# Patient Record
Sex: FEMALE | Race: BLACK OR AFRICAN AMERICAN | Employment: UNEMPLOYED | URBAN - METROPOLITAN AREA
[De-identification: names, ages, dates, MRNs, and addresses within clinical notes are randomized per-mention and may not be internally consistent; named-entity substitution may affect disease eponyms.]

---

## 2017-03-06 ENCOUNTER — APPOINTMENT (OUTPATIENT)
Dept: CT IMAGING | Age: 52
DRG: 149 | End: 2017-03-06
Attending: EMERGENCY MEDICINE
Payer: MEDICARE

## 2017-03-06 ENCOUNTER — APPOINTMENT (OUTPATIENT)
Dept: MRI IMAGING | Age: 52
DRG: 149 | End: 2017-03-06
Attending: INTERNAL MEDICINE
Payer: MEDICARE

## 2017-03-06 ENCOUNTER — HOSPITAL ENCOUNTER (INPATIENT)
Age: 52
LOS: 3 days | Discharge: HOME | DRG: 149 | End: 2017-03-10
Attending: EMERGENCY MEDICINE | Admitting: INTERNAL MEDICINE
Payer: MEDICARE

## 2017-03-06 ENCOUNTER — APPOINTMENT (OUTPATIENT)
Dept: GENERAL RADIOLOGY | Age: 52
DRG: 149 | End: 2017-03-06
Attending: EMERGENCY MEDICINE
Payer: MEDICARE

## 2017-03-06 DIAGNOSIS — R42 VERTIGO: Primary | ICD-10-CM

## 2017-03-06 DIAGNOSIS — R10.819 ABDOMINAL TENDERNESS WITHOUT REBOUND TENDERNESS, UNSPECIFIED LOCATION: ICD-10-CM

## 2017-03-06 DIAGNOSIS — R42 LIGHTHEADEDNESS: ICD-10-CM

## 2017-03-06 DIAGNOSIS — R55 NEAR SYNCOPE: ICD-10-CM

## 2017-03-06 DIAGNOSIS — Z86.73 HISTORY OF CVA (CEREBROVASCULAR ACCIDENT): ICD-10-CM

## 2017-03-06 DIAGNOSIS — E78.2 MIXED HYPERLIPIDEMIA: ICD-10-CM

## 2017-03-06 DIAGNOSIS — I65.23 STENOSIS OF BOTH INTERNAL CAROTID ARTERIES: ICD-10-CM

## 2017-03-06 DIAGNOSIS — I10 ESSENTIAL HYPERTENSION: ICD-10-CM

## 2017-03-06 LAB
ALBUMIN SERPL BCP-MCNC: 3.3 G/DL (ref 3.5–5)
ALBUMIN/GLOB SERPL: 0.7 {RATIO} (ref 1.1–2.2)
ALP SERPL-CCNC: 81 U/L (ref 45–117)
ALT SERPL-CCNC: 34 U/L (ref 12–78)
ANION GAP BLD CALC-SCNC: 12 MMOL/L (ref 5–15)
APPEARANCE UR: ABNORMAL
AST SERPL W P-5'-P-CCNC: 28 U/L (ref 15–37)
BACTERIA URNS QL MICRO: NEGATIVE /HPF
BASOPHILS # BLD AUTO: 0 K/UL (ref 0–0.1)
BASOPHILS # BLD: 0 % (ref 0–1)
BILIRUB SERPL-MCNC: 0.3 MG/DL (ref 0.2–1)
BILIRUB UR QL: NEGATIVE
BUN SERPL-MCNC: 17 MG/DL (ref 6–20)
BUN/CREAT SERPL: 17 (ref 12–20)
CALCIUM SERPL-MCNC: 8.9 MG/DL (ref 8.5–10.1)
CHLORIDE SERPL-SCNC: 105 MMOL/L (ref 97–108)
CK SERPL-CCNC: 64 U/L (ref 26–192)
CO2 SERPL-SCNC: 27 MMOL/L (ref 21–32)
COLOR UR: ABNORMAL
CREAT SERPL-MCNC: 1.01 MG/DL (ref 0.55–1.02)
EOSINOPHIL # BLD: 0.2 K/UL (ref 0–0.4)
EOSINOPHIL NFR BLD: 4 % (ref 0–7)
EPITH CASTS URNS QL MICRO: ABNORMAL /LPF
ERYTHROCYTE [DISTWIDTH] IN BLOOD BY AUTOMATED COUNT: 14 % (ref 11.5–14.5)
GLOBULIN SER CALC-MCNC: 4.7 G/DL (ref 2–4)
GLUCOSE BLD STRIP.AUTO-MCNC: 71 MG/DL (ref 65–100)
GLUCOSE BLD STRIP.AUTO-MCNC: 82 MG/DL (ref 65–100)
GLUCOSE SERPL-MCNC: 90 MG/DL (ref 65–100)
GLUCOSE UR STRIP.AUTO-MCNC: NEGATIVE MG/DL
HCT VFR BLD AUTO: 35.6 % (ref 35–47)
HGB BLD-MCNC: 11 G/DL (ref 11.5–16)
HGB UR QL STRIP: NEGATIVE
HYALINE CASTS URNS QL MICRO: ABNORMAL /LPF (ref 0–5)
KETONES UR QL STRIP.AUTO: NEGATIVE MG/DL
LEUKOCYTE ESTERASE UR QL STRIP.AUTO: ABNORMAL
LIPASE SERPL-CCNC: 98 U/L (ref 73–393)
LYMPHOCYTES # BLD AUTO: 43 % (ref 12–49)
LYMPHOCYTES # BLD: 2.5 K/UL (ref 0.8–3.5)
MAGNESIUM SERPL-MCNC: 1.6 MG/DL (ref 1.6–2.4)
MCH RBC QN AUTO: 26.5 PG (ref 26–34)
MCHC RBC AUTO-ENTMCNC: 30.9 G/DL (ref 30–36.5)
MCV RBC AUTO: 85.8 FL (ref 80–99)
MONOCYTES # BLD: 0.4 K/UL (ref 0–1)
MONOCYTES NFR BLD AUTO: 7 % (ref 5–13)
NEUTS SEG # BLD: 2.7 K/UL (ref 1.8–8)
NEUTS SEG NFR BLD AUTO: 46 % (ref 32–75)
NITRITE UR QL STRIP.AUTO: NEGATIVE
PH UR STRIP: 7 [PH] (ref 5–8)
PLATELET # BLD AUTO: 251 K/UL (ref 150–400)
POTASSIUM SERPL-SCNC: 4 MMOL/L (ref 3.5–5.1)
PROT SERPL-MCNC: 8 G/DL (ref 6.4–8.2)
PROT UR STRIP-MCNC: NEGATIVE MG/DL
RBC # BLD AUTO: 4.15 M/UL (ref 3.8–5.2)
RBC #/AREA URNS HPF: ABNORMAL /HPF (ref 0–5)
SERVICE CMNT-IMP: NORMAL
SERVICE CMNT-IMP: NORMAL
SODIUM SERPL-SCNC: 144 MMOL/L (ref 136–145)
SP GR UR REFRACTOMETRY: 1.02 (ref 1–1.03)
TROPONIN I SERPL-MCNC: <0.04 NG/ML
UA: UC IF INDICATED,UAUC: ABNORMAL
UROBILINOGEN UR QL STRIP.AUTO: 0.2 EU/DL (ref 0.2–1)
WBC # BLD AUTO: 5.8 K/UL (ref 3.6–11)
WBC URNS QL MICRO: ABNORMAL /HPF (ref 0–4)

## 2017-03-06 PROCEDURE — 74000 XR ABD (KUB): CPT

## 2017-03-06 PROCEDURE — 70551 MRI BRAIN STEM W/O DYE: CPT

## 2017-03-06 PROCEDURE — 96361 HYDRATE IV INFUSION ADD-ON: CPT

## 2017-03-06 PROCEDURE — 99218 HC RM OBSERVATION: CPT

## 2017-03-06 PROCEDURE — 36415 COLL VENOUS BLD VENIPUNCTURE: CPT | Performed by: EMERGENCY MEDICINE

## 2017-03-06 PROCEDURE — 74011250637 HC RX REV CODE- 250/637: Performed by: INTERNAL MEDICINE

## 2017-03-06 PROCEDURE — 93005 ELECTROCARDIOGRAM TRACING: CPT

## 2017-03-06 PROCEDURE — 81001 URINALYSIS AUTO W/SCOPE: CPT | Performed by: EMERGENCY MEDICINE

## 2017-03-06 PROCEDURE — 85025 COMPLETE CBC W/AUTO DIFF WBC: CPT | Performed by: EMERGENCY MEDICINE

## 2017-03-06 PROCEDURE — 83690 ASSAY OF LIPASE: CPT | Performed by: EMERGENCY MEDICINE

## 2017-03-06 PROCEDURE — 70450 CT HEAD/BRAIN W/O DYE: CPT

## 2017-03-06 PROCEDURE — 82962 GLUCOSE BLOOD TEST: CPT

## 2017-03-06 PROCEDURE — 74011250637 HC RX REV CODE- 250/637: Performed by: EMERGENCY MEDICINE

## 2017-03-06 PROCEDURE — 74011250636 HC RX REV CODE- 250/636: Performed by: INTERNAL MEDICINE

## 2017-03-06 PROCEDURE — 82550 ASSAY OF CK (CPK): CPT | Performed by: EMERGENCY MEDICINE

## 2017-03-06 PROCEDURE — 96374 THER/PROPH/DIAG INJ IV PUSH: CPT

## 2017-03-06 PROCEDURE — 71010 XR CHEST SNGL V: CPT

## 2017-03-06 PROCEDURE — 74011250636 HC RX REV CODE- 250/636: Performed by: EMERGENCY MEDICINE

## 2017-03-06 PROCEDURE — 99284 EMERGENCY DEPT VISIT MOD MDM: CPT

## 2017-03-06 PROCEDURE — 83735 ASSAY OF MAGNESIUM: CPT | Performed by: EMERGENCY MEDICINE

## 2017-03-06 PROCEDURE — 80053 COMPREHEN METABOLIC PANEL: CPT | Performed by: EMERGENCY MEDICINE

## 2017-03-06 PROCEDURE — 84484 ASSAY OF TROPONIN QUANT: CPT | Performed by: EMERGENCY MEDICINE

## 2017-03-06 RX ORDER — SODIUM CHLORIDE 9 MG/ML
50 INJECTION, SOLUTION INTRAVENOUS CONTINUOUS
Status: DISCONTINUED | OUTPATIENT
Start: 2017-03-06 | End: 2017-03-10 | Stop reason: HOSPADM

## 2017-03-06 RX ORDER — CLONAZEPAM 1 MG/1
1 TABLET ORAL 2 TIMES DAILY
Status: DISCONTINUED | OUTPATIENT
Start: 2017-03-06 | End: 2017-03-10 | Stop reason: HOSPADM

## 2017-03-06 RX ORDER — CLONAZEPAM 1 MG/1
1 TABLET ORAL 2 TIMES DAILY
COMMUNITY
End: 2017-10-13 | Stop reason: ALTCHOICE

## 2017-03-06 RX ORDER — IPRATROPIUM BROMIDE AND ALBUTEROL SULFATE 2.5; .5 MG/3ML; MG/3ML
3 SOLUTION RESPIRATORY (INHALATION)
Status: DISCONTINUED | OUTPATIENT
Start: 2017-03-06 | End: 2017-03-10 | Stop reason: HOSPADM

## 2017-03-06 RX ORDER — HYDROXYZINE 25 MG/1
25 TABLET, FILM COATED ORAL 2 TIMES DAILY
Status: DISCONTINUED | OUTPATIENT
Start: 2017-03-06 | End: 2017-03-10 | Stop reason: HOSPADM

## 2017-03-06 RX ORDER — LANOLIN ALCOHOL/MO/W.PET/CERES
400 CREAM (GRAM) TOPICAL 2 TIMES DAILY
Status: DISCONTINUED | OUTPATIENT
Start: 2017-03-06 | End: 2017-03-10 | Stop reason: HOSPADM

## 2017-03-06 RX ORDER — DIAZEPAM 10 MG/2ML
2 INJECTION INTRAMUSCULAR
Status: COMPLETED | OUTPATIENT
Start: 2017-03-06 | End: 2017-03-06

## 2017-03-06 RX ORDER — GLUCOSAMINE SULFATE 1500 MG
2000 POWDER IN PACKET (EA) ORAL DAILY
COMMUNITY

## 2017-03-06 RX ORDER — ONDANSETRON 2 MG/ML
4 INJECTION INTRAMUSCULAR; INTRAVENOUS
Status: DISCONTINUED | OUTPATIENT
Start: 2017-03-06 | End: 2017-03-10 | Stop reason: HOSPADM

## 2017-03-06 RX ORDER — FLUOXETINE HYDROCHLORIDE 20 MG/1
20 CAPSULE ORAL DAILY
Status: DISCONTINUED | OUTPATIENT
Start: 2017-03-07 | End: 2017-03-10 | Stop reason: HOSPADM

## 2017-03-06 RX ORDER — OXYCODONE AND ACETAMINOPHEN 5; 325 MG/1; MG/1
1 TABLET ORAL
Status: DISCONTINUED | OUTPATIENT
Start: 2017-03-06 | End: 2017-03-10 | Stop reason: HOSPADM

## 2017-03-06 RX ORDER — SODIUM CHLORIDE 0.9 % (FLUSH) 0.9 %
5-10 SYRINGE (ML) INJECTION AS NEEDED
Status: DISCONTINUED | OUTPATIENT
Start: 2017-03-06 | End: 2017-03-10 | Stop reason: HOSPADM

## 2017-03-06 RX ORDER — LOSARTAN POTASSIUM 100 MG/1
100 TABLET ORAL DAILY
COMMUNITY
End: 2018-07-30 | Stop reason: SDUPTHER

## 2017-03-06 RX ORDER — MECLIZINE HCL 12.5 MG 12.5 MG/1
25 TABLET ORAL
Status: COMPLETED | OUTPATIENT
Start: 2017-03-06 | End: 2017-03-06

## 2017-03-06 RX ORDER — MECLIZINE HYDROCHLORIDE 25 MG/1
25 TABLET ORAL 3 TIMES DAILY
Status: DISCONTINUED | OUTPATIENT
Start: 2017-03-06 | End: 2017-03-10 | Stop reason: HOSPADM

## 2017-03-06 RX ORDER — HYDRALAZINE HYDROCHLORIDE 20 MG/ML
10 INJECTION INTRAMUSCULAR; INTRAVENOUS
Status: DISCONTINUED | OUTPATIENT
Start: 2017-03-06 | End: 2017-03-10 | Stop reason: HOSPADM

## 2017-03-06 RX ORDER — LISINOPRIL 20 MG/1
20 TABLET ORAL DAILY
Status: DISCONTINUED | OUTPATIENT
Start: 2017-03-07 | End: 2017-03-10 | Stop reason: HOSPADM

## 2017-03-06 RX ORDER — ACETAMINOPHEN 325 MG/1
650 TABLET ORAL
Status: DISCONTINUED | OUTPATIENT
Start: 2017-03-06 | End: 2017-03-10 | Stop reason: HOSPADM

## 2017-03-06 RX ORDER — VALSARTAN 160 MG/1
160 TABLET ORAL DAILY
Status: DISCONTINUED | OUTPATIENT
Start: 2017-03-07 | End: 2017-03-10 | Stop reason: HOSPADM

## 2017-03-06 RX ORDER — MAGNESIUM SULFATE 100 %
4 CRYSTALS MISCELLANEOUS AS NEEDED
Status: DISCONTINUED | OUTPATIENT
Start: 2017-03-06 | End: 2017-03-10 | Stop reason: HOSPADM

## 2017-03-06 RX ORDER — AMLODIPINE BESYLATE 5 MG/1
5 TABLET ORAL DAILY
Status: DISCONTINUED | OUTPATIENT
Start: 2017-03-07 | End: 2017-03-10 | Stop reason: HOSPADM

## 2017-03-06 RX ORDER — ASPIRIN 325 MG
325 TABLET ORAL DAILY
Status: DISCONTINUED | OUTPATIENT
Start: 2017-03-06 | End: 2017-03-10 | Stop reason: HOSPADM

## 2017-03-06 RX ORDER — ENOXAPARIN SODIUM 100 MG/ML
40 INJECTION SUBCUTANEOUS EVERY 24 HOURS
Status: DISCONTINUED | OUTPATIENT
Start: 2017-03-06 | End: 2017-03-06

## 2017-03-06 RX ORDER — DEXTROSE 50 % IN WATER (D50W) INTRAVENOUS SYRINGE
12.5-25 AS NEEDED
Status: DISCONTINUED | OUTPATIENT
Start: 2017-03-06 | End: 2017-03-10 | Stop reason: HOSPADM

## 2017-03-06 RX ORDER — ASPIRIN 325 MG
325 TABLET, DELAYED RELEASE (ENTERIC COATED) ORAL
Status: DISCONTINUED | OUTPATIENT
Start: 2017-03-06 | End: 2017-03-06

## 2017-03-06 RX ORDER — HYDROCHLOROTHIAZIDE 25 MG/1
12.5 TABLET ORAL DAILY
Status: DISCONTINUED | OUTPATIENT
Start: 2017-03-07 | End: 2017-03-10 | Stop reason: HOSPADM

## 2017-03-06 RX ORDER — METOPROLOL SUCCINATE 50 MG/1
50 TABLET, EXTENDED RELEASE ORAL DAILY
COMMUNITY
End: 2017-10-13 | Stop reason: ALTCHOICE

## 2017-03-06 RX ORDER — AMLODIPINE BESYLATE 5 MG/1
5 TABLET ORAL DAILY
COMMUNITY
End: 2017-10-13 | Stop reason: SDUPTHER

## 2017-03-06 RX ORDER — ASPIRIN 325 MG
325 TABLET, DELAYED RELEASE (ENTERIC COATED) ORAL
COMMUNITY
End: 2018-05-09 | Stop reason: ALTCHOICE

## 2017-03-06 RX ORDER — ATORVASTATIN CALCIUM 20 MG/1
20 TABLET, FILM COATED ORAL
Status: DISCONTINUED | OUTPATIENT
Start: 2017-03-06 | End: 2017-03-10 | Stop reason: HOSPADM

## 2017-03-06 RX ORDER — INSULIN LISPRO 100 [IU]/ML
INJECTION, SOLUTION INTRAVENOUS; SUBCUTANEOUS
Status: DISCONTINUED | OUTPATIENT
Start: 2017-03-06 | End: 2017-03-10 | Stop reason: HOSPADM

## 2017-03-06 RX ORDER — METOPROLOL SUCCINATE 50 MG/1
50 TABLET, EXTENDED RELEASE ORAL DAILY
Status: DISCONTINUED | OUTPATIENT
Start: 2017-03-07 | End: 2017-03-10 | Stop reason: HOSPADM

## 2017-03-06 RX ORDER — FLUOXETINE HYDROCHLORIDE 20 MG/1
20 CAPSULE ORAL DAILY
COMMUNITY
End: 2017-10-13 | Stop reason: ALTCHOICE

## 2017-03-06 RX ORDER — HEPARIN SODIUM 5000 [USP'U]/ML
5000 INJECTION, SOLUTION INTRAVENOUS; SUBCUTANEOUS EVERY 8 HOURS
Status: DISCONTINUED | OUTPATIENT
Start: 2017-03-06 | End: 2017-03-10 | Stop reason: HOSPADM

## 2017-03-06 RX ORDER — SODIUM CHLORIDE 0.9 % (FLUSH) 0.9 %
5-10 SYRINGE (ML) INJECTION EVERY 8 HOURS
Status: DISCONTINUED | OUTPATIENT
Start: 2017-03-06 | End: 2017-03-10 | Stop reason: HOSPADM

## 2017-03-06 RX ORDER — MORPHINE SULFATE 2 MG/ML
2 INJECTION, SOLUTION INTRAMUSCULAR; INTRAVENOUS
Status: DISCONTINUED | OUTPATIENT
Start: 2017-03-06 | End: 2017-03-10 | Stop reason: HOSPADM

## 2017-03-06 RX ADMIN — SODIUM CHLORIDE 50 ML/HR: 900 INJECTION, SOLUTION INTRAVENOUS at 23:46

## 2017-03-06 RX ADMIN — MECLIZINE 25 MG: 25 TABLET ORAL at 21:24

## 2017-03-06 RX ADMIN — SODIUM CHLORIDE 50 ML/HR: 900 INJECTION, SOLUTION INTRAVENOUS at 18:57

## 2017-03-06 RX ADMIN — HEPARIN SODIUM 5000 UNITS: 5000 INJECTION, SOLUTION INTRAVENOUS; SUBCUTANEOUS at 18:52

## 2017-03-06 RX ADMIN — MECLIZINE 25 MG: 12.5 TABLET ORAL at 13:50

## 2017-03-06 RX ADMIN — DIAZEPAM 2 MG: 5 INJECTION, SOLUTION INTRAMUSCULAR; INTRAVENOUS at 16:11

## 2017-03-06 RX ADMIN — Medication 10 ML: at 18:52

## 2017-03-06 RX ADMIN — Medication 400 MG: at 18:52

## 2017-03-06 RX ADMIN — ATORVASTATIN CALCIUM 20 MG: 20 TABLET, FILM COATED ORAL at 21:24

## 2017-03-06 RX ADMIN — ASPIRIN 325 MG ORAL TABLET 325 MG: 325 PILL ORAL at 18:52

## 2017-03-06 RX ADMIN — Medication 10 ML: at 21:27

## 2017-03-06 RX ADMIN — CLONAZEPAM 1 MG: 1 TABLET ORAL at 18:52

## 2017-03-06 RX ADMIN — HYDROXYZINE HYDROCHLORIDE 25 MG: 25 TABLET, FILM COATED ORAL at 18:52

## 2017-03-06 RX ADMIN — SODIUM CHLORIDE 1000 ML: 900 INJECTION, SOLUTION INTRAVENOUS at 13:51

## 2017-03-06 NOTE — IP AVS SNAPSHOT
[address] [facility] 
[phone] Patient: Reynaldo Shin MRN: [id number] QRR:1/65/5488 You are allergic to the following No active allergies Recent Documentation Height Weight Breastfeeding? BMI OB Status Smoking Status 1.626 m 108.9 kg No 41.2 kg/m2 Hysterectomy Former Smoker Emergency Contacts Name Discharge Info Relation Home Work Mobile [address] CAREGIVER [3] Spouse [3] [phone] About your hospitalization You were admitted on:  March 6, 2017 You last received care in the:  [facility] 3 NEUROSCI[facility]LE[facility] You were discharged on:  March 10, 2017 Unit phone number:  [phone] Why you were hospitalized Your primary diagnosis was:  Near Syncope Your diagnoses also included:  Dm (Diabetes Mellitus), Type 2, Uncontrolled (Hcc), Htn (Hypertension), History Of Cva (Cerebrovascular Accident), Vertigo, Hyperlipidemia, Stenosis Of Both Internal Carotid Arteries Providers Seen During Your Hospitalizations Provider Role Specialty Primary office phone Bob Brennan MD Attending Provider Emergency Medicine [phone] Jess Smith MD Attending Provider Internal Medicine [phone] Your Primary Care Physician (PCP) Primary Care Physician Office Phone Office Fax Emili Ramirez [phone] Follow-up Information Follow up With Details Comments Contact Info Jess Smith MD On 3/16/2017 at [address] [facility] 
[phone] Jess Smith MD Call  [facility] 
[phone] Current Discharge Medication List  
  
START taking these medications Dose & Instructions Dispensing Information Comments Morning Noon Evening Bedtime diazePAM 2 mg tablet Commonly known as:  VALIUM Your next dose is: Today, Tomorrow Other:  _________ Dose:  2 mg Take 1 Tab by mouth three (3) times daily. Max Daily Amount: 6 mg. Quantity:  21 Tab Refills:  0  
     
   
   
   
  
 meclizine 25 mg tablet Commonly known as:  ANTIVERT Your next dose is: Today, Tomorrow Other:  _________ Dose:  12.5 mg Take 1 Tab by mouth three (3) times daily for 10 days. Quantity:  21 Tab Refills:  0 CONTINUE these medications which have CHANGED Dose & Instructions Dispensing Information Comments Morning Noon Evening Bedtime  
 insulin glargine 100 unit/mL injection Commonly known as:  LANTUS What changed:  how much to take Your next dose is: Today, Tomorrow Other:  _________ Dose:  40 Units 40 Units by SubCUTAneous route nightly. Indications: at bedtime Quantity:  1 Vial  
Refills:  prn CONTINUE these medications which have NOT CHANGED Dose & Instructions Dispensing Information Comments Morning Noon Evening Bedtime  
 albuterol 90 mcg/actuation inhaler Commonly known as:  PROAIR HFA Your next dose is: Today, Tomorrow Other:  _________ Dose:  2 Puff Take 2 Puffs by inhalation every four (4) hours as needed for Wheezing. Quantity:  1 Inhaler Refills:  1  
     
   
   
   
  
 amLODIPine 5 mg tablet Commonly known as:  Rudene Post Your next dose is: Today, Tomorrow Other:  _________ Dose:  5 mg Take 5 mg by mouth daily. Refills:  0  
     
   
   
   
  
 aspirin delayed-release 325 mg tablet Your next dose is: Today, Tomorrow Other:  _________ Dose:  325 mg Take 325 mg by mouth every six (6) hours as needed for Pain. Refills:  0  
     
   
   
   
  
 clonazePAM 1 mg tablet Commonly known as:  Louis Mcneill  
   
 Your next dose is: Today, Tomorrow Other:  _________ Dose:  1 mg Take 1 mg by mouth two (2) times a day. Refills:  0  
     
   
   
   
  
 CRESTOR 10 mg tablet Generic drug:  rosuvastatin Your next dose is: Today, Tomorrow Other:  _________ Dose:  10 mg Take 10 mg by mouth nightly. Refills:  0 FLUoxetine 20 mg capsule Commonly known as:  PROzac Your next dose is: Today, Tomorrow Other:  _________ Dose:  20 mg Take 20 mg by mouth daily. Refills:  0 IRON (DRIED) PO Your next dose is: Today, Tomorrow Other:  _________ Dose:  65 mg Take 65 mg by mouth daily. Refills:  0 KLOR-CON M20 PO Your next dose is: Today, Tomorrow Other:  _________ Dose:  2 Tab Take 2 Tabs by mouth two (2) times a day. Refills:  0  
     
   
   
   
  
 lisinopril-hydroCHLOROthiazide 20-12.5 mg per tablet Commonly known as:  Loyce Harden Your next dose is: Today, Tomorrow Other:  _________ Take  by mouth two (2) times a day. Refills:  0  
     
   
   
   
  
 losartan 100 mg tablet Commonly known as:  COZAAR Your next dose is: Today, Tomorrow Other:  _________ Dose:  100 mg Take 100 mg by mouth daily. Refills:  0  
     
   
   
   
  
 magnesium oxide 400 mg tablet Commonly known as:  MAG-OX Your next dose is: Today, Tomorrow Other:  _________ Dose:  400 mg Take 400 mg by mouth two (2) times a day. Refills:  0  
     
   
   
   
  
 metFORMIN 500 mg tablet Commonly known as:  GLUCOPHAGE Your next dose is: Today, Tomorrow Other:  _________ Dose:  500 mg Take 500 mg by mouth two (2) times daily (with meals). Refills:  0  
     
   
   
   
  
 metoprolol succinate 50 mg XL tablet Commonly known as:  TOPROL-XL Your next dose is: Today, Tomorrow Other:  _________ Dose:  50 mg Take 50 mg by mouth daily. Refills:  0  
     
   
   
   
  
 VITAMIN C 250 mg tablet Generic drug:  ascorbic acid (vitamin C) Your next dose is: Today, Tomorrow Other:  _________ Take  by mouth. Refills:  0  
     
   
   
   
  
 VITAMIN D3 1,000 unit Cap Generic drug:  cholecalciferol Your next dose is: Today, Tomorrow Other:  _________ Dose:  2000 Units Take 2,000 Units by mouth daily. Refills:  0 Where to Get Your Medications Information on where to get these meds will be given to you by the nurse or doctor. ! Ask your nurse or doctor about these medications  
  diazePAM 2 mg tablet  
 insulin glargine 100 unit/mL injection  
 meclizine 25 mg tablet Discharge Instructions [address] [address] 
[phone] Patient Discharge Instructions Sommer Piña / [id number] : 1965 Admitted 3/6/2017 Discharged: 3/10/2017 Principal Problem: 
  Near syncope (3/7/2017) Active Problems: Hyperlipidemia (2010) DM (diabetes mellitus), type 2, uncontrolled ([facility] [address] (2011) HTN (hypertension) (2011) History of CVA (cerebrovascular accident) (2011) Vertigo (3/6/2017) Stenosis of both internal carotid arteries (3/7/2017) No Known Allergies · It is important that you take the medication exactly as they are prescribed. · Do not take other medications without consulting your doctor. What to do at Next Level of Care Recommended diet: Diabetic low salt Recommended activity: Up with assistance Information obtained by : 
I understand that if any problems occur once I am at home I am to contact my physician. I understand and acknowledge receipt of the instructions indicated above. Physician's or R.N.'s Signature                                                                  Date/Time Patient or Representative Signature                                                          Date/Time Discharge Instructions Attachments/References SMOKING CESSATION: HEALTH BENEFITS: GENERAL INFO (ENGLISH) Discharge Orders None PubNub Announcement We are excited to announce that we are making your provider's discharge notes available to you in PubNub. You will see these notes when they are completed and signed by the physician that discharged you from your recent hospital stay. If you have any questions or concerns about any information you see in PubNub, please call the Health Information Department where you were seen or reach out to your Primary Care Provider for more information about your plan of care. Introducing [facility]! [facility] introduces PubNub patient portal. Now you can access parts of your medical record, email your doctor's office, and request medication refills online. 1. In your internet browser, go to [email or web address] 2. Click on the First Time User? Click Here link in the Sign In box. You will see the New Member Sign Up page. 3. Enter your PubNub Access Code exactly as it appears below. You will not need to use this code after youve completed the sign-up process. If you do not sign up before the expiration date, you must request a new code. · PubNub Access Code: [email or web address] Expires: 6/4/2017  1:26 PM 
 
 4. Enter the last four digits of your Social Security Number (xxxx) and Date of Birth (mm/dd/yyyy) as indicated and click Submit. You will be taken to the next sign-up page. 5. Create a SnapYeti ID. This will be your SnapYeti login ID and cannot be changed, so think of one that is secure and easy to remember. 6. Create a SnapYeti password. You can change your password at any time. 7. Enter your Password Reset Question and Answer. This can be used at a later time if you forget your password. 8. Enter your e-mail address. You will receive e-mail notification when new information is available in [address] 9. Click Sign Up. You can now view and download portions of your medical record. 10. Click the Download Summary menu link to download a portable copy of your medical information. If you have questions, please visit the Frequently Asked Questions section of the SnapYeti website. Remember, SnapYeti is NOT to be used for urgent needs. For medical emergencies, dial 911. Now available from your iPhone and Android! General Information Please provide this summary of care documentation to your next provider. Patient Signature:  ____________________________________________________________ Date:  ____________________________________________________________  
  
Molly Sleight Provider Signature:  ____________________________________________________________ Date:  ____________________________________________________________ More Information Learning About Benefits From Quitting Smoking How does quitting smoking make you healthier? If you're thinking about quitting smoking, you may have a few reasons to be smoke-free. Your health may be one of them. · When you quit smoking, you lower your risks for cancer, lung disease, heart attack, stroke, blood vessel disease, and blindness from macular degeneration. · When you're smoke-free, you get sick less often, and you heal faster. You are less likely to get colds, flu, bronchitis, and pneumonia. · As a nonsmoker, you may find that your mood is better and you are less stressed. When and how will you feel healthier? Quitting has real health benefits that start from day 1 of being smoke-free. And the longer you stay smoke-free, the healthier you get and the better you feel. The first hours · After just 20 minutes, your blood pressure and heart rate go down. That means there's less stress on your heart and blood vessels. · Within 12 hours, the level of carbon monoxide in your blood drops back to normal. That makes room for more oxygen. With more oxygen in your body, you may notice that you have more energy than when you smoked. After 2 weeks · Your lungs start to work better. · Your risk of heart attack starts to drop. After 1 month · When your lungs are clear, you cough less and breathe deeper, so it's easier to be active. · Your sense of taste and smell return. That means you can enjoy food more than you have since you started smoking. Over the years · After 1 year, your risk of heart disease is half what it would be if you kept smoking. · After 5 years, your risk of stroke starts to shrink. Within a few years after that, it's about the same as if you'd never smoked. · After 10 years, your risk of dying from lung cancer is cut by about half. And your risk for many other types of cancer is lower too. How would quitting help others in your life? When you quit smoking, you improve the health of everyone who now breathes in your smoke. · Their heart, lung, and cancer risks drop, much like yours. · They are sick less. For babies and small children, living smoke-free means they're less likely to have ear infections, pneumonia, and bronchitis. · If you're a woman who is or will be pregnant someday, quitting smoking means a healthier . · Children who are close to you are less likely to become adult smokers. Where can you learn more? Go to http://stephen-laurie.info/. Enter [id number] [id number] in the search box to learn more about \"Learning About Benefits From Quitting Smoking. \" Current as of: May 26, 2016 Content Version: 11.1 © 4953-2109 [employer]. Care instructions adapted under license by [employer] (which disclaims liability or warranty for this information). If you have questions about a medical condition or this instruction, always ask your healthcare professional. [place] [address] any warranty or liability for your use of this information.

## 2017-03-06 NOTE — ED PROVIDER NOTES
HPI Comments: Angie Rand is a 47 y.o. female with pertinent PMHx of HTN, stroke, and DM presenting via EMS to the ED c/o dizziness x two days. Pt states that \"I feel like I'm going to pass out and everything is dizzy\". Pt notes associated symptoms of tinnitus and chills (two days ago). Pt denies any history of hypotension. Pt states that she had a normal bowel movement PTA in the ED. Pt notes a family hx of cardiac issues, DM, HTN and CA. Pt specifically denies any headache, chest pain, SOB, abdominal pain, N/V/D, constipation or urinary symptoms. PCP: Milagros Armendariz MD  Social Hx: - tobacco use, - alcohol use, - illicit drug use    There are no other complaints, changes, or physical findings at this time. The history is provided by the patient. No  was used. Past Medical History:   Diagnosis Date    Abnormal mammogram with microcalcification 6/4/2013    Right  UOQ    Aphasia due to stroke ([facility] [address]     SPEAKS SOME    Arthritis     back    Diabetes ([facility] [address]     TYPE 2; IDDM    Hypertension     Psychiatric disorder     depression    Stroke ([facility] [address] 5/11/2010    RIGHT SIDE WEAKNESS       Past Surgical History:   Procedure Laterality Date    HX CATARACT REMOVAL  Dec 2010/ Jan 2011    bilateral cataracts removed    HX GI      HEMMORHOIDECTOMY    HX GYN      pmb    HX HEENT      tonsillectomy    HX OTHER SURGICAL      HX TONSILLECTOMY      DE LAPAROSCOPY W TOT HYSTERECTUTERUS <=250 GRAM  W TUBE/OVARY  6/2011    leiomyomata         Family History:   Problem Relation Age of Onset    Hypertension Mother     MS Mother     Diabetes Father     Stroke Father     Heart Disease Father     Hypertension Father     Post-op Nausea/Vomiting Sister        Social History     Social History    Marital status:      Spouse name: N/A    Number of children: N/A    Years of education: N/A     Occupational History    Not on file.      Social History Main Topics    Smoking status: Former Smoker     Quit date: 5/12/2010    Smokeless tobacco: Never Used    Alcohol use No    Drug use: No    Sexual activity: No     Other Topics Concern    Not on file     Social History Narrative    ** Merged History Encounter **              ALLERGIES: Review of patient's allergies indicates no known allergies. Review of Systems   Constitutional: Positive for chills. HENT: Positive for tinnitus. Eyes: Negative. Respiratory: Negative for shortness of breath. Cardiovascular: Negative for chest pain. Gastrointestinal: Negative for abdominal pain, constipation, diarrhea, nausea and vomiting. Endocrine: Negative for heat intolerance. Genitourinary: Negative. Musculoskeletal: Negative for back pain. Skin: Negative for rash. Allergic/Immunologic: Negative for immunocompromised state. Neurological: Positive for dizziness and light-headedness. Negative for headaches. Hematological: Does not bruise/bleed easily. Psychiatric/Behavioral: Negative. All other systems reviewed and are negative. Vitals:    03/06/17 1249 03/06/17 1600   BP: 135/78 140/78   Pulse: 73 67   Resp: 16 16   Temp: 98.6 °F (37 °C)    SpO2: 96% 98%   Weight: 108.9 kg (240 lb)    Height: 5' 4\" (1.626 m)             Physical Exam   Constitutional: She is oriented to person, place, and time. She appears well-developed and well-nourished. No distress. Elevated BMI   HENT:   Head: Normocephalic and atraumatic. Eyes: Pupils are equal, round, and reactive to light. Right eye exhibits nystagmus. Left eye exhibits nystagmus. Horizontal nystagmus   Neck: Normal range of motion. Neck supple. Cardiovascular: Normal rate, regular rhythm and normal heart sounds. Pulmonary/Chest: Effort normal and breath sounds normal. No respiratory distress. Abdominal: Soft. She exhibits no mass. There is tenderness (mild diffuse TTP). Positive bowel sounds   Musculoskeletal: Normal range of motion.  She exhibits no edema. Neurological: She is alert and oriented to person, place, and time. Coordination normal.   Right arm and right leg are flaccid, which is pt's baseline  Sensation is in tact   Skin: Skin is warm and dry. Psychiatric: She has a normal mood and affect. Nursing note and vitals reviewed. MDM  Number of Diagnoses or Management Options  Abdominal tenderness without rebound tenderness, unspecified location:   Lightheadedness:   Vertigo:   Diagnosis management comments: DDx: anemia, vertigo, dehydration, electrolyte abnormality, arrhythmia, CVA, UTI, pancreatitis, musculoskeletal       Amount and/or Complexity of Data Reviewed  Clinical lab tests: ordered and reviewed  Tests in the radiology section of CPT®: ordered and reviewed  Tests in the medicine section of CPT®: ordered and reviewed  Review and summarize past medical records: yes  Discuss the patient with other providers: yes (Hospitalist)  Independent visualization of images, tracings, or specimens: yes    Patient Progress  Patient progress: stable    ED Course       Procedures   EKG interpretation: (Preliminary) at 1317  Rhythm: normal sinus rhythm; and regular . Rate (approx.): 69 bpm; Axis: normal; P wave: normal; QRS interval: normal ; ST/T wave: normal; Other findings: unchanged from previous ekg. Written by Madge Cranker [address], ED Scribe, as dictated by Isabel Barnes MD.      Progress Note:  3:36 PM  Pt re-evaluated. Pt states that the spinning sensation is gone, but she still feels lightheaded. Written by Madge Cranker [address], ED Scribe, as dictated by Isabel Barnes MD.     CONSULT NOTE:   5:03 PM  Isabel Barnes MD spoke with Dr. Edilson Mathis,   Specialty: Hospitalist  Discussed pt's hx, disposition, and available diagnostic and imaging results. Reviewed care plans. Consultant will admit the pt for observation.   Written by MYRNA Maravillaibmiky, as dictated by Isabel Barnes MD.        LABORATORY TESTS:  Recent Results (from the past 12 hour(s)) EKG, 12 LEAD, INITIAL    Collection Time: 03/06/17  1:17 PM   Result Value Ref Range    Ventricular Rate 69 BPM    Atrial Rate 69 BPM    P-R Interval 160 ms    QRS Duration 72 ms    Q-T Interval 410 ms    QTC Calculation (Bezet) 439 ms    Calculated P Axis 47 degrees    Calculated R Axis 0 degrees    Calculated T Axis -15 degrees    Diagnosis       Normal sinus rhythm  Normal ECG  When compared with ECG of 02-MAY-2016 22:27,  No significant change was found     CBC WITH AUTOMATED DIFF    Collection Time: 03/06/17  1:38 PM   Result Value Ref Range    WBC 5.8 3.6 - 11.0 K/uL    RBC 4.15 3.80 - 5.20 M/uL    HGB 11.0 (L) 11.5 - 16.0 g/dL    HCT 35.6 35.0 - 47.0 %    MCV 85.8 80.0 - 99.0 FL    MCH 26.5 26.0 - 34.0 PG    MCHC 30.9 30.0 - 36.5 g/dL    RDW 14.0 11.5 - 14.5 %    PLATELET 543 215 - 014 K/uL    NEUTROPHILS 46 32 - 75 %    LYMPHOCYTES 43 12 - 49 %    MONOCYTES 7 5 - 13 %    EOSINOPHILS 4 0 - 7 %    BASOPHILS 0 0 - 1 %    ABS. NEUTROPHILS 2.7 1.8 - 8.0 K/UL    ABS. LYMPHOCYTES 2.5 0.8 - 3.5 K/UL    ABS. MONOCYTES 0.4 0.0 - 1.0 K/UL    ABS. EOSINOPHILS 0.2 0.0 - 0.4 K/UL    ABS. BASOPHILS 0.0 0.0 - 0.1 K/UL   METABOLIC PANEL, COMPREHENSIVE    Collection Time: 03/06/17  1:38 PM   Result Value Ref Range    Sodium 144 136 - 145 mmol/L    Potassium 4.0 3.5 - 5.1 mmol/L    Chloride 105 97 - 108 mmol/L    CO2 27 21 - 32 mmol/L    Anion gap 12 5 - 15 mmol/L    Glucose 90 65 - 100 mg/dL    BUN 17 6 - 20 MG/DL    Creatinine 1.01 0.55 - 1.02 MG/DL    BUN/Creatinine ratio 17 12 - 20      GFR est AA >60 >60 ml/min/1.73m2    GFR est non-AA 57 (L) >60 ml/min/1.73m2    Calcium 8.9 8.5 - 10.1 MG/DL    Bilirubin, total 0.3 0.2 - 1.0 MG/DL    ALT (SGPT) 34 12 - 78 U/L    AST (SGOT) 28 15 - 37 U/L    Alk.  phosphatase 81 45 - 117 U/L    Protein, total 8.0 6.4 - 8.2 g/dL    Albumin 3.3 (L) 3.5 - 5.0 g/dL    Globulin 4.7 (H) 2.0 - 4.0 g/dL    A-G Ratio 0.7 (L) 1.1 - 2.2     CK W/ REFLX CKMB    Collection Time: 03/06/17  1:38 PM Result Value Ref Range    CK 64 26 - 192 U/L   TROPONIN I    Collection Time: 03/06/17  1:38 PM   Result Value Ref Range    Troponin-I, Qt. <0.04 <0.05 ng/mL   MAGNESIUM    Collection Time: 03/06/17  1:38 PM   Result Value Ref Range    Magnesium 1.6 1.6 - 2.4 mg/dL   LIPASE    Collection Time: 03/06/17  1:38 PM   Result Value Ref Range    Lipase 98 73 - 393 U/L   URINALYSIS W/ REFLEX CULTURE    Collection Time: 03/06/17  3:43 PM   Result Value Ref Range    Color YELLOW/STRAW      Appearance CLOUDY (A) CLEAR      Specific gravity 1.018 1.003 - 1.030      pH (UA) 7.0 5.0 - 8.0      Protein NEGATIVE  NEG mg/dL    Glucose NEGATIVE  NEG mg/dL    Ketone NEGATIVE  NEG mg/dL    Bilirubin NEGATIVE  NEG      Blood NEGATIVE  NEG      Urobilinogen 0.2 0.2 - 1.0 EU/dL    Nitrites NEGATIVE  NEG      Leukocyte Esterase SMALL (A) NEG      WBC 0-4 0 - 4 /hpf    RBC 0-5 0 - 5 /hpf    Epithelial cells FEW FEW /lpf    Bacteria NEGATIVE  NEG /hpf    UA:UC IF INDICATED CULTURE NOT INDICATED BY UA RESULT CNI      Hyaline cast 0-2 0 - 5 /lpf       IMAGING RESULTS:  CT Results  (Last 48 hours)               03/06/17 1416  CT HEAD WO CONT Final result    Impression:  IMPRESSION: Moderate-sized area of chronic encephalomalacia in the white matter   adjacent to the left lateral ventricle. No evidence of acute process. Narrative:  EXAM:  CT HEAD WO CONT       INDICATION:   dizzy       COMPARISON: 8/6/2011. TECHNIQUE: Unenhanced CT of the head was performed using 5 mm images. Brain and   bone windows were generated. CT dose reduction was achieved through use of a   standardized protocol tailored for this examination and automatic exposure   control for dose modulation. FINDINGS:   A moderate-sized area of chronic encephalomalacia is again seen in the white   matter adjacent to the left lateral ventricle. There is mild ex vacuo dilatation   of the left ventricle.  There is no intracranial hemorrhage, extra-axial collection, mass, mass effect or midline shift. The basilar cisterns are open. No acute infarct is identified. The bone windows demonstrate no abnormalities. The visualized portions of the paranasal sinuses and mastoid air cells are   clear. CXR Results  (Last 48 hours)               03/06/17 1428  XR CHEST SNGL V Final result    Impression:  IMPRESSION: No acute abnormality. Narrative:  EXAM:  XR CHEST SNGL V       INDICATION:  Dizziness, syncopal episode today, history of hypertension,   diabetes       COMPARISON: 5/2/2016. FINDINGS: A single view of the chest demonstrates clear lungs. The cardiac and   mediastinal contours and pulmonary vascularity are normal.  The bones and soft   tissues are within normal limits.                   EXAM: XR ABD (KUB).     INDICATION: Abdominal pain.     COMPARISON: 6/29/2015.     FINDINGS: A supine radiograph of the abdomen shows surgical clips in the right  upper quadrant. The gas pattern is normal. There are small clips in the pelvis  just superior to the bladder which are unchanged. No soft tissue masses or  pathologic calcifications are identified. The bones and soft tissues are within  normal limits.     IMPRESSION  IMPRESSION: No acute abdominal abnormality         MEDICATIONS GIVEN:  Medications   meclizine (ANTIVERT) tablet 25 mg (25 mg Oral Given 3/6/17 1350)   sodium chloride 0.9 % bolus infusion 1,000 mL (1,000 mL IntraVENous New Bag 3/6/17 1351)   diazePAM (VALIUM) injection 2 mg (2 mg IntraVENous Given 3/6/17 1611)       IMPRESSION:  1. Vertigo    2. Lightheadedness    3. Abdominal tenderness without rebound tenderness, unspecified location        PLAN:  1. Admit to hospitalist     Admit Note:  5:04 PM  Patient is being admitted to the hospital by Dr. Anatoliy Tucker. The results of their tests and reasons for their admission have been discussed with the patient and/or available family.  They convey agreement and understanding for the need to be admitted and for their admission diagnosis. Written by Man Grover, ED Scribe, as dictated by Yolanda Lin MD.     Attestation: This note is prepared by Lynda Grover, acting as Scribe for Yolanda Lin MD.    Yolanda Lin MD: The scribe's documentation has been prepared under my direction and personally reviewed by me in its entirety. I confirm that the note above accurately reflects all work, treatment, procedures, and medical decision making performed by me.

## 2017-03-06 NOTE — H&P
Hospitalist Admission Note    NAME: Guadalupe Ta   :  1965   MRN:  [id number]     Date/Time:  3/6/2017 5:19 PM    Patient PCP: Ramírez Lennon MD  ________________________________________________________________________    My assessment of this patient's clinical condition and my plan of care is as follows. Assessment / Plan:  Dizziness: r/o Vertigo r/o CVA, CT head shows encephalomalacia, check MRI brain, check stroke work up, start ASA, give symptomatic treatment, get Neurology evaluation. HTN: c/w Norvasc, Metoprolol, ARB, ACE, use hydralazine prn. DM: hold lantus and metformin today, place on SSI, check HbA1C. H/O CVA: with left parietal encephalomalacia, monitor. Code Status: Full Code  Surrogate Decision Maker:  Danna Lira [phone]  DVT Prophylaxis: Heparin  GI Prophylaxis: not indicated  Baseline: Has some residual deficits from prior CVA, lives at home with         Subjective:   CHIEF COMPLAINT: \"I feel dizzy and lightheaded\"    HISTORY OF PRESENT ILLNESS:     Guadalupe Ta is a 47 y.o.  female  with pertinent PMHx of HTN, stroke, and DM presenting via EMS to the ED c/o dizziness x two days. Pt states that \"I feel like I'm going to pass out and everything is dizzy\". Pt notes associated symptoms of tinnitus and chills (two days ago). Pt denies any history of hypotension. Pt states that she had a normal bowel movement PTA in the ED. Pt notes a family hx of cardiac issues, DM, HTN and CA. Pt specifically denies any headache, chest pain, SOB, abdominal pain, N/V/D, constipation or urinary symptoms. At this time patient lying in bed c/o dizziness, lightheadedness, nausea, since 2 days ago, denies chest pain, no Vomiting, no diarrhea, no fever, no cough, no urinary symptoms, no other associated symptoms. We were asked to admit for work up and evaluation of the above problems.      Past Medical History:   Diagnosis Date    Abnormal mammogram with microcalcification 6/4/2013    Right  UOQ    Aphasia due to stroke ([facility] [address]     SPEAKS SOME    Arthritis     back    Diabetes ([facility] [address]     TYPE 2; IDDM    Hypertension     Psychiatric disorder     depression    Stroke ([facility] [address] 5/11/2010    RIGHT SIDE WEAKNESS        Past Surgical History:   Procedure Laterality Date    HX CATARACT REMOVAL  Dec 2010/ Jan 2011    bilateral cataracts removed    HX GI      HEMMORHOIDECTOMY    HX GYN      pmb    HX HEENT      tonsillectomy    HX OTHER SURGICAL      HX TONSILLECTOMY      [place] LAPAROSCOPY W TOT HYSTERECTUTERUS <=250 GRAM  W TUBE/OVARY  6/2011    leiomyomata       Social History   Substance Use Topics    Smoking status: Former Smoker     Quit date: 5/12/2010    Smokeless tobacco: Never Used    Alcohol use No        Family History   Problem Relation Age of Onset    Hypertension Mother     MS Mother     Diabetes Father     Stroke Father     Heart Disease Father     Hypertension Father     Post-op Nausea/Vomiting Sister      No Known Allergies     Prior to Admission medications    Medication Sig Start Date End Date Taking? Authorizing Provider   aspirin delayed-release 325 mg tablet Take 325 mg by mouth every six (6) hours as needed for Pain. Yes Saran Mahoney MD   losartan (COZAAR) 100 mg tablet Take 100 mg by mouth daily. Yes Saran Mahoney MD   FLUoxetine (PROZAC) 20 mg capsule Take 20 mg by mouth daily. Yes Saran Mahoney MD   clonazePAM (KLONOPIN) 1 mg tablet Take 1 mg by mouth two (2) times a day. Yes Saran Mahoney MD   metoprolol succinate (TOPROL-XL) 50 mg XL tablet Take 50 mg by mouth daily. Yes Saran Mahoney MD   amLODIPine (NORVASC) 5 mg tablet Take 5 mg by mouth daily. Yes Saran Mahoney MD   cholecalciferol (VITAMIN D3) 1,000 unit cap Take 2,000 Units by mouth daily. Yes Saran Mahoney MD   rosuvastatin (CRESTOR) 10 mg tablet Take 10 mg by mouth nightly.    Yes Saran Mahoney MD   lisinopril-hydrochlorothiazide (PRINZIDE, ZESTORETIC) 20-12.5 mg per tablet Take  by mouth two (2) times a day. Yes Historical Provider   POTASSIUM CHLORIDE (KLOR-CON M20 PO) Take 2 Tabs by mouth two (2) times a day. Yes Historical Provider   magnesium oxide (MAG-OX) 400 mg tablet Take 400 mg by mouth two (2) times a day. Yes Historical Provider   ascorbic acid (VITAMIN C) 250 mg tablet Take  by mouth. Yes Historical Provider   FERROUS SULFATE, DRIED (IRON, DRIED, PO) Take 65 mg by mouth daily. Yes Historical Provider   metformin (GLUCOPHAGE) 500 mg tablet Take 500 mg by mouth two (2) times daily (with meals). 6/26/10  Yes Phys Other, MD   insulin glargine (LANTUS) 100 unit/mL injection 60 Units by SubCUTAneous route nightly. Indications: at bedtime 8/13/10  Yes Phys Other, MD   albuterol (PROAIR HFA) 90 mcg/actuation inhaler Take 2 Puffs by inhalation every four (4) hours as needed for Wheezing. 7/31/15   Reuben Baltazar MD       REVIEW OF SYSTEMS:     I am not able to complete the review of systems because:    The patient is intubated and sedated    The patient has altered mental status due to his acute medical problems    The patient has baseline aphasia from prior stroke(s)    The patient has baseline dementia and is not reliable historian    The patient is in acute medical distress and unable to provide information           Total of 12 systems reviewed as follows:       POSITIVE= underlined text  Negative = text not underlined  General:  fever, chills, sweats, generalized weakness, weight loss/gain,      loss of appetite   Eyes:    blurred vision, eye pain, loss of vision, double vision  ENT:    rhinorrhea, pharyngitis   Respiratory:   cough, sputum production, SOB, CAMPBELL, wheezing, pleuritic pain   Cardiology:   chest pain, palpitations, orthopnea, PND, edema, syncope   Gastrointestinal:  abdominal pain , Nausea, diarrhea, dysphagia, constipation, bleeding   Genitourinary:  frequency, urgency, dysuria, hematuria, incontinence   Muskuloskeletal : arthralgia, myalgia, back pain  Hematology:  easy bruising, nose or gum bleeding, lymphadenopathy   Dermatological: rash, ulceration, pruritis, color change / jaundice  Endocrine:   hot flashes or polydipsia   Neurological:  headache, dizziness, confusion, focal weakness, paresthesia,     Speech difficulties, memory loss, gait difficulty  Psychological: Feelings of anxiety, depression, agitation    Objective:   VITALS:    Visit Vitals    /78    Pulse 67    Temp 98.6 °F (37 °C)    Resp 16    Ht 5' 4\" (1.626 m)    Wt 108.9 kg (240 lb)    SpO2 98%    BMI 41.2 kg/m2       PHYSICAL EXAM:    General:    Alert, cooperative, no distress, appears stated age. HEENT: Atraumatic, anicteric sclerae, pink conjunctivae     No oral ulcers, mucosa moist, throat clear, dentition poor  Neck:  Supple, symmetrical,  thyroid: non tender  Lungs:   Coarse BS. No Wheezing or Rhonchi. No rales. Chest wall:  No tenderness  No Accessory muscle use. Heart:   Regular  rhythm,  No  murmur   No edema  Abdomen:   Soft, non-tender. Not distended. Bowel sounds normal  Extremities: No cyanosis. No clubbing      Capillary refill normal,  Radial pulse 2+  Skin:     Not pale. Not Jaundiced  No rashes   Psych:  Fair  insight. Not depressed. Not anxious or agitated.   Neurologic: Awake, oriented x3, slurred speech, GCS [id number]    _______________________________________________________________________  Care Plan discussed with:    Comments   Patient y    Family  y    RN y    Care Manager                    Consultant:  anna marie JUDD   _______________________________________________________________________  Expected  Disposition:   Home with Family    HH/PT/OT/RN y   SNF/LTC    CHRISTIANO    ________________________________________________________________________  TOTAL TIME: 61 Minutes    Critical Care Provided     Minutes non procedure based      Comments    y Reviewed previous records   >50% of visit spent in counseling and coordination of care y Discussion with patient and/or family and questions answered       ________________________________________________________________________  Signed: Librado Figueroa MD    Procedures: see electronic medical records for all procedures/Xrays and details which were not copied into this note but were reviewed prior to creation of Plan. LAB DATA REVIEWED:    Recent Results (from the past 24 hour(s))   EKG, 12 LEAD, INITIAL    Collection Time: 03/06/17  1:17 PM   Result Value Ref Range    Ventricular Rate 69 BPM    Atrial Rate 69 BPM    P-R Interval 160 ms    QRS Duration 72 ms    Q-T Interval 410 ms    QTC Calculation (Bezet) 439 ms    Calculated P Axis 47 degrees    Calculated R Axis 0 degrees    Calculated T Axis -15 degrees    Diagnosis       Normal sinus rhythm  Normal ECG  When compared with ECG of 02-MAY-2016 22:27,  No significant change was found     CBC WITH AUTOMATED DIFF    Collection Time: 03/06/17  1:38 PM   Result Value Ref Range    WBC 5.8 3.6 - 11.0 K/uL    RBC 4.15 3.80 - 5.20 M/uL    HGB 11.0 (L) 11.5 - 16.0 g/dL    HCT 35.6 35.0 - 47.0 %    MCV 85.8 80.0 - 99.0 FL    MCH 26.5 26.0 - 34.0 PG    MCHC 30.9 30.0 - 36.5 g/dL    RDW 14.0 11.5 - 14.5 %    PLATELET 479 854 - 819 K/uL    NEUTROPHILS 46 32 - 75 %    LYMPHOCYTES 43 12 - 49 %    MONOCYTES 7 5 - 13 %    EOSINOPHILS 4 0 - 7 %    BASOPHILS 0 0 - 1 %    ABS. NEUTROPHILS 2.7 1.8 - 8.0 K/UL    ABS. LYMPHOCYTES 2.5 0.8 - 3.5 K/UL    ABS. MONOCYTES 0.4 0.0 - 1.0 K/UL    ABS. EOSINOPHILS 0.2 0.0 - 0.4 K/UL    ABS.  BASOPHILS 0.0 0.0 - 0.1 K/UL   METABOLIC PANEL, COMPREHENSIVE    Collection Time: 03/06/17  1:38 PM   Result Value Ref Range    Sodium 144 136 - 145 mmol/L    Potassium 4.0 3.5 - 5.1 mmol/L    Chloride 105 97 - 108 mmol/L    CO2 27 21 - 32 mmol/L    Anion gap 12 5 - 15 mmol/L    Glucose 90 65 - 100 mg/dL    BUN 17 6 - 20 MG/DL    Creatinine 1.01 0.55 - 1.02 MG/DL    BUN/Creatinine ratio 17 12 - 20      GFR est AA >60 >60 ml/min/1.73m2 GFR est non-AA 57 (L) >60 ml/min/1.73m2    Calcium 8.9 8.5 - 10.1 MG/DL    Bilirubin, total 0.3 0.2 - 1.0 MG/DL    ALT (SGPT) 34 12 - 78 U/L    AST (SGOT) 28 15 - 37 U/L    Alk.  phosphatase 81 45 - 117 U/L    Protein, total 8.0 6.4 - 8.2 g/dL    Albumin 3.3 (L) 3.5 - 5.0 g/dL    Globulin 4.7 (H) 2.0 - 4.0 g/dL    A-G Ratio 0.7 (L) 1.1 - 2.2     CK W/ REFLX CKMB    Collection Time: 03/06/17  1:38 PM   Result Value Ref Range    CK 64 26 - 192 U/L   TROPONIN I    Collection Time: 03/06/17  1:38 PM   Result Value Ref Range    Troponin-I, Qt. <0.04 <0.05 ng/mL   MAGNESIUM    Collection Time: 03/06/17  1:38 PM   Result Value Ref Range    Magnesium 1.6 1.6 - 2.4 mg/dL   LIPASE    Collection Time: 03/06/17  1:38 PM   Result Value Ref Range    Lipase 98 73 - 393 U/L   URINALYSIS W/ REFLEX CULTURE    Collection Time: 03/06/17  3:43 PM   Result Value Ref Range    Color YELLOW/STRAW      Appearance CLOUDY (A) CLEAR      Specific gravity 1.018 1.003 - 1.030      pH (UA) 7.0 5.0 - 8.0      Protein NEGATIVE  NEG mg/dL    Glucose NEGATIVE  NEG mg/dL    Ketone NEGATIVE  NEG mg/dL    Bilirubin NEGATIVE  NEG      Blood NEGATIVE  NEG      Urobilinogen 0.2 0.2 - 1.0 EU/dL    Nitrites NEGATIVE  NEG      Leukocyte Esterase SMALL (A) NEG      WBC 0-4 0 - 4 /hpf    RBC 0-5 0 - 5 /hpf    Epithelial cells FEW FEW /lpf    Bacteria NEGATIVE  NEG /hpf    UA:UC IF INDICATED CULTURE NOT INDICATED BY UA RESULT CNI      Hyaline cast 0-2 0 - 5 /lpf

## 2017-03-06 NOTE — ROUTINE PROCESS
TRANSFER - OUT REPORT:    Verbal report given to CANDIDO clarke(name) on Elizebeth Skiff  being transferred to [facility](unit) for routine progression of care       Report consisted of patients Situation, Background, Assessment and   Recommendations(SBAR). Information from the following report(s) SBAR, Kardex, ED Summary and MAR was reviewed with the receiving nurse. Lines:   Peripheral IV 03/06/17 Left Antecubital (Active)   Site Assessment Clean, dry, & intact 3/6/2017  1:38 PM   Phlebitis Assessment 0 3/6/2017  1:38 PM   Infiltration Assessment 0 3/6/2017  1:38 PM   Dressing Status Clean, dry, & intact 3/6/2017  1:38 PM   Dressing Type Transparent 3/6/2017  1:38 PM   Hub Color/Line Status Pink 3/6/2017  1:38 PM        Opportunity for questions and clarification was provided.       Patient transported with:   [employer]

## 2017-03-07 PROBLEM — R55 NEAR SYNCOPE: Status: ACTIVE | Noted: 2017-03-07

## 2017-03-07 PROBLEM — I65.23 STENOSIS OF BOTH INTERNAL CAROTID ARTERIES: Status: ACTIVE | Noted: 2017-03-07

## 2017-03-07 LAB
ALBUMIN SERPL BCP-MCNC: 3.3 G/DL (ref 3.5–5)
ALBUMIN/GLOB SERPL: 0.7 {RATIO} (ref 1.1–2.2)
ALP SERPL-CCNC: 76 U/L (ref 45–117)
ALT SERPL-CCNC: 33 U/L (ref 12–78)
ANION GAP BLD CALC-SCNC: 11 MMOL/L (ref 5–15)
AST SERPL W P-5'-P-CCNC: 30 U/L (ref 15–37)
ATRIAL RATE: 69 BPM
BASOPHILS # BLD AUTO: 0 K/UL (ref 0–0.1)
BASOPHILS # BLD: 0 % (ref 0–1)
BILIRUB SERPL-MCNC: 0.5 MG/DL (ref 0.2–1)
BUN SERPL-MCNC: 12 MG/DL (ref 6–20)
BUN/CREAT SERPL: 13 (ref 12–20)
CALCIUM SERPL-MCNC: 8.5 MG/DL (ref 8.5–10.1)
CALCULATED P AXIS, ECG09: 47 DEGREES
CALCULATED R AXIS, ECG10: 0 DEGREES
CALCULATED T AXIS, ECG11: -15 DEGREES
CHLORIDE SERPL-SCNC: 106 MMOL/L (ref 97–108)
CHOLEST SERPL-MCNC: 183 MG/DL
CO2 SERPL-SCNC: 24 MMOL/L (ref 21–32)
CREAT SERPL-MCNC: 0.89 MG/DL (ref 0.55–1.02)
CRP SERPL HS-MCNC: 4.1 MG/L
DIAGNOSIS, 93000: NORMAL
EOSINOPHIL # BLD: 0.2 K/UL (ref 0–0.4)
EOSINOPHIL NFR BLD: 4 % (ref 0–7)
ERYTHROCYTE [DISTWIDTH] IN BLOOD BY AUTOMATED COUNT: 13.9 % (ref 11.5–14.5)
ERYTHROCYTE [SEDIMENTATION RATE] IN BLOOD: 65 MM/HR (ref 0–30)
EST. AVERAGE GLUCOSE BLD GHB EST-MCNC: 140 MG/DL
GLOBULIN SER CALC-MCNC: 4.5 G/DL (ref 2–4)
GLUCOSE BLD STRIP.AUTO-MCNC: 129 MG/DL (ref 65–100)
GLUCOSE BLD STRIP.AUTO-MCNC: 187 MG/DL (ref 65–100)
GLUCOSE BLD STRIP.AUTO-MCNC: 203 MG/DL (ref 65–100)
GLUCOSE BLD STRIP.AUTO-MCNC: 249 MG/DL (ref 65–100)
GLUCOSE SERPL-MCNC: 111 MG/DL (ref 65–100)
HBA1C MFR BLD: 6.5 % (ref 4.2–6.3)
HCT VFR BLD AUTO: 34.2 % (ref 35–47)
HDLC SERPL-MCNC: 39 MG/DL
HDLC SERPL: 4.7 {RATIO} (ref 0–5)
HGB BLD-MCNC: 10.9 G/DL (ref 11.5–16)
LDLC SERPL CALC-MCNC: ABNORMAL MG/DL (ref 0–100)
LDLC SERPL DIRECT ASSAY-MCNC: 90 MG/DL (ref 0–100)
LIPID PROFILE,FLP: ABNORMAL
LYMPHOCYTES # BLD AUTO: 53 % (ref 12–49)
LYMPHOCYTES # BLD: 3.2 K/UL (ref 0.8–3.5)
MAGNESIUM SERPL-MCNC: 1.7 MG/DL (ref 1.6–2.4)
MCH RBC QN AUTO: 27.5 PG (ref 26–34)
MCHC RBC AUTO-ENTMCNC: 31.9 G/DL (ref 30–36.5)
MCV RBC AUTO: 86.4 FL (ref 80–99)
MONOCYTES # BLD: 0.4 K/UL (ref 0–1)
MONOCYTES NFR BLD AUTO: 7 % (ref 5–13)
NEUTS SEG # BLD: 2.2 K/UL (ref 1.8–8)
NEUTS SEG NFR BLD AUTO: 36 % (ref 32–75)
P-R INTERVAL, ECG05: 160 MS
PLATELET # BLD AUTO: 271 K/UL (ref 150–400)
POTASSIUM SERPL-SCNC: 3.7 MMOL/L (ref 3.5–5.1)
PROT SERPL-MCNC: 7.8 G/DL (ref 6.4–8.2)
Q-T INTERVAL, ECG07: 410 MS
QRS DURATION, ECG06: 72 MS
QTC CALCULATION (BEZET), ECG08: 439 MS
RBC # BLD AUTO: 3.96 M/UL (ref 3.8–5.2)
SERVICE CMNT-IMP: ABNORMAL
SODIUM SERPL-SCNC: 141 MMOL/L (ref 136–145)
T3FREE SERPL-MCNC: 2.1 PG/ML (ref 2.2–4)
T4 FREE SERPL-MCNC: 1 NG/DL (ref 0.8–1.5)
TRIGL SERPL-MCNC: 474 MG/DL (ref ?–150)
TSH SERPL DL<=0.05 MIU/L-ACNC: 1.16 UIU/ML (ref 0.36–3.74)
VENTRICULAR RATE, ECG03: 69 BPM
VLDLC SERPL CALC-MCNC: ABNORMAL MG/DL
WBC # BLD AUTO: 6 K/UL (ref 3.6–11)

## 2017-03-07 PROCEDURE — A9270 NON-COVERED ITEM OR SERVICE: HCPCS | Performed by: INTERNAL MEDICINE

## 2017-03-07 PROCEDURE — G8998 SWALLOW D/C STATUS: HCPCS | Performed by: SPEECH-LANGUAGE PATHOLOGIST

## 2017-03-07 PROCEDURE — 97116 GAIT TRAINING THERAPY: CPT | Performed by: PHYSICAL THERAPIST

## 2017-03-07 PROCEDURE — 97530 THERAPEUTIC ACTIVITIES: CPT | Performed by: OCCUPATIONAL THERAPIST

## 2017-03-07 PROCEDURE — 97165 OT EVAL LOW COMPLEX 30 MIN: CPT | Performed by: OCCUPATIONAL THERAPIST

## 2017-03-07 PROCEDURE — G8987 SELF CARE CURRENT STATUS: HCPCS | Performed by: OCCUPATIONAL THERAPIST

## 2017-03-07 PROCEDURE — G8997 SWALLOW GOAL STATUS: HCPCS | Performed by: SPEECH-LANGUAGE PATHOLOGIST

## 2017-03-07 PROCEDURE — 97161 PT EVAL LOW COMPLEX 20 MIN: CPT | Performed by: PHYSICAL THERAPIST

## 2017-03-07 PROCEDURE — 83036 HEMOGLOBIN GLYCOSYLATED A1C: CPT | Performed by: INTERNAL MEDICINE

## 2017-03-07 PROCEDURE — 99218 HC RM OBSERVATION: CPT

## 2017-03-07 PROCEDURE — 92610 EVALUATE SWALLOWING FUNCTION: CPT | Performed by: SPEECH-LANGUAGE PATHOLOGIST

## 2017-03-07 PROCEDURE — 93306 TTE W/DOPPLER COMPLETE: CPT

## 2017-03-07 PROCEDURE — 65270000029 HC RM PRIVATE

## 2017-03-07 PROCEDURE — 85652 RBC SED RATE AUTOMATED: CPT | Performed by: INTERNAL MEDICINE

## 2017-03-07 PROCEDURE — 85025 COMPLETE CBC W/AUTO DIFF WBC: CPT | Performed by: INTERNAL MEDICINE

## 2017-03-07 PROCEDURE — 74011250636 HC RX REV CODE- 250/636: Performed by: INTERNAL MEDICINE

## 2017-03-07 PROCEDURE — 84439 ASSAY OF FREE THYROXINE: CPT | Performed by: INTERNAL MEDICINE

## 2017-03-07 PROCEDURE — 36415 COLL VENOUS BLD VENIPUNCTURE: CPT | Performed by: INTERNAL MEDICINE

## 2017-03-07 PROCEDURE — 82962 GLUCOSE BLOOD TEST: CPT

## 2017-03-07 PROCEDURE — G8996 SWALLOW CURRENT STATUS: HCPCS | Performed by: SPEECH-LANGUAGE PATHOLOGIST

## 2017-03-07 PROCEDURE — 84443 ASSAY THYROID STIM HORMONE: CPT | Performed by: INTERNAL MEDICINE

## 2017-03-07 PROCEDURE — 93880 EXTRACRANIAL BILAT STUDY: CPT

## 2017-03-07 PROCEDURE — 84481 FREE ASSAY (FT-3): CPT | Performed by: INTERNAL MEDICINE

## 2017-03-07 PROCEDURE — G8988 SELF CARE GOAL STATUS: HCPCS | Performed by: OCCUPATIONAL THERAPIST

## 2017-03-07 PROCEDURE — 83735 ASSAY OF MAGNESIUM: CPT | Performed by: INTERNAL MEDICINE

## 2017-03-07 PROCEDURE — 80053 COMPREHEN METABOLIC PANEL: CPT | Performed by: INTERNAL MEDICINE

## 2017-03-07 PROCEDURE — G8989 SELF CARE D/C STATUS: HCPCS | Performed by: OCCUPATIONAL THERAPIST

## 2017-03-07 PROCEDURE — 80061 LIPID PANEL: CPT | Performed by: INTERNAL MEDICINE

## 2017-03-07 PROCEDURE — 86141 C-REACTIVE PROTEIN HS: CPT | Performed by: INTERNAL MEDICINE

## 2017-03-07 PROCEDURE — 83721 ASSAY OF BLOOD LIPOPROTEIN: CPT | Performed by: INTERNAL MEDICINE

## 2017-03-07 PROCEDURE — 74011250637 HC RX REV CODE- 250/637: Performed by: INTERNAL MEDICINE

## 2017-03-07 PROCEDURE — 74011636637 HC RX REV CODE- 636/637: Performed by: INTERNAL MEDICINE

## 2017-03-07 RX ADMIN — FLUOXETINE 20 MG: 20 CAPSULE ORAL at 08:15

## 2017-03-07 RX ADMIN — LISINOPRIL 20 MG: 20 TABLET ORAL at 08:15

## 2017-03-07 RX ADMIN — HEPARIN SODIUM 5000 UNITS: 5000 INJECTION, SOLUTION INTRAVENOUS; SUBCUTANEOUS at 11:56

## 2017-03-07 RX ADMIN — HYDROXYZINE HYDROCHLORIDE 25 MG: 25 TABLET, FILM COATED ORAL at 17:09

## 2017-03-07 RX ADMIN — METOPROLOL SUCCINATE 50 MG: 50 TABLET, EXTENDED RELEASE ORAL at 08:15

## 2017-03-07 RX ADMIN — HYDROCHLOROTHIAZIDE 12.5 MG: 25 TABLET ORAL at 08:15

## 2017-03-07 RX ADMIN — MECLIZINE 25 MG: 25 TABLET ORAL at 21:47

## 2017-03-07 RX ADMIN — HEPARIN SODIUM 5000 UNITS: 5000 INJECTION, SOLUTION INTRAVENOUS; SUBCUTANEOUS at 17:08

## 2017-03-07 RX ADMIN — INSULIN LISPRO 3 UNITS: 100 INJECTION, SOLUTION INTRAVENOUS; SUBCUTANEOUS at 11:56

## 2017-03-07 RX ADMIN — CLONAZEPAM 1 MG: 1 TABLET ORAL at 17:09

## 2017-03-07 RX ADMIN — INSULIN LISPRO 2 UNITS: 100 INJECTION, SOLUTION INTRAVENOUS; SUBCUTANEOUS at 21:54

## 2017-03-07 RX ADMIN — Medication 400 MG: at 08:15

## 2017-03-07 RX ADMIN — Medication 10 ML: at 21:48

## 2017-03-07 RX ADMIN — MECLIZINE 25 MG: 25 TABLET ORAL at 08:15

## 2017-03-07 RX ADMIN — HYDROXYZINE HYDROCHLORIDE 25 MG: 25 TABLET, FILM COATED ORAL at 08:15

## 2017-03-07 RX ADMIN — CLONAZEPAM 1 MG: 1 TABLET ORAL at 08:15

## 2017-03-07 RX ADMIN — ASPIRIN 325 MG ORAL TABLET 325 MG: 325 PILL ORAL at 08:15

## 2017-03-07 RX ADMIN — Medication 400 MG: at 17:08

## 2017-03-07 RX ADMIN — VALSARTAN 160 MG: 160 TABLET ORAL at 08:15

## 2017-03-07 RX ADMIN — INSULIN LISPRO 2 UNITS: 100 INJECTION, SOLUTION INTRAVENOUS; SUBCUTANEOUS at 17:08

## 2017-03-07 RX ADMIN — AMLODIPINE BESYLATE 5 MG: 5 TABLET ORAL at 08:15

## 2017-03-07 RX ADMIN — HEPARIN SODIUM 5000 UNITS: 5000 INJECTION, SOLUTION INTRAVENOUS; SUBCUTANEOUS at 02:22

## 2017-03-07 RX ADMIN — Medication 10 ML: at 13:15

## 2017-03-07 RX ADMIN — ATORVASTATIN CALCIUM 20 MG: 20 TABLET, FILM COATED ORAL at 21:47

## 2017-03-07 RX ADMIN — MECLIZINE 25 MG: 25 TABLET ORAL at 17:08

## 2017-03-07 NOTE — PROGRESS NOTES
PROGRESS NOTE    NAME:  Kalpesh Carmichael   :   1965   MRN:   [id number]     Date/Time:  3/7/2017 7:11 AM  Subjective:   History:  Chart reviewed and patient seen and examined this AM and D/W her nurse and all events noted. She was admitted yesterday after a 2 day history of dizziness with near syncope. She remains dizzy this AM and intermittently feels as if she is going to pass out. She denies CP, Palpitations, SOB or other cardio/respiratory c/o. There are no GI/ c/o. There are no new neurologic c/o as she has the chronic R hemiplegia from her prior CVA.       Medications reviewed:  Current Facility-Administered Medications   Medication Dose Route Frequency    amLODIPine (NORVASC) tablet 5 mg  5 mg Oral DAILY    clonazePAM (KlonoPIN) tablet 1 mg  1 mg Oral BID    FLUoxetine (PROzac) capsule 20 mg  20 mg Oral DAILY    valsartan (DIOVAN) tablet 160 mg  160 mg Oral DAILY    magnesium oxide (MAG-OX) tablet 400 mg  400 mg Oral BID    metoprolol succinate (TOPROL-XL) XL tablet 50 mg  50 mg Oral DAILY    atorvastatin (LIPITOR) tablet 20 mg  20 mg Oral QHS    albuterol-ipratropium (DUO-NEB) 2.5 MG-0.5 MG/3 ML  3 mL Nebulization Q6H PRN    meclizine (ANTIVERT) tablet 25 mg  25 mg Oral TID    hydrOXYzine HCl (ATARAX) tablet 25 mg  25 mg Oral BID    acetaminophen (TYLENOL) tablet 650 mg  650 mg Oral Q4H PRN    0.9% sodium chloride infusion  50 mL/hr IntraVENous CONTINUOUS    hydrALAZINE (APRESOLINE) 20 mg/mL injection 10 mg  10 mg IntraVENous Q6H PRN    oxyCODONE-acetaminophen (PERCOCET) 5-325 mg per tablet 1 Tab  1 Tab Oral Q4H PRN    morphine injection 2 mg  2 mg IntraVENous Q4H PRN    ondansetron (ZOFRAN) injection 4 mg  4 mg IntraVENous Q6H PRN    insulin lispro (HUMALOG) injection   SubCUTAneous AC&HS    glucose chewable tablet 16 g  4 Tab Oral PRN    dextrose (D50W) injection syrg 12.5-25 g  12.5-25 g IntraVENous PRN    glucagon (GLUCAGEN) injection 1 mg  1 mg IntraMUSCular PRN    sodium chloride (NS) flush 5-10 mL  5-10 mL IntraVENous Q8H    sodium chloride (NS) flush 5-10 mL  5-10 mL IntraVENous PRN    aspirin (ASPIRIN) tablet 325 mg  325 mg Oral DAILY    heparin (porcine) injection 5,000 Units  5,000 Units SubCUTAneous Q8H    lisinopril (PRINIVIL, ZESTRIL) tablet 20 mg  20 mg Oral DAILY    hydroCHLOROthiazide (HYDRODIURIL) tablet 12.5 mg  12.5 mg Oral DAILY        Objective:   Vitals:  Visit Vitals    /64 (BP 1 Location: Left arm, BP Patient Position: At rest;Head of bed elevated (Comment degrees))    Pulse 60    Temp 97.6 °F (36.4 °C)    Resp 18    Ht 5' 4\" (1.626 m)    Wt 108.9 kg (240 lb)  Comment: spouse informed me    LMP 2011    SpO2 96%    Breastfeeding No    BMI 41.2 kg/m2      O2 Device: Room air Temp (24hrs), Av.1 °F (36.7 °C), Min:97.6 °F (36.4 °C), Max:98.6 °F (37 °C)      Last 24hr Input/Output:  No intake or output data in the 24 hours ending 17 9183     PHYSICAL EXAM:  General:     Alert, cooperative, no distress, appears stated age. Head:    Normocephalic, without obvious abnormality, atraumatic. Eyes:    Conjunctivae/corneas clear. PERRLA  Nose:   Nares normal. No drainage or sinus tenderness. Throat:     Lips, mucosa, and tongue normal.  No Thrush  Neck:   Supple, symmetrical,  no adenopathy, thyroid: non tender     no carotid bruit and no JVD. Back:     Symmetric,  No CVA tenderness. Lungs:    Clear to auscultation bilaterally. No Wheezing or Rhonchi. No rales. Heart:    Regular rate and rhythm,  no murmur, rub or gallop. Abdomen:    Soft, non-tender. Not distended. Bowel sounds normal. No masses  Extremities:  Extremities normal, atraumatic, No cyanosis. No edema. No clubbing  Lymph nodes:  Cervical, supraclavicular normal.  Neurologic:  R hemiplegia chronic, Alert and oriented X 3.  Dysarthria w/o change  Skin:                 No rash      Lab Data Reviewed:    Recent Results (from the past 24 hour(s))   EKG, 12 LEAD, INITIAL    Collection Time: 03/06/17  1:17 PM   Result Value Ref Range    Ventricular Rate 69 BPM    Atrial Rate 69 BPM    P-R Interval 160 ms    QRS Duration 72 ms    Q-T Interval 410 ms    QTC Calculation (Bezet) 439 ms    Calculated P Axis 47 degrees    Calculated R Axis 0 degrees    Calculated T Axis -15 degrees    Diagnosis       Normal sinus rhythm  Normal ECG  When compared with ECG of 02-MAY-2016 22:27,  No significant change was found     CBC WITH AUTOMATED DIFF    Collection Time: 03/06/17  1:38 PM   Result Value Ref Range    WBC 5.8 3.6 - 11.0 K/uL    RBC 4.15 3.80 - 5.20 M/uL    HGB 11.0 (L) 11.5 - 16.0 g/dL    HCT 35.6 35.0 - 47.0 %    MCV 85.8 80.0 - 99.0 FL    MCH 26.5 26.0 - 34.0 PG    MCHC 30.9 30.0 - 36.5 g/dL    RDW 14.0 11.5 - 14.5 %    PLATELET 191 887 - 024 K/uL    NEUTROPHILS 46 32 - 75 %    LYMPHOCYTES 43 12 - 49 %    MONOCYTES 7 5 - 13 %    EOSINOPHILS 4 0 - 7 %    BASOPHILS 0 0 - 1 %    ABS. NEUTROPHILS 2.7 1.8 - 8.0 K/UL    ABS. LYMPHOCYTES 2.5 0.8 - 3.5 K/UL    ABS. MONOCYTES 0.4 0.0 - 1.0 K/UL    ABS. EOSINOPHILS 0.2 0.0 - 0.4 K/UL    ABS. BASOPHILS 0.0 0.0 - 0.1 K/UL   METABOLIC PANEL, COMPREHENSIVE    Collection Time: 03/06/17  1:38 PM   Result Value Ref Range    Sodium 144 136 - 145 mmol/L    Potassium 4.0 3.5 - 5.1 mmol/L    Chloride 105 97 - 108 mmol/L    CO2 27 21 - 32 mmol/L    Anion gap 12 5 - 15 mmol/L    Glucose 90 65 - 100 mg/dL    BUN 17 6 - 20 MG/DL    Creatinine 1.01 0.55 - 1.02 MG/DL    BUN/Creatinine ratio 17 12 - 20      GFR est AA >60 >60 ml/min/1.73m2    GFR est non-AA 57 (L) >60 ml/min/1.73m2    Calcium 8.9 8.5 - 10.1 MG/DL    Bilirubin, total 0.3 0.2 - 1.0 MG/DL    ALT (SGPT) 34 12 - 78 U/L    AST (SGOT) 28 15 - 37 U/L    Alk.  phosphatase 81 45 - 117 U/L    Protein, total 8.0 6.4 - 8.2 g/dL    Albumin 3.3 (L) 3.5 - 5.0 g/dL    Globulin 4.7 (H) 2.0 - 4.0 g/dL    A-G Ratio 0.7 (L) 1.1 - 2.2     CK W/ REFLX CKMB    Collection Time: 03/06/17  1:38 PM   Result Value Ref Range    CK 64 26 - 192 U/L   TROPONIN I    Collection Time: 03/06/17  1:38 PM   Result Value Ref Range    Troponin-I, Qt. <0.04 <0.05 ng/mL   MAGNESIUM    Collection Time: 03/06/17  1:38 PM   Result Value Ref Range    Magnesium 1.6 1.6 - 2.4 mg/dL   LIPASE    Collection Time: 03/06/17  1:38 PM   Result Value Ref Range    Lipase 98 73 - 393 U/L   URINALYSIS W/ REFLEX CULTURE    Collection Time: 03/06/17  3:43 PM   Result Value Ref Range    Color YELLOW/STRAW      Appearance CLOUDY (A) CLEAR      Specific gravity 1.018 1.003 - 1.030      pH (UA) 7.0 5.0 - 8.0      Protein NEGATIVE  NEG mg/dL    Glucose NEGATIVE  NEG mg/dL    Ketone NEGATIVE  NEG mg/dL    Bilirubin NEGATIVE  NEG      Blood NEGATIVE  NEG      Urobilinogen 0.2 0.2 - 1.0 EU/dL    Nitrites NEGATIVE  NEG      Leukocyte Esterase SMALL (A) NEG      WBC 0-4 0 - 4 /hpf    RBC 0-5 0 - 5 /hpf    Epithelial cells FEW FEW /lpf    Bacteria NEGATIVE  NEG /hpf    UA:UC IF INDICATED CULTURE NOT INDICATED BY UA RESULT CNI      Hyaline cast 0-2 0 - 5 /lpf   GLUCOSE, POC    Collection Time: 03/06/17  9:30 PM   Result Value Ref Range    Glucose (POC) 71 65 - 100 mg/dL    Performed by Luana Munroe \"Susanna\"*    GLUCOSE, POC    Collection Time: 03/06/17  9:47 PM   Result Value Ref Range    Glucose (POC) 82 65 - 100 mg/dL    Performed by Luana Munroe \"Susanna\"*    SED RATE (ESR)    Collection Time: 03/07/17  5:25 AM   Result Value Ref Range    Sed rate, automated 65 (H) 0 - 30 mm/hr   CBC WITH AUTOMATED DIFF    Collection Time: 03/07/17  5:25 AM   Result Value Ref Range    WBC 6.0 3.6 - 11.0 K/uL    RBC 3.96 3.80 - 5.20 M/uL    HGB 10.9 (L) 11.5 - 16.0 g/dL    HCT 34.2 (L) 35.0 - 47.0 %    MCV 86.4 80.0 - 99.0 FL    MCH 27.5 26.0 - 34.0 PG    MCHC 31.9 30.0 - 36.5 g/dL    RDW 13.9 11.5 - 14.5 %    PLATELET 943 810 - 623 K/uL    NEUTROPHILS 36 32 - 75 %    LYMPHOCYTES 53 (H) 12 - 49 %    MONOCYTES 7 5 - 13 %    EOSINOPHILS 4 0 - 7 %    BASOPHILS 0 0 - 1 %    ABS. NEUTROPHILS 2.2 1.8 - 8.0 K/UL    ABS. LYMPHOCYTES 3.2 0.8 - 3.5 K/UL    ABS. MONOCYTES 0.4 0.0 - 1.0 K/UL    ABS. EOSINOPHILS 0.2 0.0 - 0.4 K/UL    ABS. BASOPHILS 0.0 0.0 - 0.1 K/UL   MAGNESIUM    Collection Time: 03/07/17  5:25 AM   Result Value Ref Range    Magnesium 1.7 1.6 - 2.4 mg/dL   METABOLIC PANEL, COMPREHENSIVE    Collection Time: 03/07/17  5:25 AM   Result Value Ref Range    Sodium 141 136 - 145 mmol/L    Potassium 3.7 3.5 - 5.1 mmol/L    Chloride 106 97 - 108 mmol/L    CO2 24 21 - 32 mmol/L    Anion gap 11 5 - 15 mmol/L    Glucose 111 (H) 65 - 100 mg/dL    BUN 12 6 - 20 MG/DL    Creatinine 0.89 0.55 - 1.02 MG/DL    BUN/Creatinine ratio 13 12 - 20      GFR est AA >60 >60 ml/min/1.73m2    GFR est non-AA >60 >60 ml/min/1.73m2    Calcium 8.5 8.5 - 10.1 MG/DL    Bilirubin, total 0.5 0.2 - 1.0 MG/DL    ALT (SGPT) 33 12 - 78 U/L    AST (SGOT) 30 15 - 37 U/L    Alk. phosphatase 76 45 - 117 U/L    Protein, total 7.8 6.4 - 8.2 g/dL    Albumin 3.3 (L) 3.5 - 5.0 g/dL    Globulin 4.5 (H) 2.0 - 4.0 g/dL    A-G Ratio 0.7 (L) 1.1 - 2.2     TSH 3RD GENERATION    Collection Time: 03/07/17  5:25 AM   Result Value Ref Range    TSH 1.16 0.36 - 3.74 uIU/mL   GLUCOSE, POC    Collection Time: 03/07/17  6:45 AM   Result Value Ref Range    Glucose (POC) 129 (H) 65 - 100 mg/dL    Performed by Dariel Wisdom          Assessment/Plan:     Principal Problem:    Near syncope (3/7/2017)    Active Problems:    Hyperlipidemia (5/25/2010)      DM (diabetes mellitus), type 2, uncontrolled ([facility] [address] (7/6/2011)      HTN (hypertension) (7/6/2011)      History of CVA (cerebrovascular accident) (7/6/2011)      Vertigo (3/6/2017)       ___________________________________________________  PLAN:    1. Continue Telemetry, so far NSR  2. MRI Head and CT w/o acute process there is encephalomalacia   3. Continue  QD  4. Continue Antivert although I don't believe this is simple labyrinthitis  5.   Holding Diabetic meds and follow BS and treat with SSI  6. Continue Meds for HTN and follow BP closely  7. Neurology Consult  8.   Will get Carotid Dopplers      35 minutes spent in direct care of this complex patient today      ___________________________________________________    Attending Physician: Loan Wynne MD

## 2017-03-07 NOTE — PROCEDURES
[facility]  *** FINAL REPORT ***    Name: Tod Franco  MRN: [id number]    Inpatient  : 1965  HIS Order #: [id number]  [id number] [facility] Visit #: [id number]  Date: 07 Mar 2017    TYPE OF TEST: Cerebrovascular Duplex    REASON FOR TEST  Cerebral ischemia    Right Carotid:-             Proximal               Mid                 Distal  cm/s  Systolic  Diastolic  Systolic  Diastolic  Systolic  Diastolic  CCA:     67.0                                      59.0  Bulb:  ECA:     67.0  ICA:     46.0                 53.0                 66.0  ICA/CCA:  0.8    ICA Stenosis: Normal    Right Vertebral:-  Finding: Antegrade  Sys:       53.0  Dinorah:    Right Subclavian: Normal    Left Carotid:-            Proximal                Mid                 Distal  cm/s  Systolic  Diastolic  Systolic  Diastolic  Systolic  Diastolic  CCA:     46.0                                      48.0  Bulb:  ECA:     60.0  ICA:     34.0                 50.0                 54.0  ICA/CCA:  0.7    ICA Stenosis: Normal    Left Vertebral:-  Finding: Antegrade  Sys:       50.0  Dinorah:    Left Subclavian: Normal    INTERPRETATION/FINDINGS  PROCEDURE:  Carotid Duplex Examination using B-mode, color and  spectral Doppler of the extracranial cerebrovascular arteries. 1. No evidence of significant arterial occlusive disease in the  internal carotid arteries. 2. No significant stenosis in the external carotid arteries  bilaterally. 3. Antegrade flow in both vertebral arteries. 4. Normal flow in both subclavian arteries. ADDITIONAL COMMENTS    I have personally reviewed the data relevant to the interpretation of  this  study. TECHNOLOGIST: Hira Diaz RVT  Signed: 2017 02:48 PM    PHYSICIAN: Eryn Ferrari.  Bob Austin MD  Signed: 2017 05:00 PM

## 2017-03-07 NOTE — PROGRESS NOTES
..BSV Stroke Education Deepika Senters and Stroke Education provided to patient and spouse/SO and the following topics were discussed    1. Patients personal risk factors for stroke are hypertension, diabetes mellitus, obesity and Other previous stroke    2. Warning signs of Stroke:        * Sudden numbness or weakness of the face, arm or leg, especially on one side of          The body            * Sudden confusion, trouble speaking or understanding        * Sudden trouble seeing in one or both eyes        * Sudden trouble walking, dizziness, loss of balance or coordination        * Sudden severe headache with no known cause      3. Importance of activation Emergency Medical Services ( 9-1-1 ) immediately if                       experience any warning signs of stroke. 4. Be sure and schedule a follow-up appointment with your primary care doctor or any                  specialists as instructed. 5. You must take medicine every day to treat your risk factors for stroke. Be sure to take your medicines exactly as your doctor tells you: no more, no less. Know what your medicines are for , what they do. Anti-thrombotics /anticoagulants can help prevent strokes. You are taking the following medicine(s)    , Asprin,     6. Smoking and second-hand smoke greatly increase your risk of stroke, cardiovascular disease and death.  Smoking history never

## 2017-03-07 NOTE — CONSULTS
NEUROLOGY CONSULTATION NOTE       DATE OF CONSULTATION: 3/7/2017    CONSULTED BY: Kevin Russell MD    Reason for Consult  I have been asked to see the patient in neurological consultation to render advice and opinion regarding stroke    HISTORY OF PRESENT ILLNESS  Martha Opitz is a 47 y.o. female who presents to the hospital because of vertigo. The patient states that the dizziness started on Saturday and it can happen multiple times a day and each episode lasts for close to 2 minutes. She describes it as a room spinning sensation. It is exacerbated by movement and she does also have decreased hearing in the right ear along with tinnitus. She did have MRI of the brain in the hospital and it was negative for a stroke. No new focal neurological deficits. She does have a history of stroke in the past.  Risk factors to include hypertension, hyperlipidemia and diabetes.     ROS  A ten system review of constitutional, cardiovascular, respiratory, musculoskeletal, endocrine, skin, SHEENT, genitourinary, psychiatric and neurologic systems was obtained and is unremarkable except as stated in HPI     PMH  Past Medical History:   Diagnosis Date    Abnormal mammogram with microcalcification 6/4/2013    Right  UOQ    Aphasia due to stroke ([address]     SPEAKS SOME    Arthritis     back    Diabetes ([address]     TYPE 2; IDDM    Hypertension     Psychiatric disorder     depression    Stroke ([facility] 5/11/2010    RIGHT SIDE WEAKNESS       FH  Family History   Problem Relation Age of Onset    Hypertension Mother     MS Mother     Diabetes Father     Stroke Father     Heart Disease Father     Hypertension Father     Post-op Nausea/Vomiting Sister        SH  Social History     Social History    Marital status:      Spouse name: N/A    Number of children: N/A    Years of education: N/A     Social History Main Topics    Smoking status: Former Smoker     Quit date: 5/12/2010    Smokeless tobacco: Never Used    Alcohol use No    Drug use: No    Sexual activity: No     Other Topics Concern    None     Social History Narrative    ** Merged History Encounter **            ALLERGIES  No Known Allergies    PHYSICAL EXAM  EXAMINATION:   Patient Vitals for the past 24 hrs:   Temp Pulse Resp BP SpO2   03/07/17 0739 97.5 °F (36.4 °C) 60 16 152/81 97 %   03/07/17 0420 97.6 °F (36.4 °C) 60 18 114/64 96 %   03/07/17 0019 - - - - 95 %   03/06/17 2340 98 °F (36.7 °C) 67 14 141/65 95 %   03/06/17 1817 98.3 °F (36.8 °C) 69 16 160/79 99 %   03/06/17 1600 - 67 16 140/78 98 %   03/06/17 1249 98.6 °F (37 °C) 73 16 135/78 96 %        General:   General appearance: Pt is in no acute distress   Distal pulses are preserved  Fundoscopic exam: attempted    Neurological Examination:   Mental Status:  AAO x3. Speech is slow. Follows commands, has normal fund of knowledge, attention, short term recall, comprehension and insight. Cranial Nerves: Visual fields are full. PERRL, Extraocular movements are full. Facial sensation intact V1- V3. Facial movement intact, symmetric. Hearing intact to conversation. Palate elevates symmetrically. Shoulder shrug symmetric. Tongue midline. Motor: Strength is 5/5 on the left side and 0 out of 5 on the right. Normal tone. No atrophy. Sensation: Normal to light touch    Reflexes: DTRs 2+ on the left and 3+ on the right. Coordination/Cerebellar: Intact to finger-nose-finger on the left. Cannot perform on the right    Gait: Deferred    Skin: No significant bruising or lacerations.     LAB DATA REVIEWED:    Results for orders placed or performed during the hospital encounter of 03/06/17   URINALYSIS W/ REFLEX CULTURE   Result Value Ref Range    Color YELLOW/STRAW      Appearance CLOUDY (A) CLEAR      Specific gravity 1.018 1.003 - 1.030      pH (UA) 7.0 5.0 - 8.0      Protein NEGATIVE  NEG mg/dL    Glucose NEGATIVE  NEG mg/dL    Ketone NEGATIVE  NEG mg/dL    Bilirubin NEGATIVE  NEG      Blood NEGATIVE  NEG      Urobilinogen 0.2 0.2 - 1.0 EU/dL    Nitrites NEGATIVE  NEG      Leukocyte Esterase SMALL (A) NEG      WBC 0-4 0 - 4 /hpf    RBC 0-5 0 - 5 /hpf    Epithelial cells FEW FEW /lpf    Bacteria NEGATIVE  NEG /hpf    UA:UC IF INDICATED CULTURE NOT INDICATED BY UA RESULT CNI      Hyaline cast 0-2 0 - 5 /lpf   CBC WITH AUTOMATED DIFF   Result Value Ref Range    WBC 5.8 3.6 - 11.0 K/uL    RBC 4.15 3.80 - 5.20 M/uL    HGB 11.0 (L) 11.5 - 16.0 g/dL    HCT 35.6 35.0 - 47.0 %    MCV 85.8 80.0 - 99.0 FL    MCH 26.5 26.0 - 34.0 PG    MCHC 30.9 30.0 - 36.5 g/dL    RDW 14.0 11.5 - 14.5 %    PLATELET 371 194 - 791 K/uL    NEUTROPHILS 46 32 - 75 %    LYMPHOCYTES 43 12 - 49 %    MONOCYTES 7 5 - 13 %    EOSINOPHILS 4 0 - 7 %    BASOPHILS 0 0 - 1 %    ABS. NEUTROPHILS 2.7 1.8 - 8.0 K/UL    ABS. LYMPHOCYTES 2.5 0.8 - 3.5 K/UL    ABS. MONOCYTES 0.4 0.0 - 1.0 K/UL    ABS. EOSINOPHILS 0.2 0.0 - 0.4 K/UL    ABS. BASOPHILS 0.0 0.0 - 0.1 K/UL   METABOLIC PANEL, COMPREHENSIVE   Result Value Ref Range    Sodium 144 136 - 145 mmol/L    Potassium 4.0 3.5 - 5.1 mmol/L    Chloride 105 97 - 108 mmol/L    CO2 27 21 - 32 mmol/L    Anion gap 12 5 - 15 mmol/L    Glucose 90 65 - 100 mg/dL    BUN 17 6 - 20 MG/DL    Creatinine 1.01 0.55 - 1.02 MG/DL    BUN/Creatinine ratio 17 12 - 20      GFR est AA >60 >60 ml/min/1.73m2    GFR est non-AA 57 (L) >60 ml/min/1.73m2    Calcium 8.9 8.5 - 10.1 MG/DL    Bilirubin, total 0.3 0.2 - 1.0 MG/DL    ALT (SGPT) 34 12 - 78 U/L    AST (SGOT) 28 15 - 37 U/L    Alk.  phosphatase 81 45 - 117 U/L    Protein, total 8.0 6.4 - 8.2 g/dL    Albumin 3.3 (L) 3.5 - 5.0 g/dL    Globulin 4.7 (H) 2.0 - 4.0 g/dL    A-G Ratio 0.7 (L) 1.1 - 2.2     CK W/ REFLX CKMB   Result Value Ref Range    CK 64 26 - 192 U/L   TROPONIN I   Result Value Ref Range    Troponin-I, Qt. <0.04 <0.05 ng/mL   MAGNESIUM   Result Value Ref Range    Magnesium 1.6 1.6 - 2.4 mg/dL   LIPASE   Result Value Ref Range    Lipase 98 73 - 393 U/L   LIPID PANEL   Result Value Ref Range    LIPID PROFILE          Cholesterol, total 183 <200 MG/DL    Triglyceride 474 (H) <150 MG/DL    HDL Cholesterol 39 MG/DL    LDL, calculated Not calculated due to elevated triglyceride level 0 - 100 MG/DL    VLDL, calculated  MG/DL     Calculation not valid with this patient's other Lipid values. CHOL/HDL Ratio 4.7 0 - 5.0     HEMOGLOBIN A1C WITH EAG   Result Value Ref Range    Hemoglobin A1c 6.5 (H) 4.2 - 6.3 %    Est. average glucose 140 mg/dL   SED RATE (ESR)   Result Value Ref Range    Sed rate, automated 65 (H) 0 - 30 mm/hr   CRP, HIGH SENSITIVITY   Result Value Ref Range    CRP, High sensitivity 4.1 mg/L   T3, FREE   Result Value Ref Range    Free Triiodothyronine 2.1 (L) 2.2 - 4.0 pg/mL   T4, FREE   Result Value Ref Range    T4, Free 1.0 0.8 - 1.5 NG/DL   CBC WITH AUTOMATED DIFF   Result Value Ref Range    WBC 6.0 3.6 - 11.0 K/uL    RBC 3.96 3.80 - 5.20 M/uL    HGB 10.9 (L) 11.5 - 16.0 g/dL    HCT 34.2 (L) 35.0 - 47.0 %    MCV 86.4 80.0 - 99.0 FL    MCH 27.5 26.0 - 34.0 PG    MCHC 31.9 30.0 - 36.5 g/dL    RDW 13.9 11.5 - 14.5 %    PLATELET 319 022 - 952 K/uL    NEUTROPHILS 36 32 - 75 %    LYMPHOCYTES 53 (H) 12 - 49 %    MONOCYTES 7 5 - 13 %    EOSINOPHILS 4 0 - 7 %    BASOPHILS 0 0 - 1 %    ABS. NEUTROPHILS 2.2 1.8 - 8.0 K/UL    ABS. LYMPHOCYTES 3.2 0.8 - 3.5 K/UL    ABS. MONOCYTES 0.4 0.0 - 1.0 K/UL    ABS. EOSINOPHILS 0.2 0.0 - 0.4 K/UL    ABS.  BASOPHILS 0.0 0.0 - 0.1 K/UL   MAGNESIUM   Result Value Ref Range    Magnesium 1.7 1.6 - 2.4 mg/dL   METABOLIC PANEL, COMPREHENSIVE   Result Value Ref Range    Sodium 141 136 - 145 mmol/L    Potassium 3.7 3.5 - 5.1 mmol/L    Chloride 106 97 - 108 mmol/L    CO2 24 21 - 32 mmol/L    Anion gap 11 5 - 15 mmol/L    Glucose 111 (H) 65 - 100 mg/dL    BUN 12 6 - 20 MG/DL    Creatinine 0.89 0.55 - 1.02 MG/DL    BUN/Creatinine ratio 13 12 - 20      GFR est AA >60 >60 ml/min/1.73m2    GFR est non-AA >60 >60 ml/min/1.73m2    Calcium 8.5 8.5 - 10.1 MG/DL    Bilirubin, total 0.5 0.2 - 1.0 MG/DL    ALT (SGPT) 33 12 - 78 U/L    AST (SGOT) 30 15 - 37 U/L    Alk. phosphatase 76 45 - 117 U/L    Protein, total 7.8 6.4 - 8.2 g/dL    Albumin 3.3 (L) 3.5 - 5.0 g/dL    Globulin 4.5 (H) 2.0 - 4.0 g/dL    A-G Ratio 0.7 (L) 1.1 - 2.2     TSH 3RD GENERATION   Result Value Ref Range    TSH 1.16 0.36 - 3.74 uIU/mL   LDL, DIRECT   Result Value Ref Range    LDL,Direct 90 0 - 100 mg/dl   GLUCOSE, POC   Result Value Ref Range    Glucose (POC) 71 65 - 100 mg/dL    Performed by Gavin Salomon \"Susanna\"*    GLUCOSE, POC   Result Value Ref Range    Glucose (POC) 82 65 - 100 mg/dL    Performed by Gavin Curtiskshire \"Susanna\"*    GLUCOSE, POC   Result Value Ref Range    Glucose (POC) 129 (H) 65 - 100 mg/dL    Performed by Bailey Mak    EKG, 12 LEAD, INITIAL   Result Value Ref Range    Ventricular Rate 69 BPM    Atrial Rate 69 BPM    P-R Interval 160 ms    QRS Duration 72 ms    Q-T Interval 410 ms    QTC Calculation (Bezet) 439 ms    Calculated P Axis 47 degrees    Calculated R Axis 0 degrees    Calculated T Axis -15 degrees    Diagnosis       Normal sinus rhythm  Normal ECG  When compared with ECG of 02-MAY-2016 22:27,  No significant change was found          Imaging review:  See below. Stroke workup  EKG  Normal sinus rhythm   Normal ECG       MRI Brain:   Chronic encephalomalacia in the white matter adjacent to the left  lateral ventricle. No evidence of acute process. Carotids:   Pending    TTE:   Pending    Stroke labs:  HgBA1c    Lab Results   Component Value Date/Time    Hemoglobin A1c 6.5 03/07/2017 05:25 AM     LDL   Lab Results   Component Value Date/Time    LDL,Direct 90 03/07/2017 05:25 AM    LDL, calculated Not calculated due to elevated triglyceride level 03/07/2017 05:25 AM       HOME MEDS  Prior to Admission Medications   Prescriptions Last Dose Informant Patient Reported? Taking?    FERROUS SULFATE, DRIED (IRON, DRIED, PO) 3/6/2017 at Unknown time  Yes Yes   Sig: Take 65 mg by mouth daily. FLUoxetine (PROZAC) 20 mg capsule 3/6/2017 at Unknown time  Yes Yes   Sig: Take 20 mg by mouth daily. POTASSIUM CHLORIDE (KLOR-CON M20 PO) 3/6/2017 at Unknown time  Yes Yes   Sig: Take 2 Tabs by mouth two (2) times a day. albuterol (PROAIR HFA) 90 mcg/actuation inhaler Unknown at Unknown time  No No   Sig: Take 2 Puffs by inhalation every four (4) hours as needed for Wheezing. amLODIPine (NORVASC) 5 mg tablet 3/6/2017 at Unknown time  Yes Yes   Sig: Take 5 mg by mouth daily. ascorbic acid (VITAMIN C) 250 mg tablet 3/6/2017 at Unknown time  Yes Yes   Sig: Take  by mouth. aspirin delayed-release 325 mg tablet 3/6/2017 at Unknown time  Yes Yes   Sig: Take 325 mg by mouth every six (6) hours as needed for Pain. cholecalciferol (VITAMIN D3) 1,000 unit cap 3/6/2017 at Unknown time  Yes Yes   Sig: Take 2,000 Units by mouth daily. clonazePAM (KLONOPIN) 1 mg tablet 3/6/2017 at Unknown time  Yes Yes   Sig: Take 1 mg by mouth two (2) times a day. insulin glargine (LANTUS) 100 unit/mL injection 3/5/2017 at Unknown time  Yes Yes   Si Units by SubCUTAneous route nightly. Indications: at bedtime   lisinopril-hydrochlorothiazide (PRINZIDE, ZESTORETIC) 20-12.5 mg per tablet 3/6/2017 at Unknown time  Yes Yes   Sig: Take  by mouth two (2) times a day. losartan (COZAAR) 100 mg tablet 3/6/2017 at Unknown time  Yes Yes   Sig: Take 100 mg by mouth daily. magnesium oxide (MAG-OX) 400 mg tablet 3/6/2017 at Unknown time  Yes Yes   Sig: Take 400 mg by mouth two (2) times a day. metformin (GLUCOPHAGE) 500 mg tablet 3/6/2017 at Unknown time  Yes Yes   Sig: Take 500 mg by mouth two (2) times daily (with meals). metoprolol succinate (TOPROL-XL) 50 mg XL tablet 3/6/2017 at Unknown time  Yes Yes   Sig: Take 50 mg by mouth daily. rosuvastatin (CRESTOR) 10 mg tablet 3/5/2017 at Unknown time  Yes Yes   Sig: Take 10 mg by mouth nightly.       Facility-Administered Medications: None       CURRENT MEDS  Current Facility-Administered Medications   Medication Dose Route Frequency    amLODIPine (NORVASC) tablet 5 mg  5 mg Oral DAILY    clonazePAM (KlonoPIN) tablet 1 mg  1 mg Oral BID    FLUoxetine (PROzac) capsule 20 mg  20 mg Oral DAILY    valsartan (DIOVAN) tablet 160 mg  160 mg Oral DAILY    magnesium oxide (MAG-OX) tablet 400 mg  400 mg Oral BID    metoprolol succinate (TOPROL-XL) XL tablet 50 mg  50 mg Oral DAILY    atorvastatin (LIPITOR) tablet 20 mg  20 mg Oral QHS    meclizine (ANTIVERT) tablet 25 mg  25 mg Oral TID    hydrOXYzine HCl (ATARAX) tablet 25 mg  25 mg Oral BID    0.9% sodium chloride infusion  50 mL/hr IntraVENous CONTINUOUS    insulin lispro (HUMALOG) injection   SubCUTAneous AC&HS    sodium chloride (NS) flush 5-10 mL  5-10 mL IntraVENous Q8H    aspirin (ASPIRIN) tablet 325 mg  325 mg Oral DAILY    heparin (porcine) injection 5,000 Units  5,000 Units SubCUTAneous Q8H    lisinopril (PRINIVIL, ZESTRIL) tablet 20 mg  20 mg Oral DAILY    hydroCHLOROthiazide (HYDRODIURIL) tablet 12.5 mg  12.5 mg Oral DAILY       IMPRESSION:  Patient Active Problem List   Diagnosis Code    Hyperlipidemia E78.5    Anemia D64.9    Chronic anticoagulation Z79.01    DM (diabetes mellitus), type 2, uncontrolled (HCC) E11.65    HTN (hypertension) I10    Nausea & vomiting R11.2    Unspecified constipation K59.00    Urinary retention R33.9    Abdominal pain, acute, bilateral lower quadrant R10.31, R10.32    History of CVA (cerebrovascular accident) Z86.73    Coumadin toxicity T45.511A    Ovarian cyst N83.209    Pelvic mass R19.00    Vaginal bleeding N93.9    Abnormal mammogram with microcalcification R92.0    Vertigo R42    Near syncope R55       Omar Urias is a 47 y.o. female who presents with vertigo. It is episodic and in addition to that she does have tinnitus and hearing loss in the right ear.   I do suspect a vestibular etiology rather than a stroke. Also, she has had MRI of the brain which has been normal.  I do recommend vestibular rehabilitation as an outpatient and an outpatient consult with ENT. RECOMMENDATIONS:  -Outpatient vestibular rehabilitation  -Outpatient consultation with ENT  -Meclizine as needed for vertigo  - MRI brain w/o C - No acute findings. - Carotid US - pending  - TTE - pending  - Telemetry  - Permissive HTN (SBP<220/<120) for 24 hrs from symptom onset and then BP goal is less than 140/90  - Stroke labs (HgbA1c, TSH, lipid panel)  - Continue  mg daily  -Patient takes rosuvastatin 10 mg a day at home. Increase it to 20 mg a day as LDL is more than 70.  - ST/OT/PT eval  - Discussed tobacco cessation, healthy lifestyle changes, and modifiable risk factors for stroke with patient and family    Thank you very much for this consultation. We will follow up on the above studies and give further recommendations as indicated.       Anjum Bryant MD  Diplomate, American Board of Psychiatry & Neurology (Neurology)  [place] Board of Psychiatry & Neurology (Clinical Neurophysiology)

## 2017-03-07 NOTE — ROUTINE PROCESS
Bedside and Verbal shift change report given to (oncoming nurse) by Melissa Palma RN  (offgoing nurse). Report included the following information SBAR, Kardex, Recent Results and Cardiac Rhythm NSR.     Zone Phone: [phone]        Significant changes during shift: Carotid Duplex           Patient Information     Jf Milian  47 y.o.  3/6/2017 12:41 PM by Stan Gallagher MD. Jf Milian was admitted from Home     Problem List          Patient Active Problem List     Diagnosis Date Noted    Near syncope 03/07/2017    Vertigo 03/06/2017    Abnormal mammogram with microcalcification 06/04/2013    Vaginal bleeding 11/26/2012    Ovarian cyst 04/27/2012    Pelvic mass 04/27/2012    DM (diabetes mellitus), type 2, uncontrolled ([address] 07/06/2011    HTN (hypertension) 07/06/2011    Nausea & vomiting 07/06/2011    Unspecified constipation 07/06/2011    Urinary retention 07/06/2011    Abdominal pain, acute, bilateral lower quadrant 07/06/2011    History of CVA (cerebrovascular accident) 07/06/2011    Coumadin toxicity 07/06/2011    Chronic anticoagulation 06/26/2011    Hyperlipidemia 05/25/2010    Anemia 05/25/2010           Past Medical History:   Diagnosis Date    Abnormal mammogram with microcalcification 6/4/2013     Right UOQ    Aphasia due to stroke ([address]       SPEAKS SOME    Arthritis       back    Diabetes ([address]       TYPE 2; IDDM    Hypertension      Psychiatric disorder       depression    Stroke ([address] 5/11/2010     RIGHT SIDE WEAKNESS            Core Measures:     CVA: Yes Yes         Post Op Surgical (If Applicable):          Activity Status:     OOB to Chair No  Ambulated this shift No   Bed Rest Yes     Supplemental O2: (If Applicable)     NC No         LINES AND DRAINS: PIV 20g left ac     yes DVT prophylaxis:     DVT prophylaxis Med- Yes  DVT prophylaxis SCD or LOPEZ- Yes          Patient Safety:     Falls Score Total Score: 3  Safety Level_______  Bed Alarm On? No  Sitter?  No     Plan for upcoming shift: PT< OT Neuro Consult        Discharge Plan: Yes After CM sees pt     Active Consults:  IP CONSULT TO NEUROLOGY  IP CONSULT TO PRIMARY CARE PROVIDER

## 2017-03-07 NOTE — PROGRESS NOTES
Speech Pathology bedside swallow evaluation/discharge  Patient: Reynaldo Shin (61 y.o. female)  Date: 3/7/2017  Primary Diagnosis: cva vs vertigo        Precautions: fall       ASSESSMENT :  Based on the objective data described below, the patient presents with mild oral dysphagia due to lack of upper dentition. Pharyngeal swallow WNL. She has communication deficits which are consistent with her baseline level of function, able to communicate basic wants and needs fairly well in the context of bedside swallow assessment. Skilled therapy provided by a speech-language pathologist is not indicated at this time. PLAN :  Recommendations:  Dental soft diet  Discharge Recommendations: None     SUBJECTIVE:   Patient stated When I lay down and get up, I get dizzy.     OBJECTIVE:     Past Medical History:   Diagnosis Date    Abnormal mammogram with microcalcification 6/4/2013    Right  UOQ    Aphasia due to stroke ([address]     SPEAKS SOME    Arthritis     back    Diabetes ([address]     TYPE 2; IDDM    Hypertension     Psychiatric disorder     depression    Stroke ([facility] 5/11/2010    RIGHT SIDE WEAKNESS     Past Surgical History:   Procedure Laterality Date    HX CATARACT REMOVAL  Dec 2010/ Jan 2011    bilateral cataracts removed    HX GI      HEMMORHOIDECTOMY    HX GYN      pmb    HX HEENT      tonsillectomy    HX OTHER SURGICAL      HX TONSILLECTOMY      [place] LAPAROSCOPY W TOT HYSTERECTUTERUS <=250 GRAM  W TUBE/OVARY  6/2011    leiomyomata     Prior Level of Function/Home Situation:    Home Situation  Home Environment: Private residence  # Steps to Enter: 2  One/Two Story Residence: Two story  # of Interior Steps: 7  Height of Each Step (in):  (unknown)  Interior Rails: Both  Lift Chair Available: No  Living Alone: No  Support Systems: Family member(s)  Patient Expects to be Discharged to[de-identified] Private residence  Current DME Used/Available at Home: Cane, quad  Diet prior to admission: soft, thins  Current Diet:  Clear liquids   Cognitive and Communication Status:  Neurologic State: Alert  Orientation Level: Oriented X4  Cognition: Follows commands  Perception: Appears intact  Perseveration: No perseveration noted     Oral Assessment:  Oral Assessment  Labial: Right droop  Dentition: Limited;Natural (no uppers)  Oral Hygiene: moist, clean  Lingual: Right deviation  Mandible: No impairment  P.O. Trials:  Patient Position: upright in bed  Vocal quality prior to P.O.: No impairment  Consistency Presented: Thin liquid;Puree; Solid  How Presented: Self-fed/presented;Cup/sip;Cup/gulp; Spoon;Straw;Successive swallows     Bolus Acceptance: No impairment  Bolus Formation/Control: Impaired (prolonged due to dentition)     Propulsion: No impairment  Oral Residue: None  Initiation of Swallow: No impairment  Laryngeal Elevation: Functional  Aspiration Signs/Symptoms: None  Pharyngeal Phase Characteristics: No impairment, issues, or problems              Oral Phase Severity: Mild  Pharyngeal Phase Severity : No impairment  NOMS:   The NOMS functional outcome measure was used to quantify this patient's level of swallowing impairment. Based on the NOMS, the patient was determined to be at level 6 for swallow function     G Codes: In compliance with CMSs Claims Based Outcome Reporting, the following G-code set was chosen for this patient based the use of the NOMS functional outcome to quantify this patient's level of swallowing impairment. Using the NOMS, the patient was determined to be at level 6 for swallow function which correlates with the CI= 1-19% level of severity.     Based on the objective assessment provided within this note, the current, goal, and discharge g-codes are as follows:    Swallow  Swallowing:   Swallow Current Status CI= 1-19%   Swallow Goal Status CI= 1-19%   Swallow D/C Status CI= 1-19%      NOMS Swallowing Levels:  Level 1 (CN): NPO  Level 2 (CM): NPO but takes consistency in therapy  Level 3 (CL): Takes less than 50% of nutrition p.o. and continues with nonoral feedings; and/or safe with mod cues; and/or max diet restriction  Level 4 (CK): Safe swallow but needs mod cues; and/or mod diet restriction; and/or still requires some nonoral feeding/supplements  Level 5 (CJ): Safe swallow with min diet restriction; and/or needs min cues  Level 6 (CI): Independent with p.o.; rare cues; usually self cues; may need to avoid some foods or needs extra time  Level 7 ([address] [place] [address]): Independent for all p.o.  CECILIA. (2003). National Outcomes Measurement System (NOMS): Adult Speech-Language Pathology User's Guide. Pain:  Pain Scale 1: Numeric (0 - 10)  Pain Intensity 1: 0     After treatment:   [] Patient left in no apparent distress sitting up in chair  [x] Patient left in no apparent distress in bed  [x] Call bell left within reach  [x] Nursing notified  [x] Caregiver present  [] Bed alarm activated    COMMUNICATION/EDUCATION:   The patients plan of care including findings, recommendations, and recommended diet changes were discussed with: Registered Nurse. Patient was educated regarding purpose of SLP visit and recommendations. She verbalized understanding. [] Posted safety precautions in patient's room. [x] Patient/family have participated as able and agree with findings and recommendations. [] Patient is unable to participate in plan of care at this time.     Thank you for this referral.  Juanito Torrez SLP  Time Calculation: 11 mins

## 2017-03-07 NOTE — PROGRESS NOTES
Problem: Mobility Impaired (Adult and Pediatric)  Goal: *Acute Goals and Plan of Care (Insert Text)  Physical Therapy Goals  Initiated 3/7/2017  1. Patient will move from supine to sit and sit to supine in bed with modified independence within 7 day(s). 2. Patient will transfer from bed to chair and chair to bed with modified independence using the least restrictive device within 7 day(s). 3. Patient will perform sit to stand with modified independence within 7 day(s). 4. Patient will ambulate with modified independence for 100 feet with the least restrictive device within 7 day(s). 5. Patient will ascend/descend 7 stairs with 1 handrail(s) with modified independence within 7 day(s). PHYSICAL THERAPY EVALUATION- NEURO POPULATION     Patient: Flower Lowe (75 y.o. female)  Date: 3/7/2017  Primary Diagnosis: cva vs vertigo        Precautions: R AFO  Fall      ASSESSMENT :  Based on the objective data described below, the patient presents with near baseline level of function after admittance for severe dizziness to r/o CVA vs vertigo. Patient with residual R side weakness secondary to CVA approx 7 years ago. Patient lives with family in a tri-level home with stairs to enter and she up/down stairs at home without assistance. Patient with continued reports of dizziness during session despite stable BP. Supine to sit with supervision/modified independent. Needs some assist to don socks and AFO but has family at home to assist.  Performed majority herself. Sit to stand with supervision. Amb approx 15 feet with quad cane and CGA-SBA with hemiplegic gait pattern with no overt LOB. Returned to bed with Linette for LE management. Limited gait secondary to dizziness in all positions. SHERMAN indicates risk for fall but secondary to pervious CVA patient at risk. Educated on [address] and patient indicated her understanding. Will continue to follow. Recommend HH PT vs no services at discharge.      Patient will benefit from skilled intervention to address the above impairments. Patients rehabilitation potential is considered to be Good  Factors which may influence rehabilitation potential include:   [X]           None noted  [ ]           Mental ability/status  [ ]           Medical condition  [ ]           Home/family situation and support systems  [ ]           Safety awareness  [ ]           Pain tolerance/management  [ ]           Other:        PLAN :  Recommendations and Planned Interventions:  [X]             Bed Mobility Training             [ ]      Neuromuscular Re-Education  [X]             Transfer Training                   [ ]      Orthotic/Prosthetic Training  [X]             Gait Training                         [ ]      Modalities  [X]             Therapeutic Exercises           [ ]      Edema Management/Control  [X]             Therapeutic Activities            [X]      Patient and Family Training/Education  [ ]             Other (comment):  Frequency/Duration: Patient will be followed by physical therapy 4 times a week to address goals. Discharge Recommendations: Home Health and None  Further Equipment Recommendations for Discharge: TBD       SUBJECTIVE:   Patient stated I can do it.       OBJECTIVE DATA SUMMARY:   HISTORY:    Past Medical History:   Diagnosis Date    Abnormal mammogram with microcalcification 6/4/2013     Right  UOQ    [facility] to [facility]       SPEAKS SOME    Arthritis       back    Diabetes ([facility] [facility]       TYPE 2; IDDM    Hypertension      Psychiatric disorder       depression    Stroke ([facility] [facility] 5/11/2010     RIGHT SIDE WEAKNESS     Past Surgical History:   Procedure Laterality Date    HX CATARACT REMOVAL   Dec 2010/ Jan 2011     bilateral cataracts removed    HX GI         HEMMORHOIDECTOMY    HX GYN         pmb    HX HEENT         tonsillectomy    HX OTHER SURGICAL        HX TONSILLECTOMY        VT LAPAROSCOPY W TOT HYSTERECTUTERUS <=250 GRAM  W TUBE/OVARY 6/2011     leiomyomata     Prior Level of Function/Home Situation: Modified independent using quad cane at baseline. Lives with family in tri-level home  Personal factors and/or comorbidities impacting plan of care:      Home Situation  Home Environment: Private residence  # Steps to Enter: 2  One/Two Story Residence: Other (Comment) (tri-level)  # of Interior Steps: 7  Height of Each Step (in):  (unknown)  Interior Rails: Both  Lift Chair Available: No  Living Alone: No  Support Systems: Family member(s)  Patient Expects to be Discharged to[de-identified] Private residence  Current DME Used/Available at Home: Cane, quad     EXAMINATION/PRESENTATION/DECISION MAKING:   Critical Behavior:  Neurologic State: Alert  Orientation Level: Oriented X4  Cognition: Follows commands        Strength:    Strength: Generally decreased, functional (decreased on R previous CVA)                                                   Range Of Motion:  AROM: Generally decreased, functional                       Functional Mobility:  Bed Mobility:  Rolling: Modified independent  Supine to Sit: Modified independent  Sit to Supine: Minimum assistance     Transfers:  Sit to Stand: Contact guard assistance  Stand to Sit: Contact guard assistance                       Balance:   Sitting: Intact  Standing: Impaired  Standing - Static: Constant support;Good  Standing - Dynamic : Fair  Ambulation/Gait Training:  Distance (ft): 15 Feet (ft)  Assistive Device: Cane, quad;Gait belt  Ambulation - Level of Assistance: Stand-by asssistance        Gait Abnormalities: Decreased step clearance; Hemiplegic; Step to gait        Base of Support: Widened  Stance: Right decreased  Speed/Sneha: Pace decreased (<100 feet/min); Slow  Step Length: Right shortened                    Functional Measure  Mireles Balance Test:      Sitting to Standing: 3  Standing Unsupported: 3  Sitting with Back Unsupported: 4  Standing to Sitting: 3  Transfers: 2  Standing Unsupported with Eyes Closed: 3  Standing Unsupported with Feet Together: 1  Reach Forward with Outstretched Arm: 1   Object: 0  Turn to Look Over Shoulders: 1  Turn 360 Degrees: 2  Alternate Foot on Step/Stool: 0  Standing Unsupported One Foot in Front: 1  Stand on One Le  Total: 25             56=Maximum possible score;   0-20=High fall risk  21-40=Moderate fall risk   41-56=Low fall risk      Sherman Balance Test and G-code impairment scale:  Percentage of Impairment CH     0%    CI     1-19% CJ     20-39% CK     40-59% CL     60-79% CM     80-99% CN      100%   Sherman   Score 0-56 56 45-55 34-44 23-33 12-22 1-11 0         G codes: In compliance with CMSs Claims Based Outcome Reporting, the following G-code set was chosen for this patient based on their primary functional limitation being treated: The outcome measure chosen to determine the severity of the functional limitation was the SHERMAN with a score of 25/100 which was correlated with the impairment scale.       · Mobility - Walking and Moving Around:               - CURRENT STATUS:    CK               - GOAL STATUS:           CJ - 20%-39% impaired, limited or restricted               - D/C STATUS:                       ---------------To be determined---------------       Physical Therapy Evaluation Charge Determination   History Examination Presentation Decision-Making   HIGH Complexity :3+ comorbidities / personal factors will impact the outcome/ POC  MEDIUM Complexity : 3 Standardized tests and measures addressing body structure, function, activity limitation and / or participation in recreation  MEDIUM Complexity : Evolving with changing characteristics  MEDIUM Complexity : FOTO score of 26-74      Based on the above components, the patient evaluation is determined to be of the following complexity level: MEDIUM        Pain:  Pain Scale 1: Numeric (0 - 10)  Pain Intensity 1: 0              Activity Tolerance:   VSS  Please refer to the flowsheet for vital signs taken during this treatment. After treatment:   [ ]     Patient left in no apparent distress sitting up in chair  [X]     Patient left in no apparent distress in bed  [X]     Call bell left within reach  [X]     Nursing notified  [X]     Caregiver present  [ ]     Bed alarm activated      COMMUNICATION/EDUCATION:   The patients plan of care was discussed with: Physical Therapist, Occupational Therapist and Registered Nurse. Patient was educated regarding Her deficit(s) of R sided weakness as this relates to Her diagnosis of CVA. She demonstrated Good understanding as evidenced by teachback. [X]  Fall prevention education was provided and the patient/caregiver indicated understanding. [X]  Patient/family have participated as able in goal setting and plan of care. [X]  Patient/family agree to work toward stated goals and plan of care. [ ]  Patient understands intent and goals of therapy, but is neutral about his/her participation. [ ]  Patient is unable to participate in goal setting and plan of care.      Thank you for this referral.  Juan Eller, PT, DPT   Time Calculation: 27 mins

## 2017-03-07 NOTE — PROGRESS NOTES
Occupational Therapy EVALUATION/discharge  Patient: Tania Bryan (20 y.o. female)  Date: 3/7/2017  Primary Diagnosis: cva vs vertigo        Precautions:  Fall    ASSESSMENT:   Based on the objective data described below, the patient presents with dizziness that occurs with positional changes causing her balance to be mildly decreased from her baseline. Patient also presents with aphasia, RUE hemiplegia and RLE hemiparesis s/p CVA in 2010. Up on completion fall prevention education provided as described below, the patient was able to perform ADLs at her mod I to min A baseline. She is requiring increased supervision for ambulation, but experienced minimal dizziness and no LOB occurred during this evaluation. Further skilled acute occupational therapy is not indicated at this time. Discharge Recommendations: none in terms of OT, defer further recommendations to PT. Further Equipment Recommendations for Discharge:none          OBJECTIVE DATA SUMMARY:   HISTORY:   Past Medical History:   Diagnosis Date    Abnormal mammogram with microcalcification 6/4/2013    Right  UOQ    Aphasia due to stroke ([facility] [address]     SPEAKS SOME    Arthritis     back    Diabetes ([facility] [address]     TYPE 2; IDDM    Hypertension     Psychiatric disorder     depression    Stroke ([facility] [address] 5/11/2010    RIGHT SIDE WEAKNESS     Past Surgical History:   Procedure Laterality Date    HX CATARACT REMOVAL  Dec 2010/ Jan 2011    bilateral cataracts removed    HX GI      HEMMORHOIDECTOMY    HX GYN      pmb    HX HEENT      tonsillectomy    HX OTHER SURGICAL      HX TONSILLECTOMY      UT LAPAROSCOPY W TOT HYSTERECTUTERUS <=250 GRAM  W TUBE/OVARY  6/2011    leiomyomata       Prior Level of Function/Home Situation: H/o CVA with RUE hemiplegia and RLE hemiparesis sustained in 2010. Patient lives with  who performs all IADLs, and assists with ADLs PRN.    At baseline she ambulates mod I with a quad cane and wearing a R AFO, is supervision/setup for UB dressing, self feeding and grooming, modified independent with toileting, and is min A for LB dressing and bathing. Expanded or extensive additional review of patient history:     Home Situation  Home Environment: Private residence  # Steps to Enter: 2  One/Two Story Residence: Other (Comment) (tri-level)  # of Interior Steps: 7  Height of Each Step (in):  (unknown)  Interior Rails: Both  Lift Chair Available: No  Living Alone: No  Support Systems: Family member(s)  Patient Expects to be Discharged to[de-identified] Private residence  Current DME Used/Available at Home: Cane, quad  []  Right hand dominant   [x]  Left hand dominant    EXAMINATION OF PERFORMANCE DEFICITS:  Cognitive/Behavioral Status:  Neurologic State: Alert  Orientation Level: Oriented X4  Cognition: Appropriate decision making; Appropriate for age attention/concentration; Appropriate safety awareness; Follows commands  Perception: Appears intact  Perseveration: No perseveration noted  Safety/Judgement: Awareness of environment; Insight into deficits;Good awareness of safety precautions    Vision/Perceptual:    Tracking: Able to track stimulus in all quadrants w/o difficulty    Saccades: Decreased speed of pursuit between targets    Convergence: Normal (within 6 inches from nose)            Acuity: Within Defined Limits       Range of Motion:  AROM: Within functional limits (LUE and LLE, nonfunctional RUE,decreased/funcitonal LLE)                       Strength:  Strength:  Within functional limits (LUE and LLE, nonfunctional RUE,decreased/funcitonal LLE)              Coordination:  Coordination: Within functional limits (LUE and LLE, nonfunctional RUE,decreased/funcitonal LLE)          Tone & Sensation:  Tone: Normal  Sensation: Impaired (numbness in RUE)                      Balance:  Sitting: Intact  Standing: Impaired (but good ambulating with quad cane on level surface. )  Standing - Static: Good  Standing - Dynamic : Good    Functional Mobility and Transfers for ADLs:  Bed Mobility:  Rolling: Modified independent  Supine to Sit: Modified independent  Sit to Supine: Minimum assistance  Scooting: Independent    Transfers:  Sit to Stand: Modified independent  Stand to Sit: Modified independent  Bed to Chair: Supervision (ambulating with a quad cane)  Toilet Transfer : Modified independent    ADL Assessment:  Feeding: Supervision;Setup    Oral Facial Hygiene/Grooming: Supervision;Setup    Bathing: Minimum assistance    Upper Body Dressing: Supervision;Setup    Lower Body Dressing: Minimum assistance    Toileting: Modified independent                Intervention and task modifications:  Provided fall prevention education in regards to managing with onset of dizziness. Instructed patient to move slowly when transitioning supine to sit, sit to stand, and when turning in standing or sitting. Instructed her pause in each position prior to moving, to be sure she is not dizzy. Patient demonstrating independent use of these techniques, and verbalized full understanding. Functional Measure:  Barthel Index:    Bathin  Bladder: 10  Bowels: 10  Groomin  Dressin  Feedin  Mobility: 10  Stairs: 0  Toilet Use: 10  Transfer (Bed to Chair and Back): 10  Total: 60       Barthel and G-code impairment scale:  Percentage of impairment CH  0% CI  1-19% CJ  20-39% CK  40-59% CL  60-79% CM  80-99% CN  100%   Barthel Score 0-100 100 99-80 79-60 59-40 20-39 1-19   0   Barthel Score 0-20 20 17-19 13-16 9-12 5-8 1-4 0      The Barthel ADL Index: Guidelines  1. The index should be used as a record of what a patient does, not as a record of what a patient could do. 2. The main aim is to establish degree of independence from any help, physical or verbal, however minor and for whatever reason. 3. The need for supervision renders the patient not independent. 4. A patient's performance should be established using the best available evidence.  Asking the patient, friends/relatives and nurses are the usual sources, but direct observation and common sense are also important. However direct testing is not needed. 5. Usually the patient's performance over the preceding 24-48 hours is important, but occasionally longer periods will be relevant. 6. Middle categories imply that the patient supplies over 50 per cent of the effort. 7. Use of aids to be independent is allowed. Kathy Santos., Barthel, D.W. (0017). Functional evaluation: the Barthel Index. [facility] (14)2. EDILBERTO Alatorre, Onelia Luis., Tayo Mohan., Yonis, [facility] (1999). Measuring the change indisability after inpatient rehabilitation; comparison of the responsiveness of the Barthel Index and Functional [place] Measure. Journal of Neurology, Neurosurgery, and Psychiatry, 66(4), 697-726. Alton Raygoza, [place], ANGELA Wyatt, & Wilberto Briscoe M.A. (2004.) Assessment of post-stroke quality of life in cost-effectiveness studies: The usefulness of the Barthel Index and the EuroQoL-5D. Quality of Life Research, 13, 212-39       G codes: In compliance with CMSs Claims Based Outcome Reporting, the following G-code set was chosen for this patient based on their primary functional limitation being treated: The outcome measure chosen to determine the severity of the functional limitation was the Barthel Index with a score of 60/100 which was correlated with the impairment scale. ?  Self Care:     - CURRENT STATUS: CJ - 20%-39% impaired, limited or restricted    - GOAL STATUS: CJ - 20%-39% impaired, limited or restricted    - D/C STATUS:  CJ - 20%-39% impaired, limited or restricted     Occupational Therapy Evaluation Charge Determination   History Examination Decision-Making   LOW Complexity : Brief history review  LOW Complexity : 1-3 performance deficits relating to physical, cognitive , or psychosocial skils that result in activity limitations and / or participation restrictions  LOW Complexity : No comorbidities that affect functional and no verbal or physical assistance needed to complete eval tasks       Based on the above components, the patient evaluation is determined to be of the following complexity level: LOW   Pain:  Pain Scale 1: Numeric (0 - 10)  Pain Intensity 1: 0              Activity Tolerance:   VSS  Please refer to the flowsheet for vital signs taken during this treatment. After treatment:   [x]  Patient left in no apparent distress sitting up in chair  []  Patient left in no apparent distress in bed  [x]  Call bell left within reach  [x]  Nursing notified  []  Caregiver present  []  Bed alarm activated    COMMUNICATION/EDUCATION:   Communication/Collaboration:  [x]      Home safety education was provided and the patient/caregiver indicated understanding. [x]      Patient/family have participated as able and agree with findings and recommendations. []      Patient is unable to participate in plan of care at this time.   Findings and recommendations were discussed with: Physical Therapist    Stana Nageotte, OTR/L  Time Calculation: 27 mins

## 2017-03-07 NOTE — PROGRESS NOTES
Pt is a 46 y/o -American female under observation for CVA vs. Vertigo. Oral and Written notification given to patient and/or caregiver informing them that they are currently an Outpatient receiving care in our facility. Outpatient services include Observation Services. CM met with pt to complete initial assessment. Pt is alert and oriented to person, place, time and situation. Pt lives with her spouse, two children and one grandson. Pt lives in a tri-level home with seven steps to the bedroom. Pt has five steps to the entrance of the residence. Pt doesn't drive but has supportive family. Pt has no previous HH or SNF providers. Pt has access to cane (four prong), rolling walker, shower chair and BSC. [facility] at the intersection of [address] and [employer]. Pt will be transported at discharge by spouse via private vehicle. Care Management Interventions  PCP Verified by CM: Yes (Dr. Juliann Valle )  Mode of Transport at Discharge:  Other (see comment) (private vehicle )  Transition of Care Consult (CM Consult): Discharge Planning (CM to assist as needed )  Discharge Durable Medical Equipment: No (access to rolling walker, cane (four prong), shower chair, rolling walker, and BSC )  Health Maintenance Reviewed: Yes  Physical Therapy Consult: Yes  Occupational Therapy Consult: Yes  Speech Therapy Consult: Yes  Current Support Network: Lives with Spouse  Confirm Follow Up Transport: Family (pt ha ssupportive family to assist with transportation needs)  Plan discussed with Pt/Family/Caregiver: Yes  Discharge Location  Discharge Placement: Home      CB Teixeira, [address] [place] [address]   [phone]

## 2017-03-07 NOTE — DIABETES MGMT
[facility]        Recommendations/ Comments: If appropriate, please consider adding a new A1C test to next lab draw to assess home management. A1c:   Lab Results   Component Value Date/Time    Hemoglobin A1c 14.1 05/11/2010 03:45 PM       Will continue to follow as needed. Thank you.     Michelle Farah, [address] [facility]  Office: [phone]

## 2017-03-07 NOTE — PROGRESS NOTES
..   Bedside and Verbal shift change report given to Reinaldo (oncoming nurse) by Adolfo Beckwith (offgoing nurse). Report included the following information SBAR, Kardex, Recent Results and Cardiac Rhythm NSR. Zone Phone:   [phone]      Significant changes during shift: Admission        Patient Information    Pooja Pimentel  47 y.o.  3/6/2017 12:41 PM by Wendy Kemp MD. Pooja Pimentel was admitted from Home    Problem List    Patient Active Problem List    Diagnosis Date Noted    Near syncope 03/07/2017    Vertigo 03/06/2017    Abnormal mammogram with microcalcification 06/04/2013    Vaginal bleeding 11/26/2012    Ovarian cyst 04/27/2012    Pelvic mass 04/27/2012    DM (diabetes mellitus), type 2, uncontrolled ([address] 07/06/2011    HTN (hypertension) 07/06/2011    Nausea & vomiting 07/06/2011    Unspecified constipation 07/06/2011    Urinary retention 07/06/2011    Abdominal pain, acute, bilateral lower quadrant 07/06/2011    History of CVA (cerebrovascular accident) 07/06/2011    Coumadin toxicity 07/06/2011    Chronic anticoagulation 06/26/2011    Hyperlipidemia 05/25/2010    Anemia 05/25/2010     Past Medical History:   Diagnosis Date    Abnormal mammogram with microcalcification 6/4/2013    Right  UOQ    Aphasia due to stroke ([address]     SPEAKS SOME    Arthritis     back    Diabetes ([address]     TYPE 2; IDDM    Hypertension     Psychiatric disorder     depression    Stroke ([address] 5/11/2010    RIGHT SIDE WEAKNESS         Core Measures:    CVA: Yes Yes       Post Op Surgical (If Applicable): Activity Status:    OOB to Chair No  Ambulated this shift No   Bed Rest Yes    Supplemental O2: (If Applicable)    NC No       LINES AND DRAINS:  PIV 20g left ac      yes DVT prophylaxis:    DVT prophylaxis Med- Yes  DVT prophylaxis SCD or LOPEZ- Yes        Patient Safety:    Falls Score Total Score: 3  Safety Level_______  Bed Alarm On? No  Sitter?  No    Plan for upcoming shift: PT< OT Neuro Consult      Discharge Plan: Yes After CM sees pt    Active Consults:  IP CONSULT TO NEUROLOGY  IP CONSULT TO PRIMARY CARE PROVIDER

## 2017-03-07 NOTE — PROGRESS NOTES
Letter of Status Determination: Current Status OBSERVATION is Appropriate        Pt Name:  Kristin Sarabia   MR#  [id number]   [facility]#   [id number]   [address]  4795/60  @ [address] date  3/6/2017 12:41 PM   Current Attending Physician  Myrtha Bence, MD   Principal diagnosis  Near syncope    Clinicals  47 y.o. y.o  female hospitalized with above diagnosis. Her MRI was negative for CVA and Neurologist consultation recommendation noted for OP vestibular rehabilitation.         [facility]   Does  NOT apply    STATUS DETERMINATION  On the basis of clinical data, available documentaion, we believe that the current status of this patient as OBSERVATION is Appropriate     She does not meet inpt status criteria [facility] Moseley)    Additional comments     Insurance  Payor: Linder Cheadle / Plan: [address] [place] [address] / Product Type: Medicare /          Kaaren Castleman MD MPH FACP     Physician Advisor    [address]     President Medical Staff, [address] [place] [address]    Cell  [phone]

## 2017-03-08 LAB
ANION GAP BLD CALC-SCNC: 13 MMOL/L (ref 5–15)
BASOPHILS # BLD AUTO: 0 K/UL (ref 0–0.1)
BASOPHILS # BLD: 0 % (ref 0–1)
BUN SERPL-MCNC: 16 MG/DL (ref 6–20)
BUN/CREAT SERPL: 16 (ref 12–20)
CALCIUM SERPL-MCNC: 8.7 MG/DL (ref 8.5–10.1)
CHLORIDE SERPL-SCNC: 103 MMOL/L (ref 97–108)
CO2 SERPL-SCNC: 23 MMOL/L (ref 21–32)
CREAT SERPL-MCNC: 1.01 MG/DL (ref 0.55–1.02)
EOSINOPHIL # BLD: 0.2 K/UL (ref 0–0.4)
EOSINOPHIL NFR BLD: 4 % (ref 0–7)
ERYTHROCYTE [DISTWIDTH] IN BLOOD BY AUTOMATED COUNT: 13.9 % (ref 11.5–14.5)
GLUCOSE BLD STRIP.AUTO-MCNC: 177 MG/DL (ref 65–100)
GLUCOSE BLD STRIP.AUTO-MCNC: 177 MG/DL (ref 65–100)
GLUCOSE BLD STRIP.AUTO-MCNC: 224 MG/DL (ref 65–100)
GLUCOSE BLD STRIP.AUTO-MCNC: 225 MG/DL (ref 65–100)
GLUCOSE BLD STRIP.AUTO-MCNC: 250 MG/DL (ref 65–100)
GLUCOSE SERPL-MCNC: 222 MG/DL (ref 65–100)
HCT VFR BLD AUTO: 36.5 % (ref 35–47)
HGB BLD-MCNC: 11.8 G/DL (ref 11.5–16)
LYMPHOCYTES # BLD AUTO: 51 % (ref 12–49)
LYMPHOCYTES # BLD: 2.8 K/UL (ref 0.8–3.5)
MCH RBC QN AUTO: 27.8 PG (ref 26–34)
MCHC RBC AUTO-ENTMCNC: 32.3 G/DL (ref 30–36.5)
MCV RBC AUTO: 85.9 FL (ref 80–99)
MONOCYTES # BLD: 0.5 K/UL (ref 0–1)
MONOCYTES NFR BLD AUTO: 8 % (ref 5–13)
NEUTS SEG # BLD: 2 K/UL (ref 1.8–8)
NEUTS SEG NFR BLD AUTO: 37 % (ref 32–75)
PLATELET # BLD AUTO: 262 K/UL (ref 150–400)
POTASSIUM SERPL-SCNC: 3.9 MMOL/L (ref 3.5–5.1)
RBC # BLD AUTO: 4.25 M/UL (ref 3.8–5.2)
SERVICE CMNT-IMP: ABNORMAL
SODIUM SERPL-SCNC: 139 MMOL/L (ref 136–145)
WBC # BLD AUTO: 5.5 K/UL (ref 3.6–11)

## 2017-03-08 PROCEDURE — 74011250636 HC RX REV CODE- 250/636: Performed by: INTERNAL MEDICINE

## 2017-03-08 PROCEDURE — 85025 COMPLETE CBC W/AUTO DIFF WBC: CPT | Performed by: INTERNAL MEDICINE

## 2017-03-08 PROCEDURE — 74011636637 HC RX REV CODE- 636/637: Performed by: INTERNAL MEDICINE

## 2017-03-08 PROCEDURE — 82962 GLUCOSE BLOOD TEST: CPT

## 2017-03-08 PROCEDURE — 80048 BASIC METABOLIC PNL TOTAL CA: CPT | Performed by: INTERNAL MEDICINE

## 2017-03-08 PROCEDURE — 97530 THERAPEUTIC ACTIVITIES: CPT

## 2017-03-08 PROCEDURE — 36415 COLL VENOUS BLD VENIPUNCTURE: CPT | Performed by: INTERNAL MEDICINE

## 2017-03-08 PROCEDURE — 65270000029 HC RM PRIVATE

## 2017-03-08 PROCEDURE — 97116 GAIT TRAINING THERAPY: CPT

## 2017-03-08 PROCEDURE — 74011250637 HC RX REV CODE- 250/637: Performed by: INTERNAL MEDICINE

## 2017-03-08 RX ORDER — DIAZEPAM 2 MG/1
2 TABLET ORAL 3 TIMES DAILY
Status: DISCONTINUED | OUTPATIENT
Start: 2017-03-08 | End: 2017-03-10 | Stop reason: HOSPADM

## 2017-03-08 RX ADMIN — CLONAZEPAM 1 MG: 1 TABLET ORAL at 10:08

## 2017-03-08 RX ADMIN — DIAZEPAM 2 MG: 2 TABLET ORAL at 22:35

## 2017-03-08 RX ADMIN — HEPARIN SODIUM 5000 UNITS: 5000 INJECTION, SOLUTION INTRAVENOUS; SUBCUTANEOUS at 10:09

## 2017-03-08 RX ADMIN — HYDROXYZINE HYDROCHLORIDE 25 MG: 25 TABLET, FILM COATED ORAL at 10:08

## 2017-03-08 RX ADMIN — INSULIN LISPRO 3 UNITS: 100 INJECTION, SOLUTION INTRAVENOUS; SUBCUTANEOUS at 22:44

## 2017-03-08 RX ADMIN — ASPIRIN 325 MG ORAL TABLET 325 MG: 325 PILL ORAL at 10:08

## 2017-03-08 RX ADMIN — DIAZEPAM 2 MG: 2 TABLET ORAL at 17:11

## 2017-03-08 RX ADMIN — FLUOXETINE 20 MG: 20 CAPSULE ORAL at 10:08

## 2017-03-08 RX ADMIN — Medication 400 MG: at 10:08

## 2017-03-08 RX ADMIN — MECLIZINE 25 MG: 25 TABLET ORAL at 17:11

## 2017-03-08 RX ADMIN — HEPARIN SODIUM 5000 UNITS: 5000 INJECTION, SOLUTION INTRAVENOUS; SUBCUTANEOUS at 03:18

## 2017-03-08 RX ADMIN — HYDROCHLOROTHIAZIDE 12.5 MG: 25 TABLET ORAL at 10:08

## 2017-03-08 RX ADMIN — VALSARTAN 160 MG: 160 TABLET ORAL at 10:08

## 2017-03-08 RX ADMIN — MECLIZINE 25 MG: 25 TABLET ORAL at 22:35

## 2017-03-08 RX ADMIN — AMLODIPINE BESYLATE 5 MG: 5 TABLET ORAL at 10:08

## 2017-03-08 RX ADMIN — DIAZEPAM 2 MG: 2 TABLET ORAL at 10:08

## 2017-03-08 RX ADMIN — HYDROXYZINE HYDROCHLORIDE 25 MG: 25 TABLET, FILM COATED ORAL at 17:11

## 2017-03-08 RX ADMIN — INSULIN LISPRO 2 UNITS: 100 INJECTION, SOLUTION INTRAVENOUS; SUBCUTANEOUS at 12:39

## 2017-03-08 RX ADMIN — Medication 10 ML: at 12:40

## 2017-03-08 RX ADMIN — Medication 400 MG: at 17:11

## 2017-03-08 RX ADMIN — Medication 10 ML: at 22:36

## 2017-03-08 RX ADMIN — INSULIN LISPRO 3 UNITS: 100 INJECTION, SOLUTION INTRAVENOUS; SUBCUTANEOUS at 08:36

## 2017-03-08 RX ADMIN — CLONAZEPAM 1 MG: 1 TABLET ORAL at 17:11

## 2017-03-08 RX ADMIN — ATORVASTATIN CALCIUM 20 MG: 20 TABLET, FILM COATED ORAL at 22:36

## 2017-03-08 RX ADMIN — MECLIZINE 25 MG: 25 TABLET ORAL at 10:08

## 2017-03-08 RX ADMIN — INSULIN LISPRO 2 UNITS: 100 INJECTION, SOLUTION INTRAVENOUS; SUBCUTANEOUS at 17:11

## 2017-03-08 RX ADMIN — Medication 10 ML: at 08:36

## 2017-03-08 RX ADMIN — HEPARIN SODIUM 5000 UNITS: 5000 INJECTION, SOLUTION INTRAVENOUS; SUBCUTANEOUS at 17:11

## 2017-03-08 RX ADMIN — METOPROLOL SUCCINATE 50 MG: 50 TABLET, EXTENDED RELEASE ORAL at 10:08

## 2017-03-08 RX ADMIN — LISINOPRIL 20 MG: 20 TABLET ORAL at 10:08

## 2017-03-08 NOTE — PROGRESS NOTES
Problem: Mobility Impaired (Adult and Pediatric)  Goal: *Acute Goals and Plan of Care (Insert Text)  Physical Therapy Goals  Initiated 3/7/2017  1. Patient will move from supine to sit and sit to supine in bed with modified independence within 7 day(s). 2. Patient will transfer from bed to chair and chair to bed with modified independence using the least restrictive device within 7 day(s). 3. Patient will perform sit to stand with modified independence within 7 day(s). 4. Patient will ambulate with modified independence for 100 feet with the least restrictive device within 7 day(s). 5. Patient will ascend/descend 7 stairs with 1 handrail(s) with modified independence within 7 day(s). PHYSICAL THERAPY TREATMENT  Patient: Sharona Coats (89 y.o. female)  Date: 3/8/2017  Diagnosis: cva vs vertigo  Near syncope Near syncope       Precautions: Fall      ASSESSMENT:  Patient making some progress, but remains limited by symptomatic vertigo/dizziness with activity that appears somewhat positional though patient inconsistent with reporting. Able to perform eye testing noted below with minute R sided gaze evoked nystagmus noted which may correlate with continued c/o tinnitus on R ear. She c/o 10/10 on 0-10 scale of vertigo with first sitting that improved over gait activity detailed further below. Patient educated in length regarding vestibular system, vertigo, Meclizine after questions posed to author. Recommend stair training next session in preparation to return home with vestibular OP PT ideal.      Progression toward goals:  [ ]    Improving appropriately and progressing toward goals  [X]    Improving slowly and progressing toward goals  [ ]    Not making progress toward goals and plan of care will be adjusted       PLAN:   Patient continues to benefit from skilled intervention to address the above impairments. Continue treatment per established plan of care.   Discharge Recommendations:  Outpatient Vestibular PT   Further Equipment Recommendations for Discharge:  none       SUBJECTIVE:   Patient stated socks.       OBJECTIVE DATA SUMMARY:   Critical Behavior:  Neurologic State: Alert  Orientation Level: Oriented X4  Cognition: Appropriate decision making, Appropriate for age attention/concentration, Appropriate safety awareness, Follows commands  Safety/Judgement: Awareness of environment, Insight into deficits, Good awareness of safety precautions  Functional Mobility Training:  Bed Mobility:  Rolling: Independent  Supine to Sit: Independent     Scooting: Independent        Transfers:  Sit to Stand: Independent  Stand to Sit: Independent                             Balance:  Sitting: Intact; Without support  Standing: Impaired; With support (of Q cane)  Standing - Static: Good  Standing - Dynamic : Good (no overt instability but mild )  Ambulation/Gait Training:     Assistive Device: Cane, quad;Gait belt           Gait Abnormalities: Decreased step clearance; Hemiplegic        Base of Support: Widened  Stance: Right decreased  Speed/Sneha: Pace decreased (<100 feet/min)  Step Length: Right shortened                 Expected R heath-plegic gait pattern noted, with good utilization of quad cane but at times L lateral instability and contact with obstacles in path. Neuro Re-Education:  Educated patient in length after questions over vertigo and its origins, verbally and visually using VOR. Educated on effects of Antivert on masking nystagmus. Nystagmus testing negative for spontaneous nystagmus, but with gaze-evoked testing appeared to have very mild R beating nystagmus which may be masked 2/2 Antivert. Upon further testing on R visual fields, noted random nystagmus thus may be insignificant. She did not tolerate head-shake testing with tightness in cervical musculature with no nystagmus noted.       Pain:  Pain Scale 1: Numeric (0 - 10)  Pain Intensity 1: 0              Activity Tolerance: Reports 10/10 vertigo, in addition to dizziness from supine>sitting, that improved over activity. Did not visually appear to have 10/10 vertigo. Please refer to the flowsheet for vital signs taken during this treatment.   After treatment:   [X]    Patient left in no apparent distress sitting up in chair  [ ]    Patient left in no apparent distress in bed  [X]    Call bell left within reach  [X]    Nursing notified  [ ]    Caregiver present  [ ]    Bed alarm activated      COMMUNICATION/COLLABORATION:   The patients plan of care was discussed with: Registered Nurse     Sabas Howard, PT, DPT    Time Calculation: 30 mins

## 2017-03-08 NOTE — PROGRESS NOTES
NEUROLOGY CONSULTATION NOTE       DATE OF CONSULTATION: 3/8/2017    CONSULTED BY: Oralia Sanchez MD    Reason for Consult  I have been asked to see the patient in neurological consultation to render advice and opinion regarding stroke    Subjective  Pt complains of both vertigo like symptoms and also of lightheadedness. HISTORY OF PRESENT ILLNESS  Argelia Hoskins is a 47 y.o. female who presents to the hospital because of vertigo. The patient states that the dizziness started on Saturday and it can happen multiple times a day and each episode lasts for close to 2 minutes. She describes it as a room spinning sensation. It is exacerbated by movement and she does also have decreased hearing in the right ear along with tinnitus. She did have MRI of the brain in the hospital and it was negative for a stroke. No new focal neurological deficits. She does have a history of stroke in the past.  Risk factors to include hypertension, hyperlipidemia and diabetes. ROS  A ten system review of constitutional, cardiovascular, respiratory, musculoskeletal, endocrine, skin, SHEENT, genitourinary, psychiatric and neurologic systems was obtained and is unremarkable except as stated in HPI     PHYSICAL EXAM  EXAMINATION:   Patient Vitals for the past 24 hrs:   Temp Pulse Resp BP SpO2   03/08/17 0707 98.7 °F (37.1 °C) - 16 137/74 95 %   03/08/17 0416 98 °F (36.7 °C) 66 18 134/65 94 %   03/08/17 0042 98 °F (36.7 °C) 70 14 134/77 97 %   03/07/17 2036 - - - (!) 156/96 -   03/07/17 2031 98.3 °F (36.8 °C) 78 18 (!) 161/107 96 %   03/07/17 1159 97.8 °F (36.6 °C) 85 18 (!) 176/97 95 %        General:   General appearance: Pt is in no acute distress   Distal pulses are preserved    Neurological Examination:   Mental Status:  AAO x3. Speech is slow. Follows commands, has normal fund of knowledge, attention, short term recall, comprehension and insight. Cranial Nerves: Visual fields are full.  PERRL, Extraocular movements are full. Facial sensation intact V1- V3. Facial movement intact, symmetric. Hearing intact to conversation. Palate elevates symmetrically. Shoulder shrug symmetric. Tongue midline. Motor: Strength is 5/5 on the left side and 0 out of 5 on the right. Normal tone. No atrophy. Sensation: Normal to light touch    Coordination/Cerebellar: Intact to finger-nose-finger on the left. Cannot perform on the right    Gait: Deferred    Skin: No significant bruising or lacerations. LAB DATA REVIEWED:    Results for orders placed or performed during the hospital encounter of 03/06/17   URINALYSIS W/ REFLEX CULTURE   Result Value Ref Range    Color YELLOW/STRAW      Appearance CLOUDY (A) CLEAR      Specific gravity 1.018 1.003 - 1.030      pH (UA) 7.0 5.0 - 8.0      Protein NEGATIVE  NEG mg/dL    Glucose NEGATIVE  NEG mg/dL    Ketone NEGATIVE  NEG mg/dL    Bilirubin NEGATIVE  NEG      Blood NEGATIVE  NEG      Urobilinogen 0.2 0.2 - 1.0 EU/dL    Nitrites NEGATIVE  NEG      Leukocyte Esterase SMALL (A) NEG      WBC 0-4 0 - 4 /hpf    RBC 0-5 0 - 5 /hpf    Epithelial cells FEW FEW /lpf    Bacteria NEGATIVE  NEG /hpf    UA:UC IF INDICATED CULTURE NOT INDICATED BY UA RESULT CNI      Hyaline cast 0-2 0 - 5 /lpf   CBC WITH AUTOMATED DIFF   Result Value Ref Range    WBC 5.8 3.6 - 11.0 K/uL    RBC 4.15 3.80 - 5.20 M/uL    HGB 11.0 (L) 11.5 - 16.0 g/dL    HCT 35.6 35.0 - 47.0 %    MCV 85.8 80.0 - 99.0 FL    MCH 26.5 26.0 - 34.0 PG    MCHC 30.9 30.0 - 36.5 g/dL    RDW 14.0 11.5 - 14.5 %    PLATELET 066 695 - 346 K/uL    NEUTROPHILS 46 32 - 75 %    LYMPHOCYTES 43 12 - 49 %    MONOCYTES 7 5 - 13 %    EOSINOPHILS 4 0 - 7 %    BASOPHILS 0 0 - 1 %    ABS. NEUTROPHILS 2.7 1.8 - 8.0 K/UL    ABS. LYMPHOCYTES 2.5 0.8 - 3.5 K/UL    ABS. MONOCYTES 0.4 0.0 - 1.0 K/UL    ABS. EOSINOPHILS 0.2 0.0 - 0.4 K/UL    ABS.  BASOPHILS 0.0 0.0 - 0.1 K/UL   METABOLIC PANEL, COMPREHENSIVE   Result Value Ref Range    Sodium 144 136 - 145 mmol/L Potassium 4.0 3.5 - 5.1 mmol/L    Chloride 105 97 - 108 mmol/L    CO2 27 21 - 32 mmol/L    Anion gap 12 5 - 15 mmol/L    Glucose 90 65 - 100 mg/dL    BUN 17 6 - 20 MG/DL    Creatinine 1.01 0.55 - 1.02 MG/DL    BUN/Creatinine ratio 17 12 - 20      GFR est AA >60 >60 ml/min/1.73m2    GFR est non-AA 57 (L) >60 ml/min/1.73m2    Calcium 8.9 8.5 - 10.1 MG/DL    Bilirubin, total 0.3 0.2 - 1.0 MG/DL    ALT (SGPT) 34 12 - 78 U/L    AST (SGOT) 28 15 - 37 U/L    Alk. phosphatase 81 45 - 117 U/L    Protein, total 8.0 6.4 - 8.2 g/dL    Albumin 3.3 (L) 3.5 - 5.0 g/dL    Globulin 4.7 (H) 2.0 - 4.0 g/dL    A-G Ratio 0.7 (L) 1.1 - 2.2     CK W/ REFLX CKMB   Result Value Ref Range    CK 64 26 - 192 U/L   TROPONIN I   Result Value Ref Range    Troponin-I, Qt. <0.04 <0.05 ng/mL   MAGNESIUM   Result Value Ref Range    Magnesium 1.6 1.6 - 2.4 mg/dL   LIPASE   Result Value Ref Range    Lipase 98 73 - 393 U/L   LIPID PANEL   Result Value Ref Range    LIPID PROFILE          Cholesterol, total 183 <200 MG/DL    Triglyceride 474 (H) <150 MG/DL    HDL Cholesterol 39 MG/DL    LDL, calculated Not calculated due to elevated triglyceride level 0 - 100 MG/DL    VLDL, calculated  MG/DL     Calculation not valid with this patient's other Lipid values.     CHOL/HDL Ratio 4.7 0 - 5.0     HEMOGLOBIN A1C WITH EAG   Result Value Ref Range    Hemoglobin A1c 6.5 (H) 4.2 - 6.3 %    Est. average glucose 140 mg/dL   SED RATE (ESR)   Result Value Ref Range    Sed rate, automated 65 (H) 0 - 30 mm/hr   CRP, HIGH SENSITIVITY   Result Value Ref Range    CRP, High sensitivity 4.1 mg/L   T3, FREE   Result Value Ref Range    Free Triiodothyronine 2.1 (L) 2.2 - 4.0 pg/mL   T4, FREE   Result Value Ref Range    T4, Free 1.0 0.8 - 1.5 NG/DL   CBC WITH AUTOMATED DIFF   Result Value Ref Range    WBC 6.0 3.6 - 11.0 K/uL    RBC 3.96 3.80 - 5.20 M/uL    HGB 10.9 (L) 11.5 - 16.0 g/dL    HCT 34.2 (L) 35.0 - 47.0 %    MCV 86.4 80.0 - 99.0 FL    MCH 27.5 26.0 - 34.0 PG    MCHC 31.9 30.0 - 36.5 g/dL    RDW 13.9 11.5 - 14.5 %    PLATELET 027 270 - 553 K/uL    NEUTROPHILS 36 32 - 75 %    LYMPHOCYTES 53 (H) 12 - 49 %    MONOCYTES 7 5 - 13 %    EOSINOPHILS 4 0 - 7 %    BASOPHILS 0 0 - 1 %    ABS. NEUTROPHILS 2.2 1.8 - 8.0 K/UL    ABS. LYMPHOCYTES 3.2 0.8 - 3.5 K/UL    ABS. MONOCYTES 0.4 0.0 - 1.0 K/UL    ABS. EOSINOPHILS 0.2 0.0 - 0.4 K/UL    ABS. BASOPHILS 0.0 0.0 - 0.1 K/UL   MAGNESIUM   Result Value Ref Range    Magnesium 1.7 1.6 - 2.4 mg/dL   METABOLIC PANEL, COMPREHENSIVE   Result Value Ref Range    Sodium 141 136 - 145 mmol/L    Potassium 3.7 3.5 - 5.1 mmol/L    Chloride 106 97 - 108 mmol/L    CO2 24 21 - 32 mmol/L    Anion gap 11 5 - 15 mmol/L    Glucose 111 (H) 65 - 100 mg/dL    BUN 12 6 - 20 MG/DL    Creatinine 0.89 0.55 - 1.02 MG/DL    BUN/Creatinine ratio 13 12 - 20      GFR est AA >60 >60 ml/min/1.73m2    GFR est non-AA >60 >60 ml/min/1.73m2    Calcium 8.5 8.5 - 10.1 MG/DL    Bilirubin, total 0.5 0.2 - 1.0 MG/DL    ALT (SGPT) 33 12 - 78 U/L    AST (SGOT) 30 15 - 37 U/L    Alk. phosphatase 76 45 - 117 U/L    Protein, total 7.8 6.4 - 8.2 g/dL    Albumin 3.3 (L) 3.5 - 5.0 g/dL    Globulin 4.5 (H) 2.0 - 4.0 g/dL    A-G Ratio 0.7 (L) 1.1 - 2.2     TSH 3RD GENERATION   Result Value Ref Range    TSH 1.16 0.36 - 3.74 uIU/mL   LDL, DIRECT   Result Value Ref Range    LDL,Direct 90 0 - 100 mg/dl   CBC WITH AUTOMATED DIFF   Result Value Ref Range    WBC 5.5 3.6 - 11.0 K/uL    RBC 4.25 3.80 - 5.20 M/uL    HGB 11.8 11.5 - 16.0 g/dL    HCT 36.5 35.0 - 47.0 %    MCV 85.9 80.0 - 99.0 FL    MCH 27.8 26.0 - 34.0 PG    MCHC 32.3 30.0 - 36.5 g/dL    RDW 13.9 11.5 - 14.5 %    PLATELET 888 149 - 875 K/uL    NEUTROPHILS 37 32 - 75 %    LYMPHOCYTES 51 (H) 12 - 49 %    MONOCYTES 8 5 - 13 %    EOSINOPHILS 4 0 - 7 %    BASOPHILS 0 0 - 1 %    ABS. NEUTROPHILS 2.0 1.8 - 8.0 K/UL    ABS. LYMPHOCYTES 2.8 0.8 - 3.5 K/UL    ABS. MONOCYTES 0.5 0.0 - 1.0 K/UL    ABS. EOSINOPHILS 0.2 0.0 - 0.4 K/UL    ABS.  BASOPHILS 0.0 0.0 - 0.1 K/UL   METABOLIC PANEL, BASIC   Result Value Ref Range    Sodium 139 136 - 145 mmol/L    Potassium 3.9 3.5 - 5.1 mmol/L    Chloride 103 97 - 108 mmol/L    CO2 23 21 - 32 mmol/L    Anion gap 13 5 - 15 mmol/L    Glucose 222 (H) 65 - 100 mg/dL    BUN 16 6 - 20 MG/DL    Creatinine 1.01 0.55 - 1.02 MG/DL    BUN/Creatinine ratio 16 12 - 20      GFR est AA >60 >60 ml/min/1.73m2    GFR est non-AA 57 (L) >60 ml/min/1.73m2    Calcium 8.7 8.5 - 10.1 MG/DL   GLUCOSE, POC   Result Value Ref Range    Glucose (POC) 71 65 - 100 mg/dL    Performed by [employer] \"Susanna\"*    GLUCOSE, POC   Result Value Ref Range    Glucose (POC) 82 65 - 100 mg/dL    Performed by [employer] \"Susanna\"*    GLUCOSE, POC   Result Value Ref Range    Glucose (POC) 129 (H) 65 - 100 mg/dL    Performed by Julieta Alex    GLUCOSE, POC   Result Value Ref Range    Glucose (POC) 203 (H) 65 - 100 mg/dL    Performed by Rebecca Berg    GLUCOSE, POC   Result Value Ref Range    Glucose (POC) 187 (H) 65 - 100 mg/dL    Performed by Rebecca Berg    GLUCOSE, POC   Result Value Ref Range    Glucose (POC) 249 (H) 65 - 100 mg/dL    Performed by Jo-Ann Lantigua    GLUCOSE, POC   Result Value Ref Range    Glucose (POC) 225 (H) 65 - 100 mg/dL    Performed by Julieta Alex    EKG, 12 LEAD, INITIAL   Result Value Ref Range    Ventricular Rate 69 BPM    Atrial Rate 69 BPM    P-R Interval 160 ms    QRS Duration 72 ms    Q-T Interval 410 ms    QTC Calculation (Bezet) 439 ms    Calculated P Axis 47 degrees    Calculated R Axis 0 degrees    Calculated T Axis -15 degrees    Diagnosis       Normal sinus rhythm  Normal ECG  Confirmed by Tim Bangura ([id number]) on 3/7/2017 10:51:52 AM          Imaging review:  See below. Stroke workup  EKG  Normal sinus rhythm   Normal ECG     MRI Brain:   Chronic encephalomalacia in the white matter adjacent to the left  lateral ventricle. No evidence of acute process. Carotids:   1.  No evidence of significant arterial occlusive disease in the  internal carotid arteries. 2. No significant stenosis in the external carotid arteries  bilaterally. 3. Antegrade flow in both vertebral arteries. 4. Normal flow in both subclavian arteries. TTE:   Size was normal. Systolic function was normal. Ejection  fraction was estimated to be 55 %. There were no regional wall motion  abnormalities. Wall thickness was normal.    Stroke labs:  HgBA1c    Lab Results   Component Value Date/Time    Hemoglobin A1c 6.5 03/07/2017 05:25 AM     LDL   Lab Results   Component Value Date/Time    LDL,Direct 90 03/07/2017 05:25 AM    LDL, calculated Not calculated due to elevated triglyceride level 03/07/2017 05:25 AM       HOME MEDS  Prior to Admission Medications   Prescriptions Last Dose Informant Patient Reported? Taking? FERROUS SULFATE, DRIED (IRON, DRIED, PO) 3/6/2017 at Unknown time  Yes Yes   Sig: Take 65 mg by mouth daily. FLUoxetine (PROZAC) 20 mg capsule 3/6/2017 at Unknown time  Yes Yes   Sig: Take 20 mg by mouth daily. POTASSIUM CHLORIDE (KLOR-CON M20 PO) 3/6/2017 at Unknown time  Yes Yes   Sig: Take 2 Tabs by mouth two (2) times a day. albuterol (PROAIR HFA) 90 mcg/actuation inhaler Unknown at Unknown time  No No   Sig: Take 2 Puffs by inhalation every four (4) hours as needed for Wheezing. amLODIPine (NORVASC) 5 mg tablet 3/6/2017 at Unknown time  Yes Yes   Sig: Take 5 mg by mouth daily. ascorbic acid (VITAMIN C) 250 mg tablet 3/6/2017 at Unknown time  Yes Yes   Sig: Take  by mouth. aspirin delayed-release 325 mg tablet 3/6/2017 at Unknown time  Yes Yes   Sig: Take 325 mg by mouth every six (6) hours as needed for Pain. cholecalciferol (VITAMIN D3) 1,000 unit cap 3/6/2017 at Unknown time  Yes Yes   Sig: Take 2,000 Units by mouth daily. clonazePAM (KLONOPIN) 1 mg tablet 3/6/2017 at Unknown time  Yes Yes   Sig: Take 1 mg by mouth two (2) times a day.    insulin glargine (LANTUS) 100 unit/mL injection 3/5/2017 at Unknown time  Yes Yes   Si Units by SubCUTAneous route nightly. Indications: at bedtime   lisinopril-hydrochlorothiazide (PRINZIDE, ZESTORETIC) 20-12.5 mg per tablet 3/6/2017 at Unknown time  Yes Yes   Sig: Take  by mouth two (2) times a day. losartan (COZAAR) 100 mg tablet 3/6/2017 at Unknown time  Yes Yes   Sig: Take 100 mg by mouth daily. magnesium oxide (MAG-OX) 400 mg tablet 3/6/2017 at Unknown time  Yes Yes   Sig: Take 400 mg by mouth two (2) times a day. metformin (GLUCOPHAGE) 500 mg tablet 3/6/2017 at Unknown time  Yes Yes   Sig: Take 500 mg by mouth two (2) times daily (with meals). metoprolol succinate (TOPROL-XL) 50 mg XL tablet 3/6/2017 at Unknown time  Yes Yes   Sig: Take 50 mg by mouth daily. rosuvastatin (CRESTOR) 10 mg tablet 3/5/2017 at Unknown time  Yes Yes   Sig: Take 10 mg by mouth nightly.       Facility-Administered Medications: None       CURRENT MEDS  Current Facility-Administered Medications   Medication Dose Route Frequency    diazePAM (VALIUM) tablet 2 mg  2 mg Oral TID    amLODIPine (NORVASC) tablet 5 mg  5 mg Oral DAILY    clonazePAM (KlonoPIN) tablet 1 mg  1 mg Oral BID    FLUoxetine (PROzac) capsule 20 mg  20 mg Oral DAILY    valsartan (DIOVAN) tablet 160 mg  160 mg Oral DAILY    magnesium oxide (MAG-OX) tablet 400 mg  400 mg Oral BID    metoprolol succinate (TOPROL-XL) XL tablet 50 mg  50 mg Oral DAILY    atorvastatin (LIPITOR) tablet 20 mg  20 mg Oral QHS    meclizine (ANTIVERT) tablet 25 mg  25 mg Oral TID    hydrOXYzine HCl (ATARAX) tablet 25 mg  25 mg Oral BID    0.9% sodium chloride infusion  50 mL/hr IntraVENous CONTINUOUS    insulin lispro (HUMALOG) injection   SubCUTAneous AC&HS    sodium chloride (NS) flush 5-10 mL  5-10 mL IntraVENous Q8H    aspirin (ASPIRIN) tablet 325 mg  325 mg Oral DAILY    heparin (porcine) injection 5,000 Units  5,000 Units SubCUTAneous Q8H    lisinopril (PRINIVIL, ZESTRIL) tablet 20 mg  20 mg Oral DAILY    hydroCHLOROthiazide (HYDRODIURIL) tablet 12.5 mg  12.5 mg Oral DAILY       IMPRESSION:  Patient Active Problem List   Diagnosis Code    Hyperlipidemia E78.5    Anemia D64.9    Chronic anticoagulation Z79.01    DM (diabetes mellitus), type 2, uncontrolled ([facility] [address] E11.65    HTN (hypertension) I10    Nausea & vomiting R11.2    Unspecified constipation K59.00    Urinary retention R33.9    Abdominal pain, acute, bilateral lower quadrant R10.31, R10.32    History of CVA (cerebrovascular accident) Z86.73    Coumadin toxicity T45.511A    Ovarian cyst N83.209    Pelvic mass R19.00    Vaginal bleeding N93.9    Abnormal mammogram with microcalcification R92.0    Vertigo R42    Near syncope R55    Stenosis of both internal carotid arteries I65.23       Tania Bryan is a 47 y.o. female who presents with vertigo. It is episodic and in addition to that she does have tinnitus and hearing loss in the right ear. I do suspect a vestibular etiology. In addition to this, she is c/o lightheadedness when standing up. Plan to get orthostatic blood pressure and then outpatient tilt table study. I do recommend vestibular rehabilitation as an outpatient and an outpatient consult with ENT for her vestibular symptoms. RECOMMENDATIONS:  - Outpatient vestibular rehabilitation  - Outpatient consultation with ENT  - Tilt table study   - Meclizine as needed for vertigo  - Orthostatics  - MRI brain w/o C - No acute findings. - Carotid US - normal  - TTE - normal  - Telemetry  - BP goal is less than 140/90  - Stroke labs (HgbA1c, TSH, lipid panel)  - Continue  mg daily  -Patient takes rosuvastatin 10 mg a day at home.   Increase it to 20 mg a day as LDL is more than 70.  - ST/OT/PT janell Saravia MD  Diplomate, American Board of Psychiatry & Neurology (Neurology)  [place] Board of Psychiatry & Neurology (Clinical Neurophysiology)

## 2017-03-08 NOTE — ROUTINE PROCESS
..  Bedside and Verbal shift change report given to Reinaldo (oncoming nurse) by Escobar Stephens (offgoing nurse). Report included the following information SBAR, Kardex, Recent Results and Cardiac Rhythm NSR.     Zone Phone: [phone]        Significant changes during shift:  none           Patient Information     Henrry Campos  47 y.o.  3/6/2017 12:41 PM by Lemuel Vinson MD. Henrry Campos was admitted from Home     Problem List          Patient Active Problem List     Diagnosis Date Noted    Near syncope 03/07/2017    Vertigo 03/06/2017    Abnormal mammogram with microcalcification 06/04/2013    Vaginal bleeding 11/26/2012    Ovarian cyst 04/27/2012    Pelvic mass 04/27/2012    DM (diabetes mellitus), type 2, uncontrolled ([address] 07/06/2011    HTN (hypertension) 07/06/2011    Nausea & vomiting 07/06/2011    Unspecified constipation 07/06/2011    Urinary retention 07/06/2011    Abdominal pain, acute, bilateral lower quadrant 07/06/2011    History of CVA (cerebrovascular accident) 07/06/2011    Coumadin toxicity 07/06/2011    Chronic anticoagulation 06/26/2011    Hyperlipidemia 05/25/2010    Anemia 05/25/2010           Past Medical History:   Diagnosis Date    Abnormal mammogram with microcalcification 6/4/2013     Right UOQ    Aphasia due to stroke ([address]       SPEAKS SOME    Arthritis       back    Diabetes ([address]       TYPE 2; IDDM    Hypertension      Psychiatric disorder       depression    Stroke ([address] 5/11/2010     RIGHT SIDE WEAKNESS            Core Measures:     CVA: Yes Yes         Post Op Surgical (If Applicable):          Activity Status:     OOB to Chair No  Ambulated this shift No   Bed Rest Yes     Supplemental O2: (If Applicable)     NC No         LINES AND DRAINS: PIV 20g left ac     yes DVT prophylaxis:     DVT prophylaxis Med- Yes  DVT prophylaxis SCD or LOPEZ- Yes          Patient Safety:     Falls Score Total Score: 3  Safety Level_______  Bed Alarm On? No  Sitter? No     Plan for upcoming shift: PT< OT Neuro Consult        Discharge Plan: Yes After CM sees pt     Active Consults:  IP CONSULT TO NEUROLOGY  IP CONSULT TO PRIMARY CARE PROVIDER

## 2017-03-08 NOTE — PROGRESS NOTES
PROGRESS NOTE    NAME:  Angie Rand   :   1965   MRN:   [id number]     Date/Time:  3/8/2017 7:31 AM  Subjective:   History:  Chart reviewed and patient seen and examined this AM and D/W her nurse, and her  this AM and all events noted. She was admitted on 3/6 after a 2 day history of dizziness with near syncope. She remains dizzy this AM and intermittently feels as if she is going to pass out. She clearly states to me that she sometimes gets dizzy with no movement as she is currently this AM. She denies CP, Palpitations, SOB or other cardio/respiratory c/o. There are no GI/ c/o. There are no new neurologic c/o as she has the chronic R hemiplegia from her prior CVA. There are no other c/o on complete 14 point ROS.       Medications reviewed:  Current Facility-Administered Medications   Medication Dose Route Frequency    amLODIPine (NORVASC) tablet 5 mg  5 mg Oral DAILY    clonazePAM (KlonoPIN) tablet 1 mg  1 mg Oral BID    FLUoxetine (PROzac) capsule 20 mg  20 mg Oral DAILY    valsartan (DIOVAN) tablet 160 mg  160 mg Oral DAILY    magnesium oxide (MAG-OX) tablet 400 mg  400 mg Oral BID    metoprolol succinate (TOPROL-XL) XL tablet 50 mg  50 mg Oral DAILY    atorvastatin (LIPITOR) tablet 20 mg  20 mg Oral QHS    albuterol-ipratropium (DUO-NEB) 2.5 MG-0.5 MG/3 ML  3 mL Nebulization Q6H PRN    meclizine (ANTIVERT) tablet 25 mg  25 mg Oral TID    hydrOXYzine HCl (ATARAX) tablet 25 mg  25 mg Oral BID    acetaminophen (TYLENOL) tablet 650 mg  650 mg Oral Q4H PRN    0.9% sodium chloride infusion  50 mL/hr IntraVENous CONTINUOUS    hydrALAZINE (APRESOLINE) 20 mg/mL injection 10 mg  10 mg IntraVENous Q6H PRN    oxyCODONE-acetaminophen (PERCOCET) 5-325 mg per tablet 1 Tab  1 Tab Oral Q4H PRN    morphine injection 2 mg  2 mg IntraVENous Q4H PRN    ondansetron (ZOFRAN) injection 4 mg  4 mg IntraVENous Q6H PRN    insulin lispro (HUMALOG) injection   SubCUTAneous AC&HS    glucose chewable tablet 16 g  4 Tab Oral PRN    dextrose (D50W) injection syrg 12.5-25 g  12.5-25 g IntraVENous PRN    glucagon (GLUCAGEN) injection 1 mg  1 mg IntraMUSCular PRN    sodium chloride (NS) flush 5-10 mL  5-10 mL IntraVENous Q8H    sodium chloride (NS) flush 5-10 mL  5-10 mL IntraVENous PRN    aspirin (ASPIRIN) tablet 325 mg  325 mg Oral DAILY    heparin (porcine) injection 5,000 Units  5,000 Units SubCUTAneous Q8H    lisinopril (PRINIVIL, ZESTRIL) tablet 20 mg  20 mg Oral DAILY    hydroCHLOROthiazide (HYDRODIURIL) tablet 12.5 mg  12.5 mg Oral DAILY        Objective:   Vitals:  Visit Vitals    /74 (BP 1 Location: Left arm, BP Patient Position: At rest)    Pulse 66    Temp 98.7 °F (37.1 °C)    Resp 16    Ht 5' 4\" (1.626 m)    Wt 108.9 kg (240 lb)  Comment: spouse informed me    LMP 2011    SpO2 95%    Breastfeeding No    BMI 41.2 kg/m2      O2 Device: Room air Temp (24hrs), Av.1 °F (36.7 °C), Min:97.5 °F (36.4 °C), Max:98.7 °F (37.1 °C)      Last 24hr Input/Output:    Intake/Output Summary (Last 24 hours) at 17 0730  Last data filed at 17 0739   Gross per 24 hour   Intake           786.67 ml   Output              400 ml   Net           386.67 ml        PHYSICAL EXAM:  General:     Alert, cooperative, no distress, appears stated age. Head:    Normocephalic, without obvious abnormality, atraumatic. Eyes:    Conjunctivae/corneas clear. PERRLA No nystagmus  Nose:   Nares normal. No drainage or sinus tenderness. Throat:     Lips, mucosa, and tongue normal.  No Thrush  Neck:   Supple, symmetrical,  no adenopathy, thyroid: non tender     no carotid bruit and no JVD. Back:     Symmetric,  No CVA tenderness. Lungs:    Clear to auscultation bilaterally. No Wheezing or Rhonchi. No rales. Heart:    Regular rate and rhythm,  no murmur, rub or gallop. Abdomen:    Soft, non-tender. Not distended.   Bowel sounds normal. No masses  Extremities:  Extremities normal, atraumatic, No cyanosis. No edema. No clubbing  Lymph nodes:  Cervical, supraclavicular normal.  Neurologic:  R hemiplegia chronic, Alert and oriented X 3. Dysarthria w/o change  Skin:                 No rash      Lab Data Reviewed:    Recent Results (from the past 24 hour(s))   GLUCOSE, POC    Collection Time: 03/07/17 11:46 AM   Result Value Ref Range    Glucose (POC) 203 (H) 65 - 100 mg/dL    Performed by [employer], POC    Collection Time: 03/07/17  4:32 PM   Result Value Ref Range    Glucose (POC) 187 (H) 65 - 100 mg/dL    Performed by [employer], POC    Collection Time: 03/07/17  9:50 PM   Result Value Ref Range    Glucose (POC) 249 (H) 65 - 100 mg/dL    Performed by Karina Pack    CBC WITH AUTOMATED DIFF    Collection Time: 03/08/17  5:52 AM   Result Value Ref Range    WBC 5.5 3.6 - 11.0 K/uL    RBC 4.25 3.80 - 5.20 M/uL    HGB 11.8 11.5 - 16.0 g/dL    HCT 36.5 35.0 - 47.0 %    MCV 85.9 80.0 - 99.0 FL    MCH 27.8 26.0 - 34.0 PG    MCHC 32.3 30.0 - 36.5 g/dL    RDW 13.9 11.5 - 14.5 %    PLATELET 476 275 - 330 K/uL    NEUTROPHILS 37 32 - 75 %    LYMPHOCYTES 51 (H) 12 - 49 %    MONOCYTES 8 5 - 13 %    EOSINOPHILS 4 0 - 7 %    BASOPHILS 0 0 - 1 %    ABS. NEUTROPHILS 2.0 1.8 - 8.0 K/UL    ABS. LYMPHOCYTES 2.8 0.8 - 3.5 K/UL    ABS. MONOCYTES 0.5 0.0 - 1.0 K/UL    ABS. EOSINOPHILS 0.2 0.0 - 0.4 K/UL    ABS.  BASOPHILS 0.0 0.0 - 0.1 K/UL   METABOLIC PANEL, BASIC    Collection Time: 03/08/17  5:52 AM   Result Value Ref Range    Sodium 139 136 - 145 mmol/L    Potassium 3.9 3.5 - 5.1 mmol/L    Chloride 103 97 - 108 mmol/L    CO2 23 21 - 32 mmol/L    Anion gap 13 5 - 15 mmol/L    Glucose 222 (H) 65 - 100 mg/dL    BUN 16 6 - 20 MG/DL    Creatinine 1.01 0.55 - 1.02 MG/DL    BUN/Creatinine ratio 16 12 - 20      GFR est AA >60 >60 ml/min/1.73m2    GFR est non-AA 57 (L) >60 ml/min/1.73m2    Calcium 8.7 8.5 - 10.1 MG/DL   GLUCOSE, POC    Collection Time: 03/08/17  6:36 AM   Result Value Ref Range Glucose (POC) 225 (H) 65 - 100 mg/dL    Performed by [employer]          Assessment/Plan:     Principal Problem:    Near syncope (3/7/2017)    Active Problems:    Hyperlipidemia (5/25/2010)      DM (diabetes mellitus), type 2, uncontrolled ([address] (7/6/2011)      HTN (hypertension) (7/6/2011)      History of CVA (cerebrovascular accident) (7/6/2011)      Vertigo (3/6/2017)      Stenosis of both internal carotid arteries (3/7/2017)       ___________________________________________________  PLAN:    1. Continue Telemetry, so far NSR  2. MRI Head and CT w/o acute process there is encephalomalacia   3. Continue  QD  4. Continue Antivert although I don't believe this is simple labyrinthitis, will add low dose Valium  5. Holding Diabetic meds and follow BS and treat with SSI  6. Continue Meds for HTN and follow BP closely  7. Neurology Consult reviewed and appreciated although I'm not sure simply Labyrinthitis  8.   Carotid Dopplers < 50%          ___________________________________________________    Attending Physician: Mark Anthony Richard MD

## 2017-03-08 NOTE — ROUTINE PROCESS
Bedside and Verbal shift change report given to Natalio David Chau RN (oncoming nurse) by Oleg Scott RN (offgoing nurse). Report included the following information SBAR, Kardex, Recent Results and Cardiac Rhythm NSR.       Zone Phone: [phone]          Significant changes during shift: none              Patient Information      Reynaldo Shin  47 y.o.  3/6/2017 12:41 PM by Zach Borden MD. Reynaldo Shin was admitted from [facility]  Problem List              Patient Active Problem List      Diagnosis Date Noted    Near syncope 03/07/2017    Vertigo 03/06/2017    Abnormal mammogram with microcalcification 06/04/2013    Vaginal bleeding 11/26/2012    Ovarian cyst 04/27/2012    Pelvic mass 04/27/2012    DM (diabetes mellitus), type 2, uncontrolled ([address] 07/06/2011    HTN (hypertension) 07/06/2011    Nausea & vomiting 07/06/2011    Unspecified constipation 07/06/2011    Urinary retention 07/06/2011    Abdominal pain, acute, bilateral lower quadrant 07/06/2011    History of CVA (cerebrovascular accident) 07/06/2011    Coumadin toxicity 07/06/2011    Chronic anticoagulation 06/26/2011    Hyperlipidemia 05/25/2010    Anemia 05/25/2010               Past Medical History:   Diagnosis Date    Abnormal mammogram with microcalcification 6/4/2013      Right UOQ    Aphasia due to stroke ([address]         SPEAKS SOME    Arthritis         back    Diabetes ([address]         TYPE 2; IDDM    Hypertension       Psychiatric disorder         depression    Stroke ([address] 5/11/2010      RIGHT SIDE WEAKNESS               Core Measures:      CVA: Yes Yes          Post Op Surgical (If Applicable):           Activity Status:      OOB to Chair No  Ambulated this shift No   Bed Rest Yes      Supplemental O2: (If Applicable)      NC No          LINES AND DRAINS: PIV 20g left ac      yes DVT prophylaxis:      DVT prophylaxis Med- Yes  DVT prophylaxis SCD or LOPEZ- Yes           Patient Safety:      Falls Score Total Score: 3  Safety Level_______  Bed Alarm On? No  Sitter?  No      Plan for upcoming shift: Safety, Meds, Discharge planning          Discharge Plan: Yes After CM sees pt      Active Consults:  IP CONSULT TO NEUROLOGY  IP CONSULT TO PRIMARY CARE PROVIDER

## 2017-03-09 LAB
ANION GAP BLD CALC-SCNC: 11 MMOL/L (ref 5–15)
BASOPHILS # BLD AUTO: 0 K/UL (ref 0–0.1)
BASOPHILS # BLD: 0 % (ref 0–1)
BUN SERPL-MCNC: 13 MG/DL (ref 6–20)
BUN/CREAT SERPL: 14 (ref 12–20)
CALCIUM SERPL-MCNC: 8.3 MG/DL (ref 8.5–10.1)
CHLORIDE SERPL-SCNC: 107 MMOL/L (ref 97–108)
CO2 SERPL-SCNC: 21 MMOL/L (ref 21–32)
CREAT SERPL-MCNC: 0.91 MG/DL (ref 0.55–1.02)
EOSINOPHIL # BLD: 0.2 K/UL (ref 0–0.4)
EOSINOPHIL NFR BLD: 4 % (ref 0–7)
ERYTHROCYTE [DISTWIDTH] IN BLOOD BY AUTOMATED COUNT: 13.7 % (ref 11.5–14.5)
GLUCOSE BLD STRIP.AUTO-MCNC: 220 MG/DL (ref 65–100)
GLUCOSE BLD STRIP.AUTO-MCNC: 223 MG/DL (ref 65–100)
GLUCOSE BLD STRIP.AUTO-MCNC: 229 MG/DL (ref 65–100)
GLUCOSE BLD STRIP.AUTO-MCNC: 237 MG/DL (ref 65–100)
GLUCOSE SERPL-MCNC: 184 MG/DL (ref 65–100)
HCT VFR BLD AUTO: 32.2 % (ref 35–47)
HGB BLD-MCNC: 10.3 G/DL (ref 11.5–16)
LYMPHOCYTES # BLD AUTO: 47 % (ref 12–49)
LYMPHOCYTES # BLD: 2.8 K/UL (ref 0.8–3.5)
MCH RBC QN AUTO: 26.8 PG (ref 26–34)
MCHC RBC AUTO-ENTMCNC: 32 G/DL (ref 30–36.5)
MCV RBC AUTO: 83.9 FL (ref 80–99)
MONOCYTES # BLD: 0.6 K/UL (ref 0–1)
MONOCYTES NFR BLD AUTO: 9 % (ref 5–13)
NEUTS SEG # BLD: 2.4 K/UL (ref 1.8–8)
NEUTS SEG NFR BLD AUTO: 40 % (ref 32–75)
PLATELET # BLD AUTO: 208 K/UL (ref 150–400)
POTASSIUM SERPL-SCNC: 3.8 MMOL/L (ref 3.5–5.1)
RBC # BLD AUTO: 3.84 M/UL (ref 3.8–5.2)
SERVICE CMNT-IMP: ABNORMAL
SODIUM SERPL-SCNC: 139 MMOL/L (ref 136–145)
WBC # BLD AUTO: 6.1 K/UL (ref 3.6–11)

## 2017-03-09 PROCEDURE — 74011250636 HC RX REV CODE- 250/636: Performed by: INTERNAL MEDICINE

## 2017-03-09 PROCEDURE — 80048 BASIC METABOLIC PNL TOTAL CA: CPT | Performed by: INTERNAL MEDICINE

## 2017-03-09 PROCEDURE — 74011250637 HC RX REV CODE- 250/637: Performed by: INTERNAL MEDICINE

## 2017-03-09 PROCEDURE — 85025 COMPLETE CBC W/AUTO DIFF WBC: CPT | Performed by: INTERNAL MEDICINE

## 2017-03-09 PROCEDURE — 36415 COLL VENOUS BLD VENIPUNCTURE: CPT | Performed by: INTERNAL MEDICINE

## 2017-03-09 PROCEDURE — 74011636637 HC RX REV CODE- 636/637: Performed by: INTERNAL MEDICINE

## 2017-03-09 PROCEDURE — 65270000029 HC RM PRIVATE

## 2017-03-09 PROCEDURE — 97530 THERAPEUTIC ACTIVITIES: CPT

## 2017-03-09 PROCEDURE — 82962 GLUCOSE BLOOD TEST: CPT

## 2017-03-09 RX ADMIN — FLUOXETINE 20 MG: 20 CAPSULE ORAL at 09:44

## 2017-03-09 RX ADMIN — HYDROXYZINE HYDROCHLORIDE 25 MG: 25 TABLET, FILM COATED ORAL at 09:44

## 2017-03-09 RX ADMIN — DIAZEPAM 2 MG: 2 TABLET ORAL at 21:55

## 2017-03-09 RX ADMIN — MECLIZINE 25 MG: 25 TABLET ORAL at 09:44

## 2017-03-09 RX ADMIN — AMLODIPINE BESYLATE 5 MG: 5 TABLET ORAL at 09:44

## 2017-03-09 RX ADMIN — HEPARIN SODIUM 5000 UNITS: 5000 INJECTION, SOLUTION INTRAVENOUS; SUBCUTANEOUS at 02:05

## 2017-03-09 RX ADMIN — INSULIN LISPRO 2 UNITS: 100 INJECTION, SOLUTION INTRAVENOUS; SUBCUTANEOUS at 21:53

## 2017-03-09 RX ADMIN — INSULIN LISPRO 3 UNITS: 100 INJECTION, SOLUTION INTRAVENOUS; SUBCUTANEOUS at 07:31

## 2017-03-09 RX ADMIN — METOPROLOL SUCCINATE 50 MG: 50 TABLET, EXTENDED RELEASE ORAL at 09:44

## 2017-03-09 RX ADMIN — Medication 10 ML: at 21:55

## 2017-03-09 RX ADMIN — HYDROXYZINE HYDROCHLORIDE 25 MG: 25 TABLET, FILM COATED ORAL at 17:51

## 2017-03-09 RX ADMIN — HEPARIN SODIUM 5000 UNITS: 5000 INJECTION, SOLUTION INTRAVENOUS; SUBCUTANEOUS at 09:42

## 2017-03-09 RX ADMIN — DIAZEPAM 2 MG: 2 TABLET ORAL at 17:51

## 2017-03-09 RX ADMIN — INSULIN LISPRO 3 UNITS: 100 INJECTION, SOLUTION INTRAVENOUS; SUBCUTANEOUS at 13:05

## 2017-03-09 RX ADMIN — ATORVASTATIN CALCIUM 20 MG: 20 TABLET, FILM COATED ORAL at 21:54

## 2017-03-09 RX ADMIN — Medication 400 MG: at 09:44

## 2017-03-09 RX ADMIN — MECLIZINE 25 MG: 25 TABLET ORAL at 17:50

## 2017-03-09 RX ADMIN — Medication 10 ML: at 13:05

## 2017-03-09 RX ADMIN — HEPARIN SODIUM 5000 UNITS: 5000 INJECTION, SOLUTION INTRAVENOUS; SUBCUTANEOUS at 17:51

## 2017-03-09 RX ADMIN — MECLIZINE 25 MG: 25 TABLET ORAL at 21:55

## 2017-03-09 RX ADMIN — CLONAZEPAM 1 MG: 1 TABLET ORAL at 17:50

## 2017-03-09 RX ADMIN — CLONAZEPAM 1 MG: 1 TABLET ORAL at 09:44

## 2017-03-09 RX ADMIN — VALSARTAN 160 MG: 160 TABLET ORAL at 09:44

## 2017-03-09 RX ADMIN — ASPIRIN 325 MG ORAL TABLET 325 MG: 325 PILL ORAL at 09:44

## 2017-03-09 RX ADMIN — Medication 400 MG: at 17:51

## 2017-03-09 RX ADMIN — INSULIN LISPRO 3 UNITS: 100 INJECTION, SOLUTION INTRAVENOUS; SUBCUTANEOUS at 17:51

## 2017-03-09 RX ADMIN — DIAZEPAM 2 MG: 2 TABLET ORAL at 09:44

## 2017-03-09 RX ADMIN — HYDROCHLOROTHIAZIDE 12.5 MG: 25 TABLET ORAL at 09:45

## 2017-03-09 RX ADMIN — LISINOPRIL 20 MG: 20 TABLET ORAL at 09:44

## 2017-03-09 RX ADMIN — Medication 10 ML: at 02:14

## 2017-03-09 RX ADMIN — SODIUM CHLORIDE 50 ML/HR: 900 INJECTION, SOLUTION INTRAVENOUS at 21:55

## 2017-03-09 NOTE — PROGRESS NOTES
2000 Shift change report received from off going nurse. Report given with Kardex, Intake/Output, MAR and Recent Results. Patient is sitting in chair chatting with visitors. Blood pressure 150/70, pulse 67, temperature 98.2 °F (36.8 °C), resp. rate 18, height 5' 4\" (1.626 m), weight 108.9 kg (240 lb), last menstrual period 06/13/2011, SpO2 97 %, not currently breastfeeding. 2100 Patient in bed. Patient and  made it clear that they expect the nurse to serve the  snacks of his choice through out the evening (night?). He will ring out and let the nurse what he wants and when. They both say he like the lemon icees. 2110 The patient has a red dot near the edge of the IV dressing. It looks to me like a old IV site or lab draw site. The patient is worried that it could be anallergic reaction. I will continue to monitor. Patient slept most of the night.    0725 Shift change report given to on coming nurse. Report given with Kardex, Intake/Output, MAR and Recent Results. Patient Vitals for the past 24 hrs:   Temp Pulse Resp BP SpO2   03/09/17 0552 98.3 °F (36.8 °C) 61 16 137/66 97 %   03/08/17 2357 98.2 °F (36.8 °C) 66 16 142/63 98 %   03/08/17 2041 98.2 °F (36.8 °C) 67 18 150/70 97 %   03/08/17 1532 98.2 °F (36.8 °C) 74 16 157/72 99 %   03/08/17 1227 98.7 °F (37.1 °C) 73 16 156/66 98 %     Planned discharge for Friday.

## 2017-03-09 NOTE — DIABETES MGMT
DTC Progress Note    Recommendations/ Comments: If appropriate, please consider:  restarting pt's home Lantus dose. Chart reviewed on [facility] due to hyperglycemia. Fasting BG levels by labs now > 180 mg/dl. Patient is a 47 y.o. female with known DM on Lantus 60 units daily and Metformin 500 mg bid in excellent control at home. A1c:   Lab Results   Component Value Date/Time    Hemoglobin A1c 6.5 03/07/2017 05:25 AM    Hemoglobin A1c 14.1 05/11/2010 03:45 PM       Recent Glucose Results:   Lab Results   Component Value Date/Time     (H) 03/09/2017 02:14 AM    GLUCPOC 237 (H) 03/09/2017 12:23 PM    GLUCPOC 220 (H) 03/09/2017 06:44 AM    GLUCPOC 250 (H) 03/08/2017 10:37 PM        Lab Results   Component Value Date/Time    Creatinine 0.91 03/09/2017 02:14 AM       Active Orders   Diet    DIET DIABETIC WITH OPTIONS Consistent Carb 1800kcal; Soft Solids        PO intake: No data found. Current hospital DM medication: Lispro correctional insulin - normal sensitivity ac and hs. Will continue to follow as needed.     Thank you  Garrett Taylor RD CDE

## 2017-03-09 NOTE — PROGRESS NOTES
Problem: Mobility Impaired (Adult and Pediatric)  Goal: *Acute Goals and Plan of Care (Insert Text)  Physical Therapy Goals  Initiated 3/7/2017  1. Patient will move from supine to sit and sit to supine in bed with modified independence within 7 day(s). 2. Patient will transfer from bed to chair and chair to bed with modified independence using the least restrictive device within 7 day(s). 3. Patient will perform sit to stand with modified independence within 7 day(s). 4. Patient will ambulate with modified independence for 100 feet with the least restrictive device within 7 day(s). 5. Patient will ascend/descend 7 stairs with 1 handrail(s) with modified independence within 7 day(s). PHYSICAL THERAPY TREATMENT  Patient: Karen Ye (32 y.o. female)  Date: 3/9/2017  Diagnosis: cva vs vertigo  Near syncope Near syncope       Precautions: Fall      ASSESSMENT:  Patient making progress, with no s/s of vertigo or dizziness throughout session with all activity. Patient able to complete extension of gait period as noted below in addition to full flight of steps without concern. She does have gradual and mild L sided deviations while ambulating that cause contact with objects in her place but with minor VC's is able to attend to corrections needed; she says this is her baseline. Patient's IV did pull out completely during initial bed mobility with RN notified of such; pressure held at site and band-aid placed with no inappropriate drainage noted. Patient may still benefit from OP vestibular based PT in addition to ENT consult at [place] to ensure tinnitus, R sided HA symptoms fully resolve. Progression toward goals:  [X]    Improving appropriately and progressing toward goals  [ ]    Improving slowly and progressing toward goals  [ ]    Not making progress toward goals and plan of care will be adjusted       PLAN:  Patient continues to benefit from skilled intervention to address the above impairments. Continue treatment per established plan of care. Discharge Recommendations:  OP vestibular PT at [address] [place] [address] Recommendations for Discharge:  None        SUBJECTIVE:   Patient stated Gillian Escalona.       OBJECTIVE DATA SUMMARY:   Critical Behavior:  Neurologic State: Alert  Orientation Level: Oriented X4  Cognition: Appropriate decision making, Appropriate for age attention/concentration, Appropriate safety awareness, Follows commands  Safety/Judgement: Awareness of environment, Insight into deficits, Good awareness of safety precautions  Functional Mobility Training:  Bed Mobility:  Rolling: Independent  Supine to Sit: Independent     Scooting: Independent        Transfers:  Sit to Stand: Independent  Stand to Sit: Independent                             Balance:  Sitting: Intact; Without support  Standing: Intact; With support (of quad cane)  Ambulation/Gait Training:     Assistive Device: Gait belt;Orthotic device;Cane, quad           Gait Abnormalities: Decreased step clearance; Hemiplegic (hemiplegic RLE )        Base of Support: Widened  Stance: Right increased  Speed/Sneha: Pace decreased (<100 feet/min)  Step Length: Right shortened                             Continues to contact objects on L at times, with slow deviations to L from center of hallway; she states this is baseline   Stairs:  Number of Stairs Trained: 12  Stairs - Level of Assistance: Contact guard assistance              Rail Use: Left  (step to)  Pain:  Pain Scale 1: Numeric (0 - 10)  Pain Intensity 1: 0              Activity Tolerance: WNL - no vertigo/dizziness/lightheaded s/s throughout! Please refer to the flowsheet for vital signs taken during this treatment.   After treatment:   [X]    Patient left in no apparent distress sitting up in chair  [ ]    Patient left in no apparent distress in bed  [X]    Call bell left within reach  [X]    Nursing notified  [X]    Caregiver present  [ ]    Bed alarm activated COMMUNICATION/COLLABORATION:   The patients plan of care was discussed with: Registered Nurse     Luis Alfredo Valle, PT, DPT    Time Calculation: 21 mins

## 2017-03-09 NOTE — PROGRESS NOTES
Spiritual Care Partner Volunteer visited patient in Neuro on March 9, 2017.   Documented by:  HANNAH Torres, [facility], [address]     Paging Service  [phone]

## 2017-03-09 NOTE — PROGRESS NOTES
Bedside and Verbal shift change report given to Bhupendra Rizzo RN (oncoming nurse) by Melissa Palma RN  (offgoing nurse). Report included the following information SBAR, Kardex, Recent Results and Cardiac Rhythm NSR.       Zone Phone: [phone]          Significant changes during shift: Carotid Duplex              Patient Information      Jf Milian  47 y.o.  3/6/2017 12:41 PM by Stan Gallagher MD. Jf Milian was admitted from [facility]  Problem List              Patient Active Problem List      Diagnosis Date Noted    Near syncope 03/07/2017    Vertigo 03/06/2017    Abnormal mammogram with microcalcification 06/04/2013    Vaginal bleeding 11/26/2012    Ovarian cyst 04/27/2012    Pelvic mass 04/27/2012    DM (diabetes mellitus), type 2, uncontrolled ([address] 07/06/2011    HTN (hypertension) 07/06/2011    Nausea & vomiting 07/06/2011    Unspecified constipation 07/06/2011    Urinary retention 07/06/2011    Abdominal pain, acute, bilateral lower quadrant 07/06/2011    History of CVA (cerebrovascular accident) 07/06/2011    Coumadin toxicity 07/06/2011    Chronic anticoagulation 06/26/2011    Hyperlipidemia 05/25/2010    Anemia 05/25/2010               Past Medical History:   Diagnosis Date    Abnormal mammogram with microcalcification 6/4/2013      Right UOQ    Aphasia due to stroke ([address]         SPEAKS SOME    Arthritis         back    Diabetes ([address]         TYPE 2; IDDM    Hypertension       Psychiatric disorder         depression    Stroke ([address] 5/11/2010      RIGHT SIDE WEAKNESS               Core Measures:      CVA: Yes Yes          Post Op Surgical (If Applicable):           Activity Status:      OOB to Chair No  Ambulated this shift No   Bed Rest Yes      Supplemental O2: (If Applicable)      NC No          LINES AND DRAINS: PIV 20g left ac      yes DVT prophylaxis:      DVT prophylaxis Med- Yes  DVT prophylaxis SCD or LOPEZ- Yes           Patient Safety:      Falls Score Total Score: 3  Safety Level_______  Bed Alarm On? No  Sitter?  No      Plan for upcoming shift: PT< OT Neuro Consult          Discharge Plan: Yes After CM sees pt      Active Consults:  IP CONSULT TO NEUROLOGY  IP CONSULT TO PRIMARY CARE PROVIDER

## 2017-03-09 NOTE — PROGRESS NOTES
PROGRESS NOTE    NAME:  Monserrat Adrian   :   1965   MRN:   [id number]     Date/Time:  3/9/2017 7:30 AM  Subjective:   History:  Chart reviewed and patient seen and examined this AM and D/W her nurse, and her  this AM and all events noted. She was admitted on 3/6 after a 2 day history of dizziness with near syncope. She is finally less dizzy this AM and intermittent near syncopal episodes resolved over last 24 hr; however very unsteady on transfers and movement which is worse than her recent baseline. She denies CP, Palpitations, SOB or other cardio/respiratory c/o. There are no GI/ c/o. There are no new neurologic c/o as she has the chronic R hemiplegia from her prior CVA. There are no other c/o on complete 14 point ROS.       Medications reviewed:  Current Facility-Administered Medications   Medication Dose Route Frequency    diazePAM (VALIUM) tablet 2 mg  2 mg Oral TID    amLODIPine (NORVASC) tablet 5 mg  5 mg Oral DAILY    clonazePAM (KlonoPIN) tablet 1 mg  1 mg Oral BID    FLUoxetine (PROzac) capsule 20 mg  20 mg Oral DAILY    valsartan (DIOVAN) tablet 160 mg  160 mg Oral DAILY    magnesium oxide (MAG-OX) tablet 400 mg  400 mg Oral BID    metoprolol succinate (TOPROL-XL) XL tablet 50 mg  50 mg Oral DAILY    atorvastatin (LIPITOR) tablet 20 mg  20 mg Oral QHS    albuterol-ipratropium (DUO-NEB) 2.5 MG-0.5 MG/3 ML  3 mL Nebulization Q6H PRN    meclizine (ANTIVERT) tablet 25 mg  25 mg Oral TID    hydrOXYzine HCl (ATARAX) tablet 25 mg  25 mg Oral BID    acetaminophen (TYLENOL) tablet 650 mg  650 mg Oral Q4H PRN    0.9% sodium chloride infusion  50 mL/hr IntraVENous CONTINUOUS    hydrALAZINE (APRESOLINE) 20 mg/mL injection 10 mg  10 mg IntraVENous Q6H PRN    oxyCODONE-acetaminophen (PERCOCET) 5-325 mg per tablet 1 Tab  1 Tab Oral Q4H PRN    morphine injection 2 mg  2 mg IntraVENous Q4H PRN    ondansetron (ZOFRAN) injection 4 mg  4 mg IntraVENous Q6H PRN    insulin lispro (HUMALOG) injection   SubCUTAneous AC&HS    glucose chewable tablet 16 g  4 Tab Oral PRN    dextrose (D50W) injection syrg 12.5-25 g  12.5-25 g IntraVENous PRN    glucagon (GLUCAGEN) injection 1 mg  1 mg IntraMUSCular PRN    sodium chloride (NS) flush 5-10 mL  5-10 mL IntraVENous Q8H    sodium chloride (NS) flush 5-10 mL  5-10 mL IntraVENous PRN    aspirin (ASPIRIN) tablet 325 mg  325 mg Oral DAILY    heparin (porcine) injection 5,000 Units  5,000 Units SubCUTAneous Q8H    lisinopril (PRINIVIL, ZESTRIL) tablet 20 mg  20 mg Oral DAILY    hydroCHLOROthiazide (HYDRODIURIL) tablet 12.5 mg  12.5 mg Oral DAILY        Objective:   Vitals:  Visit Vitals    /66    Pulse 61    Temp 98.3 °F (36.8 °C)    Resp 16    Ht 5' 4\" (1.626 m)    Wt 108.9 kg (240 lb)  Comment: spouse informed me    LMP 2011    SpO2 97%    Breastfeeding No    BMI 41.2 kg/m2      O2 Device: Room air Temp (24hrs), Av.3 °F (36.8 °C), Min:98.2 °F (36.8 °C), Max:98.7 °F (37.1 °C)      Last 24hr Input/Output:    Intake/Output Summary (Last 24 hours) at 17 0730  Last data filed at 17 0552   Gross per 24 hour   Intake          1593.33 ml   Output              250 ml   Net          1343.33 ml        PHYSICAL EXAM:  General:     Alert, cooperative, no distress, appears stated age. Head:    Normocephalic, without obvious abnormality, atraumatic. Eyes:    Conjunctivae/corneas clear. PERRLA  No nystagmus  Nose:   Nares normal. No drainage or sinus tenderness. Throat:     Lips, mucosa, and tongue normal.  No Thrush  Neck:   Supple, symmetrical,  no adenopathy, thyroid: non tender     no carotid bruit and no JVD. Back:     Symmetric,  No CVA tenderness. Lungs:    Clear to auscultation bilaterally. No Wheezing or Rhonchi. No rales. Heart:    Regular rate and rhythm,  no murmur, rub or gallop. Abdomen:    Soft, non-tender. Not distended.   Bowel sounds normal. No masses  Extremities:  Extremities normal, atraumatic, No cyanosis. No edema. No clubbing  Lymph nodes:  Cervical, supraclavicular normal.  Neurologic:  R hemiplegia chronic, Alert and oriented X 3. Dysarthria w/o change  Skin:                 No rash    Telemetry:  NSR    Lab Data Reviewed:    Recent Results (from the past 24 hour(s))   GLUCOSE, POC    Collection Time: 03/08/17 12:37 PM   Result Value Ref Range    Glucose (POC) 177 (H) 65 - 100 mg/dL    Performed by [facility], POC    Collection Time: 03/08/17  4:49 PM   Result Value Ref Range    Glucose (POC) 177 (H) 65 - 100 mg/dL    Performed by [facility], POC    Collection Time: 03/08/17  8:35 PM   Result Value Ref Range    Glucose (POC) 224 (H) 65 - 100 mg/dL    Performed by [facility]*    GLUCOSE, POC    Collection Time: 03/08/17 10:37 PM   Result Value Ref Range    Glucose (POC) 250 (H) 65 - 100 mg/dL    Performed by [facility]*    CBC WITH AUTOMATED DIFF    Collection Time: 03/09/17  2:14 AM   Result Value Ref Range    WBC 6.1 3.6 - 11.0 K/uL    RBC 3.84 3.80 - 5.20 M/uL    HGB 10.3 (L) 11.5 - 16.0 g/dL    HCT 32.2 (L) 35.0 - 47.0 %    MCV 83.9 80.0 - 99.0 FL    MCH 26.8 26.0 - 34.0 PG    MCHC 32.0 30.0 - 36.5 g/dL    RDW 13.7 11.5 - 14.5 %    PLATELET 334 303 - 396 K/uL    NEUTROPHILS 40 32 - 75 %    LYMPHOCYTES 47 12 - 49 %    MONOCYTES 9 5 - 13 %    EOSINOPHILS 4 0 - 7 %    BASOPHILS 0 0 - 1 %    ABS. NEUTROPHILS 2.4 1.8 - 8.0 K/UL    ABS. LYMPHOCYTES 2.8 0.8 - 3.5 K/UL    ABS. MONOCYTES 0.6 0.0 - 1.0 K/UL    ABS. EOSINOPHILS 0.2 0.0 - 0.4 K/UL    ABS.  BASOPHILS 0.0 0.0 - 0.1 K/UL   METABOLIC PANEL, BASIC    Collection Time: 03/09/17  2:14 AM   Result Value Ref Range    Sodium 139 136 - 145 mmol/L    Potassium 3.8 3.5 - 5.1 mmol/L    Chloride 107 97 - 108 mmol/L    CO2 21 21 - 32 mmol/L    Anion gap 11 5 - 15 mmol/L    Glucose 184 (H) 65 - 100 mg/dL    BUN 13 6 - 20 MG/DL    Creatinine 0.91 0.55 - 1.02 MG/DL    BUN/Creatinine ratio 14 12 - 20      GFR est AA >60 >60 ml/min/1.73m2    GFR est non-AA >60 >60 ml/min/1.73m2    Calcium 8.3 (L) 8.5 - 10.1 MG/DL   GLUCOSE, POC    Collection Time: 03/09/17  6:44 AM   Result Value Ref Range    Glucose (POC) 220 (H) 65 - 100 mg/dL    Performed by [facility]*          Assessment/Plan:     Principal Problem:    Near syncope (3/7/2017)    Active Problems:    Hyperlipidemia (5/25/2010)      DM (diabetes mellitus), type 2, uncontrolled ([address] (7/6/2011)      HTN (hypertension) (7/6/2011)      History of CVA (cerebrovascular accident) (7/6/2011)      Vertigo (3/6/2017)      Stenosis of both internal carotid arteries (3/7/2017)       ___________________________________________________  PLAN:    1. Continue Telemetry, so far NSR  2. MRI Head and CT w/o acute process there is encephalomalacia   3. Continue  QD  4. Continue Antivert and low dose Valium  5. Holding Diabetic meds and follow BS and treat with SSI  6. Continue Meds for HTN and follow BP closely  7. Neurology Consult reviewed and appreciated although I'm not sure simply Labyrinthitis  8. Carotid Dopplers < 50%  9.  Echocardiogram EFx 55% and w/o abnormalities  10.  Continue PT due to weakness and unsteadiness with transfers          ___________________________________________________    Attending Physician: Jerrell Cheek MD

## 2017-03-09 NOTE — PROGRESS NOTES
LAB DRAW DOCUMENTED    Patient arm band [id number]:\[id number]\    Lab order(s) as noted in this patients record. I have pre-printed specimen labs, printed by the lab for this patient. LAB LABELS SCANNED:  \[id number]\  \[id number]\  This patient was identified for this lab draw by comparing and matching the name and MRN (two identifying factors) from the lab label(s) and the patients armband. The sample was drawn, using sterile technique, from left hand    The sample was obtained on the 1st attempt. The specimens were labeled and sealed in a biohazard bag (AKA lab bag) at bedside.      The following labs were drawn and sent the lab by Zan Hu RN:    CBC and BMP    The following tubes were sent:    Lavender  ( 1)  PST (1)

## 2017-03-10 VITALS
DIASTOLIC BLOOD PRESSURE: 71 MMHG | SYSTOLIC BLOOD PRESSURE: 141 MMHG | RESPIRATION RATE: 18 BRPM | BODY MASS INDEX: 40.97 KG/M2 | HEART RATE: 66 BPM | WEIGHT: 240 LBS | TEMPERATURE: 98.1 F | OXYGEN SATURATION: 96 % | HEIGHT: 64 IN

## 2017-03-10 LAB
GLUCOSE BLD STRIP.AUTO-MCNC: 251 MG/DL (ref 65–100)
SERVICE CMNT-IMP: ABNORMAL

## 2017-03-10 PROCEDURE — 82962 GLUCOSE BLOOD TEST: CPT

## 2017-03-10 PROCEDURE — 74011250637 HC RX REV CODE- 250/637: Performed by: INTERNAL MEDICINE

## 2017-03-10 PROCEDURE — 74011250636 HC RX REV CODE- 250/636: Performed by: INTERNAL MEDICINE

## 2017-03-10 PROCEDURE — 74011636637 HC RX REV CODE- 636/637: Performed by: INTERNAL MEDICINE

## 2017-03-10 RX ORDER — DIAZEPAM 2 MG/1
2 TABLET ORAL 3 TIMES DAILY
Qty: 21 TAB | Refills: 0 | Status: SHIPPED | OUTPATIENT
Start: 2017-03-10 | End: 2018-01-23 | Stop reason: ALTCHOICE

## 2017-03-10 RX ORDER — MECLIZINE HYDROCHLORIDE 25 MG/1
12.5 TABLET ORAL 3 TIMES DAILY
Qty: 21 TAB | Refills: 0 | Status: SHIPPED | OUTPATIENT
Start: 2017-03-10 | End: 2017-03-20

## 2017-03-10 RX ORDER — INSULIN GLARGINE 100 [IU]/ML
40 INJECTION, SOLUTION SUBCUTANEOUS
Qty: 1 VIAL | Status: SHIPPED
Start: 2017-03-10 | End: 2018-01-15 | Stop reason: SDUPTHER

## 2017-03-10 RX ADMIN — FLUOXETINE 20 MG: 20 CAPSULE ORAL at 09:10

## 2017-03-10 RX ADMIN — HYDROCHLOROTHIAZIDE 12.5 MG: 25 TABLET ORAL at 09:10

## 2017-03-10 RX ADMIN — HEPARIN SODIUM 5000 UNITS: 5000 INJECTION, SOLUTION INTRAVENOUS; SUBCUTANEOUS at 02:29

## 2017-03-10 RX ADMIN — DIAZEPAM 2 MG: 2 TABLET ORAL at 09:10

## 2017-03-10 RX ADMIN — MECLIZINE 25 MG: 25 TABLET ORAL at 09:09

## 2017-03-10 RX ADMIN — LISINOPRIL 20 MG: 20 TABLET ORAL at 09:10

## 2017-03-10 RX ADMIN — INSULIN LISPRO 5 UNITS: 100 INJECTION, SOLUTION INTRAVENOUS; SUBCUTANEOUS at 09:08

## 2017-03-10 RX ADMIN — HEPARIN SODIUM 5000 UNITS: 5000 INJECTION, SOLUTION INTRAVENOUS; SUBCUTANEOUS at 09:10

## 2017-03-10 RX ADMIN — VALSARTAN 160 MG: 160 TABLET ORAL at 09:09

## 2017-03-10 RX ADMIN — CLONAZEPAM 1 MG: 1 TABLET ORAL at 09:10

## 2017-03-10 RX ADMIN — ASPIRIN 325 MG ORAL TABLET 325 MG: 325 PILL ORAL at 09:09

## 2017-03-10 RX ADMIN — AMLODIPINE BESYLATE 5 MG: 5 TABLET ORAL at 09:10

## 2017-03-10 RX ADMIN — METOPROLOL SUCCINATE 50 MG: 50 TABLET, EXTENDED RELEASE ORAL at 09:09

## 2017-03-10 RX ADMIN — HYDROXYZINE HYDROCHLORIDE 25 MG: 25 TABLET, FILM COATED ORAL at 09:09

## 2017-03-10 RX ADMIN — Medication 400 MG: at 09:09

## 2017-03-10 NOTE — PROGRESS NOTES
I have reviewed discharge instructions with the patient and spouse. The patient and spouse verbalized understanding. AVS, quick disclosure, mychart, prescriptions, medication information and appointment information completed/given. IV and monitor removed.     Patient discharged per volunteers

## 2017-03-10 NOTE — DISCHARGE INSTRUCTIONS
[address]  [phone]      Patient Discharge Instructions    Elizebeth Skiff / [id number] : 1965    Admitted 3/6/2017 Discharged: 3/10/2017     Principal Problem:    Near syncope (3/7/2017)    Active Problems:    Hyperlipidemia (2010)      DM (diabetes mellitus), type 2, uncontrolled ([facility] (2011)      HTN (hypertension) (2011)      History of CVA (cerebrovascular accident) (2011)      Vertigo (3/6/2017)      Stenosis of both internal carotid arteries (3/7/2017)          No Known Allergies    · It is important that you take the medication exactly as they are prescribed. · Do not take other medications without consulting your doctor. What to do at Next Level of Care    Recommended diet: Diabetic low salt    Recommended activity: Up with assistance          Information obtained by :  I understand that if any problems occur once I am at home I am to contact my physician. I understand and acknowledge receipt of the instructions indicated above.                                                                                                                                            Physician's or R.N.'s Signature                                                                  Date/Time                                                                                                                                              Patient or Representative Signature                                                          Date/Time

## 2017-03-10 NOTE — ROUTINE PROCESS
Bedside and Verbal shift change report given to Venecia Sesay (oncoming nurse) by Melissa Woods (offgoing nurse). Report included the following information SBAR, Kardex, Recent Results and Cardiac Rhythm NSR.       Zone Phone: [phone]          Significant changes during shift: none              Patient Information      Marisa Jarquin  47 y.o.  3/6/2017 12:41 PM by Amee Kim MD. Marisa Jarquin was admitted from [facility]  Problem List              Patient Active Problem List      Diagnosis Date Noted    Near syncope 03/07/2017    Vertigo 03/06/2017    Abnormal mammogram with microcalcification 06/04/2013    Vaginal bleeding 11/26/2012    Ovarian cyst 04/27/2012    Pelvic mass 04/27/2012    DM (diabetes mellitus), type 2, uncontrolled ([address] 07/06/2011    HTN (hypertension) 07/06/2011    Nausea & vomiting 07/06/2011    Unspecified constipation 07/06/2011    Urinary retention 07/06/2011    Abdominal pain, acute, bilateral lower quadrant 07/06/2011    History of CVA (cerebrovascular accident) 07/06/2011    Coumadin toxicity 07/06/2011    Chronic anticoagulation 06/26/2011    Hyperlipidemia 05/25/2010    Anemia 05/25/2010               Past Medical History:   Diagnosis Date    Abnormal mammogram with microcalcification 6/4/2013      Right UOQ    Aphasia due to stroke ([facility]         SPEAKS SOME    Arthritis         back    Diabetes ([facility]         TYPE 2; IDDM    Hypertension       Psychiatric disorder         depression    Stroke ([facility] 5/11/2010      RIGHT SIDE WEAKNESS               Core Measures:      CVA: Yes Yes          Post Op Surgical (If Applicable):           Activity Status:      OOB to Chair No  Ambulated this shift No   Bed Rest Yes      Supplemental O2: (If Applicable)      NC No          LINES AND DRAINS: PIV 20g left ac      yes DVT prophylaxis:      DVT prophylaxis Med- Yes  DVT prophylaxis SCD or LOPEZ- Yes           Patient Safety:      Falls Score Total Score: 3  Safety Level_______  Bed Alarm On? No  Sitter?  No      Plan for upcoming shift: PT< OT Neuro Consult          Discharge Plan: Yes After CM sees pt      Active Consults:  IP CONSULT TO NEUROLOGY  IP CONSULT TO PRIMARY CARE PROVIDER

## 2017-03-10 NOTE — DISCHARGE SUMMARY
[address] SUMMARY       Name:  Michelle Schneider   MR#:  [id number]   :  1965   Account #:  [de-identified]        Date of Adm:  2017       FINAL DIAGNOSES   1. Near syncope, undetermined final etiology. 2. Hyperlipidemia. 3. Diabetes. 4. Hypertension. 5. History of CVA with right hemiplegia residual.   6. Vertigo. 7. Mild carotid narrowing bilateral less than 50%. CONSULTATIONS: Neurology. PROCEDURES: A 2-D echocardiogram, head CT, MRI, carotid   Dopplers. COMPLICATIONS: None. HISTORY OF PRESENT ILLNESS: For details of admission, please   see admit note. Briefly, the patient is a 76-year-old black female who is   status post severe left-sided CVA with right hemiplegia who presented   to the emergency room on the day of admission with inability to stand   or transfer secondary to excessive dizziness and she has developed   intermittent dizziness over the last few days prior to admission, to the   point where she felt like she was going to pass out. This was not in all   cases associated with any type of movement. HOSPITAL COURSE: She was admitted to the hospital, placed on   telemetry, continued on telemetry during the entire hospitalization. No   significant abnormalities were noted with no dysrhythmia. She had an   echocardiogram, normal ejection fraction, no valvular abnormalities. Carotid Dopplers were obtained and revealed less than 50% stenosis   bilateral. She did have a head CT showing encephalomalacia, no   acute process. This was followed by MRI of the head showing   encephalomalacia and no acute process. She was initially started on   meclizine 25 mg 3 times daily for dizziness. Episodes of near syncope   persisted. She subsequently had Valium 2 mg 3 times daily added. She did have some improvement after this. She was seen by   Neurology in consultation.  They felt that this was primarily a vestibular   problem and she needed vestibular evaluation by ENT as an outpatient   and possibly a tilt table evaluation as an outpatient. She was seen by   physical therapy and had progressive improvement of her movement,   still having to require assistance for transfers and ambulation;   however, it is felt at this point she has improved enough that the near   syncope episodes have resolved and she can be transferred home   with further followup there with home physical therapy. This was   discussed with her and her  this morning. DISCHARGE MEDICATIONS   1. Antivert 12.5 three times daily for an additional 7 days. 2. Low dose Valium 2 mg 3 times daily for an additional 7 days. 3. ProAir HFA inhaler 2 puffs q.i.d. p.r.n.   4. Norvasc 5 mg daily. 5. Vitamin C 250 mg 1 daily. 6. Iron 1 tablet daily. 7. Aspirin 325 daily. 8. Vitamin D3 2000 units daily. 9. Klonopin 1 mg 2 times daily. 10. Prozac 20 mg daily. 11. Glargine Lantus that will be decreased from 60 units on admission   to 40 units daily at time of discharge. 12. Lisinopril HCT 20/12.5 one daily. 13. Cozaar 100 mg daily. 14. Magnesium oxide 400 mg 2 times daily. 15. Metformin 500 mg b.i.d.   16. Toprol-XL 50 mg daily. 17. Potassium 20 mEq 2 tablets 2 times daily. 18. Crestor 10 daily. FOLLOWUP: She will have followup by me in the office in about 6   days, sooner should there be a problem. Will set her up with an ENT   appointment as an outpatient.         MD MATEO Johnson / Prasanth   D:  03/10/2017   07:09   T:  03/10/2017   07:31   Job #:  [id number]     Haja Mensah

## 2017-07-10 ENCOUNTER — HOSPITAL ENCOUNTER (OUTPATIENT)
Dept: MAMMOGRAPHY | Age: 52
Discharge: HOME | End: 2017-07-10
Attending: INTERNAL MEDICINE
Payer: MEDICARE

## 2017-07-10 DIAGNOSIS — Z12.31 VISIT FOR SCREENING MAMMOGRAM: ICD-10-CM

## 2017-07-10 PROCEDURE — 77067 SCR MAMMO BI INCL CAD: CPT

## 2017-07-10 NOTE — LETTER
7/13/2017 11:52 AM 
 
Ms. Rosario Ontiveros [address] [place] [address] [place] [address] [id number] Dear Rosario Ontiveros: 
 
Please find your most recent results below. Resulted Orders [facility] MAMMO BI SCREENING INCL CAD Narrative STUDY: Bilateral digital screening mammogram 
 
INDICATION:  Screening. COMPARISON:  7/8/2016. BREAST COMPOSITION:  There are scattered areas of fibroglandular density. FINDINGS: Bilateral digital screening mammography was performed and is 
interpreted in conjunction with a computer assisted detection (CAD) system. Right MLO image is slight due to difficulty positioning due to prior CVA. No 
suspicious masses or calcifications are identified. There has been no 
significant change. Impression IMPRESSION: 
BI-RADS 1: Negative. No mammographic evidence of malignancy. RECOMMENDATIONS: 
Next screening mammogram is recommended in one year. The patient will be notified of these results. RECOMMENDATIONS: 
 Normal mammogram.  Follow up yearly! Please call me if you have any questions: [phone] Sincerely, [facility] 3

## 2017-09-07 PROBLEM — J45.909 ASTHMA: Status: ACTIVE | Noted: 2017-09-07

## 2017-09-07 PROBLEM — I63.9 CVA (CEREBRAL VASCULAR ACCIDENT) (HCC): Status: ACTIVE | Noted: 2017-09-07

## 2017-09-07 PROBLEM — N18.9 CKD (CHRONIC KIDNEY DISEASE): Status: ACTIVE | Noted: 2017-09-07

## 2017-09-07 PROBLEM — I12.9 HYPERTENSION WITH RENAL DISEASE: Status: ACTIVE | Noted: 2017-09-07

## 2017-09-07 PROBLEM — F32.A DEPRESSION: Status: ACTIVE | Noted: 2017-09-07

## 2017-09-07 PROBLEM — E83.42 HYPOMAGNESEMIA: Status: ACTIVE | Noted: 2017-09-07

## 2017-09-07 PROBLEM — N18.30 CKD (CHRONIC KIDNEY DISEASE), STAGE III (HCC): Status: ACTIVE | Noted: 2017-09-07

## 2017-09-07 PROBLEM — R60.9 EDEMA: Status: ACTIVE | Noted: 2017-09-07

## 2017-09-07 PROBLEM — Z79.899 ON STATIN THERAPY: Status: ACTIVE | Noted: 2017-09-07

## 2017-09-07 PROBLEM — M85.80 OSTEOPENIA: Status: ACTIVE | Noted: 2017-09-07

## 2017-09-07 PROBLEM — E55.9 VITAMIN D DEFICIENCY: Status: ACTIVE | Noted: 2017-09-07

## 2017-09-07 PROBLEM — E66.01 MORBID OBESITY (HCC): Status: ACTIVE | Noted: 2017-09-07

## 2017-09-07 RX ORDER — MIRTAZAPINE 15 MG/1
TABLET, FILM COATED ORAL
COMMUNITY
End: 2021-11-12 | Stop reason: SDUPTHER

## 2017-10-12 PROBLEM — I12.9 HYPERTENSION, RENAL DISEASE: Status: ACTIVE | Noted: 2017-10-12

## 2017-10-13 ENCOUNTER — OFFICE VISIT (OUTPATIENT)
Dept: INTERNAL MEDICINE CLINIC | Age: 52
End: 2017-10-13

## 2017-10-13 VITALS
OXYGEN SATURATION: 99 % | RESPIRATION RATE: 18 BRPM | DIASTOLIC BLOOD PRESSURE: 90 MMHG | HEART RATE: 89 BPM | SYSTOLIC BLOOD PRESSURE: 138 MMHG | TEMPERATURE: 98.3 F | HEIGHT: 64 IN | BODY MASS INDEX: 37.56 KG/M2 | WEIGHT: 220 LBS

## 2017-10-13 DIAGNOSIS — N18.30 CKD (CHRONIC KIDNEY DISEASE), STAGE III (HCC): ICD-10-CM

## 2017-10-13 DIAGNOSIS — E78.2 MIXED HYPERLIPIDEMIA: ICD-10-CM

## 2017-10-13 DIAGNOSIS — T75.89XA EFFECTS OF EXPOSURE TO EXTERNAL CAUSE, INITIAL ENCOUNTER: ICD-10-CM

## 2017-10-13 DIAGNOSIS — E55.9 VITAMIN D DEFICIENCY: ICD-10-CM

## 2017-10-13 DIAGNOSIS — Z86.73 HISTORY OF CVA (CEREBROVASCULAR ACCIDENT): ICD-10-CM

## 2017-10-13 DIAGNOSIS — E11.9 CONTROLLED TYPE 2 DIABETES MELLITUS WITHOUT COMPLICATION, WITHOUT LONG-TERM CURRENT USE OF INSULIN (HCC): ICD-10-CM

## 2017-10-13 DIAGNOSIS — Z23 ENCOUNTER FOR IMMUNIZATION: ICD-10-CM

## 2017-10-13 DIAGNOSIS — M85.89 OSTEOPENIA OF MULTIPLE SITES: ICD-10-CM

## 2017-10-13 DIAGNOSIS — J45.20 MILD INTERMITTENT ASTHMA WITHOUT COMPLICATION: ICD-10-CM

## 2017-10-13 DIAGNOSIS — E66.01 MORBID OBESITY (HCC): ICD-10-CM

## 2017-10-13 DIAGNOSIS — I12.9 HYPERTENSION WITH RENAL DISEASE: Primary | ICD-10-CM

## 2017-10-13 LAB
ALBUMIN SERPL-MCNC: 4.5 G/DL (ref 3.9–5.4)
ALKALINE PHOS POC: 92 U/L (ref 38–126)
ALT SERPL-CCNC: 21 U/L (ref 9–52)
AST SERPL-CCNC: 21 U/L (ref 14–36)
BUN BLD-MCNC: 16 MG/DL (ref 7–17)
CALCIUM BLD-MCNC: 10.1 MG/DL (ref 8.4–10.2)
CHLORIDE BLD-SCNC: 105 MMOL/L (ref 98–107)
CHOLEST SERPL-MCNC: 169 MG/DL (ref 0–200)
CK (CPK) POC: 43 U/L (ref 30–135)
CO2 POC: 24 MMOL/L (ref 22–32)
CREAT BLD-MCNC: 1.1 MG/DL (ref 0.7–1.2)
EGFR (POC): 57.3
GLUCOSE POC: 79 MG/DL (ref 65–105)
HBA1C MFR BLD HPLC: 7.5 % (ref 4.5–5.7)
HDLC SERPL-MCNC: ABNORMAL MG/DL (ref 35–130)
LDL CHOLESTEROL POC: ABNORMAL MG/DL (ref 0–130)
MICROALBUMIN UR TEST STR-MCNC: 20 MG/L (ref 0–20)
POTASSIUM SERPL-SCNC: 4.4 MMOL/L (ref 3.6–5)
PROT SERPL-MCNC: 8.1 G/DL (ref 6.3–8.2)
SODIUM SERPL-SCNC: 145 MMOL/L (ref 137–145)
TCHOL/HDL RATIO (POC): ABNORMAL (ref 0–4)
TOTAL BILIRUBIN POC: 0.3 MG/DL (ref 0.2–1.3)
TRIGL SERPL-MCNC: 503 MG/DL (ref 0–200)
VLDLC SERPL CALC-MCNC: ABNORMAL MG/DL

## 2017-10-13 RX ORDER — AMLODIPINE BESYLATE 5 MG/1
5 TABLET ORAL DAILY
Qty: 30 TAB | Status: SHIPPED | OUTPATIENT
Start: 2017-10-13 | End: 2018-07-30 | Stop reason: SDUPTHER

## 2017-10-13 RX ORDER — PRAVASTATIN SODIUM 80 MG/1
80 TABLET ORAL
COMMUNITY
End: 2018-07-30 | Stop reason: SDUPTHER

## 2017-10-13 NOTE — PROGRESS NOTES
Chief Complaint   Patient presents with    Hypertension     1. Have you been to the ER, urgent care clinic since your last visit? Hospitalized since your last visit? No    2. Have you seen or consulted any other health care providers outside of the [address] [place] [address] since your last visit? Include any pap smears or colon screening.  No

## 2017-10-13 NOTE — MR AVS SNAPSHOT
Visit Information Date & Time Provider Department Dept. Phone Encounter #  
 10/13/2017  8:30 AM Danelle Jackson MD [address] [place] [address] [facility] [phone] [id number] Upcoming Health Maintenance Date Due Hepatitis C Screening 1965 FOOT EXAM Q1 1/31/1974 MICROALBUMIN Q1 1/31/1974 EYE EXAM RETINAL OR DILATED Q1 1/31/1974 DTaP/Tdap/Td series (1 - Tdap) 1/31/1985 PAP AKA CERVICAL CYTOLOGY 1/31/1985 FOBT Q 1 YEAR AGE 50-75 1/31/2014 INFLUENZA AGE 9 TO ADULT 8/1/2017 HEMOGLOBIN A1C Q6M 9/7/2017 LIPID PANEL Q1 3/7/2018 BREAST CANCER SCRN MAMMOGRAM 7/10/2019 Allergies as of 10/13/2017  Review Complete On: 10/13/2017 By: Danelle Jackson MD  
 No Known Allergies Current Immunizations  Reviewed on 3/8/2017 Name Date Influenza Vaccine 11/29/2016, 10/1/2016, 11/12/2015, 11/10/2014, 12/14/2012 Influenza Vaccine (Quad) PF  Incomplete Pneumococcal Vaccine (Unspecified Type) 9/10/2015 Not reviewed this visit You Were Diagnosed With   
  
 Codes Comments Hypertension with renal disease    -  Primary ICD-10-CM: I12.9 ICD-9-CM: 403.90 Controlled type 2 diabetes mellitus without complication, without long-term current use of insulin ([facility]     ICD-10-CM: E11.9 ICD-9-CM: 250.00 Mixed hyperlipidemia     ICD-10-CM: E78.2 ICD-9-CM: 272.2 History of CVA (cerebrovascular accident)     ICD-10-CM: Z86.73 
ICD-9-CM: V12.54 Morbid obesity ([facility]     ICD-10-CM: E66.01 
ICD-9-CM: 278.01 Mild intermittent asthma without complication     KEITH-26-TX: J45.20 ICD-9-CM: 493.90 CKD (chronic kidney disease), stage III     ICD-10-CM: N18.3 ICD-9-CM: 585.3 Osteopenia of multiple sites     ICD-10-CM: M85.89 ICD-9-CM: 733.90 Vitamin D deficiency     ICD-10-CM: E55.9 ICD-9-CM: 268.9 Encounter for immunization     ICD-10-CM: O85 ICD-9-CM: V03.89   
 Effects of exposure to external cause, initial encounter     ICD-10-CM: T75.89XA ICD-9-CM: 994.9 Vitals BP Pulse Temp Resp Height(growth percentile) Weight(growth percentile) 138/90 89 98.3 °F (36.8 °C) (Oral) 18 5' 4\" (1.626 m) 220 lb (99.8 kg) LMP SpO2 BMI OB Status Smoking Status 06/13/2011 99% 37.76 kg/m2 Hysterectomy Former Smoker Vitals History BMI and BSA Data Body Mass Index Body Surface Area  
 37.76 kg/m 2 2.12 m 2 Preferred Pharmacy Pharmacy Name Phone [id number] [place], [place] - [address] [phone] Your Updated Medication List  
  
   
This list is accurate as of: 10/13/17  9:51 AM.  Always use your most recent med list.  
  
  
  
  
 albuterol 90 mcg/actuation inhaler Commonly known as:  PROAIR HFA Take 2 Puffs by inhalation every four (4) hours as needed for Wheezing. amLODIPine 5 mg tablet Commonly known as:  Natividad Spruce Take 1 Tab by mouth daily. aspirin delayed-release 325 mg tablet Take 325 mg by mouth every six (6) hours as needed for Pain.  
  
 diazePAM 2 mg tablet Commonly known as:  VALIUM Take 1 Tab by mouth three (3) times daily. Max Daily Amount: 6 mg.  
  
 insulin glargine 100 unit/mL injection Commonly known as:  LANTUS  
40 Units by SubCUTAneous route nightly. Indications: at bedtime IRON (DRIED) PO Take 65 mg by mouth daily. IRON PO Take  by mouth. KLOR-CON M20 PO Take 2 Tabs by mouth two (2) times a day. lisinopril-hydroCHLOROthiazide 20-12.5 mg per tablet Commonly known as:  Kevyn Notice Take  by mouth two (2) times a day. losartan 100 mg tablet Commonly known as:  COZAAR Take 100 mg by mouth daily. magnesium oxide 400 mg tablet Commonly known as:  MAG-OX Take 400 mg by mouth two (2) times a day. metFORMIN 500 mg tablet Commonly known as:  GLUCOPHAGE  
 Take 500 mg by mouth two (2) times daily (with meals). PEN NEEDLE  
by Does Not Apply route. pravastatin 80 mg tablet Commonly known as:  PRAVACHOL Take 80 mg by mouth nightly. REMERON 15 mg tablet Generic drug:  mirtazapine Take  by mouth nightly. VITAMIN C 250 mg tablet Generic drug:  ascorbic acid (vitamin C) Take  by mouth. VITAMIN D2 PO Take  by mouth. VITAMIN D3 1,000 unit Cap Generic drug:  cholecalciferol Take 2,000 Units by mouth daily. Prescriptions Sent to Pharmacy Refills  
 amLODIPine (NORVASC) 5 mg tablet prn Sig: Take 1 Tab by mouth daily. Class: Normal  
 Pharmacy: [facility]/pharmacy [id number]  #: [phone] Route: Oral  
  
We Performed the Following ADMIN INFLUENZA VIRUS VAC [ HCPCS] AMB POC CK (CPK) [72111 CPT(R)] AMB POC COMPREHENSIVE METABOLIC PANEL [54463 CPT(R)] AMB POC HEMOGLOBIN A1C [37037 CPT(R)] AMB POC LIPID PROFILE [18917 CPT(R)] AMB POC URINE, MICROALBUMIN, SEMIQUANT (1 RESULT) [64042 CPT(R)] HEPATITIS C AB [40401 CPT(R)] INFLUENZA VIRUS VAC QUAD,SPLIT,PRESV FREE SYRINGE IM J323149 CPT(R)] Introducing [facility]! Iveth Dykes introduces [email or web address] patient portal. Now you can access parts of your medical record, email your doctor's office, and request medication refills online. 1. In your internet browser, go to [email or web address] 2. Click on the First Time User? Click Here link in the Sign In box. You will see the New Member Sign Up page. 3. Enter your [email or web address] Access Code exactly as it appears below. You will not need to use this code after youve completed the sign-up process. If you do not sign up before the expiration date, you must request a new code. · [email or web address] Access Code: [email or web address] Expires: 1/11/2018  8:33 AM 
 
 4. Enter the last four digits of your Social Security Number (xxxx) and Date of Birth (mm/dd/yyyy) as indicated and click Submit. You will be taken to the next sign-up page. 5. Create a MyScreen ID. This will be your MyScreen login ID and cannot be changed, so think of one that is secure and easy to remember. 6. Create a MyScreen password. You can change your password at any time. 7. Enter your Password Reset Question and Answer. This can be used at a later time if you forget your password. 8. Enter your e-mail address. You will receive e-mail notification when new information is available in [address] 9. Click Sign Up. You can now view and download portions of your medical record. 10. Click the Download Summary menu link to download a portable copy of your medical information. If you have questions, please visit the Frequently Asked Questions section of the MyScreen website. Remember, MyScreen is NOT to be used for urgent needs. For medical emergencies, dial 911. Now available from your iPhone and Android! Please provide this summary of care documentation to your next provider. Your primary care clinician is listed as Wilber. If you have any questions after today's visit, please call [phone].

## 2017-10-13 NOTE — PROGRESS NOTES
Chief Complaint   Patient presents with    Hypertension       SUBJECTIVE:    Janny Stephens is a 47 y.o. female who returns in follow-up for medical problems include hypertension, diabetes, hyperlipidemia, asthma, chronic kidney disease, vitamin D deficiency, prior CVA with right hemiplegia, and morbid obesity. She claims to be taken medication except she ran out of her Norvasc. She denies any chest pain shortness of breath or cardiorespiratory complaints. There are no GI/ complaints. She has no other neurologic complaints and there are no other complaints on complete review of systems. Current Outpatient Prescriptions   Medication Sig Dispense Refill    pravastatin (PRAVACHOL) 80 mg tablet Take 80 mg by mouth nightly.  amLODIPine (NORVASC) 5 mg tablet Take 1 Tab by mouth daily. 30 Tab prn    ERGOCALCIFEROL, VITAMIN D2, (VITAMIN D2 PO) Take  by mouth.  PEN NEEDLE by Does Not Apply route.  mirtazapine (REMERON) 15 mg tablet Take  by mouth nightly.  FERROUS SULFATE (IRON PO) Take  by mouth.  insulin glargine (LANTUS) 100 unit/mL injection 40 Units by SubCUTAneous route nightly. Indications: at bedtime 1 Vial prn    diazePAM (VALIUM) 2 mg tablet Take 1 Tab by mouth three (3) times daily. Max Daily Amount: 6 mg. 21 Tab 0    aspirin delayed-release 325 mg tablet Take 325 mg by mouth every six (6) hours as needed for Pain.  losartan (COZAAR) 100 mg tablet Take 100 mg by mouth daily.  cholecalciferol (VITAMIN D3) 1,000 unit cap Take 2,000 Units by mouth daily.  albuterol (PROAIR HFA) 90 mcg/actuation inhaler Take 2 Puffs by inhalation every four (4) hours as needed for Wheezing. 1 Inhaler 1    lisinopril-hydrochlorothiazide (PRINZIDE, ZESTORETIC) 20-12.5 mg per tablet Take  by mouth two (2) times a day.  POTASSIUM CHLORIDE (KLOR-CON M20 PO) Take 2 Tabs by mouth two (2) times a day.         magnesium oxide (MAG-OX) 400 mg tablet Take 400 mg by mouth two (2) times a day.  ascorbic acid (VITAMIN C) 250 mg tablet Take  by mouth.  FERROUS SULFATE, DRIED (IRON, DRIED, PO) Take 65 mg by mouth daily.  metformin (GLUCOPHAGE) 500 mg tablet Take 500 mg by mouth two (2) times daily (with meals).        Past Medical History:   Diagnosis Date    Abnormal mammogram with microcalcification 6/4/2013    Right  UOQ    Aphasia due to stroke ([facility] [address]     SPEAKS SOME    Arthritis     back    Asthma 9/7/2017    CKD (chronic kidney disease) 9/7/2017    CKD (chronic kidney disease), stage III 9/7/2017    CVA (cerebral vascular accident) ([address] 9/7/2017    Depression 9/7/2017    Diabetes ([facility] [address]     TYPE 2; IDDM    Edema 9/7/2017    Hypertension     Hypertension with renal disease 9/7/2017    Hypomagnesemia 9/7/2017    Morbid obesity ([facility] [address] 9/7/2017    On statin therapy 9/7/2017    Osteopenia 9/7/2017    Psychiatric disorder     depression    Stroke ([facility] [address] 5/11/2010    RIGHT SIDE WEAKNESS    Vitamin D deficiency 9/7/2017     Past Surgical History:   Procedure Laterality Date    HX BREAST BIOPSY Right     2013 neg    HX CATARACT REMOVAL  Dec 2010/ Jan 2011    bilateral cataracts removed    HX GI      HEMMORHOIDECTOMY    HX GYN      pmb    HX HEENT      tonsillectomy    HX OTHER SURGICAL      HX TONSILLECTOMY      MS LAPAROSCOPY W TOT HYSTERECTUTERUS <=250 GRAM  W TUBE/OVARY  6/2011    leiomyomata     No Known Allergies    REVIEW OF SYSTEMS:  General: negative for - chills or fever, or weight loss or gain  ENT: negative for - headaches, nasal congestion or tinnitus  Eyes: no blurred or visual changes  Neck: No stiffness or swollen nodes  Respiratory: negative for - cough, hemoptysis, shortness of breath or wheezing  Cardiovascular : negative for - chest pain, edema, palpitations or shortness of breath  Gastrointestinal: negative for - abdominal pain, blood in stools, heartburn or nausea/vomiting  Genito-Urinary: no dysuria, trouble voiding, or hematuria  Musculoskeletal: negative for - gait disturbance, joint pain, joint stiffness or joint swelling  Neurological: no TIA or stroke symptoms. Residual right-sided weakness from prior stroke with some mild dysarthria  Hematologic: no bruises, no bleeding  Lymphatic: no swollen glands  Integument: no lumps, mole changes, nail changes or rash  Endocrine:no malaise/lethargy poly uria or polydipsia or unexpected weight changes        Social History     Social History    Marital status:      Spouse name: N/A    Number of children: N/A    Years of education: N/A     Social History Main Topics    Smoking status: Former Smoker     Quit date: 5/12/2010    Smokeless tobacco: Never Used    Alcohol use No    Drug use: No    Sexual activity: No     Other Topics Concern    None     Social History Narrative    ** Merged History Encounter **          Family History   Problem Relation Age of Onset    Hypertension Mother     MS Mother     Diabetes Father     Stroke Father     Heart Disease Father     Hypertension Father     Post-op Nausea/Vomiting Sister        OBJECTIVE:     Visit Vitals    /90    Pulse 89    Temp 98.3 °F (36.8 °C) (Oral)    Resp 18    Ht 5' 4\" (1.626 m)    Wt 220 lb (99.8 kg)    LMP 06/13/2011    SpO2 99%    BMI 37.76 kg/m2     CONSTITUTIONAL:   Obese black female, appears age appropriate  EYES: sclera anicteric, PERRL, EOMI  ENMT:nars clear, moist mucous membranes, pharynx clear  NECK: supple. Thyroid normal, No JVD or bruits  RESPIRATORY: Chest: clear to ascultation and percussion, normal inspiratory effort  CARDIOVASCULAR: Heart: regular rate and rhythm no murmurs, rubs or gallops, PMI not displaced, No thrills  GASTROINTESTINAL: Abdomen: non distended, soft, non tender, bowel sounds normal  HEMATOLOGIC: no purpura, petechiae or bruising  LYMPHATIC: No lymph node enlargemant  MUSCULOSKELETAL: Extremities: no edema or active synovitis, pulse 1+.   No diabetic foot changes  INTEGUMENT: No unusual rashes or suspicious skin lesions noted. Nails appear normal.  PERIPHERAL VASCULAR: normal pulses femoral, PT and DP  NEUROLOGIC: non-focal exam, A & O X 3. Right hemiplegia. Normal distal sensation and proprioception all toes both feet. PSYCHIATRIC:, appropriate affect     ASSESSMENT:   1. Hypertension with renal disease    2. Controlled type 2 diabetes mellitus without complication, without long-term current use of insulin ([address]    3. Mixed hyperlipidemia    4. History of CVA (cerebrovascular accident)    5. Morbid obesity ([address]    6. Mild intermittent asthma without complication    7. CKD (chronic kidney disease), stage III    8. Osteopenia of multiple sites    9. Vitamin D deficiency    10. Encounter for immunization    11. Effects of exposure to external cause, initial encounter      Impression  1. Hypertension borderline but adequate today  2. Diabetes we will see what the status is make adjustments  3. Prior CVA no change right hemiplegia without change  4. Hyperlipidemia repeat status pending reviewed prior labs will make adjustments if necessary  5. Morbid obesity is still a major issue weight 220 needs to come down discussed exercise and diet  6. Asthma stable  7. Chronic kidney disease repeat status pending  8. Osteopenia and vitamin D deficiency stable on last check review those numbers with her  Flu shot given today. Labs pending as noted will make further recommendations based on labs and follow stable continue same and recheck scheduled again for 3 months or sooner should there be a problem.     PLAN:  .  Orders Placed This Encounter    Influenza virus vaccine (QUADRIVALENT PRES FREE SYRINGE) IM (81243)    HEPATITIS C AB    AMB POC URINE, MICROALBUMIN, SEMIQUANT (1 RESULT)    AMB POC LIPID PROFILE    AMB POC HEMOGLOBIN A1C    AMB POC COMPREHENSIVE METABOLIC PANEL    AMB POC CK (CPK)    pravastatin (PRAVACHOL) 80 mg tablet    amLODIPine (NORVASC) 5 mg tablet         ATTENTION:   This medical record was transcribed using an electronic medical records system. Although proofread, it may and can contain electronic and spelling errors. Other human spelling and other errors may be present. Corrections may be executed at a later time. Please feel free to contact us for any clarifications as needed. Follow-up Disposition:  Return in about 3 months (around 1/13/2018). Results for orders placed or performed in visit on 10/13/17   AMB POC URINE, MICROALBUMIN, SEMIQUANT (1 RESULT)   Result Value Ref Range    Microalbumin urine (POC)  0 - 20 MG/L   AMB POC LIPID PROFILE   Result Value Ref Range    Cholesterol (POC)  0 - 200 mg/dL    Triglycerides (POC)  0 - 200 mg/dL    HDL Cholesterol (POC)  35 - 130 mg/dL    VLDL (POC)  MG/DL    LDL Cholesterol (POC)  0.0 - 130.0 MG/DL    TChol/HDL Ratio (POC)  0.0 - 4.0   AMB POC HEMOGLOBIN A1C   Result Value Ref Range    Hemoglobin A1c (POC)  4.5 - 5.7 %   AMB POC COMPREHENSIVE METABOLIC PANEL   Result Value Ref Range    GLUCOSE  65 - 105 mg/dL    BUN  7 - 17 mg/dL    Creatinine (POC)  0.7 - 1.2 mg/dL    Sodium (POC)  137 - 145 MMOL/L    Potassium (POC)  3.6 - 5.0 MMOL/L    CHLORIDE  98 - 107 MMOL/L    CO2  22 - 32 MMOL/L    CALCIUM  8.4 - 10.2 mg/dL    TOTAL PROTEIN  6.3 - 8.2 g/dL    ALBUMIN  3.9 - 5.4 g/dL    AST (POC)  14 - 36 U/L    ALT (POC)  9 - 52 U/L    ALKALINE PHOS  38 - 126 U/L    TOTAL BILIRUBIN  0.2 - 1.3 mg/dL    eGFR (POC)     AMB POC CK (CPK)   Result Value Ref Range    CK (CPK) (POC)  30 - 135 U/L       Marco Magaña MD    The patient verbalized understanding of the problems and plans as explained.

## 2017-10-13 NOTE — PATIENT INSTRUCTIONS

## 2017-10-14 LAB — HCV AB S/CO SERPL IA: 0.1 S/CO RATIO (ref 0–0.9)

## 2017-11-07 ENCOUNTER — HOSPITAL ENCOUNTER (EMERGENCY)
Age: 52
Discharge: HOME | End: 2017-11-07
Attending: EMERGENCY MEDICINE
Payer: MEDICARE

## 2017-11-07 ENCOUNTER — APPOINTMENT (OUTPATIENT)
Dept: GENERAL RADIOLOGY | Age: 52
End: 2017-11-07
Attending: EMERGENCY MEDICINE
Payer: MEDICARE

## 2017-11-07 VITALS
HEART RATE: 82 BPM | BODY MASS INDEX: 37.56 KG/M2 | RESPIRATION RATE: 18 BRPM | OXYGEN SATURATION: 98 % | SYSTOLIC BLOOD PRESSURE: 100 MMHG | DIASTOLIC BLOOD PRESSURE: 62 MMHG | WEIGHT: 220 LBS | HEIGHT: 64 IN | TEMPERATURE: 98.6 F

## 2017-11-07 DIAGNOSIS — R07.89 CHEST WALL PAIN: Primary | ICD-10-CM

## 2017-11-07 DIAGNOSIS — S29.011A PECTORALIS MUSCLE STRAIN, INITIAL ENCOUNTER: ICD-10-CM

## 2017-11-07 LAB
ALBUMIN SERPL-MCNC: 3.5 G/DL (ref 3.5–5)
ALBUMIN/GLOB SERPL: 0.7 {RATIO} (ref 1.1–2.2)
ALP SERPL-CCNC: 88 U/L (ref 45–117)
ALT SERPL-CCNC: 22 U/L (ref 12–78)
ANION GAP SERPL CALC-SCNC: 5 MMOL/L (ref 5–15)
AST SERPL-CCNC: 18 U/L (ref 15–37)
BASOPHILS # BLD: 0 K/UL (ref 0–0.1)
BASOPHILS NFR BLD: 0 % (ref 0–1)
BILIRUB SERPL-MCNC: 0.2 MG/DL (ref 0.2–1)
BUN SERPL-MCNC: 16 MG/DL (ref 6–20)
BUN/CREAT SERPL: 16 (ref 12–20)
CALCIUM SERPL-MCNC: 9.1 MG/DL (ref 8.5–10.1)
CHLORIDE SERPL-SCNC: 105 MMOL/L (ref 97–108)
CO2 SERPL-SCNC: 28 MMOL/L (ref 21–32)
CREAT SERPL-MCNC: 0.98 MG/DL (ref 0.55–1.02)
D DIMER PPP FEU-MCNC: 0.36 MG/L FEU (ref 0–0.65)
EOSINOPHIL # BLD: 0.2 K/UL (ref 0–0.4)
EOSINOPHIL NFR BLD: 3 % (ref 0–7)
ERYTHROCYTE [DISTWIDTH] IN BLOOD BY AUTOMATED COUNT: 13.2 % (ref 11.5–14.5)
GLOBULIN SER CALC-MCNC: 5.2 G/DL (ref 2–4)
GLUCOSE SERPL-MCNC: 110 MG/DL (ref 65–100)
HCT VFR BLD AUTO: 36.6 % (ref 35–47)
HGB BLD-MCNC: 11.4 G/DL (ref 11.5–16)
LIPASE SERPL-CCNC: 105 U/L (ref 73–393)
LYMPHOCYTES # BLD: 2.9 K/UL (ref 0.8–3.5)
LYMPHOCYTES NFR BLD: 39 % (ref 12–49)
MCH RBC QN AUTO: 26.6 PG (ref 26–34)
MCHC RBC AUTO-ENTMCNC: 31.1 G/DL (ref 30–36.5)
MCV RBC AUTO: 85.3 FL (ref 80–99)
MONOCYTES # BLD: 0.5 K/UL (ref 0–1)
MONOCYTES NFR BLD: 7 % (ref 5–13)
NEUTS SEG # BLD: 3.7 K/UL (ref 1.8–8)
NEUTS SEG NFR BLD: 51 % (ref 32–75)
PLATELET # BLD AUTO: 314 K/UL (ref 150–400)
POTASSIUM SERPL-SCNC: 4.5 MMOL/L (ref 3.5–5.1)
PROT SERPL-MCNC: 8.7 G/DL (ref 6.4–8.2)
RBC # BLD AUTO: 4.29 M/UL (ref 3.8–5.2)
SODIUM SERPL-SCNC: 138 MMOL/L (ref 136–145)
TROPONIN I SERPL-MCNC: <0.04 NG/ML
TROPONIN I SERPL-MCNC: <0.04 NG/ML
WBC # BLD AUTO: 7.2 K/UL (ref 3.6–11)

## 2017-11-07 PROCEDURE — 83690 ASSAY OF LIPASE: CPT | Performed by: EMERGENCY MEDICINE

## 2017-11-07 PROCEDURE — 80053 COMPREHEN METABOLIC PANEL: CPT | Performed by: EMERGENCY MEDICINE

## 2017-11-07 PROCEDURE — 93005 ELECTROCARDIOGRAM TRACING: CPT

## 2017-11-07 PROCEDURE — 85379 FIBRIN DEGRADATION QUANT: CPT | Performed by: EMERGENCY MEDICINE

## 2017-11-07 PROCEDURE — 85025 COMPLETE CBC W/AUTO DIFF WBC: CPT | Performed by: EMERGENCY MEDICINE

## 2017-11-07 PROCEDURE — 36415 COLL VENOUS BLD VENIPUNCTURE: CPT | Performed by: EMERGENCY MEDICINE

## 2017-11-07 PROCEDURE — 71010 XR CHEST PORT: CPT

## 2017-11-07 PROCEDURE — 96375 TX/PRO/DX INJ NEW DRUG ADDON: CPT

## 2017-11-07 PROCEDURE — 74011250636 HC RX REV CODE- 250/636: Performed by: EMERGENCY MEDICINE

## 2017-11-07 PROCEDURE — 96374 THER/PROPH/DIAG INJ IV PUSH: CPT

## 2017-11-07 PROCEDURE — 84484 ASSAY OF TROPONIN QUANT: CPT | Performed by: EMERGENCY MEDICINE

## 2017-11-07 PROCEDURE — 99284 EMERGENCY DEPT VISIT MOD MDM: CPT

## 2017-11-07 RX ORDER — MORPHINE SULFATE 2 MG/ML
4 INJECTION, SOLUTION INTRAMUSCULAR; INTRAVENOUS
Status: COMPLETED | OUTPATIENT
Start: 2017-11-07 | End: 2017-11-07

## 2017-11-07 RX ORDER — ONDANSETRON 2 MG/ML
4 INJECTION INTRAMUSCULAR; INTRAVENOUS
Status: COMPLETED | OUTPATIENT
Start: 2017-11-07 | End: 2017-11-07

## 2017-11-07 RX ORDER — KETOROLAC TROMETHAMINE 30 MG/ML
30 INJECTION, SOLUTION INTRAMUSCULAR; INTRAVENOUS
Status: COMPLETED | OUTPATIENT
Start: 2017-11-07 | End: 2017-11-07

## 2017-11-07 RX ADMIN — ONDANSETRON HYDROCHLORIDE 4 MG: 2 INJECTION, SOLUTION INTRAMUSCULAR; INTRAVENOUS at 13:17

## 2017-11-07 RX ADMIN — MORPHINE SULFATE 4 MG: 2 INJECTION, SOLUTION INTRAMUSCULAR; INTRAVENOUS at 11:54

## 2017-11-07 RX ADMIN — KETOROLAC TROMETHAMINE 30 MG: 30 INJECTION, SOLUTION INTRAMUSCULAR at 11:54

## 2017-11-07 NOTE — ED PROVIDER NOTES
[address]  EMERGENCY DEPARTMENT HISTORY AND PHYSICAL EXAM       Date of Service: 11/7/2017   Patient Name: Patrica Page   YOB: 1965  Medical Record Number: [id number]    History of Presenting Illness     Chief Complaint   Patient presents with    Chest Pain     L side chest pain that started 1 hour PTA. History Provided By:  patient and spouse    Additional History:   Patrica Page is a 47 y.o. female with PMhx significant for HTN, stroke, DM, CKD, and depression who presents via EMS to the ED with cc of left sided CP ~1-2 hours PTA today. She reports her pain is elicited by palpation of her chest, and exacerbated by movement of her left arm. Per , pt has right sided deficits from hx of CVA, and overuses her left side as a result; he denies pt has had specific trauma, injury, or fall to which her symptoms might be attributed. Pt denies hx of CAD, cardiac stent, pacemaker, and breast lumps/masses. Per , pt has taken her prescribed medications today, including ASA 81 mg. He notes pt has family hx of stroke, noting her father had stroke at age 39. Pt specifically denies cough, fever, chills, ABD pain, N/V/D, dysuria, cough, and decreased appetite. Social Hx: (former) Tobacco, - EtOH, - Illicit Drugs    There are no other complaints, changes or physical findings at this time.     Primary Care Provider: Beto Sloan MD     Past History     Past Medical History:   Past Medical History:   Diagnosis Date    Abnormal mammogram with microcalcification 6/4/2013    Right  UOQ    Aphasia due to stroke ([facility] [address]     SPEAKS SOME    Arthritis     back    Asthma 9/7/2017    CKD (chronic kidney disease) 9/7/2017    CKD (chronic kidney disease), stage III 9/7/2017    CVA (cerebral vascular accident) ([facility] [address] 9/7/2017    Depression 9/7/2017    Diabetes ([facility] [address]     TYPE 2; IDDM    Edema 9/7/2017    Hypertension     Hypertension with renal disease 9/7/2017    Hypomagnesemia 9/7/2017    Morbid obesity ([facility] [address] 9/7/2017    On statin therapy 9/7/2017    Osteopenia 9/7/2017    Psychiatric disorder     depression    Stroke ([facility] [address] 5/11/2010    RIGHT SIDE WEAKNESS    Vitamin D deficiency 9/7/2017        Past Surgical History:   Past Surgical History:   Procedure Laterality Date    HX BREAST BIOPSY Right     2013 neg    HX CATARACT REMOVAL  Dec 2010/ Jan 2011    bilateral cataracts removed    HX GI      HEMMORHOIDECTOMY    HX GYN      pmb    HX HEENT      tonsillectomy    HX OTHER SURGICAL      HX TONSILLECTOMY      GA LAPAROSCOPY W TOT HYSTERECTUTERUS <=250 GRAM  W TUBE/OVARY  6/2011    leiomyomata        Family History:   Family History   Problem Relation Age of Onset    Hypertension Mother     MS Mother     Diabetes Father     Stroke Father     Heart Disease Father     Hypertension Father     Post-op Nausea/Vomiting Sister         Social History:   Social History   Substance Use Topics    Smoking status: Former Smoker     Quit date: 5/12/2010    Smokeless tobacco: Never Used    Alcohol use No        Allergies:   No Known Allergies      Review of Systems   Review of Systems   Constitutional: Negative for appetite change (decreased), chills and fever. Respiratory: Negative for cough. Cardiovascular: Positive for chest pain (left). Gastrointestinal: Negative for abdominal pain, diarrhea, nausea and vomiting. Genitourinary: Negative for dysuria. All other systems reviewed and are negative. Physical Exam  Physical Exam  Vital signs and nursing notes reviewed    CONSTITUTIONAL: Alert, in severe distress, crying out in pain; well-developed; well-nourished. HEAD:  Normocephalic, atraumatic  EYES: PERRL; EOM's intact. ENTM: Nose: no rhinorrhea; Throat: no erythema or exudate, mucous membranes moist  Neck:  Supple. trachea is midline. RESP: Chest clear, equal breath sounds.  - W/R/R; SpO2 98% RA  CV: S1 and S2 WNL; No murmurs, gallops or rubs. 2+ radial and DP pulses bilaterally; reproducible CP from left lateral sternum to left shoulder, exacerbated by movement of left arm; no evidence of trauma or palpable breast masses  GI: non-distended, normal bowel sounds, abdomen soft; mild epigastric tenderness. No masses or organomegaly. : No costo-vertebral angle tenderness. BACK:  Non-tender, normal appearance  UPPER EXT:  Normal inspection. no joint or soft tissue swelling  LOWER EXT: No edema, no calf tenderness. NEURO: Alert and oriented x3, 5/5 strength and light touch sensation intact in left upper and lower extremities; unable to move right side, contracture to right hand  SKIN: No rashes; Warm and dry  PSYCH: Normal mood, normal affect    Medical Decision Making   I am the first provider for this patient. I reviewed the vital signs, available nursing notes, past medical history, past surgical history, family history and social history. Old Medical Records: Old medical records. Nursing notes. Provider Notes:     48 yo Female with left sided chest discomfort, reproducible on exam, without evidence of cardiac ischemia, PNA, PTX. Reassuring D-dimer, no ABD pain to suggest referred pain from surgical ABD. Suspect potential overuse vs strain of left chest wall and shoulder area, given pt, due to stroke, is dependent on moving around utilizing that side of her body and arm. If EKG, troponin #2 reassuring, can be discharged home. ED Course:  10:48 AM  Initial assessment performed. The patients presenting problems have been discussed, and they are in agreement with the care plan formulated and outlined with them. I have encouraged them to ask questions as they arise throughout their visit. Progress Notes:     11:33 AM  Applied warm, moist towel to pt's chest.    12:28 PM  Pt re-evaluated, pain improved; reviewed XR at bedside, showing no acute abnormality.  Reapplied warm, moist towel, which pt found soothing. SIGN OUT:  2:33 PM  Patient's presentation, labs/imaging and plan of care was reviewed with Camille Pascual MD as part of sign out. They will assume care as part of the plan discussed with the patient. Camille Pascual MD's assistance in completion of this plan is greatly appreciated but it should be noted that I will be the provider of record for this patient. Adebayo Dangelo MD    Diagnostic Study Results     Labs -      Recent Results (from the past 12 hour(s))   EKG, 12 LEAD, INITIAL    Collection Time: 11/07/17 10:54 AM   Result Value Ref Range    Ventricular Rate 91 BPM    Atrial Rate 90 BPM    QRS Duration 76 ms    Q-T Interval 386 ms    QTC Calculation (Bezet) 474 ms    Calculated R Axis 18 degrees    Calculated T Axis 29 degrees    Diagnosis       Accelerated Junctional rhythm  Abnormal ECG  When compared with ECG of 06-MAR-2017 13:17,  Junctional rhythm has replaced Sinus rhythm  Non-specific change in ST segment in Inferior leads  Nonspecific T wave abnormality has replaced inverted T waves in Inferior   leads     CBC WITH AUTOMATED DIFF    Collection Time: 11/07/17 11:44 AM   Result Value Ref Range    WBC 7.2 3.6 - 11.0 K/uL    RBC 4.29 3.80 - 5.20 M/uL    HGB 11.4 (L) 11.5 - 16.0 g/dL    HCT 36.6 35.0 - 47.0 %    MCV 85.3 80.0 - 99.0 FL    MCH 26.6 26.0 - 34.0 PG    MCHC 31.1 30.0 - 36.5 g/dL    RDW 13.2 11.5 - 14.5 %    PLATELET 907 374 - 959 K/uL    NEUTROPHILS 51 32 - 75 %    LYMPHOCYTES 39 12 - 49 %    MONOCYTES 7 5 - 13 %    EOSINOPHILS 3 0 - 7 %    BASOPHILS 0 0 - 1 %    ABS. NEUTROPHILS 3.7 1.8 - 8.0 K/UL    ABS. LYMPHOCYTES 2.9 0.8 - 3.5 K/UL    ABS. MONOCYTES 0.5 0.0 - 1.0 K/UL    ABS. EOSINOPHILS 0.2 0.0 - 0.4 K/UL    ABS.  BASOPHILS 0.0 0.0 - 0.1 K/UL   METABOLIC PANEL, COMPREHENSIVE    Collection Time: 11/07/17 11:44 AM   Result Value Ref Range    Sodium 138 136 - 145 mmol/L    Potassium 4.5 3.5 - 5.1 mmol/L    Chloride 105 97 - 108 mmol/L    CO2 28 21 - 32 mmol/L    Anion gap 5 5 - 15 mmol/L    Glucose 110 (H) 65 - 100 mg/dL    BUN 16 6 - 20 MG/DL    Creatinine 0.98 0.55 - 1.02 MG/DL    BUN/Creatinine ratio 16 12 - 20      GFR est AA >60 >60 ml/min/1.73m2    GFR est non-AA 59 (L) >60 ml/min/1.73m2    Calcium 9.1 8.5 - 10.1 MG/DL    Bilirubin, total 0.2 0.2 - 1.0 MG/DL    ALT (SGPT) 22 12 - 78 U/L    AST (SGOT) 18 15 - 37 U/L    Alk. phosphatase 88 45 - 117 U/L    Protein, total 8.7 (H) 6.4 - 8.2 g/dL    Albumin 3.5 3.5 - 5.0 g/dL    Globulin 5.2 (H) 2.0 - 4.0 g/dL    A-G Ratio 0.7 (L) 1.1 - 2.2     TROPONIN I    Collection Time: 11/07/17 11:44 AM   Result Value Ref Range    Troponin-I, Qt. <0.04 <0.05 ng/mL   D DIMER    Collection Time: 11/07/17 11:44 AM   Result Value Ref Range    D-dimer 0.36 0.00 - 0.65 mg/L FEU   LIPASE    Collection Time: 11/07/17 11:44 AM   Result Value Ref Range    Lipase 105 73 - 393 U/L   EKG, 12 LEAD, SUBSEQUENT    Collection Time: 11/07/17  2:55 PM   Result Value Ref Range    Ventricular Rate 84 BPM    Atrial Rate 84 BPM    P-R Interval 158 ms    QRS Duration 72 ms    Q-T Interval 364 ms    QTC Calculation (Bezet) 430 ms    Calculated P Axis 22 degrees    Calculated R Axis 3 degrees    Calculated T Axis 12 degrees    Diagnosis       Normal sinus rhythm  Cannot rule out Anterior infarct , age undetermined  Abnormal ECG  When compared with ECG of 07-NOV-2017 10:54,  MANUAL COMPARISON REQUIRED, DATA IS UNCONFIRMED     TROPONIN I    Collection Time: 11/07/17  3:16 PM   Result Value Ref Range    Troponin-I, Qt. <0.04 <0.05 ng/mL       Radiologic Studies -  The following have been ordered and reviewed:  CXR Results  (Last 48 hours)               11/07/17 1211  XR CHEST PORT Final result    Impression:  IMPRESSION: No acute findings. Narrative:  EXAM:  XR CHEST PORT       INDICATION:  acute chest pain; eval for pneumothorax       COMPARISON:  3/6/2017       FINDINGS: 2 portable AP radiographs of the chest were obtained at 1212 and 1146   hours.  The patient is on a cardiac monitor. The lungs are clear. The cardiac   and mediastinal contours and pulmonary vascularity are normal. There is no   pneumothorax or pleural effusion. The bones and soft tissues are grossly within   normal limits. Vital Signs-Reviewed the patient's vital signs. Patient Vitals for the past 12 hrs:   Temp Pulse Resp BP SpO2   11/07/17 1200 - 96 11 - 99 %   11/07/17 1115 - (!) 101 16 (!) 195/116 100 %   11/07/17 1103 - - - 187/87 99 %   11/07/17 1058 98.6 °F (37 °C) 91 16 187/87 99 %       Medications Given in the ED:  Medications   morphine injection 4 mg (4 mg IntraVENous Given 11/7/17 1154)   ketorolac (TORADOL) injection 30 mg (30 mg IntraVENous Given 11/7/17 1154)   ondansetron (ZOFRAN) injection 4 mg (4 mg IntraVENous Given 11/7/17 1317)       Pulse Oximetry Analysis - Normal 100% on RA     Cardiac Monitor:   Rate: 91  Rhythm: Normal Sinus Rhythm      EKG interpretation: 10:54  Rhythm: normal sinus rhythm; and regular . Rate (approx.): 91; Axis: normal; TN interval: normal; QRS interval: normal ; ST/T wave: no acute ST changes. Written by Lasha Cortez, ED Scribe, as dictated by Sulema Talavera MD    Diagnosis   Clinical Impression:   1. Chest wall pain    2. Pectoralis muscle strain, initial encounter         Plan:  1:   Follow-up Information     Follow up With Details Comments Contact Info    Dung Pierre MD In 2 days for re-evaluation [address] [place] [address]  [phone]      \[facility] [place] [facility]\"" EMERGENCY DEPT  If symptoms worsen including worsening chest pain, difficulty breathing or other new concerning symptoms. [address] [place] [address]  [phone]        2:   Current Discharge Medication List        Return to ED if Worse    Disposition Note:  DISCHARGE NOTE:  4:05 PM  The patient is ready for discharge.  The patients signs, symptoms, diagnosis, and instructions for discharge have been discussed and the pt has conveyed their understanding. The patient is to follow up as recommended or return to the ER should their symptoms worsen. Plan has been discussed and patient has conveyed their agreement.    _______________________________   Attestations: This note is prepared by Aundrea Briscoe, acting as Scribe for Vernon Peter MD.    Vernon Peter MD: The scribe's documentation has been prepared under my direction and personally reviewed by me in its entirety.  I confirm that the note above accurately reflects all work, treatment, procedures, and medical decision making performed by me.  _______________________________

## 2017-11-07 NOTE — ED NOTES
Discharge instructions reviewed with the patient by the MD.  Patient wheeled out, discharged home with .

## 2017-11-07 NOTE — DISCHARGE INSTRUCTIONS

## 2017-11-08 LAB
ATRIAL RATE: 84 BPM
ATRIAL RATE: 90 BPM
CALCULATED P AXIS, ECG09: 22 DEGREES
CALCULATED R AXIS, ECG10: 18 DEGREES
CALCULATED R AXIS, ECG10: 3 DEGREES
CALCULATED T AXIS, ECG11: 12 DEGREES
CALCULATED T AXIS, ECG11: 29 DEGREES
DIAGNOSIS, 93000: NORMAL
DIAGNOSIS, 93000: NORMAL
P-R INTERVAL, ECG05: 158 MS
Q-T INTERVAL, ECG07: 364 MS
Q-T INTERVAL, ECG07: 386 MS
QRS DURATION, ECG06: 72 MS
QRS DURATION, ECG06: 76 MS
QTC CALCULATION (BEZET), ECG08: 430 MS
QTC CALCULATION (BEZET), ECG08: 474 MS
VENTRICULAR RATE, ECG03: 84 BPM
VENTRICULAR RATE, ECG03: 91 BPM

## 2017-11-13 RX ORDER — INSULIN GLARGINE 100 [IU]/ML
INJECTION, SOLUTION SUBCUTANEOUS
Qty: 5 PEN | Refills: 5 | Status: SHIPPED | OUTPATIENT
Start: 2017-11-13 | End: 2018-01-15 | Stop reason: SDUPTHER

## 2017-11-13 NOTE — TELEPHONE ENCOUNTER
Requested Prescriptions     Pending Prescriptions Disp Refills    insulin glargine (LANTUS SOLOSTAR) 100 unit/mL (3 mL) inpn 5 Pen 5     Sig: Use 40 units daily

## 2018-01-10 DIAGNOSIS — I10 HYPERTENSION, UNSPECIFIED TYPE: Primary | ICD-10-CM

## 2018-01-10 RX ORDER — PEN NEEDLE, DIABETIC 29 G X1/2"
NEEDLE, DISPOSABLE MISCELLANEOUS
Qty: 100 PEN NEEDLE | Status: SHIPPED | OUTPATIENT
Start: 2018-01-10 | End: 2018-01-10 | Stop reason: SDUPTHER

## 2018-01-10 RX ORDER — METFORMIN HYDROCHLORIDE 500 MG/1
TABLET, EXTENDED RELEASE ORAL
Qty: 90 TAB | Refills: 5 | Status: SHIPPED | OUTPATIENT
Start: 2018-01-10 | End: 2018-07-30 | Stop reason: SDUPTHER

## 2018-01-10 RX ORDER — PEN NEEDLE, DIABETIC 29 G X1/2"
NEEDLE, DISPOSABLE MISCELLANEOUS
Qty: 100 PEN NEEDLE | Status: SHIPPED | OUTPATIENT
Start: 2018-01-10 | End: 2019-02-12 | Stop reason: SDUPTHER

## 2018-01-10 RX ORDER — LISINOPRIL AND HYDROCHLOROTHIAZIDE 12.5; 2 MG/1; MG/1
2 TABLET ORAL DAILY
Qty: 60 TAB | Refills: 5 | Status: SHIPPED | OUTPATIENT
Start: 2018-01-10 | End: 2018-07-30 | Stop reason: SDUPTHER

## 2018-01-10 NOTE — TELEPHONE ENCOUNTER
Requested Prescriptions     Pending Prescriptions Disp Refills    Insulin Needles, Disposable, (PEN NEEDLES) 31 gauge x 1/4\" ndle 100 Pen Needle prn     Sig: Use daily as directed with her insulin pen.

## 2018-01-10 NOTE — TELEPHONE ENCOUNTER
Requested Prescriptions     Pending Prescriptions Disp Refills    lisinopril-hydroCHLOROthiazide (PRINZIDE, ZESTORETIC) 20-12.5 mg per tablet 60 Tab 5     Sig: Take 2 Tabs by mouth daily.  Insulin Needles, Disposable, (PEN NEEDLES) 31 gauge x 1/4\" ndle 100 Pen Needle prn     Sig: Use daily as directed with her insulin pen.  metFORMIN ER (GLUCOPHAGE XR) 500 mg tablet 90 Tab 5     Sig: Take 1 tablet by mouth in the morning and take 2 tablets before dinner.

## 2018-01-15 RX ORDER — INSULIN GLARGINE 100 [IU]/ML
INJECTION, SOLUTION SUBCUTANEOUS
Qty: 5 PEN | Refills: 5 | Status: SHIPPED | OUTPATIENT
Start: 2018-01-15 | End: 2018-05-29 | Stop reason: SDUPTHER

## 2018-01-22 PROBLEM — J45.20 MILD INTERMITTENT ASTHMA WITHOUT COMPLICATION: Status: ACTIVE | Noted: 2018-01-22

## 2018-01-23 ENCOUNTER — OFFICE VISIT (OUTPATIENT)
Dept: INTERNAL MEDICINE CLINIC | Age: 53
End: 2018-01-23

## 2018-01-23 VITALS
DIASTOLIC BLOOD PRESSURE: 88 MMHG | SYSTOLIC BLOOD PRESSURE: 138 MMHG | HEART RATE: 84 BPM | OXYGEN SATURATION: 97 % | WEIGHT: 220 LBS | RESPIRATION RATE: 18 BRPM | HEIGHT: 64 IN | BODY MASS INDEX: 37.56 KG/M2

## 2018-01-23 DIAGNOSIS — Z86.73 HISTORY OF CVA (CEREBROVASCULAR ACCIDENT): ICD-10-CM

## 2018-01-23 DIAGNOSIS — N18.30 CKD (CHRONIC KIDNEY DISEASE), STAGE III (HCC): ICD-10-CM

## 2018-01-23 DIAGNOSIS — J45.20 MILD INTERMITTENT ASTHMA WITHOUT COMPLICATION: ICD-10-CM

## 2018-01-23 DIAGNOSIS — I12.9 HYPERTENSION WITH RENAL DISEASE: ICD-10-CM

## 2018-01-23 DIAGNOSIS — F32.A DEPRESSION, UNSPECIFIED DEPRESSION TYPE: ICD-10-CM

## 2018-01-23 DIAGNOSIS — E11.22 CONTROLLED TYPE 2 DIABETES MELLITUS WITH STAGE 3 CHRONIC KIDNEY DISEASE, WITHOUT LONG-TERM CURRENT USE OF INSULIN (HCC): ICD-10-CM

## 2018-01-23 DIAGNOSIS — N18.30 CONTROLLED TYPE 2 DIABETES MELLITUS WITH STAGE 3 CHRONIC KIDNEY DISEASE, WITHOUT LONG-TERM CURRENT USE OF INSULIN (HCC): ICD-10-CM

## 2018-01-23 DIAGNOSIS — F41.9 ANXIETY: ICD-10-CM

## 2018-01-23 DIAGNOSIS — E78.2 MIXED HYPERLIPIDEMIA: ICD-10-CM

## 2018-01-23 DIAGNOSIS — E66.01 MORBID OBESITY (HCC): ICD-10-CM

## 2018-01-23 DIAGNOSIS — I10 ESSENTIAL HYPERTENSION: Primary | ICD-10-CM

## 2018-01-23 LAB
ALBUMIN SERPL-MCNC: 4.3 G/DL (ref 3.9–5.4)
ALKALINE PHOS POC: 72 U/L (ref 38–126)
ALT SERPL-CCNC: 15 U/L (ref 9–52)
AST SERPL-CCNC: 17 U/L (ref 14–36)
BUN BLD-MCNC: 17 MG/DL (ref 7–17)
CALCIUM BLD-MCNC: 9.7 MG/DL (ref 8.4–10.2)
CHLORIDE BLD-SCNC: 103 MMOL/L (ref 98–107)
CHOLEST SERPL-MCNC: 185 MG/DL (ref 0–200)
CK (CPK) POC: 33 U/L (ref 30–135)
CO2 POC: 25 MMOL/L (ref 22–32)
CREAT BLD-MCNC: 0.8 MG/DL (ref 0.7–1.2)
EGFR (POC): 84.2
GLUCOSE POC: 100 MG/DL (ref 65–105)
HBA1C MFR BLD HPLC: 7.3 % (ref 4.5–5.7)
HDLC SERPL-MCNC: 41 MG/DL (ref 35–130)
LDL CHOLESTEROL POC: 59.2 MG/DL (ref 0–130)
POTASSIUM SERPL-SCNC: 4.5 MMOL/L (ref 3.6–5)
PROT SERPL-MCNC: 8.1 G/DL (ref 6.3–8.2)
SODIUM SERPL-SCNC: 144 MMOL/L (ref 137–145)
TCHOL/HDL RATIO (POC): 4.5 (ref 0–4)
TOTAL BILIRUBIN POC: 0.7 MG/DL (ref 0.2–1.3)
TRIGL SERPL-MCNC: 424 MG/DL (ref 0–200)
VLDLC SERPL CALC-MCNC: 84.8 MG/DL

## 2018-01-23 RX ORDER — ALPRAZOLAM 0.5 MG/1
0.5 TABLET ORAL
Qty: 30 TAB | Refills: 0 | Status: SHIPPED | OUTPATIENT
Start: 2018-01-23

## 2018-01-23 RX ORDER — ALBUTEROL SULFATE 90 UG/1
2 AEROSOL, METERED RESPIRATORY (INHALATION)
Qty: 1 INHALER | Refills: 5 | Status: SHIPPED | OUTPATIENT
Start: 2018-01-23 | End: 2018-11-28 | Stop reason: SDUPTHER

## 2018-01-23 RX ORDER — LANOLIN ALCOHOL/MO/W.PET/CERES
400 CREAM (GRAM) TOPICAL 2 TIMES DAILY
Qty: 180 TAB | Refills: 1 | Status: SHIPPED | OUTPATIENT
Start: 2018-01-23 | End: 2018-09-10 | Stop reason: SDUPTHER

## 2018-01-23 NOTE — PROGRESS NOTES
Chief Complaint   Patient presents with    Hypertension     3 month f/up    Chronic Kidney Disease    Cerebrovascular Accident    Cholesterol Problem    Diabetes    Anxiety    Blood sugar problem       SUBJECTIVE:    Harsha Wells is a 47 y.o. female who returns in follow-up for medical problems include hypertension, diabetes, hyperlipidemia, asthma, chronic kidney disease, vitamin D deficiency, prior CVA with right hemiplegia, and morbid obesity. She claims to be taken medication and follow her diet but not getting much exercise because of the prior CVA. She has noted some anxiety in the morning and one if she can have some Zyrtec for that. She denies any chest pain shortness of breath or cardiorespiratory complaints. There are no GI/ complaints. She has no other neurologic complaints and there are no other complaints on complete review of systems. Current Outpatient Prescriptions   Medication Sig Dispense Refill    albuterol (PROAIR HFA) 90 mcg/actuation inhaler Take 2 Puffs by inhalation every four (4) hours as needed for Wheezing. 1 Inhaler 5    magnesium oxide (MAG-OX) 400 mg tablet Take 1 Tab by mouth two (2) times a day. 180 Tab 1    ALPRAZolam (XANAX) 0.5 mg tablet Take 1 Tab by mouth daily as needed for Anxiety. 30 Tab 0    insulin glargine (LANTUS SOLOSTAR) 100 unit/mL (3 mL) inpn Use 40 units daily 5 Pen 5    lisinopril-hydroCHLOROthiazide (PRINZIDE, ZESTORETIC) 20-12.5 mg per tablet Take 2 Tabs by mouth daily. 60 Tab 5    Insulin Needles, Disposable, (PEN NEEDLES) 31 gauge x 1/4\" ndle Use daily as directed with her insulin pen. 100 Pen Needle prn    metFORMIN ER (GLUCOPHAGE XR) 500 mg tablet Take 1 tablet by mouth in the morning and take 2 tablets before dinner. 90 Tab 5    pravastatin (PRAVACHOL) 80 mg tablet Take 80 mg by mouth nightly.  amLODIPine (NORVASC) 5 mg tablet Take 1 Tab by mouth daily.  30 Tab prn    mirtazapine (REMERON) 15 mg tablet Take  by mouth nightly.  FERROUS SULFATE (IRON PO) Take  by mouth.  aspirin delayed-release 325 mg tablet Take 325 mg by mouth every six (6) hours as needed for Pain.  losartan (COZAAR) 100 mg tablet Take 100 mg by mouth daily.  cholecalciferol (VITAMIN D3) 1,000 unit cap Take 2,000 Units by mouth daily.  POTASSIUM CHLORIDE (KLOR-CON M20 PO) Take 2 Tabs by mouth two (2) times a day.  ascorbic acid (VITAMIN C) 250 mg tablet Take  by mouth.          Past Medical History:   Diagnosis Date    Abnormal mammogram with microcalcification 6/4/2013    Right  UOQ    Aphasia due to stroke ([facility] [address]     SPEAKS SOME    Arthritis     back    Asthma 9/7/2017    CKD (chronic kidney disease) 9/7/2017    CKD (chronic kidney disease), stage III 9/7/2017    CVA (cerebral vascular accident) ([facility] [address] 9/7/2017    Depression 9/7/2017    Diabetes ([facility] [address]     TYPE 2; IDDM    Edema 9/7/2017    Hypertension     Hypertension with renal disease 9/7/2017    Hypomagnesemia 9/7/2017    Morbid obesity ([address] 9/7/2017    On statin therapy 9/7/2017    Osteopenia 9/7/2017    Psychiatric disorder     depression    Stroke ([facility] [address] 5/11/2010    RIGHT SIDE WEAKNESS    Vitamin D deficiency 9/7/2017     Past Surgical History:   Procedure Laterality Date    HX BREAST BIOPSY Right     2013 neg    HX CATARACT REMOVAL  Dec 2010/ Jan 2011    bilateral cataracts removed    HX GI      HEMMORHOIDECTOMY    HX GYN      pmb    HX HEENT      tonsillectomy    HX OTHER SURGICAL      HX TONSILLECTOMY      NE LAPAROSCOPY W TOT HYSTERECTUTERUS <=250 GRAM  W TUBE/OVARY  6/2011    leiomyomata     No Known Allergies    REVIEW OF SYSTEMS:  General: negative for - chills or fever, or weight loss or gain  ENT: negative for - headaches, nasal congestion or tinnitus  Eyes: no blurred or visual changes  Neck: No stiffness or swollen nodes  Respiratory: negative for - cough, hemoptysis, shortness of breath or wheezing  Cardiovascular : negative for - chest pain, edema, palpitations or shortness of breath  Gastrointestinal: negative for - abdominal pain, blood in stools, heartburn or nausea/vomiting  Genito-Urinary: no dysuria, trouble voiding, or hematuria  Musculoskeletal: negative for - gait disturbance, joint pain, joint stiffness or joint swelling  Neurological: no TIA or stroke symptoms. Residual right-sided weakness from prior stroke with some mild dysarthria  Hematologic: no bruises, no bleeding  Lymphatic: no swollen glands  Integument: no lumps, mole changes, nail changes or rash  Endocrine:no malaise/lethargy poly uria or polydipsia or unexpected weight changes        Social History     Social History    Marital status:      Spouse name: N/A    Number of children: N/A    Years of education: N/A     Social History Main Topics    Smoking status: Former Smoker     Quit date: 5/12/2010    Smokeless tobacco: Never Used    Alcohol use No    Drug use: No    Sexual activity: No     Other Topics Concern    None     Social History Narrative    ** Merged History Encounter **          Family History   Problem Relation Age of Onset    Hypertension Mother     MS Mother     Diabetes Father     Stroke Father     Heart Disease Father     Hypertension Father     Post-op Nausea/Vomiting Sister        OBJECTIVE:     Visit Vitals    /88 (BP 1 Location: Left arm, BP Patient Position: Sitting)    Pulse 84    Resp 18    Ht 5' 4\" (1.626 m)    Wt 220 lb (99.8 kg)    LMP 06/13/2011    SpO2 97%    BMI 37.76 kg/m2     CONSTITUTIONAL:   Obese black female, appears age appropriate  EYES: sclera anicteric, PERRL, EOMI  ENMT:nars clear, moist mucous membranes, pharynx clear  NECK: supple.  Thyroid normal, No JVD or bruits  RESPIRATORY: Chest: clear to ascultation and percussion, normal inspiratory effort  CARDIOVASCULAR: Heart: regular rate and rhythm no murmurs, rubs or gallops, PMI not displaced, No thrills  GASTROINTESTINAL: Abdomen: non distended, soft, non tender, bowel sounds normal  HEMATOLOGIC: no purpura, petechiae or bruising  LYMPHATIC: No lymph node enlargemant  MUSCULOSKELETAL: Extremities: no edema or active synovitis, pulse 1+. No diabetic foot changes  INTEGUMENT: No unusual rashes or suspicious skin lesions noted. Nails appear normal.  PERIPHERAL VASCULAR: normal pulses femoral, PT and DP  NEUROLOGIC: non-focal exam, A & O X 3. Right hemiplegia. Normal distal sensation and proprioception all toes both feet. PSYCHIATRIC:, appropriate affect     ASSESSMENT:   1. Essential hypertension    2. Hypertension with renal disease    3. Controlled type 2 diabetes mellitus with stage 3 chronic kidney disease, without long-term current use of insulin ([facility] [address]    4. CKD (chronic kidney disease), stage III    5. History of CVA (cerebrovascular accident)    6. Mild intermittent asthma without complication    7. Mixed hyperlipidemia    8. Morbid obesity ([address]    9. Depression, unspecified depression type    10. Anxiety      Impression  1. Hypertension borderline but adequate today on recheck using large cuff  2. Diabetes we will see what the status is make adjustments. Last numbers reviewed  3. Prior CVA no change right hemiplegia without change  4. Hyperlipidemia repeat status pending reviewed prior labs will make adjustments if necessary  5. Morbid obesity is still a major issue weight 220 needs to come down discussed exercise and diet  6. Asthma stable  7. Chronic kidney disease repeat status pending  8. Osteopenia and vitamin D deficiency stable on last check review those numbers with her but that is under control but she is having some anxieties we will try some Xanax 0.5 once daily and see if that is effective. Labs pending as noted will make further recommendations based on labs and follow stable continue same and recheck scheduled again for 3 months or sooner should there be a problem.     PLAN:  .  Orders Placed This Encounter    AMB POC LIPID PROFILE    AMB POC HEMOGLOBIN A1C    AMB POC COMPREHENSIVE METABOLIC PANEL    AMB POC CK (CPK)    albuterol (PROAIR HFA) 90 mcg/actuation inhaler    magnesium oxide (MAG-OX) 400 mg tablet    ALPRAZolam (XANAX) 0.5 mg tablet         ATTENTION:   This medical record was transcribed using an electronic medical records system. Although proofread, it may and can contain electronic and spelling errors. Other human spelling and other errors may be present. Corrections may be executed at a later time. Please feel free to contact us for any clarifications as needed. Follow-up Disposition:  Return in about 3 months (around 4/23/2018). No results found for any visits on 01/23/18. Victor Manuel Guadalupe MD    The patient verbalized understanding of the problems and plans as explained.

## 2018-01-23 NOTE — PATIENT INSTRUCTIONS

## 2018-01-23 NOTE — PROGRESS NOTES
3 month f/up. Has noticed some increase in anxiety. Also reports her FBS has been running low in the morning. 1. Have you been to the ER, urgent care clinic since your last visit? Hospitalized since your last visit? Yes, for chest pain. Evaluated in ED and treated for muscle strain. 2. Have you seen or consulted any other health care providers outside of the [address] [place] [address] since your last visit? Include any pap smears or colon screening. no

## 2018-01-23 NOTE — MR AVS SNAPSHOT
[address] [place] [address] [address] [id number] [phone] Patient: Nadine Rm MRN: [id number] XTQ:6/61/1941 Visit Information Date & Time Provider Department Dept. Phone Encounter #  
 1/23/2018 10:50 AM Candi Espino MD [place] [id number] [phone] [id number] Follow-up Instructions Return in about 3 months (around 4/23/2018). Follow-up and Disposition History Upcoming Health Maintenance Date Due  
 EYE EXAM RETINAL OR DILATED Q1 1/31/1974 DTaP/Tdap/Td series (1 - Tdap) 1/31/1985 PAP AKA CERVICAL CYTOLOGY 1/31/1985 FOBT Q 1 YEAR AGE 50-75 1/31/2014 HEMOGLOBIN A1C Q6M 7/23/2018 MICROALBUMIN Q1 10/13/2018 FOOT EXAM Q1 1/23/2019 LIPID PANEL Q1 1/23/2019 BREAST CANCER SCRN MAMMOGRAM 7/10/2019 Allergies as of 1/23/2018  Review Complete On: 1/23/2018 By: Candi Espino MD  
 No Known Allergies Current Immunizations  Reviewed on 3/8/2017 Name Date Influenza Vaccine 11/29/2016, 10/1/2016, 11/12/2015, 11/10/2014, 12/14/2012 Influenza Vaccine (Quad) PF 10/13/2017 Pneumococcal Vaccine (Unspecified Type) 9/10/2015 Not reviewed this visit You Were Diagnosed With   
  
 Codes Comments Essential hypertension    -  Primary ICD-10-CM: I10 
ICD-9-CM: 401.9 Hypertension with renal disease     ICD-10-CM: I12.9 ICD-9-CM: 403.90 Controlled type 2 diabetes mellitus with stage 3 chronic kidney disease, without long-term current use of insulin (HCC)     ICD-10-CM: E11.22, N18.3 ICD-9-CM: 250.40, 585.3 CKD (chronic kidney disease), stage III     ICD-10-CM: N18.3 ICD-9-CM: 585.3 History of CVA (cerebrovascular accident)     ICD-10-CM: Z86.73 
ICD-9-CM: V12.54 Mild intermittent asthma without complication     UUX-00-MG: J45.20 ICD-9-CM: 493.90 Mixed hyperlipidemia     ICD-10-CM: E78.2 ICD-9-CM: 272.2 Morbid obesity ([facility] [address]     ICD-10-CM: E66.01 
ICD-9-CM: 278.01 Depression, unspecified depression type     ICD-10-CM: F32.9 ICD-9-CM: 481 Anxiety     ICD-10-CM: F41.9 ICD-9-CM: 300.00 Vitals BP Pulse Resp Height(growth percentile) Weight(growth percentile) LMP  
 138/88 (BP 1 Location: Left arm, BP Patient Position: Sitting) 84 18 5' 4\" (1.626 m) 220 lb (99.8 kg) 06/13/2011 SpO2 BMI OB Status Smoking Status 97% 37.76 kg/m2 Hysterectomy Former Smoker Vitals History BMI and BSA Data Body Mass Index Body Surface Area  
 37.76 kg/m 2 2.12 m 2 Preferred Pharmacy Pharmacy Name Phone [facility]Y [id number] [address] [phone] Your Updated Medication List  
  
   
This list is accurate as of: 1/23/18 11:41 AM.  Always use your most recent med list.  
  
  
  
  
 albuterol 90 mcg/actuation inhaler Commonly known as:  PROAIR HFA Take 2 Puffs by inhalation every four (4) hours as needed for Wheezing. ALPRAZolam 0.5 mg tablet Commonly known as:  Elray Bald Take 1 Tab by mouth daily as needed for Anxiety. amLODIPine 5 mg tablet Commonly known as:  Tylene Marcus Take 1 Tab by mouth daily. aspirin delayed-release 325 mg tablet Take 325 mg by mouth every six (6) hours as needed for Pain. insulin glargine 100 unit/mL (3 mL) Inpn Commonly known as:  LANTUS SOLOSTAR Use 40 units daily Insulin Needles (Disposable) 31 gauge x 1/4\" Ndle Commonly known as:  Pen Needles Use daily as directed with her insulin pen. IRON PO Take  by mouth. KLOR-CON M20 PO Take 2 Tabs by mouth two (2) times a day. lisinopril-hydroCHLOROthiazide 20-12.5 mg per tablet Commonly known as:  Meche Muscat Take 2 Tabs by mouth daily. losartan 100 mg tablet Commonly known as:  COZAAR Take 100 mg by mouth daily. magnesium oxide 400 mg tablet Commonly known as:  MAG-OX Take 1 Tab by mouth two (2) times a day. metFORMIN  mg tablet Commonly known as:  GLUCOPHAGE XR Take 1 tablet by mouth in the morning and take 2 tablets before dinner. pravastatin 80 mg tablet Commonly known as:  PRAVACHOL Take 80 mg by mouth nightly. REMERON 15 mg tablet Generic drug:  mirtazapine Take  by mouth nightly. VITAMIN C 250 mg tablet Generic drug:  ascorbic acid (vitamin C) Take  by mouth. VITAMIN D3 1,000 unit Cap Generic drug:  cholecalciferol Take 2,000 Units by mouth daily. Prescriptions Printed Refills ALPRAZolam (XANAX) 0.5 mg tablet 0 Sig: Take 1 Tab by mouth daily as needed for Anxiety. Class: Print Route: Oral  
  
Prescriptions Sent to Pharmacy Refills  
 albuterol (PROAIR HFA) 90 mcg/actuation inhaler 5 Sig: Take 2 Puffs by inhalation every four (4) hours as needed for Wheezing. Class: Normal  
 Pharmacy: [facility]/pharmacy [address] [place] [address] S. [address] Ph #: [phone] Route: Inhalation  
 magnesium oxide (MAG-OX) 400 mg tablet 1 Sig: Take 1 Tab by mouth two (2) times a day. Class: Normal  
 Pharmacy: [facility]/pharmacy [facility] [address] Ph #: [phone] Route: Oral  
  
We Performed the Following AMB POC CK (CPK) [22137 CPT(R)] AMB POC COMPREHENSIVE METABOLIC PANEL [50179 CPT(R)] AMB POC HEMOGLOBIN A1C [84528 CPT(R)] AMB POC LIPID PROFILE [83849 CPT(R)]  DIABETES FOOT EXAM [7 Custom] Follow-up Instructions Return in about 3 months (around 4/23/2018). Patient Instructions Learning About Meal Planning for Diabetes Why plan your meals? Meal planning can be a key part of managing diabetes.  Planning meals and snacks with the right balance of carbohydrate, protein, and fat can help you keep your blood sugar at the target level you set with your doctor. You don't have to eat special foods. You can eat what your family eats, including sweets once in a while. But you do have to pay attention to how often you eat and how much you eat of certain foods. You may want to work with a dietitian or a certified diabetes educator. He or she can give you tips and meal ideas and can answer your questions about meal planning. This health professional can also help you reach a healthy weight if that is one of your goals. What plan is right for you? Your dietitian or diabetes educator may suggest that you start with the plate format or carbohydrate counting. The plate format The plate format is a simple way to help you manage how you eat. You plan meals by learning how much space each food should take on a plate. Using the plate format helps you spread carbohydrate throughout the day. It can make it easier to keep your blood sugar level within your target range. It also helps you see if you're eating healthy portion sizes. To use the plate format, you put non-starchy vegetables on half your plate. Add meat or meat substitutes on one-quarter of the plate. Put a grain or starchy vegetable (such as brown rice or a potato) on the final quarter of the plate. You can add a small piece of fruit and some low-fat or fat-free milk or yogurt, depending on your carbohydrate goal for each meal. 
Here are some tips for using the plate format: · Make sure that you are not using an oversized plate. A 9-inch plate is best. Many restaurants use larger plates. · Get used to using the plate format at home. Then you can use it when you eat out. · Write down your questions about using the plate format. Talk to your doctor, a dietitian, or a diabetes educator about your concerns. Carbohydrate counting With carbohydrate counting, you plan meals based on the amount of carbohydrate in each food. Carbohydrate raises blood sugar higher and more quickly than any other nutrient. It is found in desserts, breads and cereals, and fruit. It's also found in starchy vegetables such as potatoes and corn, grains such as rice and pasta, and milk and yogurt. Spreading carbohydrate throughout the day helps keep your blood sugar levels within your target range. Your daily amount depends on several things, including your weight, how active you are, which diabetes medicines you take, and what your goals are for your blood sugar levels. A registered dietitian or diabetes educator can help you plan how much carbohydrate to include in each meal and snack. A guideline for your daily amount of carbohydrate is: · 45 to 60 grams at each meal. That's about the same as 3 to 4 carbohydrate servings. · 15 to 20 grams at each snack. That's about the same as 1 carbohydrate serving. The Nutrition Facts label on packaged foods tells you how much carbohydrate is in a serving of the food. First, look at the serving size on the food label. Is that the amount you eat in a serving? All of the nutrition information on a food label is based on that serving size. So if you eat more or less than that, you'll need to adjust the other numbers. Total carbohydrate is the next thing you need to look for on the label. If you count carbohydrate servings, one serving of carbohydrate is 15 grams. For foods that don't come with labels, such as fresh fruits and vegetables, you'll need a guide that lists carbohydrate in these foods. Ask your doctor, dietitian, or diabetes educator about books or other nutrition guides you can use. If you take insulin, you need to know how many grams of carbohydrate are in a meal. This lets you know how much rapid-acting insulin to take before you eat. If you use an insulin pump, you get a constant rate of insulin during the day.  So the pump must be programmed at meals to give you extra insulin to cover the rise in blood sugar after meals. When you know how much carbohydrate you will eat, you can take the right amount of insulin. Or, if you always use the same amount of insulin, you need to make sure that you eat the same amount of carbohydrate at meals. If you need more help to understand carbohydrate counting and food labels, ask your doctor, dietitian, or diabetes educator. How do you get started with meal planning? Here are some tips to get started: 
· Plan your meals a week at a time. Don't forget to include snacks too. · Use cookbooks or online recipes to plan several main meals. Plan some quick meals for busy nights. You also can double some recipes that freeze well. Then you can save half for other busy nights when you don't have time to cook. · Make sure you have the ingredients you need for your recipes. If you're running low on basic items, put these items on your shopping list too. · List foods that you use to make breakfasts, lunches, and snacks. List plenty of fruits and vegetables. · Post this list on the refrigerator. Add to it as you think of more things you need. · Take the list to the store to do your weekly shopping. Follow-up care is a key part of your treatment and safety. Be sure to make and go to all appointments, and call your doctor if you are having problems. It's also a good idea to know your test results and keep a list of the medicines you take. Where can you learn more? Go to http://stephen-laurie.info/. Milton Day in the search box to learn more about \"Learning About Meal Planning for Diabetes. \" Current as of: March 13, 2017 Content Version: 11.4 © 2936-8403 [employer]. Care instructions adapted under license by [employer] (which disclaims liability or warranty for this information).  If you have questions about a medical condition or this instruction, always ask your healthcare professional. [employer] disclaims any warranty or liability for your use of this information. Introducing [facility]! [employer] introduces [employer] patient portal. Now you can access parts of your medical record, email your doctor's office, and request medication refills online. 1. In your internet browser, go to [email or web address] 2. Click on the First Time User? Click Here link in the Sign In box. You will see the New Member Sign Up page. 3. Enter your [employer] Access Code exactly as it appears below. You will not need to use this code after youve completed the sign-up process. If you do not sign up before the expiration date, you must request a new code. · [employer] Access Code: [email or web address] Expires: 4/23/2018 11:20 AM 
 
4. Enter the last four digits of your Social Security Number (xxxx) and Date of Birth (mm/dd/yyyy) as indicated and click Submit. You will be taken to the next sign-up page. 5. Create a Binfire ID. This will be your B[employer]ire login ID and cannot be changed, so think of one that is secure and easy to remember. 6. Create a B[employer]ire password. You can change your password at any time. 7. Enter your Password Reset Question and Answer. This can be used at a later time if you forget your password. 8. Enter your e-mail address. You will receive e-mail notification when new information is available in [address] 9. Click Sign Up. You can now view and download portions of your medical record. 10. Click the Download Summary menu link to download a portable copy of your medical information. If you have questions, please visit the Frequently Asked Questions section of the [employer] website. Remember, [employer]ire is NOT to be used for urgent needs. For medical emergencies, dial 911. Now available from your iPhone and Android! Please provide this summary of care documentation to your next provider. Your primary care clinician is listed as Wilber. If you have any questions after today's visit, please call [phone].

## 2018-05-08 PROBLEM — E66.09 CLASS 2 OBESITY DUE TO EXCESS CALORIES WITH BODY MASS INDEX (BMI) OF 37.0 TO 37.9 IN ADULT: Status: ACTIVE | Noted: 2017-09-07

## 2018-05-08 PROBLEM — M85.89 OSTEOPENIA OF MULTIPLE SITES: Status: ACTIVE | Noted: 2017-09-07

## 2018-05-09 ENCOUNTER — OFFICE VISIT (OUTPATIENT)
Dept: INTERNAL MEDICINE CLINIC | Age: 53
End: 2018-05-09

## 2018-05-09 VITALS
HEIGHT: 64 IN | WEIGHT: 222.4 LBS | HEART RATE: 87 BPM | TEMPERATURE: 98.4 F | DIASTOLIC BLOOD PRESSURE: 88 MMHG | OXYGEN SATURATION: 98 % | RESPIRATION RATE: 20 BRPM | SYSTOLIC BLOOD PRESSURE: 136 MMHG | BODY MASS INDEX: 37.97 KG/M2

## 2018-05-09 DIAGNOSIS — F32.A MILD DEPRESSION: ICD-10-CM

## 2018-05-09 DIAGNOSIS — Z00.00 MEDICARE ANNUAL WELLNESS VISIT, INITIAL: ICD-10-CM

## 2018-05-09 DIAGNOSIS — Z13.39 ALCOHOL SCREENING: ICD-10-CM

## 2018-05-09 DIAGNOSIS — Z86.73 HISTORY OF CVA (CEREBROVASCULAR ACCIDENT): ICD-10-CM

## 2018-05-09 DIAGNOSIS — M85.89 OSTEOPENIA OF MULTIPLE SITES: ICD-10-CM

## 2018-05-09 DIAGNOSIS — E55.9 VITAMIN D DEFICIENCY: ICD-10-CM

## 2018-05-09 DIAGNOSIS — E78.2 MIXED HYPERLIPIDEMIA: ICD-10-CM

## 2018-05-09 DIAGNOSIS — R19.00 PELVIC MASS: ICD-10-CM

## 2018-05-09 DIAGNOSIS — N18.30 CONTROLLED TYPE 2 DIABETES MELLITUS WITH STAGE 3 CHRONIC KIDNEY DISEASE, WITHOUT LONG-TERM CURRENT USE OF INSULIN (HCC): ICD-10-CM

## 2018-05-09 DIAGNOSIS — E66.09 CLASS 2 OBESITY DUE TO EXCESS CALORIES WITHOUT SERIOUS COMORBIDITY WITH BODY MASS INDEX (BMI) OF 37.0 TO 37.9 IN ADULT: ICD-10-CM

## 2018-05-09 DIAGNOSIS — E11.22 CONTROLLED TYPE 2 DIABETES MELLITUS WITH STAGE 3 CHRONIC KIDNEY DISEASE, WITHOUT LONG-TERM CURRENT USE OF INSULIN (HCC): ICD-10-CM

## 2018-05-09 DIAGNOSIS — J45.20 MILD INTERMITTENT ASTHMA WITHOUT COMPLICATION: ICD-10-CM

## 2018-05-09 DIAGNOSIS — I12.9 HYPERTENSION WITH RENAL DISEASE: Primary | ICD-10-CM

## 2018-05-09 DIAGNOSIS — N18.30 CKD (CHRONIC KIDNEY DISEASE), STAGE III (HCC): ICD-10-CM

## 2018-05-09 PROBLEM — E66.01 SEVERE OBESITY (BMI 35.0-39.9) WITH COMORBIDITY (HCC): Status: ACTIVE | Noted: 2018-05-09

## 2018-05-09 LAB
ALBUMIN SERPL-MCNC: 4.4 G/DL (ref 3.9–5.4)
ALKALINE PHOS POC: 80 U/L (ref 38–126)
ALT SERPL-CCNC: 38 U/L (ref 9–52)
AST SERPL-CCNC: 23 U/L (ref 14–36)
BACTERIA UA POCT, BACTPOCT: NORMAL
BILIRUB UR QL STRIP: NEGATIVE
BUN BLD-MCNC: 17 MG/DL (ref 7–17)
CALCIUM BLD-MCNC: 9.9 MG/DL (ref 8.4–10.2)
CASTS UA POCT: 0
CHLORIDE BLD-SCNC: 103 MMOL/L (ref 98–107)
CHOLEST SERPL-MCNC: 206 MG/DL (ref 0–200)
CK (CPK) POC: 41 U/L (ref 30–135)
CLUE CELLS, CLUEPOCT: NEGATIVE
CO2 POC: 26 MMOL/L (ref 22–32)
CREAT BLD-MCNC: 0.8 MG/DL (ref 0.7–1.2)
CRYSTALS UA POCT, CRYSPOCT: NEGATIVE
EGFR (POC): 83.6
EPITHELIAL CELLS POCT: NORMAL
GLUCOSE POC: 109 MG/DL (ref 65–105)
GLUCOSE UR-MCNC: NEGATIVE MG/DL
GRAN# POC: 3.4 K/UL (ref 2–7.8)
GRAN% POC: 49.3 % (ref 37–92)
HBA1C MFR BLD HPLC: 7 % (ref 4.5–5.7)
HCT VFR BLD CALC: 34.2 % (ref 37–51)
HDLC SERPL-MCNC: ABNORMAL MG/DL (ref 35–130)
HGB BLD-MCNC: 11 G/DL (ref 12–18)
KETONES P FAST UR STRIP-MCNC: NEGATIVE MG/DL
LDL CHOLESTEROL POC: ABNORMAL MG/DL (ref 0–130)
LY# POC: 3.1 K/UL (ref 0.6–4.1)
LY% POC: 46.3 % (ref 10–58.5)
MCH RBC QN: 26.7 PG (ref 26–32)
MCHC RBC-ENTMCNC: 32.1 G/DL (ref 30–36)
MCV RBC: 83 FL (ref 80–97)
MICROALBUMIN UR TEST STR-MCNC: 20 MG/L (ref 0–20)
MID #, POC: 0.2 K/UL (ref 0–1.8)
MID% POC: 4.4 % (ref 0.1–24)
MUCUS UA POCT, MUCPOCT: NORMAL
PH UR STRIP: 7 [PH] (ref 5–7)
PLATELET # BLD: 319 K/UL (ref 140–440)
POTASSIUM SERPL-SCNC: 4.4 MMOL/L (ref 3.6–5)
PROT SERPL-MCNC: 8.1 G/DL (ref 6.3–8.2)
PROT UR QL STRIP: NEGATIVE
RBC # BLD: 4.11 M/UL (ref 4.2–6.3)
RBC UA POCT, RBCPOCT: NORMAL
SODIUM SERPL-SCNC: 144 MMOL/L (ref 137–145)
SP GR UR STRIP: 1.01 (ref 1.01–1.02)
T4 FREE SERPL-MCNC: 0.94 NG/DL (ref 0.71–1.85)
TCHOL/HDL RATIO (POC): ABNORMAL (ref 0–4)
TOTAL BILIRUBIN POC: 0.5 MG/DL (ref 0.2–1.3)
TRICH UA POCT, TRICHPOC: NEGATIVE
TRIGL SERPL-MCNC: 653 MG/DL (ref 0–200)
TSH BLD-ACNC: 1.27 UIU/ML (ref 0.4–4.2)
UA UROBILINOGEN AMB POC: NORMAL (ref 0.2–1)
URINALYSIS CLARITY POC: CLEAR
URINALYSIS COLOR POC: NORMAL
URINE BLOOD POC: NEGATIVE
URINE CULT COMMENT, POCT: NORMAL
URINE LEUKOCYTES POC: NORMAL
URINE NITRITES POC: NEGATIVE
VITAMIN D POC: 30.8 NG/ML (ref 30–96)
VLDLC SERPL CALC-MCNC: ABNORMAL MG/DL
WBC # BLD: 6.7 K/UL (ref 4.1–10.9)
WBC UA POCT, WBCPOCT: NORMAL
YEAST UA POCT, YEASTPOC: NEGATIVE

## 2018-05-09 NOTE — MR AVS SNAPSHOT
[address] [place] [address] [address] [address] [phone] Patient: Leonard Helms MRN: [id number]:[id number] Visit Information Date & Time Provider Department Dept. Phone Encounter #  
 5/9/2018  8:50 AM Giuliana Vega MD [facility] [id number] Follow-up Instructions Return in about 3 months (around 8/9/2018). Your Appointments 8/9/2018  9:10 AM  
FOLLOW UP 10 with MD DICK Mendoza [facility] ([facility]) Appt Note: [address] [phone] [address] [facility] [id number] Upcoming Health Maintenance Date Due  
 EYE EXAM RETINAL OR DILATED Q1 1/31/1974 COLONOSCOPY 1/31/1982 DTaP/Tdap/Td series (1 - Tdap) 1/31/1985 PAP AKA CERVICAL CYTOLOGY 1/31/1985 HEMOGLOBIN A1C Q6M 7/23/2018 Influenza Age 5 to Adult 8/1/2018 MICROALBUMIN Q1 10/13/2018 FOOT EXAM Q1 1/23/2019 LIPID PANEL Q1 1/23/2019 MEDICARE YEARLY EXAM 5/10/2019 BREAST CANCER SCRN MAMMOGRAM 7/10/2019 Allergies as of 5/9/2018  Review Complete On: 5/9/2018 By: Giuliana Vega MD  
 No Known Allergies Current Immunizations  Reviewed on 3/8/2017 Name Date Influenza Vaccine 11/29/2016, 10/1/2016, 11/12/2015, 11/10/2014, 12/14/2012 Influenza Vaccine (Quad) PF 10/13/2017 Pneumococcal Vaccine (Unspecified Type) 9/10/2015 Not reviewed this visit You Were Diagnosed With   
  
 Codes Comments Hypertension with renal disease    -  Primary ICD-10-CM: I12.9 ICD-9-CM: 403.90 Controlled type 2 diabetes mellitus with stage 3 chronic kidney disease, without long-term current use of insulin (HCC)     ICD-10-CM: E11.22, N18.3 ICD-9-CM: 250.40, 585.3 Mixed hyperlipidemia     ICD-10-CM: E78.2 ICD-9-CM: 272.2 Mild intermittent asthma without complication     W-41-FO: J45.20 ICD-9-CM: 493.90 History of CVA (cerebrovascular accident)     ICD-10-CM: Z86.73 
ICD-9-CM: V12.54 Class 2 obesity due to excess calories without serious comorbidity with body mass index (BMI) of 37.0 to 37.9 in adult     ICD-10-CM: E66.09, P09.49 ICD-9-CM: 278.00, V85.37 CKD (chronic kidney disease), stage III     ICD-10-CM: N18.3 ICD-9-CM: 585.3 Osteopenia of multiple sites     ICD-10-CM: M85.89 ICD-9-CM: 733.90 Vitamin D deficiency     ICD-10-CM: E55.9 ICD-9-CM: 268.9 Mild depression (HCC)     ICD-10-CM: F32.0 ICD-9-CM: 557 Medicare annual wellness visit, initial     ICD-10-CM: Z00.00 ICD-9-CM: V70.0 Alcohol screening     ICD-10-CM: Z13.89 ICD-9-CM: V79.1 Vitals BP Pulse Temp Resp Height(growth percentile) Weight(growth percentile) 136/88 87 98.4 °F (36.9 °C) (Oral) 20 5' 4\" (1.626 m) 222 lb 6.4 oz (100.9 kg) LMP SpO2 BMI OB Status Smoking Status 06/13/2011 98% 38.17 kg/m2 Hysterectomy Former Smoker Vitals History BMI and BSA Data Body Mass Index Body Surface Area  
 38.17 kg/m 2 2.13 m 2 Preferred Pharmacy Pharmacy Name Phone [id number], [place] [address] [phone] Your Updated Medication List  
  
   
This list is accurate as of 5/9/18  9:59 AM.  Always use your most recent med list.  
  
  
  
  
 albuterol 90 mcg/actuation inhaler Commonly known as:  PROAIR HFA Take 2 Puffs by inhalation every four (4) hours as needed for Wheezing. ALPRAZolam 0.5 mg tablet Commonly known as:  Phoebe Nixon Take 1 Tab by mouth daily as needed for Anxiety. amLODIPine 5 mg tablet Commonly known as:  Remonia Grates Take 1 Tab by mouth daily. insulin glargine 100 unit/mL (3 mL) Inpn Commonly known as:  LANTUS SOLOSTAR U-100 INSULIN Use 40 units daily Insulin Needles (Disposable) 31 gauge x 1/4\" Ndle Commonly known as:  Pen Needles Use daily as directed with her insulin pen. IRON PO Take  by mouth. KLOR-CON M20 PO Take 2 Tabs by mouth two (2) times a day. lisinopril-hydroCHLOROthiazide 20-12.5 mg per tablet Commonly known as:  Adonica Frankel Take 2 Tabs by mouth daily. losartan 100 mg tablet Commonly known as:  COZAAR Take 100 mg by mouth daily. magnesium oxide 400 mg tablet Commonly known as:  MAG-OX Take 1 Tab by mouth two (2) times a day. metFORMIN  mg tablet Commonly known as:  GLUCOPHAGE XR Take 1 tablet by mouth in the morning and take 2 tablets before dinner. pravastatin 80 mg tablet Commonly known as:  PRAVACHOL Take 80 mg by mouth nightly. REMERON 15 mg tablet Generic drug:  mirtazapine Take  by mouth nightly. VITAMIN C 250 mg tablet Generic drug:  ascorbic acid (vitamin C) Take  by mouth. VITAMIN D3 1,000 unit Cap Generic drug:  cholecalciferol Take 2,000 Units by mouth daily. We Performed the Following AMB POC CK (CPK) [82713 CPT(R)] AMB POC COMPLETE CBC,AUTOMATED ENTER J3339734 CPT(R)] AMB POC COMPREHENSIVE METABOLIC PANEL [95483 CPT(R)] AMB POC HEMOGLOBIN A1C [30542 CPT(R)] AMB POC LIPID PROFILE [27385 CPT(R)] AMB POC T4, FREE U723237 CPT(R)] AMB POC TSH [80432 CPT(R)] AMB POC URINALYSIS DIP STICK AUTO W/ MICRO  [65701 CPT(R)] AMB POC URINE, MICROALBUMIN, SEMIQUANT (1 RESULT) [22271 CPT(R)] AMB POC VITAMIN D [45285 CPT(R)] Follow-up Instructions Return in about 3 months (around 8/9/2018). Patient Instructions Depression and Chronic Disease: Care Instructions Your Care Instructions A chronic disease is one that you have for a long time. Some chronic diseases can be controlled, but they usually cannot be cured.  Depression is common in people with chronic diseases, but it often goes unnoticed. Many people have concerns about seeking treatment for a mental health problem. You may think it's a sign of weakness, or you don't want people to know about it. It's important to overcome these reasons for not seeking treatment. Treating depression or anxiety is good for your health. Follow-up care is a key part of your treatment and safety. Be sure to make and go to all appointments, and call your doctor if you are having problems. It's also a good idea to know your test results and keep a list of the medicines you take. How can you care for yourself at home? Watch for symptoms of depression The symptoms of depression are often subtle at first. You may think they are caused by your disease rather than depression. Or you may think it is normal to be depressed when you have a chronic disease. If you are depressed you may: · Feel sad or hopeless. · Feel guilty or worthless. · Not enjoy the things you used to enjoy. · Feel hopeless, as though life is not worth living. · Have trouble thinking or remembering. · Have low energy, and you may not eat or sleep well. · Pull away from others. · Think often about death or killing yourself. (Keep the numbers for these national suicide hotlines: 3-488-720-TALK [1-278.969.3542] and 8-560-TLJGEOG [1-890.138.3607]. ) Get treatment By treating your depression, you can feel more hopeful and have more energy. If you feel better, you may take better care of yourself, so your health may improve. · Talk to your doctor if you have any changes in mood during treatment for your disease. · Ask your doctor for help. Counseling, antidepressant medicine, or a combination of the two can help most people with depression. Often a combination works best. Counseling can also help you cope with having a chronic disease. When should you call for help? Call 911 anytime you think you may need emergency care. For example, call if: 
? · You feel like hurting yourself or someone else. ? · Someone you know has depression and is about to attempt or is attempting suicide. ?Call your doctor now or seek immediate medical care if: 
? · You hear voices. ? · Someone you know has depression and: 
¨ Starts to give away his or her possessions. ¨ Uses illegal drugs or drinks alcohol heavily. ¨ Talks or writes about death, including writing suicide notes or talking about guns, knives, or pills. ¨ Starts to spend a lot of time alone. ¨ Acts very aggressively or suddenly appears calm. ? Watch closely for changes in your health, and be sure to contact your doctor if: 
? · You do not get better as expected. Where can you learn more? Go to http://stephen-laurie.info/. Enter G816 in the search box to learn more about \"Depression and Chronic Disease: Care Instructions. \" Current as of: May 12, 2017 Content Version: 11.4 © 0954-9005 [employer]. Care instructions adapted under license by [employer] (which disclaims liability or warranty for this information). If you have questions about a medical condition or this instruction, always ask your healthcare professional. [address] any warranty or liability for your use of this information. Introducing [facility]! [employer] introduces DadShed patient portal. Now you can access parts of your medical record, email your doctor's office, and request medication refills online. 1. In your internet browser, go to [email or web address] 2. Click on the First Time User? Click Here link in the Sign In box. You will see the New Member Sign Up page. 3. Enter your DadShed Access Code exactly as it appears below. You will not need to use this code after youve completed the sign-up process.  If you do not sign up before the expiration date, you must request a new code. · Hapzing Access Code: [email or web address] Expires: 8/7/2018  9:59 AM 
 
4. Enter the last four digits of your Social Security Number (xxxx) and Date of Birth (mm/dd/yyyy) as indicated and click Submit. You will be taken to the next sign-up page. 5. Create a Hapzing ID. This will be your Hapzing login ID and cannot be changed, so think of one that is secure and easy to remember. 6. Create a Hapzing password. You can change your password at any time. 7. Enter your Password Reset Question and Answer. This can be used at a later time if you forget your password. 8. Enter your e-mail address. You will receive e-mail notification when new information is available in [address] 9. Click Sign Up. You can now view and download portions of your medical record. 10. Click the Download Summary menu link to download a portable copy of your medical information. If you have questions, please visit the Frequently Asked Questions section of the Hapzing website. Remember, Hapzing is NOT to be used for urgent needs. For medical emergencies, dial 911. Now available from your iPhone and Android! Please provide this summary of care documentation to your next provider. Your primary care clinician is listed as Wilber. If you have any questions after today's visit, please call [phone].

## 2018-05-09 NOTE — PATIENT INSTRUCTIONS
Depression and Chronic Disease: Care Instructions  Your Care Instructions    A chronic disease is one that you have for a long time. Some chronic diseases can be controlled, but they usually cannot be cured. Depression is common in people with chronic diseases, but it often goes unnoticed. Many people have concerns about seeking treatment for a mental health problem. You may think it's a sign of weakness, or you don't want people to know about it. It's important to overcome these reasons for not seeking treatment. Treating depression or anxiety is good for your health. Follow-up care is a key part of your treatment and safety. Be sure to make and go to all appointments, and call your doctor if you are having problems. It's also a good idea to know your test results and keep a list of the medicines you take. How can you care for yourself at home? Watch for symptoms of depression  The symptoms of depression are often subtle at first. You may think they are caused by your disease rather than depression. Or you may think it is normal to be depressed when you have a chronic disease. If you are depressed you may:  · Feel sad or hopeless. · Feel guilty or worthless. · Not enjoy the things you used to enjoy. · Feel hopeless, as though life is not worth living. · Have trouble thinking or remembering. · Have low energy, and you may not eat or sleep well. · Pull away from others. · Think often about death or killing yourself. (Keep the numbers for these national suicide hotlines: 3-033-121-TALK [1-762.109.4369] and 6-570-HGVWDSK [1-733.308.7732]. )  Get treatment  By treating your depression, you can feel more hopeful and have more energy. If you feel better, you may take better care of yourself, so your health may improve. · Talk to your doctor if you have any changes in mood during treatment for your disease. · Ask your doctor for help.  Counseling, antidepressant medicine, or a combination of the two can help most people with depression. Often a combination works best. Counseling can also help you cope with having a chronic disease. When should you call for help? Call 911 anytime you think you may need emergency care. For example, call if:  ? · You feel like hurting yourself or someone else. ? · Someone you know has depression and is about to attempt or is attempting suicide. ?Call your doctor now or seek immediate medical care if:  ? · You hear voices. ? · Someone you know has depression and:  ¨ Starts to give away his or her possessions. ¨ Uses illegal drugs or drinks alcohol heavily. ¨ Talks or writes about death, including writing suicide notes or talking about guns, knives, or pills. ¨ Starts to spend a lot of time alone. ¨ Acts very aggressively or suddenly appears calm. ? Watch closely for changes in your health, and be sure to contact your doctor if:  ? · You do not get better as expected. Where can you learn more? Go to http://stephen-laurie.info/. Enter N593 in the search box to learn more about \"Depression and Chronic Disease: Care Instructions. \"  Current as of: May 12, 2017  Content Version: 11.4  © 6703-4613 [employer], [employer]. Care instructions adapted under license by [employer] (which disclaims liability or warranty for this information). If you have questions about a medical condition or this instruction, always ask your healthcare professional. [place] [address] any warranty or liability for your use of this information.

## 2018-05-09 NOTE — PROGRESS NOTES
1. Have you been to the ER, urgent care clinic since your last visit? Hospitalized since your last visit? No    2. Have you seen or consulted any other health care providers outside of the [address] [place] [address] since your last visit? Include any pap smears or colon screening. No     Chief Complaint   Patient presents with    Annual Wellness Visit       Fasting    Pap last done around  with Dr. Caity Gil at [facility]. Eye exam last done over a year ago. Needs to be scheduled.

## 2018-05-09 NOTE — PROGRESS NOTES
This is an Initial Medicare Annual Wellness Exam (AWV) (Performed 12 months after IPPE or effective date of Medicare Part B enrollment, Once in a lifetime)    I have reviewed the patient's medical history in detail and updated the computerized patient record. She presents today for initial annual Medicare wellness examination screening questionnaire. She is also here in follow-up of medical problems include hypertension, diabetes, hyperlipidemia, prior CVA with right hemiplegia, asthma, morbid obesity, CKD stage III, and other medical problems. She is taking her medications and trying to follow her diet but really does not get any exercise because of the previous stroke problem she does get around with a quad cane. She currently denies any chest pain, shortness breath, palpitations or cardiorespiratory complaints. She denies any GI or  complaints. She denies any headaches, dizziness or new neurologic complaints but does have the right hemiplegia from a prior stroke several years prior. She denies any current arthritic complaints and then no other complaints on complete review of systems.     History     Past Medical History:   Diagnosis Date    Abnormal mammogram with microcalcification 6/4/2013    Right  UOQ    Aphasia due to stroke     SPEAKS SOME    Arthritis     back    Asthma 9/7/2017    CKD (chronic kidney disease) 9/7/2017    CKD (chronic kidney disease), stage III 9/7/2017    CVA (cerebral vascular accident) ([address] 9/7/2017    Depression 9/7/2017    Diabetes ([address]     TYPE 2; IDDM    Edema 9/7/2017    Hypertension     Hypertension with renal disease 9/7/2017    Hypomagnesemia 9/7/2017    Morbid obesity ([address] 9/7/2017    On statin therapy 9/7/2017    Osteopenia 9/7/2017    Psychiatric disorder     depression    Stroke ([address] 5/11/2010    RIGHT SIDE WEAKNESS    Vitamin D deficiency 9/7/2017      Past Surgical History:   Procedure Laterality Date    HX BREAST BIOPSY Right     2013 neg    HX CATARACT REMOVAL  Dec 2010/ Jan 2011    bilateral cataracts removed    HX GI      HEMMORHOIDECTOMY    HX GYN      pmb    HX HEENT      tonsillectomy    HX OTHER SURGICAL      HX TONSILLECTOMY      MA LAPAROSCOPY W TOT HYSTERECTUTERUS <=250 GRAM  W TUBE/OVARY  6/2011    leiomyomata     Current Outpatient Prescriptions   Medication Sig Dispense Refill    albuterol (PROAIR HFA) 90 mcg/actuation inhaler Take 2 Puffs by inhalation every four (4) hours as needed for Wheezing. 1 Inhaler 5    magnesium oxide (MAG-OX) 400 mg tablet Take 1 Tab by mouth two (2) times a day. 180 Tab 1    ALPRAZolam (XANAX) 0.5 mg tablet Take 1 Tab by mouth daily as needed for Anxiety. 30 Tab 0    insulin glargine (LANTUS SOLOSTAR) 100 unit/mL (3 mL) inpn Use 40 units daily 5 Pen 5    lisinopril-hydroCHLOROthiazide (PRINZIDE, ZESTORETIC) 20-12.5 mg per tablet Take 2 Tabs by mouth daily. 60 Tab 5    Insulin Needles, Disposable, (PEN NEEDLES) 31 gauge x 1/4\" ndle Use daily as directed with her insulin pen. 100 Pen Needle prn    metFORMIN ER (GLUCOPHAGE XR) 500 mg tablet Take 1 tablet by mouth in the morning and take 2 tablets before dinner. 90 Tab 5    pravastatin (PRAVACHOL) 80 mg tablet Take 80 mg by mouth nightly.  amLODIPine (NORVASC) 5 mg tablet Take 1 Tab by mouth daily. 30 Tab prn    mirtazapine (REMERON) 15 mg tablet Take  by mouth nightly.  FERROUS SULFATE (IRON PO) Take  by mouth.  losartan (COZAAR) 100 mg tablet Take 100 mg by mouth daily.  cholecalciferol (VITAMIN D3) 1,000 unit cap Take 2,000 Units by mouth daily.  POTASSIUM CHLORIDE (KLOR-CON M20 PO) Take 2 Tabs by mouth two (2) times a day.  ascorbic acid (VITAMIN C) 250 mg tablet Take  by mouth.          No Known Allergies  Family History   Problem Relation Age of Onset    Hypertension Mother    [facility] Mother     Diabetes Father     Stroke Father     Heart Disease Father     Hypertension Father     Post-op Nausea/Vomiting Sister      Social History   Substance Use Topics    Smoking status: Former Smoker     Quit date: 5/12/2010    Smokeless tobacco: Never Used    Alcohol use No     Patient Active Problem List   Diagnosis Code    Mixed hyperlipidemia E78.2    Anemia D64.9    Chronic anticoagulation Z79.01    Controlled type 2 diabetes mellitus with stage 3 chronic kidney disease, without long-term current use of insulin (HCC) E11.22, N18.3    Unspecified constipation K59.00    Urinary retention R33.9    History of CVA (cerebrovascular accident) Z86.73    Ovarian cyst N83.209    Pelvic mass R19.00    Abnormal mammogram with microcalcification R92.0    Vertigo R42    Near syncope R55    Stenosis of both internal carotid arteries I65.23    Hypomagnesemia E83.42    Osteopenia of multiple sites M85.89    Class 2 obesity due to excess calories with body mass index (BMI) of 37.0 to 37.9 in adult E66.09, Z68.37    CKD (chronic kidney disease), stage III N18.3    Hypertension with renal disease I12.9    Vitamin D deficiency E55.9    On statin therapy Z79.899    Mild intermittent asthma without complication B59.07    Anxiety F41.9    Mild depression ([facility] [address] F32.0    Medicare annual wellness visit, initial Z00.00    Alcohol screening Z13.89    Severe obesity (BMI 35.0-39. 9) with comorbidity ([address] E66.01       Depression Risk Factor Screening:     PHQ over the last two weeks 5/9/2018   Little interest or pleasure in doing things Not at all   Feeling down, depressed or hopeless Not at all   Total Score PHQ 2 0     Alcohol Risk Factor Screening: You do not drink alcohol or very rarely. Functional Ability and Level of Safety:     Hearing Loss  Hearing is good. Activities of Daily Living  The home contains: grab bars  Patient needs help with:  transportation and dressing    Fall Risk  Fall Risk Assessment, last 12 mths 5/9/2018   Able to walk? Yes   Fall in past 12 months?  No       Abuse Screen  Patient is not abused    Cognitive Screening   Evaluation of Cognitive Function:  Has your family/caregiver stated any concerns about your memory: no  Normal     ROS:    Constitutional: She denies fevers, weight loss, sweats. Eyes: No blurred or double vision. ENT: No difficulty with swallowing, taste, speech or smell. NECK: no stiffness swelling or lymph node enlargement  Respiratory: No cough wheezing or shortness of breath. Cardiovascular: Denies chest pain, palpitations, unexplained indigestion or syncope. Breast: She has noted no masses or lumps and no discharge or axillary swelling  Gastrointestinal:  No changes in bowel movements, no abdominal pain, no bloating. Genitourinary: No discharge or abnormal bleeding or pain  Extremities: No joint pain, stiffness or swelling. Neurological:  No numbness, tingling, burring paresthesias or loss of motor strength. No syncope, dizziness or frequent headache. Right hemiplegia from prior stroke  Skin:  No recent rashes or mole changes. Psychiatric/Behavioral:  Negative for depression. The patient is not nervous/anxious. HEMATOLOGIC: no easy bruising or bleeding gums  Endocrine: no sweats of urinary frequency or excessive thirst    Vitals:    05/09/18 0906 05/09/18 0944   BP: 154/90 136/88   Pulse: 87    Resp: 20    Temp: 98.4 °F (36.9 °C)    TempSrc: Oral    SpO2: 98%    Weight: 222 lb 6.4 oz (100.9 kg)    Height: 5' 4\" (1.626 m)    PainSc:   0 - No pain    LMP: 06/13/2011        PHYSICAL EXAM:    General appearance - alert, well appearing, and in no distress  Mental status - alert, oriented to person, place, and time  HEENT:  Ears - bilateral TM's and external ear canals clear  Eyes - pupillary responses were normal.  Extraocular muscle function intact. Lids and conjunctiva not injected. Fundoscopic exam revealed sharp disc margins. eye movements intact  Pharynx- clear with teeth in good repair. No masses were noted  Neck - supple without thyromegaly or burit.   No JVD noted  Lungs - clear to auscultation and percussion  Cardiac- normal rate, regular rhythm without murmurs. PMI not displaced. No gallop, rub or click  Breast: deferred to GYN  Abdomen - flat, soft, non-tender without palpable organomegaly or mass. No pulsatile mass was felt, and not bruit was heard. Bowel sounds were active   Female - deferred to GYN  Rectal - deferred to GYN  Extremities -  no clubbing cyanosis or edema  Lymphatics - no palpable lymphadenopathy, no hepatosplenomegaly  Peripheral vascular - Dorsalis pedis and posterior tibial pulses felt without difficulty  Skin - no rash or unusual mole change noted  Neurological - Cranial nerves II-XII grossly intact. Motor strength 5/5. DTR's 2+ and symmetric. Right hemiplegia from prior stroke has a brace on her right leg and is able to thus walk with a quad cane  Back exam - full range of motion, no tenderness, palpable spasm or pain on motion  Musculoskeletal - no joint tenderness, deformity or swelling  Hematologic: no purpura, petechiae or bruising    Patient Care Team   Patient Care Team:  Liss Langston MD as PCP - General (Internal Medicine)    Assessment/Plan   Education and counseling provided:  Are appropriate based on today's review and evaluation    ASSESSMENT:   1. Hypertension with renal disease    2. Controlled type 2 diabetes mellitus with stage 3 chronic kidney disease, without long-term current use of insulin ([address]    3. Mixed hyperlipidemia    4. Mild intermittent asthma without complication    5. History of CVA (cerebrovascular accident)    6. Class 2 obesity due to excess calories without serious comorbidity with body mass index (BMI) of 37.0 to 37.9 in adult    7. CKD (chronic kidney disease), stage III    8. Osteopenia of multiple sites    9. Vitamin D deficiency    10. Mild depression ([address]    11. Medicare annual wellness visit, initial    15. Alcohol screening    13. Pelvic mass      Impression  1.   Hypertension that is controlled on recheck initially up when she first got into exam room stressed importance of continue to work on trying to get her weight down to help with this overall. 2.  Diabetes repeat status pending reviewed prior labs make adjustments as necessary  3. Hyperlipidemia prior labs reviewed repeat status pending will adjust medicines if needed  4. Asthma that is stable  5. Prior CVA has a chronic right hemiplegia continue aspirin daily  6. Obesity discussed diet exercise weight reduction for [employer] [facility]  7. CKD that is stable  8. Osteopenia reviewed prior bone density with her  9. Vitamin D deficiency repeat status pending  10. Mild depression she currently seems to be stable without taking any antidepressants although she does take Xanax as needed for anxiety and seems to be doing well with that and does not feel like she needs anything else. She does take Remeron on a regular basis. Medicare annual wellness examination screening questionnaire completed today. The results were reviewed with her and her . Their questions were answered. Lifestyle recommendations and modifications discussed and made. I will call the lab make further recommendations and adjustments if necessary. Follow stable continue same and I will recheck her again myself in 3 months or sooner should they be a problem. PLAN:  .  Orders Placed This Encounter    AMB POC LIPID PROFILE    AMB POC COMPLETE CBC,AUTOMATED ENTER    AMB POC COMPREHENSIVE METABOLIC PANEL    AMB POC HEMOGLOBIN A1C    AMB POC T4, FREE    AMB POC URINALYSIS DIP STICK AUTO W/ MICRO     AMB POC TSH    AMB POC CK (CPK)    AMB POC VITAMIN D    AMB POC URINE, MICROALBUMIN, SEMIQUANT (1 RESULT)         ATTENTION:   This medical record was transcribed using an electronic medical records system. Although proofread, it may and can contain electronic and spelling errors. Other human spelling and other errors may be present.   Corrections may be executed at a later time. Please feel free to contact us for any clarifications as needed. Follow-up Disposition:  Return in about 3 months (around 8/9/2018).       Clifton Krabbe, MD     Health Maintenance Due   Topic Date Due    EYE EXAM RETINAL OR DILATED Q1  01/31/1974    COLONOSCOPY  01/31/1982    DTaP/Tdap/Td series (1 - Tdap) 01/31/1985

## 2018-05-29 RX ORDER — INSULIN GLARGINE 100 [IU]/ML
INJECTION, SOLUTION SUBCUTANEOUS
Qty: 12 PEN | Refills: 3 | Status: SHIPPED | OUTPATIENT
Start: 2018-05-29 | End: 2018-06-06 | Stop reason: SDUPTHER

## 2018-05-29 RX ORDER — INSULIN GLARGINE 100 [IU]/ML
INJECTION, SOLUTION SUBCUTANEOUS
Refills: 3 | Status: CANCELLED | OUTPATIENT
Start: 2018-05-29

## 2018-06-06 RX ORDER — INSULIN GLARGINE 100 [IU]/ML
INJECTION, SOLUTION SUBCUTANEOUS
Qty: 12 PEN | Refills: 3 | Status: SHIPPED | OUTPATIENT
Start: 2018-06-06 | End: 2018-12-04 | Stop reason: SDUPTHER

## 2018-06-06 NOTE — TELEPHONE ENCOUNTER
Requested Prescriptions     Pending Prescriptions Disp Refills    insulin glargine (LANTUS SOLOSTAR U-100 INSULIN) 100 unit/mL (3 mL) inpn 12 Pen 3     Sig: Use 40 units daily

## 2018-07-12 ENCOUNTER — HOSPITAL ENCOUNTER (OUTPATIENT)
Dept: MAMMOGRAPHY | Age: 53
Discharge: HOME | End: 2018-07-12
Attending: INTERNAL MEDICINE
Payer: MEDICARE

## 2018-07-12 DIAGNOSIS — Z12.31 VISIT FOR SCREENING MAMMOGRAM: ICD-10-CM

## 2018-07-12 PROCEDURE — 77067 SCR MAMMO BI INCL CAD: CPT

## 2018-07-30 DIAGNOSIS — I10 HYPERTENSION, UNSPECIFIED TYPE: ICD-10-CM

## 2018-07-30 RX ORDER — LISINOPRIL AND HYDROCHLOROTHIAZIDE 12.5; 2 MG/1; MG/1
2 TABLET ORAL DAILY
Qty: 180 TAB | Refills: 1 | Status: SHIPPED | OUTPATIENT
Start: 2018-07-30 | End: 2019-02-12 | Stop reason: SDUPTHER

## 2018-07-30 RX ORDER — METFORMIN HYDROCHLORIDE 500 MG/1
TABLET, EXTENDED RELEASE ORAL
Qty: 270 TAB | Refills: 1 | Status: SHIPPED | OUTPATIENT
Start: 2018-07-30 | End: 2019-02-12 | Stop reason: SDUPTHER

## 2018-07-30 RX ORDER — LOSARTAN POTASSIUM 100 MG/1
100 TABLET ORAL DAILY
Qty: 90 TAB | Refills: 1 | Status: SHIPPED | OUTPATIENT
Start: 2018-07-30 | End: 2019-02-12 | Stop reason: SDUPTHER

## 2018-07-30 RX ORDER — PRAVASTATIN SODIUM 80 MG/1
80 TABLET ORAL
Qty: 90 TAB | Refills: 1 | Status: SHIPPED | OUTPATIENT
Start: 2018-07-30 | End: 2019-02-12 | Stop reason: SDUPTHER

## 2018-07-30 RX ORDER — AMLODIPINE BESYLATE 5 MG/1
5 TABLET ORAL DAILY
Qty: 90 TAB | Refills: 1 | Status: SHIPPED | OUTPATIENT
Start: 2018-07-30 | End: 2019-02-12 | Stop reason: SDUPTHER

## 2018-07-30 NOTE — TELEPHONE ENCOUNTER
Requested Prescriptions     Pending Prescriptions Disp Refills    losartan (COZAAR) 100 mg tablet 90 Tab 1     Sig: Take 1 Tab by mouth daily.  metFORMIN ER (GLUCOPHAGE XR) 500 mg tablet 270 Tab 1     Sig: Take 1 tablet by mouth in the morning and take 2 tablets before dinner.  pravastatin (PRAVACHOL) 80 mg tablet 90 Tab 1     Sig: Take 1 Tab by mouth nightly.  lisinopril-hydroCHLOROthiazide (PRINZIDE, ZESTORETIC) 20-12.5 mg per tablet 180 Tab 1     Sig: Take 2 Tabs by mouth daily.  amLODIPine (NORVASC) 5 mg tablet 90 Tab 1     Sig: Take 1 Tab by mouth daily.      Patient Last Seen:  05- with labs    Next appointment:  08-

## 2018-08-09 ENCOUNTER — OFFICE VISIT (OUTPATIENT)
Dept: INTERNAL MEDICINE CLINIC | Age: 53
End: 2018-08-09

## 2018-08-09 VITALS
SYSTOLIC BLOOD PRESSURE: 138 MMHG | WEIGHT: 210 LBS | TEMPERATURE: 98.6 F | HEIGHT: 64 IN | HEART RATE: 81 BPM | OXYGEN SATURATION: 99 % | DIASTOLIC BLOOD PRESSURE: 80 MMHG | BODY MASS INDEX: 35.85 KG/M2 | RESPIRATION RATE: 18 BRPM

## 2018-08-09 DIAGNOSIS — F32.A MILD DEPRESSION: ICD-10-CM

## 2018-08-09 DIAGNOSIS — I12.9 HYPERTENSION WITH RENAL DISEASE: Primary | ICD-10-CM

## 2018-08-09 DIAGNOSIS — J45.20 MILD INTERMITTENT ASTHMA WITHOUT COMPLICATION: ICD-10-CM

## 2018-08-09 DIAGNOSIS — N18.30 CKD (CHRONIC KIDNEY DISEASE), STAGE III (HCC): ICD-10-CM

## 2018-08-09 DIAGNOSIS — E78.2 MIXED HYPERLIPIDEMIA: ICD-10-CM

## 2018-08-09 DIAGNOSIS — E11.22 CONTROLLED TYPE 2 DIABETES MELLITUS WITH STAGE 3 CHRONIC KIDNEY DISEASE, WITHOUT LONG-TERM CURRENT USE OF INSULIN (HCC): ICD-10-CM

## 2018-08-09 DIAGNOSIS — E66.09 CLASS 2 OBESITY DUE TO EXCESS CALORIES WITHOUT SERIOUS COMORBIDITY WITH BODY MASS INDEX (BMI) OF 37.0 TO 37.9 IN ADULT: ICD-10-CM

## 2018-08-09 DIAGNOSIS — N18.30 CONTROLLED TYPE 2 DIABETES MELLITUS WITH STAGE 3 CHRONIC KIDNEY DISEASE, WITHOUT LONG-TERM CURRENT USE OF INSULIN (HCC): ICD-10-CM

## 2018-08-09 RX ORDER — ASPIRIN 325 MG
325 TABLET ORAL DAILY
COMMUNITY

## 2018-08-09 NOTE — MR AVS SNAPSHOT
[address] [facility] [address] [phone] Patient: Marley Cranker MRN: [id number] Visit Information Date & Time Provider Department Dept. Phone Encounter #  
 8/9/2018  9:10 AM Oralia Sanchez MD [place] [id number] [phone] [id number] Follow-up Instructions Return in about 3 months (around 11/9/2018). Your Appointments 11/28/2018  9:00 AM  
FOLLOW UP 10 with MD DICK Begum [facility] ([address]) Appt Note: [address] [phone] [address] [facility] [phone] Upcoming Health Maintenance Date Due  
 EYE EXAM RETINAL OR DILATED Q1 1/31/1974 COLONOSCOPY 1/31/1982 DTaP/Tdap/Td series (1 - Tdap) 1/31/1985 Influenza Age 5 to Adult 8/1/2018 HEMOGLOBIN A1C Q6M 11/9/2018 FOOT EXAM Q1 1/23/2019 MICROALBUMIN Q1 5/9/2019 LIPID PANEL Q1 5/9/2019 MEDICARE YEARLY EXAM 5/10/2019 BREAST CANCER SCRN MAMMOGRAM 7/12/2020 Allergies as of 8/9/2018  Review Complete On: 8/9/2018 By: Oralia Sanchez MD  
 No Known Allergies Current Immunizations  Reviewed on 3/8/2017 Name Date Influenza Vaccine 11/29/2016, 10/1/2016, 11/12/2015, 11/10/2014, 12/14/2012 Influenza Vaccine (Quad) PF 10/13/2017 Pneumococcal Vaccine (Unspecified Type) 9/10/2015 Not reviewed this visit You Were Diagnosed With   
  
 Codes Comments Hypertension with renal disease    -  Primary ICD-10-CM: I12.9 ICD-9-CM: 403.90 Controlled type 2 diabetes mellitus with stage 3 chronic kidney disease, without long-term current use of insulin (HCC)     ICD-10-CM: E11.22, N18.3 ICD-9-CM: 250.40, 585.3 Mixed hyperlipidemia     ICD-10-CM: E78.2 ICD-9-CM: 272.2 Mild intermittent asthma without complication     STACIE-54-GI: J45.20 ICD-9-CM: 493.90 Class 2 obesity due to excess calories without serious comorbidity with body mass index (BMI) of 37.0 to 37.9 in adult     ICD-10-CM: E66.09, V42.92 ICD-9-CM: 278.00, V85.37 CKD (chronic kidney disease), stage III     ICD-10-CM: N18.3 ICD-9-CM: 859. 3 Mild depression (HCC)     ICD-10-CM: F32.0 ICD-9-CM: 477 Vitals BP Pulse Temp Resp Height(growth percentile) Weight(growth percentile) 138/80 81 98.6 °F (37 °C) (Oral) 18 5' 4\" (1.626 m) 210 lb (95.3 kg) LMP SpO2 BMI OB Status Smoking Status 06/13/2011 99% 36.05 kg/m2 Hysterectomy Former Smoker Vitals History BMI and BSA Data Body Mass Index Body Surface Area 36.05 kg/m 2 2.07 m 2 Preferred Pharmacy Pharmacy Name Phone [id number] [address] [phone] Your Updated Medication List  
  
   
This list is accurate as of 8/9/18 10:20 AM.  Always use your most recent med list.  
  
  
  
  
 albuterol 90 mcg/actuation inhaler Commonly known as:  PROAIR HFA Take 2 Puffs by inhalation every four (4) hours as needed for Wheezing. ALPRAZolam 0.5 mg tablet Commonly known as:  Pixie Oz Take 1 Tab by mouth daily as needed for Anxiety. amLODIPine 5 mg tablet Commonly known as:  Brooke Henri Take 1 Tab by mouth daily. aspirin 325 mg tablet Commonly known as:  ASPIRIN Take 325 mg by mouth daily. insulin glargine 100 unit/mL (3 mL) Inpn Commonly known as:  LANTUS SOLOSTAR U-100 INSULIN Use 40 units daily Insulin Needles (Disposable) 31 gauge x 1/4\" Ndle Commonly known as:  Pen Needles Use daily as directed with her insulin pen. IRON PO Take  by mouth. KLOR-CON M20 PO Take 2 Tabs by mouth two (2) times a day. lisinopril-hydroCHLOROthiazide 20-12.5 mg per tablet Commonly known as:  Katrinka Fat Take 2 Tabs by mouth daily. losartan 100 mg tablet Commonly known as:  COZAAR Take 1 Tab by mouth daily. magnesium oxide 400 mg tablet Commonly known as:  MAG-OX Take 1 Tab by mouth two (2) times a day. metFORMIN  mg tablet Commonly known as:  GLUCOPHAGE XR Take 1 tablet by mouth in the morning and take 2 tablets before dinner. pravastatin 80 mg tablet Commonly known as:  PRAVACHOL Take 1 Tab by mouth nightly. REMERON 15 mg tablet Generic drug:  mirtazapine Take  by mouth nightly. VITAMIN C 250 mg tablet Generic drug:  ascorbic acid (vitamin C) Take  by mouth. VITAMIN D3 1,000 unit Cap Generic drug:  cholecalciferol Take 2,000 Units by mouth daily. We Performed the Following CK [id number] CPT(R)] HEMOGLOBIN A1C WITH EAG [13145 CPT(R)] LIPID PANEL [27180 CPT(R)] METABOLIC PANEL, COMPREHENSIVE [11396 CPT(R)] Follow-up Instructions Return in about 3 months (around 11/9/2018). Patient Instructions Asthma in Adults: Care Instructions Your Care Instructions During an asthma attack, your airways swell and narrow as a reaction to certain things (triggers). This makes it hard to breathe. You may be able to prevent asthma attacks if you avoid the things that set off your asthma symptoms. Keeping your asthma under control and treating symptoms before they get bad can help you avoid severe attacks. If you can control your asthma, you may be able to do all of your normal daily activities. You may also avoid asthma attacks and trips to the hospital. 
Follow-up care is a key part of your treatment and safety. Be sure to make and go to all appointments, and call your doctor if you are having problems. It's also a good idea to know your test results and keep a list of the medicines you take. How can you care for yourself at home? · Follow your asthma action plan so you can manage your symptoms at home. An asthma action plan will help you prevent and control airway reactions and will tell you what to do during an asthma attack. If you do not have an asthma action plan, work with your doctor to build one. · Take your asthma medicine exactly as prescribed. Medicine plays an important role in controlling asthma. Talk to your doctor right away if you have any questions about what to take and how to take it. ¨ Use your quick-relief medicine when you have symptoms of an attack. Quick-relief medicine often is an albuterol inhaler. Some people need to use quick-relief medicine before they exercise. ¨ Take your controller medicine every day, not just when you have symptoms. Controller medicine is usually an inhaled corticosteroid. The goal is to prevent problems before they occur. Do not use your controller medicine to try to treat an attack that has already started. It does not work fast enough to help. ¨ If your doctor prescribed corticosteroid pills to use during an attack, take them as directed. They may take hours to work, but they may shorten the attack and help you breathe better. ¨ Keep your quick-relief medicine with you at all times. · Talk to your doctor before using other medicines. Some medicines, such as aspirin, can cause asthma attacks in some people. · Check yourself for asthma symptoms to know which step to follow in your action plan. Watch for things like being short of breath, having chest tightness, coughing, and wheezing. Also notice if symptoms wake you up at night or if you get tired quickly when you exercise. · If you have a peak flow meter, use it to check how well you are breathing. This can help you predict when an asthma attack is going to occur. Then you can take medicine to prevent the asthma attack or make it less severe. · See your doctor regularly.  These visits will help you learn more about asthma and what you can do to control it. Your doctor will monitor your treatment to make sure the medicine is helping you. · Keep track of your asthma attacks and your treatment. After you have had an attack, write down what triggered it, what helped end it, and any concerns you have about your asthma action plan. Take your diary when you see your doctor. You can then review your asthma action plan and decide if it is working. · Do not smoke or allow others to smoke around you. Avoid smoky places. Smoking makes asthma worse. If you need help quitting, talk to your doctor about stop-smoking programs and medicines. These can increase your chances of quitting for good. · Learn what triggers an asthma attack for you, and avoid the triggers when you can. Common triggers include colds, smoke, air pollution, dust, pollen, mold, pets, cockroaches, stress, and cold air. · Avoid colds and the flu. Get a pneumococcal vaccine shot. If you have had one before, ask your doctor whether you need a second dose. Get a flu vaccine every fall. If you must be around people with colds or the flu, wash your hands often. When should you call for help? Call 911 anytime you think you may need emergency care. For example, call if: 
  · You have severe trouble breathing.  
 Call your doctor now or seek immediate medical care if: 
  · Your symptoms do not get better after you have followed your asthma action plan.  
  · You cough up yellow, dark brown, or bloody mucus (sputum).  
 Watch closely for changes in your health, and be sure to contact your doctor if: 
  · Your coughing and wheezing get worse.  
  · You need to use quick-relief medicine on more than 2 days a week (unless it is just for exercise).  
  · You need help figuring out what is triggering your asthma attacks. Where can you learn more? Go to http://stephen-laurie.info/.  
Enter P597 in the search box to learn more about \"Asthma in Adults: Care Instructions. \" Current as of: December 6, 2017 Content Version: 11.7 © 1185-8851 nap- [employer]. Care instructions adapted under license by [employer] (which disclaims liability or warranty for this information). If you have questions about a medical condition or this instruction, always ask your healthcare professional. [address] any warranty or liability for your use of this information. Introducing [facility]! Germain Luke introduces [employer] patient portal. Now you can access parts of your medical record, email your doctor's office, and request medication refills online. 1. In your internet browser, go to [email or web address] 2. Click on the First Time User? Click Here link in the Sign In box. You will see the New Member Sign Up page. 3. Enter your [employer] Access Code exactly as it appears below. You will not need to use this code after youve completed the sign-up process. If you do not sign up before the expiration date, you must request a new code. · [employer] Access Code: [email or web address] Expires: 11/7/2018  9:19 AM 
 
4. Enter the last four digits of your Social Security Number (xxxx) and Date of Birth (mm/dd/yyyy) as indicated and click Submit. You will be taken to the next sign-up page. 5. Create a DevHD ID. This will be your [employer] login ID and cannot be changed, so think of one that is secure and easy to remember. 6. Create a [employer] password. You can change your password at any time. 7. Enter your Password Reset Question and Answer. This can be used at a later time if you forget your password. 8. Enter your e-mail address. You will receive e-mail notification when new information is available in [address] 9. Click Sign Up. You can now view and download portions of your medical record. 10. Click the Download Summary menu link to download a portable copy of your medical information. If you have questions, please visit the Frequently Asked Questions section of the [employer] website. Remember, [employer] is NOT to be used for urgent needs. For medical emergencies, dial 911. Now available from your iPhone and Android! Please provide this summary of care documentation to your next provider. Your primary care clinician is listed as Wilber. If you have any questions after today's visit, please call [phone].

## 2018-08-09 NOTE — PROGRESS NOTES
Chief Complaint   Patient presents with    Diabetes     3 month follow up    Hypertension     3 month follow up    Medication Refill     Remeron       SUBJECTIVE:    Yrn Boucher is a 46 y.o. female who returns in follow-up for medical problems include hypertension, diabetes, hyperlipidemia, asthma, chronic kidney disease, vitamin D deficiency, prior CVA with right hemiplegia, and morbid obesity. She claims to be taking her medication and follow her diet but not getting much exercise because of the prior CVA. She denies any chest pain, shortness of breath or cardiorespiratory complaints. There are no GI/ complaints. She has no other neurologic complaints except the right hemiplegia. She has no current arthritic complaints and there are no other complaints on complete review of systems. Current Outpatient Prescriptions   Medication Sig Dispense Refill    aspirin (ASPIRIN) 325 mg tablet Take 325 mg by mouth daily.  losartan (COZAAR) 100 mg tablet Take 1 Tab by mouth daily. 90 Tab 1    metFORMIN ER (GLUCOPHAGE XR) 500 mg tablet Take 1 tablet by mouth in the morning and take 2 tablets before dinner. 270 Tab 1    pravastatin (PRAVACHOL) 80 mg tablet Take 1 Tab by mouth nightly. 90 Tab 1    lisinopril-hydroCHLOROthiazide (PRINZIDE, ZESTORETIC) 20-12.5 mg per tablet Take 2 Tabs by mouth daily. 180 Tab 1    amLODIPine (NORVASC) 5 mg tablet Take 1 Tab by mouth daily. 90 Tab 1    insulin glargine (LANTUS SOLOSTAR U-100 INSULIN) 100 unit/mL (3 mL) inpn Use 40 units daily 12 Pen 3    magnesium oxide (MAG-OX) 400 mg tablet Take 1 Tab by mouth two (2) times a day. 180 Tab 1    Insulin Needles, Disposable, (PEN NEEDLES) 31 gauge x 1/4\" ndle Use daily as directed with her insulin pen. 100 Pen Needle prn    mirtazapine (REMERON) 15 mg tablet Take  by mouth nightly.  FERROUS SULFATE (IRON PO) Take  by mouth.       cholecalciferol (VITAMIN D3) 1,000 unit cap Take 2,000 Units by mouth daily.      POTASSIUM CHLORIDE (KLOR-CON M20 PO) Take 2 Tabs by mouth two (2) times a day.  ascorbic acid (VITAMIN C) 250 mg tablet Take  by mouth.  albuterol (PROAIR HFA) 90 mcg/actuation inhaler Take 2 Puffs by inhalation every four (4) hours as needed for Wheezing. 1 Inhaler 5    ALPRAZolam (XANAX) 0.5 mg tablet Take 1 Tab by mouth daily as needed for Anxiety.  27 Tab 0     Past Medical History:   Diagnosis Date    Abnormal mammogram with microcalcification 6/4/2013    Right  UOQ    Aphasia due to stroke     SPEAKS SOME    Arthritis     back    Asthma 9/7/2017    CKD (chronic kidney disease) 9/7/2017    CKD (chronic kidney disease), stage III 9/7/2017    CVA (cerebral vascular accident) ([facility] [address] 9/7/2017    Depression 9/7/2017    Diabetes ([facility] [address]     TYPE 2; IDDM    Edema 9/7/2017    Hypertension     Hypertension with renal disease 9/7/2017    Hypomagnesemia 9/7/2017    Morbid obesity ([address] 9/7/2017    On statin therapy 9/7/2017    Osteopenia 9/7/2017    Psychiatric disorder     depression    Stroke ([facility] [address] 5/11/2010    RIGHT SIDE WEAKNESS    Vitamin D deficiency 9/7/2017     Past Surgical History:   Procedure Laterality Date    HX BREAST BIOPSY Right     2013 neg    HX CATARACT REMOVAL  Dec 2010/ Jan 2011    bilateral cataracts removed    HX GI      HEMMORHOIDECTOMY    HX GYN      pmb    HX HEENT      tonsillectomy    HX OOPHORECTOMY Bilateral     HX OTHER SURGICAL      HX TONSILLECTOMY      SD LAPAROSCOPY W TOT HYSTERECTUTERUS <=250 GRAM  W TUBE/OVARY  6/2011    leiomyomata     No Known Allergies    REVIEW OF SYSTEMS:  General: negative for - chills or fever, or weight loss or gain  ENT: negative for - headaches, nasal congestion or tinnitus  Eyes: no blurred or visual changes  Neck: No stiffness or swollen nodes  Respiratory: negative for - cough, hemoptysis, shortness of breath or wheezing  Cardiovascular : negative for - chest pain, edema, palpitations or shortness of breath  Gastrointestinal: negative for - abdominal pain, blood in stools, heartburn or nausea/vomiting  Genito-Urinary: no dysuria, trouble voiding, or hematuria  Musculoskeletal: negative for - gait disturbance, joint pain, joint stiffness or joint swelling  Neurological: no TIA or stroke symptoms. Residual right-sided weakness from prior stroke with some mild dysarthria  Hematologic: no bruises, no bleeding  Lymphatic: no swollen glands  Integument: no lumps, mole changes, nail changes or rash  Endocrine:no malaise/lethargy poly uria or polydipsia or unexpected weight changes        Social History     Social History    Marital status:      Spouse name: N/A    Number of children: N/A    Years of education: N/A     Social History Main Topics    Smoking status: Former Smoker     Quit date: 5/12/2010    Smokeless tobacco: Never Used    Alcohol use No    Drug use: No    Sexual activity: No     Other Topics Concern    None     Social History Narrative    ** Merged History Encounter **          Family History   Problem Relation Age of Onset    Hypertension Mother     MS Mother     Diabetes Father     Stroke Father     Heart Disease Father     Hypertension Father     Post-op Nausea/Vomiting Sister     Breast Cancer Paternal Aunt        OBJECTIVE:     Visit Vitals    /80    Pulse 81    Temp 98.6 °F (37 °C) (Oral)    Resp 18    Ht 5' 4\" (1.626 m)    Wt 210 lb (95.3 kg)    LMP 06/13/2011    SpO2 99%    BMI 36.05 kg/m2     CONSTITUTIONAL:   Obese black female, appears age appropriate  EYES: sclera anicteric, PERRL, EOMI  ENMT:nars clear, moist mucous membranes, pharynx clear  NECK: supple.  Thyroid normal, No JVD or bruits  RESPIRATORY: Chest: clear to ascultation and percussion, normal inspiratory effort  CARDIOVASCULAR: Heart: regular rate and rhythm no murmurs, rubs or gallops, PMI not displaced, No thrills  GASTROINTESTINAL: Abdomen: non distended, soft, non tender, bowel sounds normal  HEMATOLOGIC: no purpura, petechiae or bruising  LYMPHATIC: No lymph node enlargemant  MUSCULOSKELETAL: Extremities: no edema or active synovitis, pulse 1+. No diabetic foot changes  INTEGUMENT: No unusual rashes or suspicious skin lesions noted. Nails appear normal.  PERIPHERAL VASCULAR: normal pulses femoral, PT and DP  NEUROLOGIC: non-focal exam, A & O X 3. Right hemiplegia. Normal distal sensation and proprioception all toes both feet. She does use a quad cane to walk  PSYCHIATRIC:, appropriate affect     ASSESSMENT:   1. Hypertension with renal disease    2. Controlled type 2 diabetes mellitus with stage 3 chronic kidney disease, without long-term current use of insulin ([address]    3. Mixed hyperlipidemia    4. Mild intermittent asthma without complication    5. Class 2 obesity due to excess calories without serious comorbidity with body mass index (BMI) of 37.0 to 37.9 in adult    6. CKD (chronic kidney disease), stage III    7. Mild depression (HCC)      Impression  1. Hypertension borderline but adequate today on recheck so continue current therapy and continue to work on diet and weight reduction  2. Diabetes we will see what the status is and make adjustments if needed. Last numbers reviewed  3. Prior CVA no change of right hemiplegia   4. Hyperlipidemia repeat status pending and I reviewed prior labs and I will make adjustments if necessary  5. Morbid obesity is still a major issue weight 210 pounds now that is actually down 12 pounds I am encouraged to continue to work on that with diet and exercise. 6.  Asthma stable  7. Chronic kidney disease repeat status pending  8. Osteopenia and vitamin D deficiency stable on last check and I reviewed those numbers with her    Labs pending as noted and I will make further recommendations based on labs and if stable continue same and recheck scheduled again for 3 months or sooner should there be a problem.     PLAN:  .  Orders Placed This Encounter  METABOLIC PANEL, COMPREHENSIVE    LIPID PANEL    HEMOGLOBIN A1C WITH EAG    CK    aspirin (ASPIRIN) 325 mg tablet         ATTENTION:   This medical record was transcribed using an electronic medical records system. Although proofread, it may and can contain electronic and spelling errors. Other human spelling and other errors may be present. Corrections may be executed at a later time. Please feel free to contact us for any clarifications as needed. Follow-up Disposition:  Return in about 3 months (around 11/9/2018). No results found for any visits on 08/09/18. Denisa Rios MD    The patient verbalized understanding of the problems and plans as explained.

## 2018-08-09 NOTE — PROGRESS NOTES
Chief Complaint   Patient presents with    Diabetes     3 month follow up    Hypertension     3 month follow up    Medication Refill     Remeron     1. Have you been to the ER, urgent care clinic since your last visit? No   Hospitalized since your last visit? No       2. Have you seen or consulted any other health care providers outside of the [address] [place] [address] since your last visit? No      Patient is aware she needs an eye exam.    Patient does not know when she had one, but it has been in the last 10 years. Saline Pod, LPN

## 2018-08-09 NOTE — PATIENT INSTRUCTIONS

## 2018-08-10 LAB
ALBUMIN SERPL-MCNC: 4.3 G/DL (ref 3.5–5.5)
ALBUMIN/GLOB SERPL: 1.2 {RATIO} (ref 1.2–2.2)
ALP SERPL-CCNC: 81 IU/L (ref 39–117)
ALT SERPL-CCNC: 11 IU/L (ref 0–32)
AST SERPL-CCNC: 13 IU/L (ref 0–40)
BILIRUB SERPL-MCNC: <0.2 MG/DL (ref 0–1.2)
BUN SERPL-MCNC: 17 MG/DL (ref 6–24)
BUN/CREAT SERPL: 16 (ref 9–23)
CALCIUM SERPL-MCNC: 9.9 MG/DL (ref 8.7–10.2)
CHLORIDE SERPL-SCNC: 101 MMOL/L (ref 96–106)
CHOLEST SERPL-MCNC: 172 MG/DL (ref 100–199)
CK SERPL-CCNC: 48 U/L (ref 24–173)
CO2 SERPL-SCNC: 22 MMOL/L (ref 20–29)
CREAT SERPL-MCNC: 1.04 MG/DL (ref 0.57–1)
EST. AVERAGE GLUCOSE BLD GHB EST-MCNC: 197 MG/DL
GLOBULIN SER CALC-MCNC: 3.6 G/DL (ref 1.5–4.5)
GLUCOSE SERPL-MCNC: 178 MG/DL (ref 65–99)
HBA1C MFR BLD: 8.5 % (ref 4.8–5.6)
HDLC SERPL-MCNC: 44 MG/DL
LDLC SERPL CALC-MCNC: 58 MG/DL (ref 0–99)
POTASSIUM SERPL-SCNC: 5 MMOL/L (ref 3.5–5.2)
PROT SERPL-MCNC: 7.9 G/DL (ref 6–8.5)
SODIUM SERPL-SCNC: 144 MMOL/L (ref 134–144)
TRIGL SERPL-MCNC: 352 MG/DL (ref 0–149)
VLDLC SERPL CALC-MCNC: 70 MG/DL (ref 5–40)

## 2018-08-10 NOTE — PROGRESS NOTES
Blood sugar, triglycerides and glycol are all significantly elevated so do a better job with diet and work on weight reduction. Increase Lantus to 46 units daily.

## 2018-08-28 ENCOUNTER — TELEPHONE (OUTPATIENT)
Dept: INTERNAL MEDICINE CLINIC | Age: 53
End: 2018-08-28

## 2018-08-28 NOTE — TELEPHONE ENCOUNTER
----- Message from Mora Jaffe MD sent at 8/10/2018 12:37 PM EDT -----  Blood sugar, triglycerides and glycol are all significantly elevated so do a better job with diet and work on weight reduction. Increase Lantus to 46 units daily.

## 2018-08-30 NOTE — TELEPHONE ENCOUNTER
Patient's  informed of patient's lab results and recommendations made by Dr. Ravi Chowdhury. Instructed to increase Lantus Insulin to 46 units daily. Patient's  verbalized understanding to increase Lantus to 46 units daily and work on diet.

## 2018-09-04 ENCOUNTER — TELEPHONE (OUTPATIENT)
Dept: INTERNAL MEDICINE CLINIC | Age: 53
End: 2018-09-04

## 2018-09-04 NOTE — TELEPHONE ENCOUNTER
----- Message from Chavez Toure MD sent at 8/10/2018 12:37 PM EDT -----  Blood sugar, triglycerides and glycol are all significantly elevated so do a better job with diet and work on weight reduction. Increase Lantus to 46 units daily.

## 2018-09-10 DIAGNOSIS — I10 ESSENTIAL HYPERTENSION: ICD-10-CM

## 2018-09-10 RX ORDER — LANOLIN ALCOHOL/MO/W.PET/CERES
400 CREAM (GRAM) TOPICAL 2 TIMES DAILY
Qty: 180 TAB | Refills: 1 | Status: SHIPPED | OUTPATIENT
Start: 2018-09-10 | End: 2019-04-02 | Stop reason: SDUPTHER

## 2018-11-27 NOTE — PROGRESS NOTES
Chief Complaint Patient presents with  Cholesterol Problem 3m  Diabetes  Hypertension SUBJECTIVE: 
 
Adalgisa Galindo is a 46 y.o. female who returns in follow-up of her medical problems include hypertension, diabetes, hyperlipidemia, asthma, CKD stage III, obesity, prior CVA and other medical problems. She is taking her medications and trying to follow her diet and get some exercise but exercise is limited because of the prior CVA and the fact that she has to use a cane. She currently denies any chest pain, shortness of breath, palpitations, PND, orthopnea or cardiorespiratory complaints. She denies any GI or  complaints. She denies any headaches, dizziness or change of her chronic neurologic deficits. She denies any other complaints on complete review of systems. Current Outpatient Medications Medication Sig Dispense Refill  albuterol (PROAIR HFA) 90 mcg/actuation inhaler Take 2 Puffs by inhalation every four (4) hours as needed for Wheezing. 1 Inhaler 5  potassium chloride (KLOR-CON M20) 20 mEq tablet Take 2 Tabs by mouth two (2) times a day. 120 Tab 12  
 magnesium oxide (MAG-OX) 400 mg tablet Take 1 Tab by mouth two (2) times a day. 180 Tab 1  
 aspirin (ASPIRIN) 325 mg tablet Take 325 mg by mouth daily.  losartan (COZAAR) 100 mg tablet Take 1 Tab by mouth daily. 90 Tab 1  
 metFORMIN ER (GLUCOPHAGE XR) 500 mg tablet Take 1 tablet by mouth in the morning and take 2 tablets before dinner. 270 Tab 1  pravastatin (PRAVACHOL) 80 mg tablet Take 1 Tab by mouth nightly. 90 Tab 1  
 lisinopril-hydroCHLOROthiazide (PRINZIDE, ZESTORETIC) 20-12.5 mg per tablet Take 2 Tabs by mouth daily. 180 Tab 1  
 amLODIPine (NORVASC) 5 mg tablet Take 1 Tab by mouth daily. 90 Tab 1  
 insulin glargine (LANTUS SOLOSTAR U-100 INSULIN) 100 unit/mL (3 mL) inpn Use 40 units daily (Patient taking differently: 46 Units nightly.  Use 40 units daily) 12 Pen 3  
  ALPRAZolam (XANAX) 0.5 mg tablet Take 1 Tab by mouth daily as needed for Anxiety. 30 Tab 0  
 Insulin Needles, Disposable, (PEN NEEDLES) 31 gauge x 1/4\" ndle Use daily as directed with her insulin pen. 100 Pen Needle prn  mirtazapine (REMERON) 15 mg tablet Take  by mouth nightly.  FERROUS SULFATE (IRON PO) Take  by mouth.  cholecalciferol (VITAMIN D3) 1,000 unit cap Take 2,000 Units by mouth daily.  ascorbic acid (VITAMIN C) 250 mg tablet Take  by mouth. Past Medical History:  
Diagnosis Date  Abnormal mammogram with microcalcification 6/4/2013 Right  UOQ  Aphasia due to stroke SPEAKS SOME  Arthritis   
 back  Asthma 9/7/2017  CKD (chronic kidney disease) 9/7/2017  CKD (chronic kidney disease), stage III ([address] 9/7/2017  CVA (cerebral vascular accident) ([facility] 9/7/2017  Depression 9/7/2017  Diabetes ([address] TYPE 2; IDDM  Edema 9/7/2017  Hypertension  Hypertension with renal disease 9/7/2017  Hypomagnesemia 9/7/2017  Morbid obesity ([address] 9/7/2017  On statin therapy 9/7/2017  Osteopenia 9/7/2017  Psychiatric disorder   
 depression  Stroke ([address] 5/11/2010 RIGHT SIDE WEAKNESS  Vitamin D deficiency 9/7/2017 Past Surgical History:  
Procedure Laterality Date  HX BREAST BIOPSY Right 2013 neg  HX CATARACT REMOVAL  Dec 2010/ Jan 2011  
 bilateral cataracts removed  HX GI    
 HEMMORHOIDECTOMY  HX GYN    
 pmb  HX HEENT    
 tonsillectomy  HX OOPHORECTOMY Bilateral   
 HX OTHER SURGICAL    
 HX TONSILLECTOMY  DC LAPAROSCOPY W TOT HYSTERECTUTERUS <=250 [address] [place] TUBE/OVARY  6/2011  
 leiomyomata No Known Allergies REVIEW OF SYSTEMS: 
General: negative for - chills or fever, or weight loss or gain ENT: negative for - headaches, nasal congestion or tinnitus Eyes: no blurred or visual changes Neck: No stiffness or swollen nodes Respiratory: negative for - cough, hemoptysis, shortness of breath or wheezing Cardiovascular : negative for - chest pain, edema, palpitations or shortness of breath Gastrointestinal: negative for - abdominal pain, blood in stools, heartburn or nausea/vomiting Genito-Urinary: no dysuria, trouble voiding, or hematuria Musculoskeletal: negative for - gait disturbance, joint pain, joint stiffness or joint swelling Neurological: no new TIA or stroke symptoms but she does have residual right-sided weakness and dysarthria from a prior stroke Hematologic: no bruises, no bleeding Lymphatic: no swollen glands Integument: no lumps, mole changes, nail changes or rash Endocrine:no malaise/lethargy poly uria or polydipsia or unexpected weight changes Social History Socioeconomic History  Marital status:  Spouse name: Not on file  Number of children: Not on file  Years of education: Not on file  Highest education level: Not on file Tobacco Use  Smoking status: Former Smoker Last attempt to quit: 2010 Years since quittin.5  Smokeless tobacco: Never Used Substance and Sexual Activity  Alcohol use: No  
 Drug use: No  
 Sexual activity: No  
Social History Narrative ** Merged History Encounter ** Family History Problem Relation Age of Onset  Hypertension Mother  MS Mother  Diabetes Father  Stroke Father  Heart Disease Father  Hypertension Father  Post-op Nausea/Vomiting Sister  Breast Cancer Paternal Aunt OBJECTIVE:  
 
Visit Vitals /78 Pulse 83 Temp 98 °F (36.7 °C) (Oral) Resp 18 Ht 5' 4\" (1.626 m) LMP 2011 SpO2 97% BMI 36.05 kg/m² CONSTITUTIONAL:   well nourished, appears age appropriate EYES: sclera anicteric, PERRL, EOMI 
ENMT:nares clear, moist mucous membranes, pharynx clear NECK: supple. Thyroid normal, No JVD or bruits RESPIRATORY: Chest: clear to ascultation and percussion, normal inspiratory effort CARDIOVASCULAR: Heart: regular rate and rhythm no murmurs, rubs or gallops, PMI not displaced, No thrills GASTROINTESTINAL: Abdomen: non distended, soft, non tender, bowel sounds normal 
HEMATOLOGIC: no purpura, petechiae or bruising LYMPHATIC: No lymph node enlargemant MUSCULOSKELETAL: Extremities: no edema or active synovitis, pulse 1+ INTEGUMENT: No unusual rashes or suspicious skin lesions noted. Nails appear normal. 
PERIPHERAL VASCULAR: normal pulses femoral, PT and DP NEUROLOGIC: no change of her right upper and lower extremity weakness from prior CVA and mild dysarthria, A & O X 3 PSYCHIATRIC:, appropriate affect ASSESSMENT:  
1. Hypertension with renal disease 2. Controlled type 2 diabetes mellitus with stage 3 chronic kidney disease, without long-term current use of insulin ([address] 3. Mixed hyperlipidemia 4. Mild intermittent asthma without complication 5. History of CVA (cerebrovascular accident) 6. Class 2 obesity due to excess calories without serious comorbidity with body mass index (BMI) of 37.0 to 37.9 in adult 7. CKD (chronic kidney disease), stage III ([address] Impression 1. Hypertension that is controlled on recheck by me so continue current therapy reviewed with her and her  2. Diabetes repeat status pending and prior labs reviewed on make adjustments if necessary. 3.  Hyperlipidemia prior labs reviewed and repeat status pending I will adjust if needed. 4.  Asthma that is stable 5   Prior CVA without change of her residual deficit 6. Obesity discussed diet, exercise and weight reduction for overall health benefit. 7.  CKD that is stable on last check and repeat status pending I will call with lab results make further recommendations or adjustments if necessary. Follow-up as scheduled for 3 months or sooner should to be a problem. PLAN: 
. Orders Placed This Encounter  METABOLIC PANEL, COMPREHENSIVE  LIPID PANEL  
 CK  
 HEMOGLOBIN A1C WITH EAG  
 albuterol (PROAIR HFA) 90 mcg/actuation inhaler  potassium chloride (KLOR-CON M20) 20 mEq tablet ATTENTION:  
This medical record was transcribed using an electronic medical records system. Although proofread, it may and can contain electronic and spelling errors. Other human spelling and other errors may be present. Corrections may be executed at a later time. Please feel free to contact us for any clarifications as needed. Follow-up Disposition: 
Return in about 3 months (around 2/28/2019). No results found for any visits on 11/28/18. Taylor Yun MD 
 
The patient verbalized understanding of the problems and plans as explained.

## 2018-11-28 ENCOUNTER — OFFICE VISIT (OUTPATIENT)
Dept: INTERNAL MEDICINE CLINIC | Age: 53
End: 2018-11-28

## 2018-11-28 VITALS
RESPIRATION RATE: 18 BRPM | HEIGHT: 64 IN | OXYGEN SATURATION: 97 % | DIASTOLIC BLOOD PRESSURE: 78 MMHG | BODY MASS INDEX: 36.05 KG/M2 | HEART RATE: 83 BPM | SYSTOLIC BLOOD PRESSURE: 136 MMHG | TEMPERATURE: 98 F

## 2018-11-28 DIAGNOSIS — Z86.73 HISTORY OF CVA (CEREBROVASCULAR ACCIDENT): ICD-10-CM

## 2018-11-28 DIAGNOSIS — E78.2 MIXED HYPERLIPIDEMIA: ICD-10-CM

## 2018-11-28 DIAGNOSIS — E66.09 CLASS 2 OBESITY DUE TO EXCESS CALORIES WITHOUT SERIOUS COMORBIDITY WITH BODY MASS INDEX (BMI) OF 37.0 TO 37.9 IN ADULT: ICD-10-CM

## 2018-11-28 DIAGNOSIS — N18.30 CKD (CHRONIC KIDNEY DISEASE), STAGE III (HCC): ICD-10-CM

## 2018-11-28 DIAGNOSIS — N18.30 CONTROLLED TYPE 2 DIABETES MELLITUS WITH STAGE 3 CHRONIC KIDNEY DISEASE, WITHOUT LONG-TERM CURRENT USE OF INSULIN (HCC): ICD-10-CM

## 2018-11-28 DIAGNOSIS — J45.20 MILD INTERMITTENT ASTHMA WITHOUT COMPLICATION: ICD-10-CM

## 2018-11-28 DIAGNOSIS — E11.22 CONTROLLED TYPE 2 DIABETES MELLITUS WITH STAGE 3 CHRONIC KIDNEY DISEASE, WITHOUT LONG-TERM CURRENT USE OF INSULIN (HCC): ICD-10-CM

## 2018-11-28 DIAGNOSIS — I12.9 HYPERTENSION WITH RENAL DISEASE: Primary | ICD-10-CM

## 2018-11-28 RX ORDER — POTASSIUM CHLORIDE 20 MEQ/1
40 TABLET, EXTENDED RELEASE ORAL 2 TIMES DAILY
Qty: 120 TAB | Refills: 12 | Status: SHIPPED | OUTPATIENT
Start: 2018-11-28 | End: 2020-02-06 | Stop reason: SDUPTHER

## 2018-11-28 RX ORDER — ALBUTEROL SULFATE 90 UG/1
2 AEROSOL, METERED RESPIRATORY (INHALATION)
Qty: 1 INHALER | Refills: 5 | Status: SHIPPED | OUTPATIENT
Start: 2018-11-28 | End: 2019-03-14 | Stop reason: SDUPTHER

## 2018-11-28 NOTE — PROGRESS NOTES
Chief Complaint Patient presents with  Cholesterol Problem 3m  Diabetes  Hypertension 1. Have you been to the ER, urgent care clinic since your last visit? Hospitalized since your last visit? No 
 
2. Have you seen or consulted any other health care providers outside of the [address] [place] [address] since your last visit? Include any pap smears or colon screening.  No

## 2018-11-28 NOTE — PATIENT INSTRUCTIONS
Learning About ACE Inhibitors and ARBs for Diabetes Introduction ACE inhibitors and ARBs are medicines used to control blood pressure. They allow blood vessels to relax and open up. This lowers your blood pressure. When you have diabetes, taking an ACE inhibitor or ARB can help to: · Treat high blood pressure. Your risk of problems from diabetes goes up when you have high blood pressure. · Prevent or slow kidney damage. Diabetes can damage the blood vessels in the kidneys. High blood pressure can damage the kidneys, too. · Lower the risks of stroke and heart attack. Your risks go up when you have high blood pressure, heart disease, or both. An ACE inhibitor or ARB is a good choice for people with diabetes. Unlike some medicines, these don't affect blood sugar levels. Examples ACE inhibitors include: · Benazepril. · Lisinopril. · Ramipril. ARBs include: · Irbesartan. · Losartan. · Telmisartan. Possible side effects All medicines can cause side effects. Some side effects of ACE inhibitors include: 
· Low blood pressure. You may feel dizzy and weak. · A cough. · High potassium levels. · An allergic reaction of the skin. Symptoms may range from mild swelling to painful welts. Some side effects of ARBs include: · Diarrhea. · High potassium levels. · Sinus problems. · Stomach problems. You may have other side effects or reactions not listed here. Check the information that comes with your medicine. What to know about taking this medicine · Be safe with medicines. Take your medicines exactly as prescribed. Call your doctor if you think you are having a problem with your medicine. · Before starting an ACE inhibitor or ARB, tell your doctor if you: ? Use a salt substitute. ? Take diuretics or potassium tablets. · These medicines are not safe for pregnancy. If you are pregnant or planning to be, talk to your doctor about a safe blood pressure medicine. · ACE inhibitors can cause a dry cough. If the cough is bad, talk to your doctor. Switching to an ARB is likely to help. · Taking some medicines together can cause problems. Tell your doctor or pharmacist all the medicines you take. This includes over-the-counter medicines, vitamins, herbal products, and supplements. · You may need regular blood and urine tests. Where can you learn more? Go to http://stephen-laurie.info/. Enter M316 in the search box to learn more about \"Learning About ACE Inhibitors and ARBs for Diabetes. \" Current as of: December 7, 2017 Content Version: 11.8 © 3830-4138 [employer]wise, [employer]. Care instructions adapted under license by [facility] (which disclaims liability or warranty for this information). If you have questions about a medical condition or this instruction, always ask your healthcare professional. [address] any warranty or liability for your use of this information.

## 2018-11-29 LAB
ALBUMIN SERPL-MCNC: 4.2 G/DL (ref 3.5–5.5)
ALBUMIN/GLOB SERPL: 1.2 {RATIO} (ref 1.2–2.2)
ALP SERPL-CCNC: 82 IU/L (ref 39–117)
ALT SERPL-CCNC: 17 IU/L (ref 0–32)
AST SERPL-CCNC: 20 IU/L (ref 0–40)
BILIRUB SERPL-MCNC: <0.2 MG/DL (ref 0–1.2)
BUN SERPL-MCNC: 20 MG/DL (ref 6–24)
BUN/CREAT SERPL: 20 (ref 9–23)
CALCIUM SERPL-MCNC: 10.3 MG/DL (ref 8.7–10.2)
CHLORIDE SERPL-SCNC: 101 MMOL/L (ref 96–106)
CHOLEST SERPL-MCNC: 212 MG/DL (ref 100–199)
CK SERPL-CCNC: 49 U/L (ref 24–173)
CO2 SERPL-SCNC: 24 MMOL/L (ref 20–29)
CREAT SERPL-MCNC: 1 MG/DL (ref 0.57–1)
EST. AVERAGE GLUCOSE BLD GHB EST-MCNC: 220 MG/DL
GLOBULIN SER CALC-MCNC: 3.6 G/DL (ref 1.5–4.5)
GLUCOSE SERPL-MCNC: 131 MG/DL (ref 65–99)
HBA1C MFR BLD: 9.3 % (ref 4.8–5.6)
HDLC SERPL-MCNC: 43 MG/DL
LDLC SERPL CALC-MCNC: ABNORMAL MG/DL (ref 0–99)
POTASSIUM SERPL-SCNC: 4.9 MMOL/L (ref 3.5–5.2)
PROT SERPL-MCNC: 7.8 G/DL (ref 6–8.5)
SODIUM SERPL-SCNC: 143 MMOL/L (ref 134–144)
TRIGL SERPL-MCNC: 553 MG/DL (ref 0–149)
VLDLC SERPL CALC-MCNC: ABNORMAL MG/DL (ref 5–40)

## 2018-11-30 NOTE — PROGRESS NOTES
Lab remarkable for marked elevation of glycohemoglobin as well as elevated blood sugar and triglycerides Lantus dose looks like it is 40 units once daily now so I would increase to 50 units daily and she needs to do better with diet.

## 2018-12-03 ENCOUNTER — TELEPHONE (OUTPATIENT)
Dept: INTERNAL MEDICINE CLINIC | Age: 53
End: 2018-12-03

## 2018-12-03 NOTE — TELEPHONE ENCOUNTER
----- Message from Rajani Rubio MD sent at 11/30/2018  6:34 PM EST -----  Lab remarkable for marked elevation of glycohemoglobin as well as elevated blood sugar and triglycerides Lantus dose looks like it is 40 units once daily now so I would increase to 50 units daily and she needs to do better with diet.

## 2018-12-04 RX ORDER — INSULIN GLARGINE 100 [IU]/ML
INJECTION, SOLUTION SUBCUTANEOUS
Qty: 12 PEN | Refills: 3 | Status: SHIPPED | OUTPATIENT
Start: 2018-12-04 | End: 2019-07-12 | Stop reason: SDUPTHER

## 2018-12-04 NOTE — TELEPHONE ENCOUNTER
RX refill request from the patient/pharmacy. Patient last seen 11- with labs, and next appt. scheduled for 03-  Requested Prescriptions     Pending Prescriptions Disp Refills    insulin glargine (LANTUS SOLOSTAR U-100 INSULIN) 100 unit/mL (3 mL) inpn 12 Pen 3     Sig: Use 50 units daily   .

## 2019-02-12 DIAGNOSIS — I10 HYPERTENSION, UNSPECIFIED TYPE: ICD-10-CM

## 2019-02-12 RX ORDER — LOSARTAN POTASSIUM 100 MG/1
100 TABLET ORAL DAILY
Qty: 90 TAB | Refills: 1 | Status: SHIPPED | OUTPATIENT
Start: 2019-02-12 | End: 2019-03-14 | Stop reason: ALTCHOICE

## 2019-02-12 RX ORDER — PRAVASTATIN SODIUM 80 MG/1
80 TABLET ORAL
Qty: 90 TAB | Refills: 1 | Status: SHIPPED | OUTPATIENT
Start: 2019-02-12 | End: 2019-10-14 | Stop reason: ALTCHOICE

## 2019-02-12 RX ORDER — METFORMIN HYDROCHLORIDE 500 MG/1
TABLET, EXTENDED RELEASE ORAL
Qty: 270 TAB | Refills: 1 | Status: SHIPPED | OUTPATIENT
Start: 2019-02-12 | End: 2019-10-14 | Stop reason: SDUPTHER

## 2019-02-12 RX ORDER — LISINOPRIL AND HYDROCHLOROTHIAZIDE 12.5; 2 MG/1; MG/1
2 TABLET ORAL DAILY
Qty: 180 TAB | Refills: 1 | Status: SHIPPED | OUTPATIENT
Start: 2019-02-12 | End: 2019-09-10 | Stop reason: SDUPTHER

## 2019-02-12 RX ORDER — AMLODIPINE BESYLATE 5 MG/1
5 TABLET ORAL DAILY
Qty: 90 TAB | Refills: 1 | Status: SHIPPED | OUTPATIENT
Start: 2019-02-12 | End: 2019-10-14 | Stop reason: SDUPTHER

## 2019-02-12 RX ORDER — PEN NEEDLE, DIABETIC 29 G X1/2"
NEEDLE, DISPOSABLE MISCELLANEOUS
Qty: 100 PEN NEEDLE | Status: SHIPPED | OUTPATIENT
Start: 2019-02-12 | End: 2020-05-14 | Stop reason: SDUPTHER

## 2019-02-12 NOTE — TELEPHONE ENCOUNTER
RX refill request from the patient/pharmacy. Patient last seen 11- with labs, and next appt. scheduled for 03-  Requested Prescriptions     Pending Prescriptions Disp Refills    pravastatin (PRAVACHOL) 80 mg tablet 90 Tab 1     Sig: Take 1 Tab by mouth nightly.  metFORMIN ER (GLUCOPHAGE XR) 500 mg tablet 270 Tab 1     Sig: Take 1 tablet by mouth in the morning and take 2 tablets before dinner.  losartan (COZAAR) 100 mg tablet 90 Tab 1     Sig: Take 1 Tab by mouth daily.  lisinopril-hydroCHLOROthiazide (PRINZIDE, ZESTORETIC) 20-12.5 mg per tablet 180 Tab 1     Sig: Take 2 Tabs by mouth daily.  Insulin Needles, Disposable, (PEN NEEDLES) 31 gauge x 1/4\" ndle 100 Pen Needle prn     Sig: Use daily as directed with her insulin pen.  amLODIPine (NORVASC) 5 mg tablet 90 Tab 1     Sig: Take 1 Tab by mouth daily. Diana Lute

## 2019-03-13 NOTE — PROGRESS NOTES
Chief Complaint   Patient presents with    Hypertension     3 month follow up    Diabetes       SUBJECTIVE:    Marlena Cuevas is a 53 y.o. female who returns in follow-up for medical problems include hypertension, diabetes, hyperlipidemia, asthma, chronic kidney disease, vitamin D deficiency, prior CVA with right hemiplegia, and morbid obesity. She claims to be taking her medication and follow her diet but not getting much exercise because of the prior CVA. She denies any chest pain, shortness of breath or cardiorespiratory complaints. There are no GI/ complaints. She has no other neurologic complaints except the right hemiplegia. She has no current arthritic complaints and there are no other complaints on complete review of systems. Current Outpatient Medications   Medication Sig Dispense Refill    valsartan (DIOVAN) 320 mg tablet Take 1 Tab by mouth daily. 30 Tab prn    albuterol (PROAIR HFA) 90 mcg/actuation inhaler Take 2 Puffs by inhalation every four (4) hours as needed for Wheezing. 1 Inhaler 5    pravastatin (PRAVACHOL) 80 mg tablet Take 1 Tab by mouth nightly. 90 Tab 1    metFORMIN ER (GLUCOPHAGE XR) 500 mg tablet Take 1 tablet by mouth in the morning and take 2 tablets before dinner. 270 Tab 1    lisinopril-hydroCHLOROthiazide (PRINZIDE, ZESTORETIC) 20-12.5 mg per tablet Take 2 Tabs by mouth daily. 180 Tab 1    Insulin Needles, Disposable, (PEN NEEDLES) 31 gauge x 1/4\" ndle Use daily as directed with her insulin pen. 100 Pen Needle prn    amLODIPine (NORVASC) 5 mg tablet Take 1 Tab by mouth daily. 90 Tab 1    insulin glargine (LANTUS SOLOSTAR U-100 INSULIN) 100 unit/mL (3 mL) inpn Use 50 units daily 12 Pen 3    potassium chloride (KLOR-CON M20) 20 mEq tablet Take 2 Tabs by mouth two (2) times a day. 120 Tab 12    magnesium oxide (MAG-OX) 400 mg tablet Take 1 Tab by mouth two (2) times a day. 180 Tab 1    aspirin (ASPIRIN) 325 mg tablet Take 325 mg by mouth daily.  ALPRAZolam (XANAX) 0.5 mg tablet Take 1 Tab by mouth daily as needed for Anxiety. 30 Tab 0    mirtazapine (REMERON) 15 mg tablet Take  by mouth nightly.  FERROUS SULFATE (IRON PO) Take  by mouth.  cholecalciferol (VITAMIN D3) 1,000 unit cap Take 2,000 Units by mouth daily.  ascorbic acid (VITAMIN C) 250 mg tablet Take  by mouth.          Past Medical History:   Diagnosis Date    Abnormal mammogram with microcalcification 6/4/2013    Right  UOQ    Aphasia due to stroke     SPEAKS SOME    Arthritis     back    Asthma 9/7/2017    CKD (chronic kidney disease) 9/7/2017    CKD (chronic kidney disease), stage III ([facility] 9/7/2017    CVA (cerebral vascular accident) ([facility] 9/7/2017    Depression 9/7/2017    Diabetes ([facility]     TYPE 2; IDDM    Edema 9/7/2017    Hypertension     Hypertension with renal disease 9/7/2017    Hypomagnesemia 9/7/2017    Morbid obesity ([facility] 9/7/2017    On statin therapy 9/7/2017    Osteopenia 9/7/2017    Psychiatric disorder     depression    Stroke ([facility] 5/11/2010    RIGHT SIDE WEAKNESS    Vitamin D deficiency 9/7/2017     Past Surgical History:   Procedure Laterality Date    HX BREAST BIOPSY Right     2013 neg    HX CATARACT REMOVAL  Dec 2010/ Jan 2011    bilateral cataracts removed    HX GI      HEMMORHOIDECTOMY    HX GYN      pmb    HX HEENT      tonsillectomy    HX OOPHORECTOMY Bilateral     HX OTHER SURGICAL      HX TONSILLECTOMY      IN LAPAROSCOPY W TOT HYSTERECTUTERUS <=250 GRAM  W TUBE/OVARY  6/2011    leiomyomata     No Known Allergies    REVIEW OF SYSTEMS:  General: negative for - chills or fever, or weight loss or gain  ENT: negative for - headaches, nasal congestion or tinnitus  Eyes: no blurred or visual changes  Neck: No stiffness or swollen nodes  Respiratory: negative for - cough, hemoptysis, shortness of breath or wheezing  Cardiovascular : negative for - chest pain, edema, palpitations or shortness of breath  Gastrointestinal: negative for - abdominal pain, blood in stools, heartburn or nausea/vomiting  Genito-Urinary: no dysuria, trouble voiding, or hematuria  Musculoskeletal: negative for - gait disturbance, joint pain, joint stiffness or joint swelling  Neurological: no TIA or stroke symptoms. Residual right-sided weakness from prior stroke with some mild dysarthria  Hematologic: no bruises, no bleeding  Lymphatic: no swollen glands  Integument: no lumps, mole changes, nail changes or rash  Endocrine:no malaise/lethargy poly uria or polydipsia or unexpected weight changes        Social History     Socioeconomic History    Marital status:      Spouse name: Not on file    Number of children: Not on file    Years of education: Not on file    Highest education level: Not on file   Tobacco Use    Smoking status: Former Smoker     Last attempt to quit: 2010     Years since quittin.8    Smokeless tobacco: Never Used   Substance and Sexual Activity    Alcohol use: No    Drug use: No    Sexual activity: No   Social History Narrative    ** Merged History Encounter **          Family History   Problem Relation Age of Onset    Hypertension Mother     MS Mother     Diabetes Father     Stroke Father     Heart Disease Father     Hypertension Father     Post-op Nausea/Vomiting Sister     Breast Cancer Paternal Aunt        OBJECTIVE:     Visit Vitals  /84   Pulse (!) 102   Resp 22   Ht 5' 4\" (1.626 m)   Wt 224 lb 6.4 oz (101.8 kg)   LMP 2011   SpO2 97%   BMI 38.52 kg/m²     CONSTITUTIONAL:   Obese black female, appears age appropriate  EYES: sclera anicteric, PERRL, EOMI  ENMT:nars clear, moist mucous membranes, pharynx clear  NECK: supple.  Thyroid normal, No JVD or bruits  RESPIRATORY: Chest: clear to ascultation and percussion, normal inspiratory effort  CARDIOVASCULAR: Heart: regular rate and rhythm no murmurs, rubs or gallops, PMI not displaced, No thrills  GASTROINTESTINAL: Abdomen: non distended, soft, non tender, bowel sounds normal  HEMATOLOGIC: no purpura, petechiae or bruising  LYMPHATIC: No lymph node enlargemant  MUSCULOSKELETAL: Extremities: no edema or active synovitis, pulse 1+. No diabetic foot changes  INTEGUMENT: No unusual rashes or suspicious skin lesions noted. Nails appear normal.  PERIPHERAL VASCULAR: normal pulses femoral, PT and DP  NEUROLOGIC: non-focal exam, A & O X 3. Right hemiplegia. Normal distal sensation and proprioception all toes both feet. She does use a quad cane to walk  PSYCHIATRIC:, appropriate affect     ASSESSMENT:   1. Hypertension with renal disease    2. Controlled type 2 diabetes mellitus with stage 3 chronic kidney disease, without long-term current use of insulin ([facility]    3. Mixed hyperlipidemia    4. Mild intermittent asthma without complication    5. History of CVA (cerebrovascular accident)    6. CKD (chronic kidney disease), stage III ([facility]    7. Severe obesity (BMI 35.0-39. 9) with comorbidity ([facility]    8. Mild depression (HCC)      Impression  1. Hypertension that is not controlled so I am switching her losartan back to valsartan 320 daily where she was previously controlled and I stressed importance of watching diet. 2.  Diabetes repeat status pending and prior labs reviewed and I will make adjustments if necessary. 3   Hyperlipidemia prior lab reviewed and repeat status pending and I will adjust if needed. 4   Asthma that is currently stable  5   History of CVA continue aspirin daily  6. CKD stage III repeat status is pending  7. Obesity we discussed diet, exercise and weight reduction for overall health benefit. 8.  Depression that is currently stable I will recheck her again myself in 1 month regarding hypertension 3 months regarding other medical problems.      PLAN:  .  Orders Placed This Encounter    HEMOGLOBIN A1C WITH EAG    METABOLIC PANEL, COMPREHENSIVE ([facility])    LIPID PANEL ([facility])    CK ([facility])    valsartan (DIOVAN) 320 mg tablet    albuterol (PROAIR HFA) 90 mcg/actuation inhaler         ATTENTION:   This medical record was transcribed using an electronic medical records system. Although proofread, it may and can contain electronic and spelling errors. Other human spelling and other errors may be present. Corrections may be executed at a later time. Please feel free to contact us for any clarifications as needed. Follow-up Disposition:  Return in about 3 months (around 6/14/2019). No results found for any visits on 03/14/19. Christine Baker MD    The patient verbalized understanding of the problems and plans as explained.

## 2019-03-14 ENCOUNTER — OFFICE VISIT (OUTPATIENT)
Dept: INTERNAL MEDICINE CLINIC | Age: 54
End: 2019-03-14

## 2019-03-14 VITALS
SYSTOLIC BLOOD PRESSURE: 160 MMHG | HEART RATE: 102 BPM | BODY MASS INDEX: 38.31 KG/M2 | WEIGHT: 224.4 LBS | RESPIRATION RATE: 22 BRPM | HEIGHT: 64 IN | OXYGEN SATURATION: 97 % | DIASTOLIC BLOOD PRESSURE: 84 MMHG

## 2019-03-14 DIAGNOSIS — F32.A MILD DEPRESSION: ICD-10-CM

## 2019-03-14 DIAGNOSIS — N18.30 CONTROLLED TYPE 2 DIABETES MELLITUS WITH STAGE 3 CHRONIC KIDNEY DISEASE, WITHOUT LONG-TERM CURRENT USE OF INSULIN (HCC): ICD-10-CM

## 2019-03-14 DIAGNOSIS — E66.01 SEVERE OBESITY (BMI 35.0-39.9) WITH COMORBIDITY (HCC): ICD-10-CM

## 2019-03-14 DIAGNOSIS — E11.22 CONTROLLED TYPE 2 DIABETES MELLITUS WITH STAGE 3 CHRONIC KIDNEY DISEASE, WITHOUT LONG-TERM CURRENT USE OF INSULIN (HCC): ICD-10-CM

## 2019-03-14 DIAGNOSIS — Z86.73 HISTORY OF CVA (CEREBROVASCULAR ACCIDENT): ICD-10-CM

## 2019-03-14 DIAGNOSIS — N18.30 CKD (CHRONIC KIDNEY DISEASE), STAGE III (HCC): ICD-10-CM

## 2019-03-14 DIAGNOSIS — I12.9 HYPERTENSION WITH RENAL DISEASE: Primary | ICD-10-CM

## 2019-03-14 DIAGNOSIS — E78.2 MIXED HYPERLIPIDEMIA: ICD-10-CM

## 2019-03-14 DIAGNOSIS — J45.20 MILD INTERMITTENT ASTHMA WITHOUT COMPLICATION: ICD-10-CM

## 2019-03-14 LAB
A-G RATIO,AGRAT: 1.1 RATIO
ALBUMIN SERPL-MCNC: 4.3 G/DL (ref 3.9–5.4)
ALP SERPL-CCNC: 102 U/L (ref 38–126)
ALT SERPL-CCNC: 15 U/L (ref 9–52)
ANION GAP SERPL CALC-SCNC: 16 MMOL/L
AST SERPL W P-5'-P-CCNC: 21 U/L (ref 14–36)
BILIRUB SERPL-MCNC: 0.3 MG/DL (ref 0.2–1.3)
BUN SERPL-MCNC: 17 MG/DL (ref 7–17)
BUN/CREATININE RATIO,BUCR: 21 RATIO
CALCIUM SERPL-MCNC: 10 MG/DL (ref 8.4–10.2)
CHLORIDE SERPL-SCNC: 101 MMOL/L (ref 98–107)
CHOL/HDL RATIO,CHHD: 6 RATIO (ref 0–4)
CHOLEST SERPL-MCNC: 250 MG/DL (ref 0–200)
CK SERPL-CCNC: 36 U/L (ref 30–135)
CO2 SERPL-SCNC: 23 MMOL/L (ref 22–32)
CREAT SERPL-MCNC: 0.8 MG/DL (ref 0.7–1.2)
GLOBULIN,GLOB: 3.8
GLUCOSE SERPL-MCNC: 149 MG/DL (ref 65–105)
LDL/HDL RATIO,LDHD: 2 RATIO
LDLC SERPL CALC-MCNC: 74 MG/DL (ref 0–130)
POTASSIUM SERPL-SCNC: 4.6 MMOL/L (ref 3.6–5)
PROT SERPL-MCNC: 8.1 G/DL (ref 6.3–8.2)
SODIUM SERPL-SCNC: 140 MMOL/L (ref 137–145)
TRIGL SERPL-MCNC: 678 MG/DL (ref 0–200)
VLDLC SERPL CALC-MCNC: 136 MG/DL

## 2019-03-14 RX ORDER — ALBUTEROL SULFATE 90 UG/1
2 AEROSOL, METERED RESPIRATORY (INHALATION)
Qty: 1 INHALER | Refills: 5 | Status: SHIPPED | OUTPATIENT
Start: 2019-03-14 | End: 2020-05-15 | Stop reason: SDUPTHER

## 2019-03-14 RX ORDER — VALSARTAN 320 MG/1
320 TABLET ORAL DAILY
Qty: 30 TAB | Status: SHIPPED | OUTPATIENT
Start: 2019-03-14 | End: 2020-04-23 | Stop reason: SDUPTHER

## 2019-03-14 NOTE — PATIENT INSTRUCTIONS
Anemia: Care Instructions  Your Care Instructions    Anemia is a low level of red blood cells, which carry oxygen throughout your body. Many things can cause anemia. Lack of iron is one of the most common causes. Your body needs iron to make hemoglobin, a substance in red blood cells that carries oxygen from the lungs to your body's cells. Without enough iron, the body produces fewer and smaller red blood cells. As a result, your body's cells do not get enough oxygen, and you feel tired and weak. And you may have trouble concentrating. Bleeding is the most common cause of a lack of iron. You may have heavy menstrual bleeding or bleeding caused by conditions such as ulcers, hemorrhoids, or cancer. Regular use of aspirin or other anti-inflammatory medicines (such as ibuprofen) also can cause bleeding in some people. A lack of iron in your diet also can cause anemia, especially at times when the body needs more iron, such as during pregnancy, infancy, and the teen years. Your doctor may have prescribed iron pills. It may take several months of treatment for your iron levels to return to normal. Your doctor also may suggest that you eat foods that are rich in iron, such as meat and beans. There are many other causes of anemia. It is not always due to a lack of iron. Finding the specific cause of your anemia will help your doctor find the right treatment for you. Follow-up care is a key part of your treatment and safety. Be sure to make and go to all appointments, and call your doctor if you are having problems. It's also a good idea to know your test results and keep a list of the medicines you take. How can you care for yourself at home? · Take your medicines exactly as prescribed. Call your doctor if you think you are having a problem with your medicine. · If your doctor recommends iron pills, take them as directed:  ? Try to take the pills on an empty stomach about 1 hour before or 2 hours after meals. But you may need to take iron with food to avoid an upset stomach. ? Do not take antacids or drink milk or caffeine drinks (such as coffee, tea, or cola) at the same time or within 2 hours of the time that you take your iron. They can make it hard for your body to absorb the iron. ? Vitamin C (from food or supplements) helps your body absorb iron. Try taking iron pills with a glass of orange juice or some other food that is high in vitamin C, such as citrus fruits. ? Iron pills may cause stomach problems, such as heartburn, nausea, diarrhea, constipation, and cramps. Be sure to drink plenty of fluids, and include fruits, vegetables, and fiber in your diet each day. Iron pills often make your bowel movements dark or green. ? If you forget to take an iron pill, do not take a double dose of iron the next time you take a pill. ? Keep iron pills out of the reach of small children. An overdose of iron can be very dangerous. · Follow your doctor's advice about eating iron-rich foods. These include red meat, shellfish, poultry, eggs, beans, raisins, whole-grain bread, and leafy green vegetables. · Steam vegetables to help them keep their iron content. When should you call for help? Call 911 anytime you think you may need emergency care. For example, call if:    · You have symptoms of a heart attack. These may include:  ? Chest pain or pressure, or a strange feeling in the chest.  ? Sweating. ? Shortness of breath. ? Nausea or vomiting. ? Pain, pressure, or a strange feeling in the back, neck, jaw, or upper belly or in one or both shoulders or arms. ? Lightheadedness or sudden weakness. ? A fast or irregular heartbeat. After you call 911, the  may tell you to chew 1 adult-strength or 2 to 4 low-dose aspirin. Wait for an ambulance.  Do not try to drive yourself.     · You passed out (lost consciousness).    Call your doctor now or seek immediate medical care if:    · You have new or increased shortness of breath.     · You are dizzy or lightheaded, or you feel like you may faint.     · Your fatigue and weakness continue or get worse.     · You have any abnormal bleeding, such as:  ? Nosebleeds. ? Vaginal bleeding that is different (heavier, more frequent, at a different time of the month) than what you are used to.  ? Bloody or black stools, or rectal bleeding. ? Bloody or pink urine.    Watch closely for changes in your health, and be sure to contact your doctor if:    · You do not get better as expected. Where can you learn more? Go to http://stephen-laurie.info/. Enter R301 in the search box to learn more about \"Anemia: Care Instructions. \"  Current as of: May 6, 2018  Content Version: 11.9  © 3120-1225 [employer]. Care instructions adapted under license by [employer] (which disclaims liability or warranty for this information). If you have questions about a medical condition or this instruction, always ask your healthcare professional. [address] any warranty or liability for your use of this information.

## 2019-03-14 NOTE — PROGRESS NOTES
Digna Cabello  Identified pt with two pt identifiers(name and ). Chief Complaint   Patient presents with    Hypertension     3 month follow up    Diabetes       1. Have you been to the ER, urgent care clinic since your last visit? Hospitalized since your last visit? No    2. Have you seen or consulted any other health care providers outside of the [address] [place] [address] since your last visit? Include any pap smears or colon screening. No      Health Maintenance Topics with due status: Overdue       Topic Date Due    EYE EXAM RETINAL OR DILATED 1974    COLONOSCOPY 1982    DTaP/Tdap/Td series 1985    Shingrix Vaccine Age 50> 2014    FOOT EXAM Q1 2019     Health Maintenance Topics with due status: Not Due       Topic Last Completion Date    MEDICARE YEARLY EXAM 2018    MICROALBUMIN Q1 2018    BREAST CANCER SCRN MAMMOGRAM 2018    HEMOGLOBIN A1C Q6M 2018    LIPID PANEL Q1 2018     Health Maintenance Topics with due status: Completed       Topic Last Completion Date    Pneumococcal 19-64 Medium Risk 09/10/2015    Hepatitis C Screening 10/13/2017           Medication reconciliation up to date and corrected with patient at this time. Today's provider has been notified of reason for visit, vitals and flowsheets obtained on patients. Reviewed record in preparation for visit, huddled with provider and have obtained necessary documentation.         Wt Readings from Last 3 Encounters:   19 224 lb 6.4 oz (101.8 kg)   18 210 lb (95.3 kg)   18 222 lb 6.4 oz (100.9 kg)     Temp Readings from Last 3 Encounters:   18 98 °F (36.7 °C) (Oral)   18 98.6 °F (37 °C) (Oral)   18 98.4 °F (36.9 °C) (Oral)     BP Readings from Last 3 Encounters:   19 180/80   18 136/78   18 138/80     Pulse Readings from Last 3 Encounters:   19 (!) 102   18 83   18 81     Vitals:    19 1035   BP: 180/80   Pulse: (!) 102   Resp: 22   SpO2: 97%   Weight: 224 lb 6.4 oz (101.8 kg)   Height: 5' 4\" (1.626 m)   PainSc:   0 - No pain   LMP: 06/13/2011         Learning Assessment:  :     Learning Assessment 10/13/2017   PRIMARY LEARNER Patient   PRIMARY LANGUAGE ENGLISH   LEARNER PREFERENCE PRIMARY DEMONSTRATION   ANSWERED BY patient   RELATIONSHIP SELF       Depression Screening:  :     3 most recent PHQ Screens 3/14/2019   Little interest or pleasure in doing things Not at all   Feeling down, depressed, irritable, or hopeless Not at all   Total Score PHQ 2 0       No flowsheet data found. Fall Risk Assessment:  :     Fall Risk Assessment, last 12 mths 5/9/2018   Able to walk? Yes   Fall in past 12 months? No       Abuse Screening:  :     Abuse Screening Questionnaire 3/14/2019 10/13/2017   Do you ever feel afraid of your partner? N N   Are you in a relationship with someone who physically or mentally threatens you? N N   Is it safe for you to go home?  [place]

## 2019-03-15 LAB
EST. AVERAGE GLUCOSE BLD GHB EST-MCNC: 232 MG/DL
HBA1C MFR BLD: 9.7 % (ref 4.8–5.6)

## 2019-03-19 NOTE — PROGRESS NOTES
Very high blood sugar and triglycerides as well as elevated glycol all indicating poor diabetes control. The question is if she is taking her medications correctly and what does she have saying that she takes versus our list and how we need to change will be based upon that.

## 2019-03-21 NOTE — PROGRESS NOTES
Ms. Newberry Scarce called back and states her  manages her diabetic medication. She will have him call to discuss.

## 2019-03-21 NOTE — PROGRESS NOTES
Tried to contact patient by phone regarding her lab. Voice mail box was full and unable to leave a message. Note sent asking patient to call to discuss.

## 2019-03-25 ENCOUNTER — TELEPHONE (OUTPATIENT)
Dept: INTERNAL MEDICINE CLINIC | Age: 54
End: 2019-03-25

## 2019-03-25 NOTE — PROGRESS NOTES
Patient's  did call back regarding what his wife takes for Diabetes. Currently on Metformin 500 mg 1 in the am and 2 tabs with dinner and Lantus 50 units daily which he states was just increased. Advised Mr. Abhishek Wise that Dr. Gisella Magallon is out of town and it will be a week before I can call him back. Discussed working on diet as well.

## 2019-04-01 NOTE — PROGRESS NOTES
called back and found out that his wife is not taking her evening dose of 1000 mg of the metformin. Was only taking the 500 mg in the morning. Mr. Victoria Vila states he has got her back on her schedule and will re evaluate at her f/up in 3 months.

## 2019-04-02 DIAGNOSIS — I10 ESSENTIAL HYPERTENSION: ICD-10-CM

## 2019-04-02 RX ORDER — LANOLIN ALCOHOL/MO/W.PET/CERES
400 CREAM (GRAM) TOPICAL 2 TIMES DAILY
Qty: 180 TAB | Refills: 3 | Status: SHIPPED | OUTPATIENT
Start: 2019-04-02 | End: 2019-07-11 | Stop reason: SDUPTHER

## 2019-04-02 NOTE — TELEPHONE ENCOUNTER
RX refill request from the patient/pharmacy. Patient last seen 03- with labs, and next appt. scheduled for 04-  Requested Prescriptions     Pending Prescriptions Disp Refills    magnesium oxide (MAG-OX) 400 mg tablet 180 Tab 3     Sig: Take 1 Tab by mouth two (2) times a day. Millie Meza

## 2019-04-12 NOTE — PROGRESS NOTES
Chief Complaint   Patient presents with    Hypertension     one month follow up on BP       SUBJECTIVE:    Cesar Meade is a 53 y.o. female who returns in follow-up for her hypertension after having been seen here a month ago with blood pressure 160/84 which time I switched her losartan to valsartan because she had been well controlled on that before. She currently denies any headaches, nosebleeds or sequela of elevated blood pressure. She denies any new neurologic complaints. She denies any chest pain, shortness of breath, palpitations, PND, orthopnea or other cardiorespiratory complaints. There are no GI or  complaints. She has no other complaints noted. Current Outpatient Medications   Medication Sig Dispense Refill    metoprolol succinate (TOPROL-XL) 25 mg XL tablet Take 1 Tab by mouth daily. 30 Tab prn    magnesium oxide (MAG-OX) 400 mg tablet Take 1 Tab by mouth two (2) times a day. 180 Tab 3    valsartan (DIOVAN) 320 mg tablet Take 1 Tab by mouth daily. 30 Tab prn    albuterol (PROAIR HFA) 90 mcg/actuation inhaler Take 2 Puffs by inhalation every four (4) hours as needed for Wheezing. 1 Inhaler 5    pravastatin (PRAVACHOL) 80 mg tablet Take 1 Tab by mouth nightly. 90 Tab 1    metFORMIN ER (GLUCOPHAGE XR) 500 mg tablet Take 1 tablet by mouth in the morning and take 2 tablets before dinner. 270 Tab 1    lisinopril-hydroCHLOROthiazide (PRINZIDE, ZESTORETIC) 20-12.5 mg per tablet Take 2 Tabs by mouth daily. 180 Tab 1    Insulin Needles, Disposable, (PEN NEEDLES) 31 gauge x 1/4\" ndle Use daily as directed with her insulin pen. 100 Pen Needle prn    amLODIPine (NORVASC) 5 mg tablet Take 1 Tab by mouth daily. 90 Tab 1    insulin glargine (LANTUS SOLOSTAR U-100 INSULIN) 100 unit/mL (3 mL) inpn Use 50 units daily 12 Pen 3    potassium chloride (KLOR-CON M20) 20 mEq tablet Take 2 Tabs by mouth two (2) times a day.  120 Tab 12    aspirin (ASPIRIN) 325 mg tablet Take 325 mg by mouth daily.  ALPRAZolam (XANAX) 0.5 mg tablet Take 1 Tab by mouth daily as needed for Anxiety. 30 Tab 0    mirtazapine (REMERON) 15 mg tablet Take  by mouth nightly.  FERROUS SULFATE (IRON PO) Take  by mouth.  cholecalciferol (VITAMIN D3) 1,000 unit cap Take 2,000 Units by mouth daily.  ascorbic acid (VITAMIN C) 250 mg tablet Take  by mouth.          Past Medical History:   Diagnosis Date    Abnormal mammogram with microcalcification 6/4/2013    Right  UOQ    Aphasia due to stroke     SPEAKS SOME    Arthritis     back    Asthma 9/7/2017    CKD (chronic kidney disease) 9/7/2017    CKD (chronic kidney disease), stage III ([facility] 9/7/2017    CVA (cerebral vascular accident) ([facility] 9/7/2017    Depression 9/7/2017    Diabetes ([facility] [facility]     TYPE 2; IDDM    Edema 9/7/2017    Hypertension     Hypertension with renal disease 9/7/2017    Hypomagnesemia 9/7/2017    Morbid obesity ([address] 9/7/2017    On statin therapy 9/7/2017    Osteopenia 9/7/2017    Psychiatric disorder     depression    Stroke ([facility] [facility] 5/11/2010    RIGHT SIDE WEAKNESS    Vitamin D deficiency 9/7/2017     Past Surgical History:   Procedure Laterality Date    HX BREAST BIOPSY Right     2013 neg    HX CATARACT REMOVAL  Dec 2010/ Jan 2011    bilateral cataracts removed    HX GI      HEMMORHOIDECTOMY    HX GYN      pmb    HX HEENT      tonsillectomy    HX OOPHORECTOMY Bilateral     HX OTHER SURGICAL      HX TONSILLECTOMY      VT LAPAROSCOPY W TOT HYSTERECTUTERUS <=250 GRAM  W TUBE/OVARY  6/2011    leiomyomata     No Known Allergies    REVIEW OF SYSTEMS:  General: negative for - chills or fever, or weight loss or gain  ENT: negative for - headaches, nasal congestion or tinnitus  Eyes: no blurred or visual changes  Neck: No stiffness or swollen nodes  Respiratory: negative for - cough, hemoptysis, shortness of breath or wheezing  Cardiovascular : negative for - chest pain, edema, palpitations or shortness of breath  Gastrointestinal: negative for - abdominal pain, blood in stools, heartburn or nausea/vomiting  Genito-Urinary: no dysuria, trouble voiding, or hematuria  Musculoskeletal: negative for - gait disturbance, joint pain, joint stiffness or joint swelling  Neurological: no TIA or stroke symptoms  Hematologic: no bruises, no bleeding  Lymphatic: no swollen glands  Integument: no lumps, mole changes, nail changes or rash  Endocrine:no malaise/lethargy poly uria or polydipsia or unexpected weight changes        Social History     Socioeconomic History    Marital status:      Spouse name: Not on file    Number of children: Not on file    Years of education: Not on file    Highest education level: Not on file   Tobacco Use    Smoking status: Former Smoker     Last attempt to quit: 2010     Years since quittin.9    Smokeless tobacco: Never Used   Substance and Sexual Activity    Alcohol use: No    Drug use: No    Sexual activity: Never   Social History Narrative    ** Merged History Encounter **          Family History   Problem Relation Age of Onset    Hypertension Mother     MS Mother     Diabetes Father     Stroke Father     Heart Disease Father     Hypertension Father     Post-op Nausea/Vomiting Sister     Breast Cancer Paternal Aunt        OBJECTIVE:     Visit Vitals  /86   Pulse 89   Temp 99 °F (37.2 °C) (Oral)   Resp 17   Ht 5' 4\" (1.626 m)   LMP 2011   SpO2 98%   BMI 38.52 kg/m²     CONSTITUTIONAL:   well nourished, appears age appropriate  EYES: sclera anicteric, PERRL, EOMI  ENMT:nares clear, moist mucous membranes, pharynx clear  NECK: supple.  Thyroid normal, No JVD or bruits  RESPIRATORY: Chest: clear to ascultation and percussion, normal inspiratory effort  CARDIOVASCULAR: Heart: regular rate and rhythm no murmurs, rubs or gallops, PMI not displaced, No thrills  GASTROINTESTINAL: Abdomen: non distended, soft, non tender, bowel sounds normal  HEMATOLOGIC: no purpura, petechiae or bruising  LYMPHATIC: No lymph node enlargemant  MUSCULOSKELETAL: Extremities: no edema or active synovitis, pulse 1+   INTEGUMENT: No unusual rashes or suspicious skin lesions noted. Nails appear normal.  PERIPHERAL VASCULAR: normal pulses femoral, PT and DP  NEUROLOGIC: non-focal exam, A & O X 3  PSYCHIATRIC:, appropriate affect     ASSESSMENT:   1. Hypertension with renal disease    2. History of CVA (cerebrovascular accident)    3. Controlled type 2 diabetes mellitus with stage 3 chronic kidney disease, without long-term current use of insulin ([facility])      Impression  1. Hypertension that is not controlled so I think at this point we will can add a beta-blocker starting with Toprol-XL 25 a day and I clearly told her and her  I suspect is going to take a higher dose but I do want to go too high to start with. I did suggest taken this in the evening because of potential drowsiness. She will continue her valsartan 320 daily as well as her lisinopril HCTZ 20/12.52 tablets daily and Norvasc 5 mg daily. 2.  History of CVA with certain needle blood pressure down to report a repeat of this  3. Diabetes mellitus her last blood sugar was not controlled and we discussed those findings  Follow-up scheduled for 4 weeks or sooner if there is a problem. All of the above discussed with her  present with her today. PLAN:  .  Orders Placed This Encounter    metoprolol succinate (TOPROL-XL) 25 mg XL tablet         ATTENTION:   This medical record was transcribed using an electronic medical records system. Although proofread, it may and can contain electronic and spelling errors. Other human spelling and other errors may be present. Corrections may be executed at a later time. Please feel free to contact us for any clarifications as needed. Follow-up and Dispositions    · Return in about 1 month (around 5/13/2019). No results found for any visits on 04/15/19.     Dawn Riley Katja Remy MD    The patient verbalized understanding of the problems and plans as explained.

## 2019-04-15 ENCOUNTER — OFFICE VISIT (OUTPATIENT)
Dept: INTERNAL MEDICINE CLINIC | Age: 54
End: 2019-04-15

## 2019-04-15 VITALS
BODY MASS INDEX: 38.52 KG/M2 | OXYGEN SATURATION: 98 % | TEMPERATURE: 99 F | DIASTOLIC BLOOD PRESSURE: 86 MMHG | SYSTOLIC BLOOD PRESSURE: 156 MMHG | RESPIRATION RATE: 17 BRPM | HEIGHT: 64 IN | HEART RATE: 89 BPM

## 2019-04-15 DIAGNOSIS — E11.22 CONTROLLED TYPE 2 DIABETES MELLITUS WITH STAGE 3 CHRONIC KIDNEY DISEASE, WITHOUT LONG-TERM CURRENT USE OF INSULIN (HCC): ICD-10-CM

## 2019-04-15 DIAGNOSIS — Z86.73 HISTORY OF CVA (CEREBROVASCULAR ACCIDENT): ICD-10-CM

## 2019-04-15 DIAGNOSIS — I12.9 HYPERTENSION WITH RENAL DISEASE: Primary | ICD-10-CM

## 2019-04-15 DIAGNOSIS — N18.30 CONTROLLED TYPE 2 DIABETES MELLITUS WITH STAGE 3 CHRONIC KIDNEY DISEASE, WITHOUT LONG-TERM CURRENT USE OF INSULIN (HCC): ICD-10-CM

## 2019-04-15 RX ORDER — METOPROLOL SUCCINATE 25 MG/1
25 TABLET, EXTENDED RELEASE ORAL DAILY
Qty: 30 TAB | Status: SHIPPED | OUTPATIENT
Start: 2019-04-15 | End: 2019-05-13 | Stop reason: SDUPTHER

## 2019-04-15 NOTE — PROGRESS NOTES
Kyleighsalvador Minaya  Identified pt with two pt identifiers(name and ). Chief Complaint   Patient presents with    Hypertension     one month follow up on BP       1. Have you been to the ER, urgent care clinic since your last visit? Hospitalized since your last visit? No    2. Have you seen or consulted any other health care providers outside of the [address] [place] [address] since your last visit? Include any pap smears or colon screening. No      Health Maintenance Topics with due status: Overdue       Topic Date Due    EYE EXAM RETINAL OR DILATED 1974    COLONOSCOPY 1982    DTaP/Tdap/Td series 1985    Shingrix Vaccine Age 50> 2014     Health Maintenance Topics with due status: Due Soon       Topic Date Due    MICROALBUMIN Q1 2019    MEDICARE YEARLY EXAM 05/10/2019     Health Maintenance Topics with due status: Not Due       Topic Last Completion Date    Influenza Age 5 to Adult 10/13/2017    BREAST CANCER SCRN MAMMOGRAM 2018    FOOT EXAM Q1 2019    HEMOGLOBIN A1C Q6M 2019    LIPID PANEL Q1 2019     Health Maintenance Topics with due status: Completed       Topic Last Completion Date    Pneumococcal 0-64 years 09/10/2015    Hepatitis C Screening 10/13/2017           Medication reconciliation up to date and corrected with patient at this time. Today's provider has been notified of reason for visit, vitals and flowsheets obtained on patients. Reviewed record in preparation for visit, huddled with provider and have obtained necessary documentation.         Wt Readings from Last 3 Encounters:   19 224 lb 6.4 oz (101.8 kg)   18 210 lb (95.3 kg)   18 222 lb 6.4 oz (100.9 kg)     Temp Readings from Last 3 Encounters:   04/15/19 99 °F (37.2 °C) (Oral)   18 98 °F (36.7 °C) (Oral)   18 98.6 °F (37 °C) (Oral)     BP Readings from Last 3 Encounters:   04/15/19 158/90   19 160/84   18 136/78     Pulse Readings from Last 3 Encounters:   04/15/19 89   03/14/19 (!) 102   11/28/18 83     Vitals:    04/15/19 1515   BP: 158/90   Pulse: 89   Resp: 17   Temp: 99 °F (37.2 °C)   TempSrc: Oral   SpO2: 98%   Height: 5' 4\" (1.626 m)   PainSc:   0 - No pain   LMP: 06/13/2011         Learning Assessment:  :     Learning Assessment 10/13/2017   PRIMARY LEARNER Patient   PRIMARY LANGUAGE ENGLISH   LEARNER PREFERENCE PRIMARY DEMONSTRATION   ANSWERED BY patient   RELATIONSHIP SELF       Depression Screening:  :     3 most recent PHQ Screens 3/14/2019   Little interest or pleasure in doing things Not at all   Feeling down, depressed, irritable, or hopeless Not at all   Total Score PHQ 2 0       No flowsheet data found. Fall Risk Assessment:  :     Fall Risk Assessment, last 12 mths 5/9/2018   Able to walk? Yes   Fall in past 12 months? No       Abuse Screening:  :     Abuse Screening Questionnaire 3/14/2019 10/13/2017   Do you ever feel afraid of your partner? N N   Are you in a relationship with someone who physically or mentally threatens you? N N   Is it safe for you to go home? Y Y       ADL Screening:  :     No flowsheet data found.

## 2019-04-15 NOTE — PATIENT INSTRUCTIONS
Body Mass Index: Care Instructions  Your Care Instructions    Body mass index (BMI) can help you see if your weight is raising your risk for health problems. It uses a formula to compare how much you weigh with how tall you are. · A BMI lower than 18.5 is considered underweight. · A BMI between 18.5 and 24.9 is considered healthy. · A BMI between 25 and 29.9 is considered overweight. A BMI of 30 or higher is considered obese. If your BMI is in the normal range, it means that you have a lower risk for weight-related health problems. If your BMI is in the overweight or obese range, you may be at increased risk for weight-related health problems, such as high blood pressure, heart disease, stroke, arthritis or joint pain, and diabetes. If your BMI is in the underweight range, you may be at increased risk for health problems such as fatigue, lower protection (immunity) against illness, muscle loss, bone loss, hair loss, and hormone problems. BMI is just one measure of your risk for weight-related health problems. You may be at higher risk for health problems if you are not active, you eat an unhealthy diet, or you drink too much alcohol or use tobacco products. Follow-up care is a key part of your treatment and safety. Be sure to make and go to all appointments, and call your doctor if you are having problems. It's also a good idea to know your test results and keep a list of the medicines you take. How can you care for yourself at home? · Practice healthy eating habits. This includes eating plenty of fruits, vegetables, whole grains, lean protein, and low-fat dairy. · If your doctor recommends it, get more exercise. Walking is a good choice. Bit by bit, increase the amount you walk every day. Try for at least 30 minutes on most days of the week. · Do not smoke. Smoking can increase your risk for health problems. If you need help quitting, talk to your doctor about stop-smoking programs and medicines. These can increase your chances of quitting for good. · Limit alcohol to 2 drinks a day for men and 1 drink a day for women. Too much alcohol can cause health problems. If you have a BMI higher than 25  · Your doctor may do other tests to check your risk for weight-related health problems. This may include measuring the distance around your waist. A waist measurement of more than 40 inches in men or 35 inches in women can increase the risk of weight-related health problems. · Talk with your doctor about steps you can take to stay healthy or improve your health. You may need to make lifestyle changes to lose weight and stay healthy, such as changing your diet and getting regular exercise. If you have a BMI lower than 18.5  · Your doctor may do other tests to check your risk for health problems. · Talk with your doctor about steps you can take to stay healthy or improve your health. You may need to make lifestyle changes to gain or maintain weight and stay healthy, such as getting more healthy foods in your diet and doing exercises to build muscle. Where can you learn more? Go to http://stephen-laurie.info/. Enter S176 in the search box to learn more about \"Body Mass Index: Care Instructions. \"  Current as of: June 25, 2018  Content Version: 11.9  © 6515-1607 [employer]. Care instructions adapted under license by [employer] (which disclaims liability or warranty for this information). If you have questions about a medical condition or this instruction, always ask your healthcare professional. [address] any warranty or liability for your use of this information.

## 2019-05-12 NOTE — PROGRESS NOTES
Chief Complaint   Patient presents with    Hypertension     4 week follow up    Diabetes    Chronic Kidney Disease       SUBJECTIVE:    Patrica Page is a 53 y.o. female who returns in follow-up for hypertension after having been seen here on April 15 at which time her hypertension was not controlled with a blood pressure 156/86. At that time Toprol-XL 25 mg was added which she has been tolerating quite well. She is also in follow-up of her obesity although her weight is not able to be done because of her prior CVA she obviously does need to work on weight reduction. She says that she is been trying to follow her diet but cannot exercise really because of her prior stroke. She is taking her medications. She denies any chest pain, shortness of breath, palpitations, PND, orthopnea or other cardiorespiratory complaints. There are no GI or  complaints. Current Outpatient Medications   Medication Sig Dispense Refill    metoprolol succinate (TOPROL-XL) 50 mg XL tablet Take 1 Tab by mouth daily. 30 Tab prn    magnesium oxide (MAG-OX) 400 mg tablet Take 1 Tab by mouth two (2) times a day. 180 Tab 3    valsartan (DIOVAN) 320 mg tablet Take 1 Tab by mouth daily. 30 Tab prn    albuterol (PROAIR HFA) 90 mcg/actuation inhaler Take 2 Puffs by inhalation every four (4) hours as needed for Wheezing. 1 Inhaler 5    pravastatin (PRAVACHOL) 80 mg tablet Take 1 Tab by mouth nightly. 90 Tab 1    metFORMIN ER (GLUCOPHAGE XR) 500 mg tablet Take 1 tablet by mouth in the morning and take 2 tablets before dinner. 270 Tab 1    lisinopril-hydroCHLOROthiazide (PRINZIDE, ZESTORETIC) 20-12.5 mg per tablet Take 2 Tabs by mouth daily. 180 Tab 1    Insulin Needles, Disposable, (PEN NEEDLES) 31 gauge x 1/4\" ndle Use daily as directed with her insulin pen. 100 Pen Needle prn    amLODIPine (NORVASC) 5 mg tablet Take 1 Tab by mouth daily.  90 Tab 1    insulin glargine (LANTUS SOLOSTAR U-100 INSULIN) 100 unit/mL (3 mL) inpn Use 50 units daily 12 Pen 3    potassium chloride (KLOR-CON M20) 20 mEq tablet Take 2 Tabs by mouth two (2) times a day. 120 Tab 12    aspirin (ASPIRIN) 325 mg tablet Take 325 mg by mouth daily.  ALPRAZolam (XANAX) 0.5 mg tablet Take 1 Tab by mouth daily as needed for Anxiety. 30 Tab 0    mirtazapine (REMERON) 15 mg tablet Take  by mouth nightly.  FERROUS SULFATE (IRON PO) Take  by mouth.  cholecalciferol (VITAMIN D3) 1,000 unit cap Take 2,000 Units by mouth daily.  ascorbic acid (VITAMIN C) 250 mg tablet Take  by mouth.          Past Medical History:   Diagnosis Date    Abnormal mammogram with microcalcification 6/4/2013    Right  UOQ    Aphasia due to stroke     SPEAKS SOME    Arthritis     back    Asthma 9/7/2017    CKD (chronic kidney disease) 9/7/2017    CKD (chronic kidney disease), stage III ([address] 9/7/2017    CVA [facility]cerebral vascular accident) ([facility] 9/7/2017    Depression 9/7/2017    Diabetes ([facility]     TYPE 2; IDDM    Edema 9/7/2017    Hypertension     Hypertension with renal disease 9/7/2017    Hypomagnesemia 9/7/2017    Morbid obesity ([address] 9/7/2017    On statin therapy 9/7/2017    Osteopenia 9/7/2017    Psychiatric disorder     depression    Stroke ([facility] 5/11/2010    RIGHT SIDE WEAKNESS    Vitamin D deficiency 9/7/2017     Past Surgical History:   Procedure Laterality Date    HX BREAST BIOPSY Right     2013 neg    HX CATARACT REMOVAL  Dec 2010/ Jan 2011    bilateral cataracts removed    HX GI      HEMMORHOIDECTOMY    HX GYN      pmb    HX HEENT      tonsillectomy    HX OOPHORECTOMY Bilateral     HX OTHER SURGICAL      HX TONSILLECTOMY      MT LAPAROSCOPY W TOT HYSTERECTUTERUS <=250 GRAM  W TUBE/OVARY  6/2011    leiomyomata     No Known Allergies    REVIEW OF SYSTEMS:  General: negative for - chills or fever, or weight loss or gain  ENT: negative for - headaches, nasal congestion or tinnitus  Eyes: no blurred or visual changes  Neck: No stiffness or swollen nodes  Respiratory: negative for - cough, hemoptysis, shortness of breath or wheezing  Cardiovascular : negative for - chest pain, edema, palpitations or shortness of breath  Gastrointestinal: negative for - abdominal pain, blood in stools, heartburn or nausea/vomiting  Genito-Urinary: no dysuria, trouble voiding, or hematuria  Musculoskeletal: negative for - gait disturbance, joint pain, joint stiffness or joint swelling  Neurological: no TIA or stroke symptoms except chronic right hemiplegia from prior stroke  Hematologic: no bruises, no bleeding  Lymphatic: no swollen glands  Integument: no lumps, mole changes, nail changes or rash  Endocrine:no malaise/lethargy poly uria or polydipsia or unexpected weight changes        Social History     Socioeconomic History    Marital status:      Spouse name: Not on file    Number of children: Not on file    Years of education: Not on file    Highest education level: Not on file   Tobacco Use    Smoking status: Former Smoker     Last attempt to quit: 2010     Years since quittin.0    Smokeless tobacco: Never Used   Substance and Sexual Activity    Alcohol use: No    Drug use: No    Sexual activity: Never   Social History Narrative    ** Merged History Encounter **          Family History   Problem Relation Age of Onset    Hypertension Mother     MS Mother     Diabetes Father     Stroke Father     Heart Disease Father     Hypertension Father     Post-op Nausea/Vomiting Sister     Breast Cancer Paternal Aunt        OBJECTIVE:     Visit Vitals  /90 (BP 1 Location: Left arm, BP Patient Position: Sitting)   Pulse 83   Temp 98.5 °F (36.9 °C) (Oral)   Resp 18   Ht 5' 4\" (1.626 m)   LMP 2011   SpO2 97%   BMI 38.52 kg/m²     CONSTITUTIONAL:   well nourished, appears age appropriate  EYES: sclera anicteric, PERRL, EOMI  ENMT:nares clear, moist mucous membranes, pharynx clear  NECK: supple.  Thyroid normal, No JVD or bruits  RESPIRATORY: Chest: clear to ascultation and percussion, normal inspiratory effort  CARDIOVASCULAR: Heart: regular rate and rhythm no murmurs, rubs or gallops, PMI not displaced, No thrills  GASTROINTESTINAL: Abdomen: non distended, soft, non tender, bowel sounds normal  HEMATOLOGIC: no purpura, petechiae or bruising  LYMPHATIC: No lymph node enlargemant  MUSCULOSKELETAL: Extremities: no edema or active synovitis, pulse 1+   INTEGUMENT: No unusual rashes or suspicious skin lesions noted. Nails appear normal.  PERIPHERAL VASCULAR: normal pulses femoral, PT and DP  NEUROLOGIC: Chronic right hemiplegia, A & O X 3  PSYCHIATRIC:, appropriate affect     ASSESSMENT:   1. Hypertension with renal disease    2. Severe obesity (BMI 35.0-39. 9) with comorbidity ([address]      Impression  1. Hypertension that is improved but not yet controlled we will increase her Toprol-XL to 50 mg daily and see if that will get her blood pressure down. 2.  Obesity I again encouraged diet, exercise and weight reduction  I recheck her in about 2 months on a regular schedule appointment or sooner if there is a problem. All of the above discussed with her  present with her today. PLAN:  .  Orders Placed This Encounter    metoprolol succinate (TOPROL-XL) 50 mg XL tablet         ATTENTION:   This medical record was transcribed using an electronic medical records system. Although proofread, it may and can contain electronic and spelling errors. Other human spelling and other errors may be present. Corrections may be executed at a later time. Please feel free to contact us for any clarifications as needed. Follow-up and Dispositions    · Return in about 2 months (around 7/13/2019). No results found for any visits on 05/13/19. Jose Alberto Herrera MD    The patient verbalized understanding of the problems and plans as explained.

## 2019-05-13 ENCOUNTER — OFFICE VISIT (OUTPATIENT)
Dept: INTERNAL MEDICINE CLINIC | Age: 54
End: 2019-05-13

## 2019-05-13 ENCOUNTER — DOCUMENTATION ONLY (OUTPATIENT)
Dept: INTERNAL MEDICINE CLINIC | Age: 54
End: 2019-05-13

## 2019-05-13 VITALS
BODY MASS INDEX: 38.52 KG/M2 | RESPIRATION RATE: 18 BRPM | TEMPERATURE: 98.5 F | HEIGHT: 64 IN | SYSTOLIC BLOOD PRESSURE: 150 MMHG | DIASTOLIC BLOOD PRESSURE: 90 MMHG | HEART RATE: 83 BPM | OXYGEN SATURATION: 97 %

## 2019-05-13 DIAGNOSIS — E66.01 SEVERE OBESITY (BMI 35.0-39.9) WITH COMORBIDITY (HCC): ICD-10-CM

## 2019-05-13 DIAGNOSIS — I12.9 HYPERTENSION WITH RENAL DISEASE: Primary | ICD-10-CM

## 2019-05-13 RX ORDER — METOPROLOL SUCCINATE 50 MG/1
50 TABLET, EXTENDED RELEASE ORAL DAILY
Qty: 30 TAB | Status: SHIPPED | OUTPATIENT
Start: 2019-05-13 | End: 2020-06-16 | Stop reason: SDUPTHER

## 2019-05-13 NOTE — PATIENT INSTRUCTIONS
Body Mass Index: Care Instructions  Your Care Instructions    Body mass index (BMI) can help you see if your weight is raising your risk for health problems. It uses a formula to compare how much you weigh with how tall you are. · A BMI lower than 18.5 is considered underweight. · A BMI between 18.5 and 24.9 is considered healthy. · A BMI between 25 and 29.9 is considered overweight. A BMI of 30 or higher is considered obese. If your BMI is in the normal range, it means that you have a lower risk for weight-related health problems. If your BMI is in the overweight or obese range, you may be at increased risk for weight-related health problems, such as high blood pressure, heart disease, stroke, arthritis or joint pain, and diabetes. If your BMI is in the underweight range, you may be at increased risk for health problems such as fatigue, lower protection (immunity) against illness, muscle loss, bone loss, hair loss, and hormone problems. BMI is just one measure of your risk for weight-related health problems. You may be at higher risk for health problems if you are not active, you eat an unhealthy diet, or you drink too much alcohol or use tobacco products. Follow-up care is a key part of your treatment and safety. Be sure to make and go to all appointments, and call your doctor if you are having problems. It's also a good idea to know your test results and keep a list of the medicines you take. How can you care for yourself at home? · Practice healthy eating habits. This includes eating plenty of fruits, vegetables, whole grains, lean protein, and low-fat dairy. · If your doctor recommends it, get more exercise. Walking is a good choice. Bit by bit, increase the amount you walk every day. Try for at least 30 minutes on most days of the week. · Do not smoke. Smoking can increase your risk for health problems. If you need help quitting, talk to your doctor about stop-smoking programs and medicines. These can increase your chances of quitting for good. · Limit alcohol to 2 drinks a day for men and 1 drink a day for women. Too much alcohol can cause health problems. If you have a BMI higher than 25  · Your doctor may do other tests to check your risk for weight-related health problems. This may include measuring the distance around your waist. A waist measurement of more than 40 inches in men or 35 inches in women can increase the risk of weight-related health problems. · Talk with your doctor about steps you can take to stay healthy or improve your health. You may need to make lifestyle changes to lose weight and stay healthy, such as changing your diet and getting regular exercise. If you have a BMI lower than 18.5  · Your doctor may do other tests to check your risk for health problems. · Talk with your doctor about steps you can take to stay healthy or improve your health. You may need to make lifestyle changes to gain or maintain weight and stay healthy, such as getting more healthy foods in your diet and doing exercises to build muscle. Where can you learn more? Go to http://stephen-laurie.info/. Enter S176 in the search box to learn more about \"Body Mass Index: Care Instructions. \"  Current as of: June 25, 2018  Content Version: 11.9  © 5731-6551 [employer]. Care instructions adapted under license by [employer] (which disclaims liability or warranty for this information). If you have questions about a medical condition or this instruction, always ask your healthcare professional. [address] any warranty or liability for your use of this information.

## 2019-05-13 NOTE — PROGRESS NOTES
Nina Verde  Identified pt with two pt identifiers(name and ). Chief Complaint   Patient presents with    Hypertension     4 week follow up    Diabetes    Chronic Kidney Disease       1. Have you been to the ER, urgent care clinic since your last visit? Hospitalized since your last visit? No    2. Have you seen or consulted any other health care providers outside of the [address] [place] [address] since your last visit? Include any pap smears or colon screening. No      Health Maintenance Topics with due status: Overdue       Topic Date Due    EYE EXAM RETINAL OR DILATED 1974    COLONOSCOPY 1982    DTaP/Tdap/Td series 1985    Shingrix Vaccine Age 50> 2014    MICROALBUMIN Q1 2019     Health Maintenance Topics with due status: Due On       Topic Date Due    MEDICARE YEARLY EXAM 05/10/2019     Health Maintenance Topics with due status: Not Due       Topic Last Completion Date    Influenza Age 5 to Adult 10/13/2017    BREAST CANCER SCRN MAMMOGRAM 2018    FOOT EXAM Q1 2019    HEMOGLOBIN A1C Q6M 2019    LIPID PANEL Q1 2019     Health Maintenance Topics with due status: Completed       Topic Last Completion Date    Pneumococcal 0-64 years 09/10/2015    Hepatitis C Screening 10/13/2017           Medication reconciliation up to date and corrected with patient at this time. Today's provider has been notified of reason for visit, vitals and flowsheets obtained on patients. Reviewed record in preparation for visit, huddled with provider and have obtained necessary documentation.         Wt Readings from Last 3 Encounters:   19 224 lb 6.4 oz (101.8 kg)   18 210 lb (95.3 kg)   18 222 lb 6.4 oz (100.9 kg)     Temp Readings from Last 3 Encounters:   19 98.5 °F (36.9 °C) (Oral)   04/15/19 99 °F (37.2 °C) (Oral)   18 98 °F (36.7 °C) (Oral)     BP Readings from Last 3 Encounters:   19 150/90   04/15/19 156/86   19 160/84     Pulse Readings from Last 3 Encounters:   05/13/19 83   04/15/19 89   03/14/19 (!) 102     Vitals:    05/13/19 1338   BP: 150/90   Pulse: 83   Resp: 18   Temp: 98.5 °F (36.9 °C)   TempSrc: Oral   SpO2: 97%   Height: 5' 4\" (1.626 m)   PainSc:   0 - No pain   LMP: 06/13/2011         Learning Assessment:  :     Learning Assessment 10/13/2017   PRIMARY LEARNER Patient   PRIMARY LANGUAGE ENGLISH   LEARNER PREFERENCE PRIMARY DEMONSTRATION   ANSWERED BY patient   RELATIONSHIP SELF       Depression Screening:  :     3 most recent PHQ Screens 3/14/2019   Little interest or pleasure in doing things Not at all   Feeling down, depressed, irritable, or hopeless Not at all   Total Score PHQ 2 0       No flowsheet data found. Fall Risk Assessment:  :     Fall Risk Assessment, last 12 mths 5/9/2018   Able to walk? Yes   Fall in past 12 months? No       Abuse Screening:  :     Abuse Screening Questionnaire 3/14/2019 10/13/2017   Do you ever feel afraid of your partner? N N   Are you in a relationship with someone who physically or mentally threatens you? N N   Is it safe for you to go home?  Wendi Zendejas

## 2019-05-13 NOTE — ACP (ADVANCE CARE PLANNING)
====Deni Monroe Invitation====    Patient was invited to [facility] [place] on this date and given the information folder for review. Recommended appointment with [facility] Mabel facilitator for ACP conversation regarding advance directives. [x] Yes  [] No  Referral sent to [facility] Choices team member or Coordinator for follow-up    [] Yes  [x] No  Patient scheduled an appointment.        Site of Referral: [facility]

## 2019-07-10 NOTE — PROGRESS NOTES
This is a Subsequent Medicare Annual Wellness Visit providing Personalized Prevention Plan Services (PPPS) (Performed 12 months after initial AWV and PPPS )    I have reviewed the patient's medical history in detail and updated the computerized patient record. She returns today accompanied by her  for Medicare subsequent annual wellness examination screening questionnaire. She is also in follow-up of her multiple medical problems include hypertension, diabetes mellitus, hyperlipidemia, CKD stage III, prior CVA, and other medical problems including obesity. She is taking her medications and trying to follow her diet but is not really able to exercise because of her previous stroke. She denies any chest pain, shortness of breath, palpitations, PND, orthopnea or other cardiorespiratory complaints. She notes no GI or  complaints. She notes no headaches, dizziness or neurologic complaints except for residual right hemiplegia from a stroke which is chronic and unchanged. She has no current arthritic complaints and no other complaints on complete review of systems.     History     Past Medical History:   Diagnosis Date    Abnormal mammogram with microcalcification 6/4/2013    Right  UOQ    Aphasia due to stroke     SPEAKS SOME    Arthritis     back    Asthma 9/7/2017    CKD (chronic kidney disease) 9/7/2017    CKD (chronic kidney disease), stage III ([address] 9/7/2017    CVA (cerebral vascular accident) ([address] 9/7/2017    Depression 9/7/2017    Diabetes ([address]     TYPE 2; IDDM    Edema 9/7/2017    Hypertension     Hypertension with renal disease 9/7/2017    Hypomagnesemia 9/7/2017    Morbid obesity ([address] 9/7/2017    On statin therapy 9/7/2017    Osteopenia 9/7/2017    Psychiatric disorder     depression    Stroke ([address] 5/11/2010    RIGHT SIDE WEAKNESS    Vitamin D deficiency 9/7/2017      Past Surgical History:   Procedure Laterality Date    HX BREAST BIOPSY Right     2013 neg    HX CATARACT REMOVAL  Dec 2010/ 2011    bilateral cataracts removed    HX GI      HEMMORHOIDECTOMY    HX GYN      pmb    HX HEENT      tonsillectomy    HX OOPHORECTOMY Bilateral     HX OTHER SURGICAL      HX TONSILLECTOMY      DE LAPAROSCOPY W TOT HYSTERECTUTERUS <=250 GRAM  W TUBE/OVARY  2011    leiomyomata     Social History     Tobacco Use    Smoking status: Former Smoker     Last attempt to quit: 2010     Years since quittin.1    Smokeless tobacco: Never Used   Substance Use Topics    Alcohol use: No    Drug use: No     Current Outpatient Medications   Medication Sig Dispense Refill    magnesium oxide (MAG-OX) 400 mg tablet Take 1 Tab by mouth two (2) times a day. 180 Tab 3    metoprolol succinate (TOPROL-XL) 50 mg XL tablet Take 1 Tab by mouth daily. 30 Tab prn    valsartan (DIOVAN) 320 mg tablet Take 1 Tab by mouth daily. 30 Tab prn    albuterol (PROAIR HFA) 90 mcg/actuation inhaler Take 2 Puffs by inhalation every four (4) hours as needed for Wheezing. 1 Inhaler 5    pravastatin (PRAVACHOL) 80 mg tablet Take 1 Tab by mouth nightly. 90 Tab 1    metFORMIN ER (GLUCOPHAGE XR) 500 mg tablet Take 1 tablet by mouth in the morning and take 2 tablets before dinner. 270 Tab 1    lisinopril-hydroCHLOROthiazide (PRINZIDE, ZESTORETIC) 20-12.5 mg per tablet Take 2 Tabs by mouth daily. 180 Tab 1    Insulin Needles, Disposable, (PEN NEEDLES) 31 gauge x 1/4\" ndle Use daily as directed with her insulin pen. 100 Pen Needle prn    amLODIPine (NORVASC) 5 mg tablet Take 1 Tab by mouth daily. 90 Tab 1    insulin glargine (LANTUS SOLOSTAR U-100 INSULIN) 100 unit/mL (3 mL) inpn Use 50 units daily 12 Pen 3    potassium chloride (KLOR-CON M20) 20 mEq tablet Take 2 Tabs by mouth two (2) times a day. 120 Tab 12    aspirin (ASPIRIN) 325 mg tablet Take 325 mg by mouth daily.  ALPRAZolam (XANAX) 0.5 mg tablet Take 1 Tab by mouth daily as needed for Anxiety.  30 Tab 0    mirtazapine (REMERON) 15 mg tablet Take by mouth nightly.  FERROUS SULFATE (IRON PO) Take  by mouth.  cholecalciferol (VITAMIN D3) 1,000 unit cap Take 2,000 Units by mouth daily.  ascorbic acid (VITAMIN C) 250 mg tablet Take  by mouth. No Known Allergies  Family History   Problem Relation Age of Onset    Hypertension Mother    [facility] Mother     Diabetes Father     Stroke Father     Heart Disease Father     Hypertension Father     Post-op Nausea/Vomiting Sister     Breast Cancer Paternal Aunt        Patient Active Problem List    Diagnosis    Mild intermittent asthma without complication    CKD (chronic kidney disease), stage III ([address]    Hypertension with renal disease    Controlled type 2 diabetes mellitus with stage 3 chronic kidney disease, without long-term current use of insulin ([facility])    History of CVA (cerebrovascular accident)     Residual right-sided weakness and dysarthria from prior left CVA      Mixed hyperlipidemia    Severe obesity (BMI 35.0-39. 9) with comorbidity [facility]    Osteopenia of multiple sites    Vitamin D deficiency    Chronic anticoagulation    Mild depression ([facility]    Medicare annual wellness visit, subsequent    Alcohol screening    Anxiety    Hypomagnesemia    Near syncope    Stenosis of both internal carotid arteries    Vertigo    Abnormal mammogram with microcalcification     Right  UOQ      Ovarian cyst    Pelvic mass     Multiple leiomyomas treated with hysterectomy 6/27/2011      Unspecified constipation    Urinary retention    Anemia       Patient Care Team:  Kris Benoit MD as PCP - General (Internal Medicine)    Depression Risk Factor Screening:     3 most recent PHQ Screens 3/14/2019   Little interest or pleasure in doing things Not at all   Feeling down, depressed, irritable, or hopeless Not at all   Total Score PHQ 2 0     Alcohol Risk Factor Screening: You do not drink alcohol or very rarely.     Functional Ability and Level of Safety:     Fall Risk     Fall Risk Assessment, last 12 mths 7/11/2019   Able to walk? Yes   Fall in past 12 months? No       Hearing Loss   mild    Activities of Daily Living   Partial assistance. ADL Assessment 7/11/2019   Feeding yourself No Help Needed   Getting from bed to chair No Help Needed   Getting dressed No Help Needed   Bathing or showering No Help Needed   Walk across the room (includes cane/walker) No Help Needed   Using the telphone No Help Needed   Taking your medications No Help Needed   Preparing meals No Help Needed   Managing money (expenses/bills) No Help Needed   Moderately strenuous housework (laundry) No Help Needed   Shopping for personal items (toiletries/medicines) No Help Needed   Shopping for groceries No Help Needed   Driving Help Needed   Climbing a flight of stairs No Help Needed   Getting to places beyond walking distances No Help Needed       Abuse Screen   Patient is not abused    Social History     Social History Narrative    ** Merged History Encounter **            Review of Systems      ROS:    Constitutional: She denies fevers, weight loss, sweats. Eyes: No blurred or double vision. ENT: No difficulty with swallowing, taste, speech or smell. NECK: no stiffness swelling or lymph node enlargement  Respiratory: No cough wheezing or shortness of breath. Cardiovascular: Denies chest pain, palpitations, unexplained indigestion or syncope. Breast: She has noted no masses or lumps and no discharge or axillary swelling  Gastrointestinal:  No changes in bowel movements, no abdominal pain, no bloating. Genitourinary: No discharge or abnormal bleeding or pain  Extremities: No joint pain, stiffness or swelling. Neurological:  No numbness, tingling, burring paresthesias or loss of motor strength. No syncope, dizziness or frequent headache  Skin:  No recent rashes or mole changes. Psychiatric/Behavioral:  Negative for depression. The patient is not nervous/anxious.    HEMATOLOGIC: no easy bruising or bleeding gums  Endocrine: no sweats of urinary frequency or excessive thirst    Physical Examination     Evaluation of Cognitive Function:  Mood/affect:  happy  Appearance: age appropriate  Family member/caregiver input:     Visit Vitals  /88   Pulse 73   Temp 99 °F (37.2 °C) (Oral)   Resp 19   Ht 5' 4\" (1.626 m)   LMP 06/13/2011   SpO2 98%   BMI 38.52 kg/m²     Vitals:    07/11/19 0834 07/11/19 0918   BP: 152/90 138/88   Pulse: 73    Resp: 19    Temp: 99 °F (37.2 °C)    TempSrc: Oral    SpO2: 98%    Height: 5' 4\" (1.626 m)    PainSc:   0 - No pain    LMP: 06/13/2011        PHYSICAL EXAM:    General appearance - alert, well appearing, and in no distress  Mental status - alert, oriented to person, place, and time  HEENT:  Ears - bilateral TM's and external ear canals clear  Eyes - pupillary responses were normal.  Extraocular muscle function intact. Lids and conjunctiva not injected. Fundoscopic exam revealed sharp disc margins. eye movements intact  Pharynx- clear with teeth in good repair. No masses were noted  Neck - supple without thyromegaly or burit. No JVD noted  Lungs - clear to auscultation and percussion  Cardiac- normal rate, regular rhythm without murmurs. PMI not displaced. No gallop, rub or click  Breast: deferred to GYN  Abdomen - flat, soft, non-tender without palpable organomegaly or mass. No pulsatile mass was felt, and not bruit was heard. Bowel sounds were active   Female - deferred to GYN  Rectal - deferred to GYN  Extremities -  no clubbing cyanosis or edema  Lymphatics - no palpable lymphadenopathy, no hepatosplenomegaly  Peripheral vascular - Dorsalis pedis and posterior tibial pulses felt without difficulty  Skin - no rash or unusual mole change noted  Neurological - Cranial nerves II-XII grossly intact. Motor strength 5/5. DTR's 2+ and symmetric.   Station and gait normal  Back exam - full range of motion, no tenderness, palpable spasm or pain on motion  Musculoskeletal - no joint tenderness, deformity or swelling  Hematologic: no purpura, petechiae or bruising    Results for orders placed or performed in visit on 03/14/19   HEMOGLOBIN A1C WITH EAG   Result Value Ref Range    Hemoglobin A1c 9.7 (H) 4.8 - 5.6 %    Estimated average glucose 761 mg/dL   METABOLIC PANEL, COMPREHENSIVE   Result Value Ref Range    Glucose 149 (H) 65 - 105 mg/dL    BUN 17.0 7.0 - 17.0 mg/dL    Creatinine 0.8 0.7 - 1.2 mg/dL    Sodium 140 137 - 145 mmol/L    Potassium 4.6 3.6 - 5.0 mmol/L    Chloride 101 98 - 107 mmol/L    CO2 23.0 22.0 - 32.0 mmol/L    Calcium 10.0 8.4 - 10.2 mg/dl    Protein, total 8.1 6.3 - 8.2 g/dL    Albumin 4.3 3.9 - 5.4 g/dL    AST (SGOT) 21.0 14.0 - 36.0 U/L    ALT (SGPT) 15 9 - 52 U/L    Alk. phosphatase 102 38 - 126 U/L    Bilirubin, total 0.3 0.2 - 1.3 mg/dL    BUN/Creatinine ratio 21 Ratio    GFR est AA >60 mL/min/1.73m2    GFR est non-AA >60 mL/min/1.73m2    Globulin 3.80     A-G Ratio 1.1 Ratio    Anion gap 16 mmol/L   LIPID PANEL   Result Value Ref Range    Cholesterol, total 250 (H) 0 - 200 mg/dL    Triglyceride 678 (H) 0 - 200 mg/dL    VLDL 136 mg/dL    LDL, calculated 74 0 - 130 mg/dL    CHOL/HDL Ratio 6 (H) 0 - 4 Ratio    LDL/HDL Ratio 2 Ratio   CK   Result Value Ref Range    CK 36.00 30.00 - 135.00 U/L       Advice/Referrals/Counseling   Education and counseling provided:  Are appropriate based on today's review and evaluation  End-of-Life planning (with patient's consent)  Pneumococcal Vaccine  Influenza Vaccine  Colorectal cancer screening tests      Assessment/Plan     ASSESSMENT:   1. Hypertension with renal disease    2. Controlled type 2 diabetes mellitus with stage 3 chronic kidney disease, without long-term current use of insulin ([facility]    3. Mixed hyperlipidemia    4. History of CVA (cerebrovascular accident)    5. Mild intermittent asthma without complication    6. CKD (chronic kidney disease), stage III ([address]    7. Severe obesity (BMI 35.0-39. 9) with comorbidity ([facility]    8. Osteopenia of multiple sites    9. Vitamin D deficiency    10. Mild depression ([facility]    11. Medicare annual wellness visit, subsequent    12. Essential hypertension      Impression  1. Hypertension that is controlled on recheck by me although initially up so continue current medicine reviewed with him. 2.  Diabetes repeat status pending a prior lab reviewed and I will make adjustments if necessary. 3.  Hyperlipidemia prior lab reviewed and repeat status pending I will adjust if needed. 4.  History of CVA continue aspirin daily EKG obtained today is normal  5. Asthma that is stable  6. CKD stage III repeat status pending  7. Obesity we did discuss diet, exercise and weight reduction for overall health benefit. 8.  Osteopenia stable on last bone density  9. Vitamin D deficiency repeat status pending  10. Depression that is currently stable  Medicare subsequent annual wellness examination screening questionnaire was completed today. The results were reviewed with her and her  and the questions were answered. Lifestyle recommendations modifications discussed and made. I will call with lab results and make further recommendations or adjustments if necessary. Follow-up as scheduled again for 3 months or sooner if there is a problem. PLAN:  .  Orders Placed This Encounter    HEMOGLOBIN A1C WITH EAG    CBC WITH AUTOMATED DIFF ([facility])    METABOLIC PANEL, COMPREHENSIVE ([facility])    LIPID PANEL ([facility])    CK ([facility])    T4, FREE ([facility])    TSH 3RD GENERATION ([facility])    URINALYSIS W/O MICRO ([facility])    URINE, MICROALBUMIN, SEMIQUANTITATIVE ([facility])    VITAMIN D, 25 HYDROXY ([facility])    AMB POC EKG ROUTINE W/ 12 LEADS, INTER & REP    magnesium oxide (MAG-OX) 400 mg tablet         ATTENTION:   This medical record was transcribed using an electronic medical records system.   Although proofread, it may and can contain electronic and spelling errors. Other human spelling and other errors may be present. Corrections may be executed at a later time. Please feel free to contact us for any clarifications as needed. Follow-up and Dispositions    · Return in about 3 months (around 10/11/2019). Mandi Lainez MD    Recommended healthy diet low in carbohydrates, fats, sodium and cholesterol. Recommended regular cardiovascular exercise 3-6 times per week for 30-60 minutes daily. Current Outpatient Medications   Medication Sig Dispense Refill    magnesium oxide (MAG-OX) 400 mg tablet Take 1 Tab by mouth two (2) times a day. 180 Tab 3    metoprolol succinate (TOPROL-XL) 50 mg XL tablet Take 1 Tab by mouth daily. 30 Tab prn    valsartan (DIOVAN) 320 mg tablet Take 1 Tab by mouth daily. 30 Tab prn    albuterol (PROAIR HFA) 90 mcg/actuation inhaler Take 2 Puffs by inhalation every four (4) hours as needed for Wheezing. 1 Inhaler 5    pravastatin (PRAVACHOL) 80 mg tablet Take 1 Tab by mouth nightly. 90 Tab 1    metFORMIN ER (GLUCOPHAGE XR) 500 mg tablet Take 1 tablet by mouth in the morning and take 2 tablets before dinner. 270 Tab 1    lisinopril-hydroCHLOROthiazide (PRINZIDE, ZESTORETIC) 20-12.5 mg per tablet Take 2 Tabs by mouth daily. 180 Tab 1    Insulin Needles, Disposable, (PEN NEEDLES) 31 gauge x 1/4\" ndle Use daily as directed with her insulin pen. 100 Pen Needle prn    amLODIPine (NORVASC) 5 mg tablet Take 1 Tab by mouth daily. 90 Tab 1    insulin glargine (LANTUS SOLOSTAR U-100 INSULIN) 100 unit/mL (3 mL) inpn Use 50 units daily 12 Pen 3    potassium chloride (KLOR-CON M20) 20 mEq tablet Take 2 Tabs by mouth two (2) times a day. 120 Tab 12    aspirin (ASPIRIN) 325 mg tablet Take 325 mg by mouth daily.  ALPRAZolam (XANAX) 0.5 mg tablet Take 1 Tab by mouth daily as needed for Anxiety. 30 Tab 0    mirtazapine (REMERON) 15 mg tablet Take  by mouth nightly.       FERROUS SULFATE (IRON PO) Take  by mouth.  cholecalciferol (VITAMIN D3) 1,000 unit cap Take 2,000 Units by mouth daily.  ascorbic acid (VITAMIN C) 250 mg tablet Take  by mouth. No results found for any visits on 07/11/19. Verbal and written instructions (see AVS) provided. Patient expresses understanding of diagnosis and treatment plan.     Juliet Cline MD

## 2019-07-11 ENCOUNTER — OFFICE VISIT (OUTPATIENT)
Dept: INTERNAL MEDICINE CLINIC | Age: 54
End: 2019-07-11

## 2019-07-11 VITALS
DIASTOLIC BLOOD PRESSURE: 88 MMHG | TEMPERATURE: 99 F | SYSTOLIC BLOOD PRESSURE: 138 MMHG | HEIGHT: 64 IN | BODY MASS INDEX: 38.52 KG/M2 | HEART RATE: 73 BPM | RESPIRATION RATE: 19 BRPM | OXYGEN SATURATION: 98 %

## 2019-07-11 DIAGNOSIS — F32.A MILD DEPRESSION: ICD-10-CM

## 2019-07-11 DIAGNOSIS — N18.30 CONTROLLED TYPE 2 DIABETES MELLITUS WITH STAGE 3 CHRONIC KIDNEY DISEASE, WITHOUT LONG-TERM CURRENT USE OF INSULIN (HCC): ICD-10-CM

## 2019-07-11 DIAGNOSIS — E66.01 SEVERE OBESITY (BMI 35.0-39.9) WITH COMORBIDITY (HCC): ICD-10-CM

## 2019-07-11 DIAGNOSIS — J45.20 MILD INTERMITTENT ASTHMA WITHOUT COMPLICATION: ICD-10-CM

## 2019-07-11 DIAGNOSIS — Z00.00 MEDICARE ANNUAL WELLNESS VISIT, SUBSEQUENT: ICD-10-CM

## 2019-07-11 DIAGNOSIS — I12.9 HYPERTENSION WITH RENAL DISEASE: Primary | ICD-10-CM

## 2019-07-11 DIAGNOSIS — E78.2 MIXED HYPERLIPIDEMIA: ICD-10-CM

## 2019-07-11 DIAGNOSIS — Z86.73 HISTORY OF CVA (CEREBROVASCULAR ACCIDENT): ICD-10-CM

## 2019-07-11 DIAGNOSIS — E11.22 CONTROLLED TYPE 2 DIABETES MELLITUS WITH STAGE 3 CHRONIC KIDNEY DISEASE, WITHOUT LONG-TERM CURRENT USE OF INSULIN (HCC): ICD-10-CM

## 2019-07-11 DIAGNOSIS — M85.89 OSTEOPENIA OF MULTIPLE SITES: ICD-10-CM

## 2019-07-11 DIAGNOSIS — I10 ESSENTIAL HYPERTENSION: ICD-10-CM

## 2019-07-11 DIAGNOSIS — N18.30 CKD (CHRONIC KIDNEY DISEASE), STAGE III (HCC): ICD-10-CM

## 2019-07-11 DIAGNOSIS — E55.9 VITAMIN D DEFICIENCY: ICD-10-CM

## 2019-07-11 LAB
25(OH)D3 SERPL-MCNC: 36 NG/ML (ref 30–96)
A-G RATIO,AGRAT: 1.2 RATIO
ALBUMIN SERPL-MCNC: 4.6 G/DL (ref 3.9–5.4)
ALP SERPL-CCNC: 117 U/L (ref 38–126)
ALT SERPL-CCNC: 17 U/L (ref 9–52)
ANION GAP SERPL CALC-SCNC: 18 MMOL/L
AST SERPL W P-5'-P-CCNC: 17 U/L (ref 14–36)
BACTERIA,BACTU: ABNORMAL
BILIRUB SERPL-MCNC: 0.3 MG/DL (ref 0.2–1.3)
BILIRUB UR QL: NEGATIVE
BUN SERPL-MCNC: 19 MG/DL (ref 7–17)
BUN/CREATININE RATIO,BUCR: 21 RATIO
CALCIUM SERPL-MCNC: 9.9 MG/DL (ref 8.4–10.2)
CHLORIDE SERPL-SCNC: 101 MMOL/L (ref 98–107)
CHOLEST SERPL-MCNC: 240 MG/DL (ref 0–200)
CK SERPL-CCNC: 41 U/L (ref 30–135)
CLARITY: CLEAR
CO2 SERPL-SCNC: 23 MMOL/L (ref 22–32)
COLOR UR: ABNORMAL
CREAT SERPL-MCNC: 0.9 MG/DL (ref 0.7–1.2)
ERYTHROCYTE [DISTWIDTH] IN BLOOD BY AUTOMATED COUNT: 14.1 %
GLOBULIN,GLOB: 3.7
GLUCOSE 24H UR-MRATE: NEGATIVE G/(24.H)
GLUCOSE SERPL-MCNC: 115 MG/DL (ref 65–105)
HCT VFR BLD AUTO: 38.1 % (ref 37–51)
HGB BLD-MCNC: 11.8 G/DL (ref 12–18)
HGB UR QL STRIP: NEGATIVE
KETONES UR QL STRIP.AUTO: NEGATIVE
LEUKOCYTE ESTERASE: ABNORMAL
LYMPHOCYTES ABSOLUTE: 3.5 K/UL (ref 0.6–4.1)
LYMPHOCYTES NFR BLD: 45 % (ref 10–58.5)
MCH RBC QN AUTO: 26.8 PG (ref 26–32)
MCHC RBC AUTO-ENTMCNC: 31 G/DL (ref 30–36)
MCV RBC AUTO: 86.6 FL (ref 80–97)
MICROALBUMIN, URINE: 20 MG/L (ref 0–20)
MONOCYTES ABS-DIF,2141: 0.6 K/UL (ref 0–1.8)
MONOCYTES NFR BLD: 7.7 % (ref 0.1–24)
NEUTROPHILS # BLD: 47.3 % (ref 37–92)
NEUTROPHILS ABS,2156: 3.7 K/UL (ref 2–7.8)
NITRITE UR QL STRIP.AUTO: NEGATIVE
PH UR STRIP: 6.5 [PH] (ref 5–7)
PLATELET # BLD AUTO: 302 K/UL (ref 140–440)
PMV BLD AUTO: 9.1 FL
POTASSIUM SERPL-SCNC: 4.6 MMOL/L (ref 3.6–5)
PROT SERPL-MCNC: 8.3 G/DL (ref 6.3–8.2)
PROT UR STRIP-MCNC: ABNORMAL MG/DL
RBC # BLD AUTO: 4.4 M/UL (ref 4.2–6.3)
RBC #/AREA URNS HPF: 0 #/HPF
SODIUM SERPL-SCNC: 142 MMOL/L (ref 137–145)
SP GR UR REFRACTOMETRY: 1.01 (ref 1–1.03)
SQUAMOUS EPITHELIAL CELLS: ABNORMAL
T4 FREE SERPL-MCNC: 1.05 NG/DL (ref 0.58–2.3)
TRIGL SERPL-MCNC: 800 MG/DL (ref 0–200)
TSH SERPL DL<=0.05 MIU/L-ACNC: 1.22 UIU/ML (ref 0.34–5.6)
UROBILINOGEN UR QL STRIP.AUTO: NEGATIVE
WBC # BLD AUTO: 7.8 K/UL (ref 4.1–10.9)
WBC URNS QL MICRO: ABNORMAL #/HPF

## 2019-07-11 RX ORDER — LANOLIN ALCOHOL/MO/W.PET/CERES
400 CREAM (GRAM) TOPICAL 2 TIMES DAILY
Qty: 180 TAB | Refills: 3 | Status: SHIPPED | OUTPATIENT
Start: 2019-07-11 | End: 2020-01-22 | Stop reason: SDUPTHER

## 2019-07-11 NOTE — PATIENT INSTRUCTIONS
Anemia: Care Instructions Your Care Instructions Anemia is a low level of red blood cells, which carry oxygen throughout your body. Many things can cause anemia. Lack of iron is one of the most common causes. Your body needs iron to make hemoglobin, a substance in red blood cells that carries oxygen from the lungs to your body's cells. Without enough iron, the body produces fewer and smaller red blood cells. As a result, your body's cells do not get enough oxygen, and you feel tired and weak. And you may have trouble concentrating. Bleeding is the most common cause of a lack of iron. You may have heavy menstrual bleeding or bleeding caused by conditions such as ulcers, hemorrhoids, or cancer. Regular use of aspirin or other anti-inflammatory medicines (such as ibuprofen) also can cause bleeding in some people. A lack of iron in your diet also can cause anemia, especially at times when the body needs more iron, such as during pregnancy, infancy, and the teen years. Your doctor may have prescribed iron pills. It may take several months of treatment for your iron levels to return to normal. Your doctor also may suggest that you eat foods that are rich in iron, such as meat and beans. There are many other causes of anemia. It is not always due to a lack of iron. Finding the specific cause of your anemia will help your doctor find the right treatment for you. Follow-up care is a key part of your treatment and safety. Be sure to make and go to all appointments, and call your doctor if you are having problems. It's also a good idea to know your test results and keep a list of the medicines you take. How can you care for yourself at home? · Take your medicines exactly as prescribed. Call your doctor if you think you are having a problem with your medicine. · If your doctor recommends iron pills, take them as directed: ? Try to take the pills on an empty stomach about 1 hour before or 2 hours after meals. But you may need to take iron with food to avoid an upset stomach. ? Do not take antacids or drink milk or caffeine drinks (such as coffee, tea, or cola) at the same time or within 2 hours of the time that you take your iron. They can make it hard for your body to absorb the iron. ? Vitamin C (from food or supplements) helps your body absorb iron. Try taking iron pills with a glass of orange juice or some other food that is high in vitamin C, such as citrus fruits. ? Iron pills may cause stomach problems, such as heartburn, nausea, diarrhea, constipation, and cramps. Be sure to drink plenty of fluids, and include fruits, vegetables, and fiber in your diet each day. Iron pills often make your bowel movements dark or green. ? If you forget to take an iron pill, do not take a double dose of iron the next time you take a pill. ? Keep iron pills out of the reach of small children. An overdose of iron can be very dangerous. · Follow your doctor's advice about eating iron-rich foods. These include red meat, shellfish, poultry, eggs, beans, raisins, whole-grain bread, and leafy green vegetables. · Steam vegetables to help them keep their iron content. When should you call for help? Call 911 anytime you think you may need emergency care. For example, call if: 
  · You have symptoms of a heart attack. These may include: 
? Chest pain or pressure, or a strange feeling in the chest. 
? Sweating. ? Shortness of breath. ? Nausea or vomiting. ? Pain, pressure, or a strange feeling in the back, neck, jaw, or upper belly or in one or both shoulders or arms. ? Lightheadedness or sudden weakness. ? A fast or irregular heartbeat. After you call 911, the  may tell you to chew 1 adult-strength or 2 to 4 low-dose aspirin. Wait for an ambulance. Do not try to drive yourself.  
  · You passed out (lost consciousness).  
 Call your doctor now or seek immediate medical care if:   · You have new or increased shortness of breath.  
  · You are dizzy or lightheaded, or you feel like you may faint.  
  · Your fatigue and weakness continue or get worse.  
  · You have any abnormal bleeding, such as: 
? Nosebleeds. ? Vaginal bleeding that is different (heavier, more frequent, at a different time of the month) than what you are used to. 
? Bloody or black stools, or rectal bleeding. ? Bloody or pink urine.  
 Watch closely for changes in your health, and be sure to contact your doctor if: 
  · You do not get better as expected. Where can you learn more? Go to http://stephen-laurie.info/. Enter R301 in the search box to learn more about \"Anemia: Care Instructions. \" Current as of: May 6, 2018 Content Version: 11.9 © 8394-0215 [employer]. Care instructions adapted under license by [employer] (which disclaims liability or warranty for this information). If you have questions about a medical condition or this instruction, always ask your healthcare professional. [place] [address] any warranty or liability for your use of this information.

## 2019-07-11 NOTE — PROGRESS NOTES
Chief Complaint   Patient presents with    Annual Wellness Visit    Hair/Scalp Problem     x several months     Visit Vitals  /90 (BP 1 Location: Left arm, BP Patient Position: Sitting)   Pulse 73   Temp 99 °F (37.2 °C) (Oral)   Resp 19   Ht 5' 4\" (1.626 m)   LMP 06/13/2011   SpO2 98%   BMI 38.52 kg/m²     1. Have you been to the ER, urgent care clinic since your last visit? Hospitalized since your last visit? No    2. Have you seen or consulted any other health care providers outside of the [address] [place] [address] since your last visit? Include any pap smears or colon screening.  No

## 2019-07-12 LAB
EST. AVERAGE GLUCOSE BLD GHB EST-MCNC: 255 MG/DL
HBA1C MFR BLD: 10.5 % (ref 4.8–5.6)

## 2019-07-12 RX ORDER — INSULIN GLARGINE 100 [IU]/ML
INJECTION, SOLUTION SUBCUTANEOUS
Qty: 12 PEN | Refills: 3
Start: 2019-07-12 | End: 2019-08-09 | Stop reason: SDUPTHER

## 2019-07-12 NOTE — PROGRESS NOTES
Lab results okay except for a marked increase cholesterol triglycerides and increase blood sugar and glycol. I have listed current dose of Lantus is 50 units daily. If that is the case switch her dose to 35 units of Lantus 2 times daily. Make sure she is taking her medications. Discussed with patient. States she will tell her  regarding this because he helps her with her medications. Asked her to have him call if there were any questions.

## 2019-07-12 NOTE — TELEPHONE ENCOUNTER
RX refill request from the patient/pharmacy. Patient last seen 07- with labs, and next appt. scheduled for 10-  Requested Prescriptions     Pending Prescriptions Disp Refills    insulin glargine (LANTUS SOLOSTAR U-100 INSULIN) 100 unit/mL (3 mL) inpn 12 Pen 3     Sig: Use 35 units two times daily   .

## 2019-07-15 ENCOUNTER — HOSPITAL ENCOUNTER (OUTPATIENT)
Dept: MAMMOGRAPHY | Age: 54
Discharge: HOME | End: 2019-07-15
Attending: INTERNAL MEDICINE
Payer: MEDICARE

## 2019-07-15 DIAGNOSIS — Z12.39 BREAST SCREENING, UNSPECIFIED: ICD-10-CM

## 2019-07-15 PROCEDURE — 77067 SCR MAMMO BI INCL CAD: CPT

## 2019-08-09 RX ORDER — INSULIN GLARGINE 100 [IU]/ML
INJECTION, SOLUTION SUBCUTANEOUS
Qty: 35 ML | Refills: 3 | Status: SHIPPED | OUTPATIENT
Start: 2019-08-09 | End: 2020-01-20 | Stop reason: SDUPTHER

## 2019-08-09 NOTE — TELEPHONE ENCOUNTER
PCP: David Madrigal MD    Last appt: 7/11/2019  Future Appointments   Date Time Provider Yolanda Sofia   10/14/2019  8:30 AM David Madrigal MD [facility] SCHED       Requested Prescriptions     Pending Prescriptions Disp Refills    insulin glargine (LANTUS SOLOSTAR U-100 INSULIN) 100 unit/mL (3 mL) inpn [Pharmacy Med Name: Terese Mate PEN 100U/ML] 35 mL 3     Sig: USE 35 units twice daily

## 2019-09-10 DIAGNOSIS — I10 HYPERTENSION, UNSPECIFIED TYPE: ICD-10-CM

## 2019-09-10 RX ORDER — LISINOPRIL AND HYDROCHLOROTHIAZIDE 12.5; 2 MG/1; MG/1
2 TABLET ORAL DAILY
Qty: 180 TAB | Refills: 3 | Status: SHIPPED | OUTPATIENT
Start: 2019-09-10 | End: 2020-09-22 | Stop reason: SDUPTHER

## 2019-09-10 NOTE — TELEPHONE ENCOUNTER
RX refill request from the patient/pharmacy. Patient last seen 07- with labs, and next appt. scheduled for 10-  Requested Prescriptions     Pending Prescriptions Disp Refills    lisinopril-hydroCHLOROthiazide (PRINZIDE, ZESTORETIC) 20-12.5 mg per tablet 180 Tab 3     Sig: Take 2 Tabs by mouth daily. Daniel Flor
1.83

## 2019-09-19 PROBLEM — Z00.00 MEDICARE ANNUAL WELLNESS VISIT, SUBSEQUENT: Status: RESOLVED | Noted: 2018-05-09 | Resolved: 2019-09-19

## 2019-10-13 NOTE — PROGRESS NOTES
Chief Complaint   Patient presents with    Hypertension     3 month f/up    Chronic Kidney Disease    Cholesterol Problem    Anemia    Cerebrovascular Accident    Diabetes       SUBJECTIVE:    Renu Pennington is a 53 y.o. female who returns in follow-up for medical problems to include hypertension, diabetes, hyperlipidemia, prior CVA with right hemiplegia, CKD stage III, obesity and other medical problems. She is taking her medications and trying to follow her diet according to her and her  but is not able to really exercise because of her right hemiplegia from her prior CVA. She currently denies any chest pain, shortness of breath, palpitations, PND, orthopnea or other cardiorespiratory complaints. There are no GI or  complaints. She has no headaches, dizziness or neurologic complaints except her chronic hemiplegia on the right side as well as her dysarthria from her prior stroke. There are no current arthritic complaints and no other complaints on complete review of systems. Current Outpatient Medications   Medication Sig Dispense Refill    pravastatin (PRAVACHOL) 80 mg tablet Take 1 Tab by mouth nightly. 90 Tab 3    metFORMIN ER (GLUCOPHAGE XR) 500 mg tablet Take 1 tablet by mouth in the morning and take 2 tablets before dinner. 270 Tab 3    amLODIPine (NORVASC) 5 mg tablet Take 2 Tabs by mouth daily. 90 Tab 1    lisinopril-hydroCHLOROthiazide (PRINZIDE, ZESTORETIC) 20-12.5 mg per tablet Take 2 Tabs by mouth daily. 180 Tab 3    insulin glargine (LANTUS SOLOSTAR U-100 INSULIN) 100 unit/mL (3 mL) inpn USE 35 units twice daily 35 mL 3    magnesium oxide (MAG-OX) 400 mg tablet Take 1 Tab by mouth two (2) times a day. 180 Tab 3    metoprolol succinate (TOPROL-XL) 50 mg XL tablet Take 1 Tab by mouth daily. 30 Tab prn    valsartan (DIOVAN) 320 mg tablet Take 1 Tab by mouth daily.  30 Tab prn    albuterol (PROAIR HFA) 90 mcg/actuation inhaler Take 2 Puffs by inhalation every four (4) hours as needed for Wheezing. 1 Inhaler 5    Insulin Needles, Disposable, (PEN NEEDLES) 31 gauge x 1/4\" ndle Use daily as directed with her insulin pen. 100 Pen Needle prn    potassium chloride (KLOR-CON M20) 20 mEq tablet Take 2 Tabs by mouth two (2) times a day. 120 Tab 12    aspirin (ASPIRIN) 325 mg tablet Take 325 mg by mouth daily.  ALPRAZolam (XANAX) 0.5 mg tablet Take 1 Tab by mouth daily as needed for Anxiety. 30 Tab 0    mirtazapine (REMERON) 15 mg tablet Take  by mouth nightly.  FERROUS SULFATE (IRON PO) Take 1 Tab by mouth.  cholecalciferol (VITAMIN D3) 1,000 unit cap Take 2,000 Units by mouth daily.  ascorbic acid (VITAMIN C) 250 mg tablet Take  by mouth.          Past Medical History:   Diagnosis Date    Abnormal mammogram with microcalcification 6/4/2013    Right  UOQ    Aphasia due to stroke     SPEAKS SOME    Arthritis     back    Asthma 9/7/2017    CKD (chronic kidney disease) 9/7/2017    CKD (chronic kidney disease), stage III ([address] 9/7/2017    CVA (cerebral vascular accident) ([address] 9/7/2017    Depression 9/7/2017    Diabetes ([address]     TYPE 2; IDDM    Edema 9/7/2017    Hypertension     Hypertension with renal disease 9/7/2017    Hypomagnesemia 9/7/2017    Morbid obesity ([address] 9/7/2017    On statin therapy 9/7/2017    Osteopenia 9/7/2017    Psychiatric disorder     depression    Stroke ([facility] 5/11/2010    RIGHT SIDE WEAKNESS    Vitamin D deficiency 9/7/2017     Past Surgical History:   Procedure Laterality Date    HX BREAST BIOPSY Right     2013 neg    HX CATARACT REMOVAL  Dec 2010/ Jan 2011    bilateral cataracts removed    HX GI      HEMMORHOIDECTOMY    HX GYN      pmb    HX HEENT      tonsillectomy    HX OOPHORECTOMY Bilateral     HX OTHER SURGICAL      HX TONSILLECTOMY      NV LAPAROSCOPY W TOT HYSTERECTUTERUS <=250 GRAM  W TUBE/OVARY  6/2011    leiomyomata     No Known Allergies    REVIEW OF SYSTEMS:  General: negative for - chills or fever, or weight loss or gain  ENT: negative for - headaches, nasal congestion or tinnitus  Eyes: no blurred or visual changes  Neck: No stiffness or swollen nodes  Respiratory: negative for - cough, hemoptysis, shortness of breath or wheezing  Cardiovascular : negative for - chest pain, edema, palpitations or shortness of breath  Gastrointestinal: negative for - abdominal pain, blood in stools, heartburn or nausea/vomiting  Genito-Urinary: no dysuria, trouble voiding, or hematuria  Musculoskeletal: negative for - gait disturbance, joint pain, joint stiffness or joint swelling  Neurological: no TIA or new stroke symptoms.   Chronic symptoms without change  Hematologic: no bruises, no bleeding  Lymphatic: no swollen glands  Integument: no lumps, mole changes, nail changes or rash  Endocrine:no malaise/lethargy poly uria or polydipsia or unexpected weight changes        Social History     Socioeconomic History    Marital status:      Spouse name: Not on file    Number of children: Not on file    Years of education: Not on file    Highest education level: Not on file   Tobacco Use    Smoking status: Former Smoker     Last attempt to quit: 2010     Years since quittin.4    Smokeless tobacco: Never Used   Substance and Sexual Activity    Alcohol use: No    Drug use: No    Sexual activity: Never   Social History Narrative    ** Merged History Encounter **          Family History   Problem Relation Age of Onset    Hypertension Mother     MS Mother     Diabetes Father     Stroke Father     Heart Disease Father     Hypertension Father     Post-op Nausea/Vomiting Sister     Breast Cancer Paternal Aunt        OBJECTIVE:     Visit Vitals  BP (!) 156/96   Pulse 76   Temp 98.7 °F (37.1 °C)   Resp 18   Ht 5' 4\" (1.626 m)   Wt 228 lb 9.6 oz (103.7 kg)   LMP 2011   BMI 39.24 kg/m²     CONSTITUTIONAL:   well nourished, appears age appropriate  EYES: sclera anicteric, PERRL, EOMI  ENMT:geoffrey clear, moist mucous membranes, pharynx clear  NECK: supple. Thyroid normal, No JVD or bruits  RESPIRATORY: Chest: clear to ascultation and percussion, normal inspiratory effort  CARDIOVASCULAR: Heart: regular rate and rhythm no murmurs, rubs or gallops, PMI not displaced, No thrills  GASTROINTESTINAL: Abdomen: non distended, soft, non tender, bowel sounds normal  HEMATOLOGIC: no purpura, petechiae or bruising  LYMPHATIC: No lymph node enlargemant  MUSCULOSKELETAL: Extremities: no edema or active synovitis, pulse 1+   INTEGUMENT: No unusual rashes or suspicious skin lesions noted. Nails appear normal.  PERIPHERAL VASCULAR: normal pulses femoral, PT and DP  NEUROLOGIC:  A & O X 3. Chronic dysarthria as well as right hemiplegia  PSYCHIATRIC:, appropriate affect     ASSESSMENT:   1. Hypertension with renal disease    2. Controlled type 2 diabetes mellitus with stage 3 chronic kidney disease, without long-term current use of insulin ([address]    3. Mixed hyperlipidemia    4. Mild intermittent asthma without complication    5. CKD (chronic kidney disease), stage III ([address]    6. History of CVA (cerebrovascular accident)    7. Severe obesity (BMI 35.0-39. 9) with comorbidity ([address]    8. Encounter for immunization      Impression  1. Hypertension that is poorly controlled so increase Norvasc to 10 mg daily and recheck in about 3 weeks. 2.  Diabetes mellitus we will see what that status is and make adjustments if necessary. Prior numbers reviewed. 3.  Hyperlipidemia prior numbers reviewed and repeat status pending I will adjust if needed. 4.  Asthma that is stable  5. CKD stage III repeat status pending next #6 history of CVA with right hemiplegia and dysarthria without change  7. Obesity we did discuss diet and weight reduction for overall health benefit. Flu shot given today. I will call with lab results and make further recommendations or adjustments if necessary.   Follow-up in about 3 weeks regarding hypertension in 3 months regarding other multiple medical problems. All the above discussed with her  present with her today. PLAN:  .  Orders Placed This Encounter    Influenza virus vaccine (QUADRIVALENT PRES FREE SYRINGE) IM ([id number])    METABOLIC PANEL, COMPREHENSIVE ([facility])    LIPID PANEL ([facility])    CK ([facility])    HEMOGLOBIN A1C W/O EAG ([facility])    pravastatin (PRAVACHOL) 80 mg tablet    metFORMIN ER (GLUCOPHAGE XR) 500 mg tablet    amLODIPine (NORVASC) 5 mg tablet         ATTENTION:   This medical record was transcribed using an electronic medical records system. Although proofread, it may and can contain electronic and spelling errors. Other human spelling and other errors may be present. Corrections may be executed at a later time. Please feel free to contact us for any clarifications as needed. Follow-up and Dispositions    · Return in about 3 weeks (around 11/4/2019). No results found for any visits on 10/14/19. Jodi Baltazar MD    The patient verbalized understanding of the problems and plans as explained.

## 2019-10-14 ENCOUNTER — OFFICE VISIT (OUTPATIENT)
Dept: INTERNAL MEDICINE CLINIC | Age: 54
End: 2019-10-14

## 2019-10-14 VITALS
WEIGHT: 228.6 LBS | DIASTOLIC BLOOD PRESSURE: 96 MMHG | SYSTOLIC BLOOD PRESSURE: 156 MMHG | RESPIRATION RATE: 18 BRPM | TEMPERATURE: 98.7 F | HEART RATE: 76 BPM | HEIGHT: 64 IN | BODY MASS INDEX: 39.03 KG/M2

## 2019-10-14 DIAGNOSIS — E78.2 MIXED HYPERLIPIDEMIA: ICD-10-CM

## 2019-10-14 DIAGNOSIS — Z86.73 HISTORY OF CVA (CEREBROVASCULAR ACCIDENT): ICD-10-CM

## 2019-10-14 DIAGNOSIS — N18.30 CKD (CHRONIC KIDNEY DISEASE), STAGE III (HCC): ICD-10-CM

## 2019-10-14 DIAGNOSIS — E11.22 CONTROLLED TYPE 2 DIABETES MELLITUS WITH STAGE 3 CHRONIC KIDNEY DISEASE, WITHOUT LONG-TERM CURRENT USE OF INSULIN (HCC): ICD-10-CM

## 2019-10-14 DIAGNOSIS — E66.01 SEVERE OBESITY (BMI 35.0-39.9) WITH COMORBIDITY (HCC): ICD-10-CM

## 2019-10-14 DIAGNOSIS — Z23 ENCOUNTER FOR IMMUNIZATION: ICD-10-CM

## 2019-10-14 DIAGNOSIS — J45.20 MILD INTERMITTENT ASTHMA WITHOUT COMPLICATION: ICD-10-CM

## 2019-10-14 DIAGNOSIS — N18.30 CONTROLLED TYPE 2 DIABETES MELLITUS WITH STAGE 3 CHRONIC KIDNEY DISEASE, WITHOUT LONG-TERM CURRENT USE OF INSULIN (HCC): ICD-10-CM

## 2019-10-14 DIAGNOSIS — I12.9 HYPERTENSION WITH RENAL DISEASE: Primary | ICD-10-CM

## 2019-10-14 RX ORDER — METFORMIN HYDROCHLORIDE 500 MG/1
TABLET, EXTENDED RELEASE ORAL
Qty: 270 TAB | Refills: 3 | Status: SHIPPED | OUTPATIENT
Start: 2019-10-14 | End: 2020-11-24 | Stop reason: SDUPTHER

## 2019-10-14 RX ORDER — PRAVASTATIN SODIUM 80 MG/1
80 TABLET ORAL
Qty: 90 TAB | Refills: 3 | Status: SHIPPED | OUTPATIENT
Start: 2019-10-14 | End: 2020-11-06 | Stop reason: SDUPTHER

## 2019-10-14 RX ORDER — AMLODIPINE BESYLATE 5 MG/1
10 TABLET ORAL DAILY
Qty: 90 TAB | Refills: 1 | Status: SHIPPED | OUTPATIENT
Start: 2019-10-14 | End: 2019-11-11 | Stop reason: ALTCHOICE

## 2019-10-14 NOTE — PATIENT INSTRUCTIONS

## 2019-10-14 NOTE — PROGRESS NOTES
After obtaining written consent and per orders of Dr. Gagan Rosenbaum, injection of flu vaccine given by Cristiano Leiva RN. Order and injection/medication verified by second nurse/ma review by Slime Myers LPN. Patient tolerated procedure well. VIS was given to them. No reactions noted.

## 2019-10-14 NOTE — PROGRESS NOTES
Chief Complaint   Patient presents with    Hypertension     3 month f/up    Chronic Kidney Disease    Cholesterol Problem    Anemia    Cerebrovascular Accident    Diabetes     1. Have you been to the ER, urgent care clinic since your last visit? Hospitalized since your last visit? No    2. Have you seen or consulted any other health care providers outside of the [address] [place] [address] since your last visit? Include any pap smears or colon screening.  No

## 2019-10-15 LAB
ALBUMIN SERPL-MCNC: 4.3 G/DL (ref 3.5–5.5)
ALBUMIN/GLOB SERPL: 1.3 {RATIO} (ref 1.2–2.2)
ALP SERPL-CCNC: 98 IU/L (ref 39–117)
ALT SERPL-CCNC: 15 IU/L (ref 0–32)
AST SERPL-CCNC: 10 IU/L (ref 0–40)
BILIRUB SERPL-MCNC: <0.2 MG/DL (ref 0–1.2)
BUN SERPL-MCNC: 17 MG/DL (ref 6–24)
BUN/CREAT SERPL: 18 (ref 9–23)
CALCIUM SERPL-MCNC: 10.2 MG/DL (ref 8.7–10.2)
CHLORIDE SERPL-SCNC: 100 MMOL/L (ref 96–106)
CHOLEST SERPL-MCNC: 428 MG/DL (ref 100–199)
CK SERPL-CCNC: 64 U/L (ref 24–173)
CO2 SERPL-SCNC: 24 MMOL/L (ref 20–29)
CREAT SERPL-MCNC: 0.94 MG/DL (ref 0.57–1)
EST. AVERAGE GLUCOSE BLD GHB EST-MCNC: 240 MG/DL
GLOBULIN SER CALC-MCNC: 3.3 G/DL (ref 1.5–4.5)
GLUCOSE SERPL-MCNC: 292 MG/DL (ref 65–99)
HBA1C MFR BLD: 10 % (ref 4.8–5.6)
HDLC SERPL-MCNC: 29 MG/DL
LDLC SERPL CALC-MCNC: ABNORMAL MG/DL (ref 0–99)
POTASSIUM SERPL-SCNC: 5 MMOL/L (ref 3.5–5.2)
PROT SERPL-MCNC: 7.6 G/DL (ref 6–8.5)
SODIUM SERPL-SCNC: 140 MMOL/L (ref 134–144)
TRIGL SERPL-MCNC: 1455 MG/DL (ref 0–149)
VLDLC SERPL CALC-MCNC: ABNORMAL MG/DL (ref 5–40)

## 2019-10-16 NOTE — PROGRESS NOTES
Markedly increased blood sugar and glycohemoglobin and extremely high triglyceride level. If not on the Vasepa 2 tablets twice daily I would start that and she needs to watch diet.

## 2019-10-17 RX ORDER — ICOSAPENT ETHYL 1000 MG/1
2 CAPSULE ORAL 2 TIMES DAILY WITH MEALS
Qty: 120 CAP | Refills: 1 | Status: SHIPPED | OUTPATIENT
Start: 2019-10-17 | End: 2019-11-11 | Stop reason: ALTCHOICE

## 2019-10-17 NOTE — PROGRESS NOTES
Called and spoke to patient  Two pt identifiers confirmed  Informed patient per Dr. Anitra Bradley that blood sugar is markedly increased as well as A1C and triglycerides are extremely high. Informed patient Dr. Anitra Bradley wants her to start Vasepa 2 tablets twice daily and to watch her diet. Rx was sent to [facility] on [address]. Patient verbalized understanding of information discussed  with no further questions at this time.

## 2019-10-17 NOTE — TELEPHONE ENCOUNTER
Per Dr. Ayoub Been patient has markedly high triglycerides and to start Vascepa 2 tablets twice daily.

## 2019-11-11 ENCOUNTER — OFFICE VISIT (OUTPATIENT)
Dept: INTERNAL MEDICINE CLINIC | Age: 54
End: 2019-11-11

## 2019-11-11 VITALS
SYSTOLIC BLOOD PRESSURE: 180 MMHG | RESPIRATION RATE: 18 BRPM | HEART RATE: 70 BPM | DIASTOLIC BLOOD PRESSURE: 96 MMHG | OXYGEN SATURATION: 98 % | TEMPERATURE: 98.7 F

## 2019-11-11 DIAGNOSIS — Z86.73 HISTORY OF CVA (CEREBROVASCULAR ACCIDENT): ICD-10-CM

## 2019-11-11 DIAGNOSIS — E78.2 MIXED HYPERLIPIDEMIA: ICD-10-CM

## 2019-11-11 DIAGNOSIS — I12.9 HYPERTENSION WITH RENAL DISEASE: Primary | ICD-10-CM

## 2019-11-11 DIAGNOSIS — E11.22 CONTROLLED TYPE 2 DIABETES MELLITUS WITH STAGE 3 CHRONIC KIDNEY DISEASE, WITHOUT LONG-TERM CURRENT USE OF INSULIN (HCC): ICD-10-CM

## 2019-11-11 DIAGNOSIS — N18.30 CONTROLLED TYPE 2 DIABETES MELLITUS WITH STAGE 3 CHRONIC KIDNEY DISEASE, WITHOUT LONG-TERM CURRENT USE OF INSULIN (HCC): ICD-10-CM

## 2019-11-11 RX ORDER — ICOSAPENT ETHYL 1000 MG/1
2 CAPSULE ORAL 2 TIMES DAILY WITH MEALS
Qty: 120 CAP | Status: SHIPPED | OUTPATIENT
Start: 2019-11-11 | End: 2020-01-16 | Stop reason: ALTCHOICE

## 2019-11-11 RX ORDER — AMLODIPINE BESYLATE 10 MG/1
TABLET ORAL
Qty: 30 TAB | Refills: 5 | Status: SHIPPED | OUTPATIENT
Start: 2019-11-11 | End: 2020-06-03 | Stop reason: SDUPTHER

## 2019-11-11 RX ORDER — CLONIDINE HYDROCHLORIDE 0.1 MG/1
0.1 TABLET ORAL 2 TIMES DAILY
Qty: 60 TAB | Status: SHIPPED | OUTPATIENT
Start: 2019-11-11 | End: 2019-12-09 | Stop reason: ALTCHOICE

## 2019-11-11 NOTE — PATIENT INSTRUCTIONS

## 2019-11-11 NOTE — PROGRESS NOTES
Chief Complaint   Patient presents with    Hypertension     Out of norvasc x 1 week. SUBJECTIVE:    Lul Nicole is a 53 y.o. female who was recently seen here in follow-up of medical problems and noted to have marked accelerated blood pressure with blood pressure 156/96. At that point her Norvasc was increased to 10 mg daily and she has been taking that. She also was noted to have a significant elevation of her triglycerides and we added lisinopril 2 g twice daily but she did not get that prescription filled yet apparently there is a problem with the insurance company. She returns in follow-up having taken the blood pressure medicine increased but not the medicine for cholesterol. She currently denies any headaches, nosebleeds or sequela of untreated hypertension. She has no other complaints on complete review of systems. Current Outpatient Medications   Medication Sig Dispense Refill    amLODIPine (NORVASC) 10 mg tablet Take one tablet daily 30 Tab 5    cloNIDine HCl (CATAPRES) 0.1 mg tablet Take 1 Tab by mouth two (2) times a day. 60 Tab prn    icosapent ethyl (VASCEPA) 1 gram capsule Take 2 Caps by mouth two (2) times daily (with meals). 120 Cap prn    pravastatin (PRAVACHOL) 80 mg tablet Take 1 Tab by mouth nightly. 90 Tab 3    metFORMIN ER (GLUCOPHAGE XR) 500 mg tablet Take 1 tablet by mouth in the morning and take 2 tablets before dinner. 270 Tab 3    lisinopril-hydroCHLOROthiazide (PRINZIDE, ZESTORETIC) 20-12.5 mg per tablet Take 2 Tabs by mouth daily. 180 Tab 3    insulin glargine (LANTUS SOLOSTAR U-100 INSULIN) 100 unit/mL (3 mL) inpn USE 35 units twice daily 35 mL 3    magnesium oxide (MAG-OX) 400 mg tablet Take 1 Tab by mouth two (2) times a day. 180 Tab 3    metoprolol succinate (TOPROL-XL) 50 mg XL tablet Take 1 Tab by mouth daily. 30 Tab prn    valsartan (DIOVAN) 320 mg tablet Take 1 Tab by mouth daily.  30 Tab prn    albuterol (PROAIR HFA) 90 mcg/actuation inhaler Take 2 Puffs by inhalation every four (4) hours as needed for Wheezing. 1 Inhaler 5    Insulin Needles, Disposable, (PEN NEEDLES) 31 gauge x 1/4\" ndle Use daily as directed with her insulin pen. 100 Pen Needle prn    potassium chloride (KLOR-CON M20) 20 mEq tablet Take 2 Tabs by mouth two (2) times a day. 120 Tab 12    aspirin (ASPIRIN) 325 mg tablet Take 325 mg by mouth daily.  ALPRAZolam (XANAX) 0.5 mg tablet Take 1 Tab by mouth daily as needed for Anxiety. 30 Tab 0    mirtazapine (REMERON) 15 mg tablet Take  by mouth nightly.  FERROUS SULFATE (IRON PO) Take 1 Tab by mouth.  cholecalciferol (VITAMIN D3) 1,000 unit cap Take 2,000 Units by mouth daily.  ascorbic acid (VITAMIN C) 250 mg tablet Take  by mouth.          Past Medical History:   Diagnosis Date    Abnormal mammogram with microcalcification 6/4/2013    Right  UOQ    Aphasia due to stroke     SPEAKS SOME    Arthritis     back    Asthma 9/7/2017    CKD (chronic kidney disease) 9/7/2017    CKD (chronic kidney disease), stage III ([address] 9/7/2017    CVA (cerebral vascular accident) ([facility] 9/7/2017    Depression 9/7/2017    Diabetes ([address]     TYPE 2; IDDM    Edema 9/7/2017    Hypertension     Hypertension with renal disease 9/7/2017    Hypomagnesemia 9/7/2017    Morbid obesity ([address] 9/7/2017    On statin therapy 9/7/2017    Osteopenia 9/7/2017    Psychiatric disorder     depression    Stroke ([facility] 5/11/2010    RIGHT SIDE WEAKNESS    Vitamin D deficiency 9/7/2017     Past Surgical History:   Procedure Laterality Date    HX BREAST BIOPSY Right     2013 neg    HX CATARACT REMOVAL  Dec 2010/ Jan 2011    bilateral cataracts removed    HX GI      HEMMORHOIDECTOMY    HX GYN      pmb    HX HEENT      tonsillectomy    HX OOPHORECTOMY Bilateral     HX OTHER SURGICAL      HX TONSILLECTOMY      MD LAPAROSCOPY W TOT HYSTERECTUTERUS <=250 GRAM  W TUBE/OVARY  6/2011    leiomyomata     No Known Allergies    REVIEW OF SYSTEMS:  General: negative for - chills or fever, or weight loss or gain  ENT: negative for - headaches, nasal congestion or tinnitus  Eyes: no blurred or visual changes  Neck: No stiffness or swollen nodes  Respiratory: negative for - cough, hemoptysis, shortness of breath or wheezing  Cardiovascular : negative for - chest pain, edema, palpitations or shortness of breath  Gastrointestinal: negative for - abdominal pain, blood in stools, heartburn or nausea/vomiting  Genito-Urinary: no dysuria, trouble voiding, or hematuria  Musculoskeletal: negative for - gait disturbance, joint pain, joint stiffness or joint swelling  Neurological: no TIA or stroke symptoms  Hematologic: no bruises, no bleeding  Lymphatic: no swollen glands  Integument: no lumps, mole changes, nail changes or rash  Endocrine:no malaise/lethargy poly uria or polydipsia or unexpected weight changes        Social History     Socioeconomic History    Marital status:      Spouse name: Not on file    Number of children: Not on file    Years of education: Not on file    Highest education level: Not on file   Tobacco Use    Smoking status: Former Smoker     Last attempt to quit: 2010     Years since quittin.5    Smokeless tobacco: Never Used   Substance and Sexual Activity    Alcohol use: No    Drug use: No    Sexual activity: Never   Social History Narrative    ** Merged History Encounter **          Family History   Problem Relation Age of Onset    Hypertension Mother     MS Mother     Diabetes Father     Stroke Father     Heart Disease Father     Hypertension Father     Post-op Nausea/Vomiting Sister     Breast Cancer Paternal Aunt        OBJECTIVE:     Visit Vitals  BP (!) 180/96 (BP 1 Location: Right arm, BP Patient Position: Sitting)   Pulse 70   Temp 98.7 °F (37.1 °C)   Resp 18   LMP 2011   SpO2 98%     CONSTITUTIONAL:   well nourished, appears age appropriate  EYES: sclera anicteric, PERRL, EOMI  ENMT:geoffrey clear, moist mucous membranes, pharynx clear  NECK: supple. Thyroid normal, No JVD or bruits  RESPIRATORY: Chest: clear to ascultation and percussion, normal inspiratory effort  CARDIOVASCULAR: Heart: regular rate and rhythm no murmurs, rubs or gallops, PMI not displaced, No thrills  GASTROINTESTINAL: Abdomen: non distended, soft, non tender, bowel sounds normal  HEMATOLOGIC: no purpura, petechiae or bruising  LYMPHATIC: No lymph node enlargemant  MUSCULOSKELETAL: Extremities: no edema or active synovitis, pulse 1+   INTEGUMENT: No unusual rashes or suspicious skin lesions noted. Nails appear normal.  PERIPHERAL VASCULAR: normal pulses femoral, PT and DP  NEUROLOGIC: non-focal exam, A & O X 3  PSYCHIATRIC:, appropriate affect     ASSESSMENT:   1. Hypertension with renal disease    2. History of CVA (cerebrovascular accident)    3. Controlled type 2 diabetes mellitus with stage 3 chronic kidney disease, without long-term current use of insulin ([address]    4. Mixed hyperlipidemia      Impression  1. Hypertension that is not controlled so add Catapres 0.1 twice daily along with Norvasc 10 mg daily and her other medicines  2. Prior CVA continue aspirin daily  3. Diabetes mellitus  4. Hyperlipidemia prescription sent through for the Cipro 2 g twice daily  Recheck in 1 month or sooner should there be a problem. PLAN:  .  Orders Placed This Encounter    amLODIPine (NORVASC) 10 mg tablet    cloNIDine HCl (CATAPRES) 0.1 mg tablet    icosapent ethyl (VASCEPA) 1 gram capsule         ATTENTION:   This medical record was transcribed using an electronic medical records system. Although proofread, it may and can contain electronic and spelling errors. Other human spelling and other errors may be present. Corrections may be executed at a later time. Please feel free to contact us for any clarifications as needed. Follow-up and Dispositions    · Return in about 4 weeks (around 12/9/2019).          No results found for any visits on 11/11/19. Lorena Bolden MD    The patient verbalized understanding of the problems and plans as explained.

## 2019-12-07 NOTE — PROGRESS NOTES
Chief Complaint   Patient presents with    Hypertension     1 month f/up       SUBJECTIVE:    Martha Gupta is a 53 y.o. female who returns in follow-up for hypertension after having been seen here recently with blood pressure 180/96 and I added Catapres 0.1 twice daily. Since she started on medicine she has noted more fatigue and more drowsiness seeming to sleep all the time according to her  she is in agreement with that. She denies any chest pain, shortness of breath, palpitations, PND, orthopnea or other cardiorespiratory complaints. She denies any GI or  complaints. She notes no headaches, dizziness or neurologic complaints. She has no other complaints noted other than the fatigue. Current Outpatient Medications   Medication Sig Dispense Refill    hydrALAZINE (APRESOLINE) 50 mg tablet Take 1 Tab by mouth three (3) times daily. 90 Tab prn    amLODIPine (NORVASC) 10 mg tablet Take one tablet daily 30 Tab 5    icosapent ethyl (VASCEPA) 1 gram capsule Take 2 Caps by mouth two (2) times daily (with meals). 120 Cap prn    pravastatin (PRAVACHOL) 80 mg tablet Take 1 Tab by mouth nightly. 90 Tab 3    metFORMIN ER (GLUCOPHAGE XR) 500 mg tablet Take 1 tablet by mouth in the morning and take 2 tablets before dinner. 270 Tab 3    lisinopril-hydroCHLOROthiazide (PRINZIDE, ZESTORETIC) 20-12.5 mg per tablet Take 2 Tabs by mouth daily. 180 Tab 3    insulin glargine (LANTUS SOLOSTAR U-100 INSULIN) 100 unit/mL (3 mL) inpn USE 35 units twice daily 35 mL 3    magnesium oxide (MAG-OX) 400 mg tablet Take 1 Tab by mouth two (2) times a day. 180 Tab 3    metoprolol succinate (TOPROL-XL) 50 mg XL tablet Take 1 Tab by mouth daily. 30 Tab prn    valsartan (DIOVAN) 320 mg tablet Take 1 Tab by mouth daily. 30 Tab prn    albuterol (PROAIR HFA) 90 mcg/actuation inhaler Take 2 Puffs by inhalation every four (4) hours as needed for Wheezing.  1 Inhaler 5    Insulin Needles, Disposable, (PEN NEEDLES) 31 gauge x 1/4\" ndle Use daily as directed with her insulin pen. 100 Pen Needle prn    potassium chloride (KLOR-CON M20) 20 mEq tablet Take 2 Tabs by mouth two (2) times a day. 120 Tab 12    aspirin (ASPIRIN) 325 mg tablet Take 325 mg by mouth daily.  ALPRAZolam (XANAX) 0.5 mg tablet Take 1 Tab by mouth daily as needed for Anxiety. 30 Tab 0    mirtazapine (REMERON) 15 mg tablet Take  by mouth nightly.  FERROUS SULFATE (IRON PO) Take 1 Tab by mouth.  cholecalciferol (VITAMIN D3) 1,000 unit cap Take 2,000 Units by mouth daily.  ascorbic acid (VITAMIN C) 250 mg tablet Take  by mouth.          Past Medical History:   Diagnosis Date    Abnormal mammogram with microcalcification 6/4/2013    Right  UOQ    Aphasia due to stroke     SPEAKS SOME    Arthritis     back    Asthma 9/7/2017    CKD (chronic kidney disease) 9/7/2017    CKD (chronic kidney disease), stage III ([address] 9/7/2017    CVA (cerebral vascular accident) ([address] 9/7/2017    Depression 9/7/2017    Diabetes ([address]     TYPE 2; IDDM    Edema 9/7/2017    Hypertension     Hypertension with renal disease 9/7/2017    Hypomagnesemia 9/7/2017    Morbid obesity ([address] 9/7/2017    On statin therapy 9/7/2017    Osteopenia 9/7/2017    Psychiatric disorder     depression    Stroke ([facility] 5/11/2010    RIGHT SIDE WEAKNESS    Vitamin D deficiency 9/7/2017     Past Surgical History:   Procedure Laterality Date    HX BREAST BIOPSY Right     2013 neg    HX CATARACT REMOVAL  Dec 2010/ Jan 2011    bilateral cataracts removed    HX GI      HEMMORHOIDECTOMY    HX GYN      pmb    HX HEENT      tonsillectomy    HX OOPHORECTOMY Bilateral     HX OTHER SURGICAL      HX TONSILLECTOMY      CA LAPAROSCOPY W TOT HYSTERECTUTERUS <=250 GRAM  W TUBE/OVARY  6/2011    leiomyomata     No Known Allergies    REVIEW OF SYSTEMS:  General: negative for - chills or fever, or weight loss or gain  ENT: negative for - headaches, nasal congestion or tinnitus  Eyes: no blurred or visual changes  Neck: No stiffness or swollen nodes  Respiratory: negative for - cough, hemoptysis, shortness of breath or wheezing  Cardiovascular : negative for - chest pain, edema, palpitations or shortness of breath  Gastrointestinal: negative for - abdominal pain, blood in stools, heartburn or nausea/vomiting  Genito-Urinary: no dysuria, trouble voiding, or hematuria  Musculoskeletal: negative for - gait disturbance, joint pain, joint stiffness or joint swelling  Neurological: no new TIA or stroke symptoms except her chronic right hemiplegia from prior CVA  Hematologic: no bruises, no bleeding  Lymphatic: no swollen glands  Integument: no lumps, mole changes, nail changes or rash  Endocrine:no malaise/lethargy poly uria or polydipsia or unexpected weight changes        Social History     Socioeconomic History    Marital status:      Spouse name: Not on file    Number of children: Not on file    Years of education: Not on file    Highest education level: Not on file   Tobacco Use    Smoking status: Former Smoker     Last attempt to quit: 2010     Years since quittin.5    Smokeless tobacco: Never Used   Substance and Sexual Activity    Alcohol use: No    Drug use: No    Sexual activity: Never   Social History Narrative    ** Merged History Encounter **          Family History   Problem Relation Age of Onset    Hypertension Mother     MS Mother     Diabetes Father     Stroke Father     Heart Disease Father     Hypertension Father     Post-op Nausea/Vomiting Sister     Breast Cancer Paternal Aunt        OBJECTIVE:     Visit Vitals  /88 (BP 1 Location: Left arm, BP Patient Position: Sitting)   Pulse 80   Resp 20   LMP 2011   SpO2 98%     CONSTITUTIONAL:   well nourished, appears age appropriate  EYES: sclera anicteric, PERRL, EOMI  ENMT:nares clear, moist mucous membranes, pharynx clear  NECK: supple.  Thyroid normal, No JVD or bruits  RESPIRATORY: Chest: clear to ascultation and percussion, normal inspiratory effort  CARDIOVASCULAR: Heart: regular rate and rhythm no murmurs, rubs or gallops, PMI not displaced, No thrills  GASTROINTESTINAL: Abdomen: non distended, soft, non tender, bowel sounds normal  HEMATOLOGIC: no purpura, petechiae or bruising  LYMPHATIC: No lymph node enlargemant  MUSCULOSKELETAL: Extremities: no edema or active synovitis, pulse 1+   INTEGUMENT: No unusual rashes or suspicious skin lesions noted. Nails appear normal.  PERIPHERAL VASCULAR: normal pulses femoral, PT and DP  NEUROLOGIC: Chronic unchanged right hemiplegia, A & O X 3  PSYCHIATRIC:, appropriate affect     ASSESSMENT:   1. Hypertension with renal disease    2. History of CVA (cerebrovascular accident)    3. Severe obesity (BMI 35.0-39. 9) with comorbidity ([address]      Impression  1. Hypertension still not controlled although improved however she is not tolerating the Catapres because of fatigue thus I will stop it and replaced with hydralazine at 49 3 times daily  2. Prior CVA we certainly need better blood pressure control  3. Obesity we did again discuss diet, exercise and weight reduction  Recheck 1 month or sooner should it be a problem. PLAN:  .  Orders Placed This Encounter    hydrALAZINE (APRESOLINE) 50 mg tablet         ATTENTION:   This medical record was transcribed using an electronic medical records system. Although proofread, it may and can contain electronic and spelling errors. Other human spelling and other errors may be present. Corrections may be executed at a later time. Please feel free to contact us for any clarifications as needed. Follow-up and Dispositions    · Return in about 4 weeks (around 1/6/2020). No results found for any visits on 12/09/19. Kimble Lundborg, MD    The patient verbalized understanding of the problems and plans as explained.

## 2019-12-09 ENCOUNTER — OFFICE VISIT (OUTPATIENT)
Dept: INTERNAL MEDICINE CLINIC | Age: 54
End: 2019-12-09

## 2019-12-09 VITALS
OXYGEN SATURATION: 98 % | HEART RATE: 80 BPM | RESPIRATION RATE: 20 BRPM | DIASTOLIC BLOOD PRESSURE: 88 MMHG | SYSTOLIC BLOOD PRESSURE: 156 MMHG

## 2019-12-09 DIAGNOSIS — I12.9 HYPERTENSION WITH RENAL DISEASE: Primary | ICD-10-CM

## 2019-12-09 DIAGNOSIS — E66.01 SEVERE OBESITY (BMI 35.0-39.9) WITH COMORBIDITY (HCC): ICD-10-CM

## 2019-12-09 DIAGNOSIS — Z86.73 HISTORY OF CVA (CEREBROVASCULAR ACCIDENT): ICD-10-CM

## 2019-12-09 RX ORDER — HYDRALAZINE HYDROCHLORIDE 50 MG/1
50 TABLET, FILM COATED ORAL 3 TIMES DAILY
Qty: 90 TAB | Status: SHIPPED | OUTPATIENT
Start: 2019-12-09 | End: 2020-01-16 | Stop reason: ALTCHOICE

## 2019-12-09 NOTE — PATIENT INSTRUCTIONS
Body Mass Index: Care Instructions  Your Care Instructions    Body mass index (BMI) can help you see if your weight is raising your risk for health problems. It uses a formula to compare how much you weigh with how tall you are. · A BMI lower than 18.5 is considered underweight. · A BMI between 18.5 and 24.9 is considered healthy. · A BMI between 25 and 29.9 is considered overweight. A BMI of 30 or higher is considered obese. If your BMI is in the normal range, it means that you have a lower risk for weight-related health problems. If your BMI is in the overweight or obese range, you may be at increased risk for weight-related health problems, such as high blood pressure, heart disease, stroke, arthritis or joint pain, and diabetes. If your BMI is in the underweight range, you may be at increased risk for health problems such as fatigue, lower protection (immunity) against illness, muscle loss, bone loss, hair loss, and hormone problems. BMI is just one measure of your risk for weight-related health problems. You may be at higher risk for health problems if you are not active, you eat an unhealthy diet, or you drink too much alcohol or use tobacco products. Follow-up care is a key part of your treatment and safety. Be sure to make and go to all appointments, and call your doctor if you are having problems. It's also a good idea to know your test results and keep a list of the medicines you take. How can you care for yourself at home? · Practice healthy eating habits. This includes eating plenty of fruits, vegetables, whole grains, lean protein, and low-fat dairy. · If your doctor recommends it, get more exercise. Walking is a good choice. Bit by bit, increase the amount you walk every day. Try for at least 30 minutes on most days of the week. · Do not smoke. Smoking can increase your risk for health problems. If you need help quitting, talk to your doctor about stop-smoking programs and medicines. These can increase your chances of quitting for good. · Limit alcohol to 2 drinks a day for men and 1 drink a day for women. Too much alcohol can cause health problems. If you have a BMI higher than 25  · Your doctor may do other tests to check your risk for weight-related health problems. This may include measuring the distance around your waist. A waist measurement of more than 40 inches in men or 35 inches in women can increase the risk of weight-related health problems. · Talk with your doctor about steps you can take to stay healthy or improve your health. You may need to make lifestyle changes to lose weight and stay healthy, such as changing your diet and getting regular exercise. If you have a BMI lower than 18.5  · Your doctor may do other tests to check your risk for health problems. · Talk with your doctor about steps you can take to stay healthy or improve your health. You may need to make lifestyle changes to gain or maintain weight and stay healthy, such as getting more healthy foods in your diet and doing exercises to build muscle. Where can you learn more? Go to http://stephen-laurie.info/. Enter S176 in the search box to learn more about \"Body Mass Index: Care Instructions. \"  Current as of: March 28, 2019  Content Version: 12.2  © 3330-9881 [employer]. Care instructions adapted under license by [employer] (which disclaims liability or warranty for this information). If you have questions about a medical condition or this instruction, always ask your healthcare professional. [address] any warranty or liability for your use of this information.

## 2019-12-09 NOTE — PROGRESS NOTES
Chief Complaint   Patient presents with    Hypertension     1 month f/up     1. Have you been to the ER, urgent care clinic since your last visit? Hospitalized since your last visit? No    2. Have you seen or consulted any other health care providers outside of the [address] [place] [address] since your last visit? Include any pap smears or colon screening.  No

## 2020-01-15 NOTE — PROGRESS NOTES
Chief Complaint   Patient presents with    Diabetes     3 month f/up    Hypertension    Cholesterol Problem    Chronic Kidney Disease    Cerebrovascular Accident       SUBJECTIVE:    Gutierrez Dean is a 53 y.o. female who returns in follow-up for medical problems to include hypertension, diabetes, hyperlipidemia, prior CVA with right hemiplegia, CKD stage III, obesity and other medical problems. She is taking her medications and trying to follow her diet according to her and her  but is not able to really exercise because of her right hemiplegia from her prior CVA. She currently denies any chest pain, shortness of breath, palpitations, PND, orthopnea or other cardiorespiratory complaints. There are no GI or  complaints. She has no headaches, dizziness or neurologic complaints except her chronic hemiplegia on the right side as well as her dysarthria from her prior stroke. There are no current arthritic complaints and no other complaints on complete review of systems. She had some nausea from hydralazine thus she went back to the [facility] and is tolerating      Current Outpatient Medications   Medication Sig Dispense Refill    cloNIDine HCL (CATAPRES) 0.1 mg tablet TAKE 1 TABLET BY MOUTH TWICE A DAY      amLODIPine (NORVASC) 10 mg tablet Take one tablet daily 30 Tab 5    pravastatin (PRAVACHOL) 80 mg tablet Take 1 Tab by mouth nightly. 90 Tab 3    metFORMIN ER (GLUCOPHAGE XR) 500 mg tablet Take 1 tablet by mouth in the morning and take 2 tablets before dinner. 270 Tab 3    lisinopril-hydroCHLOROthiazide (PRINZIDE, ZESTORETIC) 20-12.5 mg per tablet Take 2 Tabs by mouth daily. 180 Tab 3    insulin glargine (LANTUS SOLOSTAR U-100 INSULIN) 100 unit/mL (3 mL) inpn USE 35 units twice daily 35 mL 3    magnesium oxide (MAG-OX) 400 mg tablet Take 1 Tab by mouth two (2) times a day. 180 Tab 3    metoprolol succinate (TOPROL-XL) 50 mg XL tablet Take 1 Tab by mouth daily.  30 Tab prn    valsartan (DIOVAN) 320 mg tablet Take 1 Tab by mouth daily. 30 Tab prn    albuterol (PROAIR HFA) 90 mcg/actuation inhaler Take 2 Puffs by inhalation every four (4) hours as needed for Wheezing. 1 Inhaler 5    Insulin Needles, Disposable, (PEN NEEDLES) 31 gauge x 1/4\" ndle Use daily as directed with her insulin pen. 100 Pen Needle prn    potassium chloride (KLOR-CON M20) 20 mEq tablet Take 2 Tabs by mouth two (2) times a day. 120 Tab 12    aspirin (ASPIRIN) 325 mg tablet Take 325 mg by mouth daily.  ALPRAZolam (XANAX) 0.5 mg tablet Take 1 Tab by mouth daily as needed for Anxiety. 30 Tab 0    mirtazapine (REMERON) 15 mg tablet Take  by mouth nightly.  FERROUS SULFATE (IRON PO) Take 1 Tab by mouth.  cholecalciferol (VITAMIN D3) 1,000 unit cap Take 2,000 Units by mouth daily.  ascorbic acid (VITAMIN C) 250 mg tablet Take  by mouth.          Past Medical History:   Diagnosis Date    Abnormal mammogram with microcalcification 6/4/2013    Right  UOQ    Aphasia due to stroke     SPEAKS SOME    Arthritis     back    Asthma 9/7/2017    CKD (chronic kidney disease) 9/7/2017    CKD (chronic kidney disease), stage III ([address] 9/7/2017    CVA (cerebral vascular accident) ([address] 9/7/2017    Depression 9/7/2017    Diabetes ([address]     TYPE 2; IDDM    Edema 9/7/2017    Hypertension     Hypertension with renal disease 9/7/2017    Hypomagnesemia 9/7/2017    Morbid obesity ([address] 9/7/2017    On statin therapy 9/7/2017    Osteopenia 9/7/2017    Psychiatric disorder     depression    Stroke ([address] 5/11/2010    RIGHT SIDE WEAKNESS    Vitamin D deficiency 9/7/2017     Past Surgical History:   Procedure Laterality Date    HX BREAST BIOPSY Right     2013 neg    HX CATARACT REMOVAL  Dec 2010/ Jan 2011    bilateral cataracts removed    HX GI      HEMMORHOIDECTOMY    HX GYN      pmb    HX HEENT      tonsillectomy    HX OOPHORECTOMY Bilateral     HX OTHER SURGICAL      HX TONSILLECTOMY      AR LAPAROSCOPY W TOT HYSTERECTUTERUS <=250 [address] TUBE/OVARY  2011    leiomyomata     No Known Allergies    REVIEW OF SYSTEMS:  General: negative for - chills or fever, or weight loss or gain  ENT: negative for - headaches, nasal congestion or tinnitus  Eyes: no blurred or visual changes  Neck: No stiffness or swollen nodes  Respiratory: negative for - cough, hemoptysis, shortness of breath or wheezing  Cardiovascular : negative for - chest pain, edema, palpitations or shortness of breath  Gastrointestinal: negative for - abdominal pain, blood in stools, heartburn or nausea/vomiting  Genito-Urinary: no dysuria, trouble voiding, or hematuria  Musculoskeletal: negative for - gait disturbance, joint pain, joint stiffness or joint swelling  Neurological: no TIA or new stroke symptoms.   Chronic symptoms without change  Hematologic: no bruises, no bleeding  Lymphatic: no swollen glands  Integument: no lumps, mole changes, nail changes or rash  Endocrine:no malaise/lethargy poly uria or polydipsia or unexpected weight changes        Social History     Socioeconomic History    Marital status:      Spouse name: Not on file    Number of children: Not on file    Years of education: Not on file    Highest education level: Not on file   Tobacco Use    Smoking status: Former Smoker     Last attempt to quit: 2010     Years since quittin.6    Smokeless tobacco: Never Used   Substance and Sexual Activity    Alcohol use: No    Drug use: No    Sexual activity: Never   Social History Narrative    ** Merged History Encounter **          Family History   Problem Relation Age of Onset    Hypertension Mother     MS Mother     Diabetes Father     Stroke Father     Heart Disease Father     Hypertension Father     Post-op Nausea/Vomiting Sister     Breast Cancer Paternal Aunt        OBJECTIVE:     Visit Vitals  /82   Pulse 76   Temp 98.6 °F (37 °C)   Resp 18   LMP 2011   SpO2 98% CONSTITUTIONAL:   well nourished, appears age appropriate  EYES: sclera anicteric, PERRL, EOMI  ENMT:nares clear, moist mucous membranes, pharynx clear  NECK: supple. Thyroid normal, No JVD or bruits  RESPIRATORY: Chest: clear to ascultation and percussion, normal inspiratory effort  CARDIOVASCULAR: Heart: regular rate and rhythm no murmurs, rubs or gallops, PMI not displaced, No thrills  GASTROINTESTINAL: Abdomen: non distended, soft, non tender, bowel sounds normal  HEMATOLOGIC: no purpura, petechiae or bruising  LYMPHATIC: No lymph node enlargemant  MUSCULOSKELETAL: Extremities: no edema or active synovitis, pulse 1+ no diabetic foot changes noted. INTEGUMENT: No unusual rashes or suspicious skin lesions noted. Nails appear normal.  PERIPHERAL VASCULAR: normal pulses femoral, PT and DP  NEUROLOGIC:  A & O X 3. Chronic dysarthria as well as right hemiplegia. Normal distal sensation and proprioception all toes left foot with slight decreased sensation right foot  PSYCHIATRIC:, appropriate affect     ASSESSMENT:   1. Hypertension with renal disease    2. Controlled type 2 diabetes mellitus with stage 3 chronic kidney disease, without long-term current use of insulin ([address]    3. Mixed hyperlipidemia    4. Mild intermittent asthma without complication    5. History of CVA (cerebrovascular accident)    6. CKD (chronic kidney disease), stage III ([address]    7. Severe obesity (BMI 35.0-39. 9) with comorbidity ([address]    8. Mild depression (HCC)      Impression  1. Hypertension that is a little better control but not completely normal however at this point I am not can increase her Catapres until we see what she has on next visit and if still elevated we will increase Catapres. 2.  Diabetes mellitus we will see what that status is and make adjustments if necessary. Prior numbers reviewed. 3.  Hyperlipidemia prior numbers reviewed and repeat status pending I will adjust if needed. 4.  Asthma that is stable  5.   CKD stage III repeat status pending next #6 history of CVA with right hemiplegia and dysarthria without change  7. Obesity we did discuss diet and weight reduction for overall health benefit. I will call with lab results and make further recommendations or adjustments if necessary. Follow-up in about 3 weeks regarding hypertension in 3 months regarding other multiple medical problems. All the above discussed with her  present with her today. PLAN:  .  Orders Placed This Encounter    METABOLIC PANEL, COMPREHENSIVE    LIPID PANEL    CK    HEMOGLOBIN A1C WITH EAG    cloNIDine HCL (CATAPRES) 0.1 mg tablet         ATTENTION:   This medical record was transcribed using an electronic medical records system. Although proofread, it may and can contain electronic and spelling errors. Other human spelling and other errors may be present. Corrections may be executed at a later time. Please feel free to contact us for any clarifications as needed. Follow-up and Dispositions    · Return in about 3 months (around 4/16/2020). No results found for any visits on 01/16/20. Jerrell Cheek MD    The patient verbalized understanding of the problems and plans as explained.

## 2020-01-16 ENCOUNTER — OFFICE VISIT (OUTPATIENT)
Dept: INTERNAL MEDICINE CLINIC | Age: 55
End: 2020-01-16

## 2020-01-16 VITALS
HEART RATE: 76 BPM | DIASTOLIC BLOOD PRESSURE: 82 MMHG | TEMPERATURE: 98.6 F | OXYGEN SATURATION: 98 % | RESPIRATION RATE: 18 BRPM | SYSTOLIC BLOOD PRESSURE: 148 MMHG

## 2020-01-16 DIAGNOSIS — Z86.73 HISTORY OF CVA (CEREBROVASCULAR ACCIDENT): ICD-10-CM

## 2020-01-16 DIAGNOSIS — F32.A MILD DEPRESSION: ICD-10-CM

## 2020-01-16 DIAGNOSIS — J45.20 MILD INTERMITTENT ASTHMA WITHOUT COMPLICATION: ICD-10-CM

## 2020-01-16 DIAGNOSIS — E11.22 CONTROLLED TYPE 2 DIABETES MELLITUS WITH STAGE 3 CHRONIC KIDNEY DISEASE, WITHOUT LONG-TERM CURRENT USE OF INSULIN (HCC): ICD-10-CM

## 2020-01-16 DIAGNOSIS — I12.9 HYPERTENSION WITH RENAL DISEASE: Primary | ICD-10-CM

## 2020-01-16 DIAGNOSIS — E66.01 SEVERE OBESITY (BMI 35.0-39.9) WITH COMORBIDITY (HCC): ICD-10-CM

## 2020-01-16 DIAGNOSIS — N18.30 CONTROLLED TYPE 2 DIABETES MELLITUS WITH STAGE 3 CHRONIC KIDNEY DISEASE, WITHOUT LONG-TERM CURRENT USE OF INSULIN (HCC): ICD-10-CM

## 2020-01-16 DIAGNOSIS — N18.30 CKD (CHRONIC KIDNEY DISEASE), STAGE III (HCC): ICD-10-CM

## 2020-01-16 DIAGNOSIS — E78.2 MIXED HYPERLIPIDEMIA: ICD-10-CM

## 2020-01-16 RX ORDER — CLONIDINE HYDROCHLORIDE 0.1 MG/1
TABLET ORAL
COMMUNITY
Start: 2019-12-11

## 2020-01-16 NOTE — PATIENT INSTRUCTIONS
Anemia: Care Instructions Your Care Instructions Anemia is a low level of red blood cells, which carry oxygen throughout your body. Many things can cause anemia. Lack of iron is one of the most common causes. Your body needs iron to make hemoglobin, a substance in red blood cells that carries oxygen from the lungs to your body's cells. Without enough iron, the body produces fewer and smaller red blood cells. As a result, your body's cells do not get enough oxygen, and you feel tired and weak. And you may have trouble concentrating. Bleeding is the most common cause of a lack of iron. You may have heavy menstrual bleeding or bleeding caused by conditions such as ulcers, hemorrhoids, or cancer. Regular use of aspirin or other anti-inflammatory medicines (such as ibuprofen) also can cause bleeding in some people. A lack of iron in your diet also can cause anemia, especially at times when the body needs more iron, such as during pregnancy, infancy, and the teen years. Your doctor may have prescribed iron pills. It may take several months of treatment for your iron levels to return to normal. Your doctor also may suggest that you eat foods that are rich in iron, such as meat and beans. There are many other causes of anemia. It is not always due to a lack of iron. Finding the specific cause of your anemia will help your doctor find the right treatment for you. Follow-up care is a key part of your treatment and safety. Be sure to make and go to all appointments, and call your doctor if you are having problems. It's also a good idea to know your test results and keep a list of the medicines you take. How can you care for yourself at home? · Take your medicines exactly as prescribed. Call your doctor if you think you are having a problem with your medicine. · If your doctor recommends iron pills, take them as directed: ? Try to take the pills on an empty stomach about 1 hour before or 2 hours after meals. But you may need to take iron with food to avoid an upset stomach. ? Do not take antacids or drink milk or caffeine drinks (such as coffee, tea, or cola) at the same time or within 2 hours of the time that you take your iron. They can make it hard for your body to absorb the iron. ? Vitamin C (from food or supplements) helps your body absorb iron. Try taking iron pills with a glass of orange juice or some other food that is high in vitamin C, such as citrus fruits. ? Iron pills may cause stomach problems, such as heartburn, nausea, diarrhea, constipation, and cramps. Be sure to drink plenty of fluids, and include fruits, vegetables, and fiber in your diet each day. Iron pills often make your bowel movements dark or green. ? If you forget to take an iron pill, do not take a double dose of iron the next time you take a pill. ? Keep iron pills out of the reach of small children. An overdose of iron can be very dangerous. · Follow your doctor's advice about eating iron-rich foods. These include red meat, shellfish, poultry, eggs, beans, raisins, whole-grain bread, and leafy green vegetables. · Steam vegetables to help them keep their iron content. When should you call for help? Call 911 anytime you think you may need emergency care. For example, call if: 
  · You have symptoms of a heart attack. These may include: 
? Chest pain or pressure, or a strange feeling in the chest. 
? Sweating. ? Shortness of breath. ? Nausea or vomiting. ? Pain, pressure, or a strange feeling in the back, neck, jaw, or upper belly or in one or both shoulders or arms. ? Lightheadedness or sudden weakness. ? A fast or irregular heartbeat. After you call 911, the  may tell you to chew 1 adult-strength or 2 to 4 low-dose aspirin. Wait for an ambulance. Do not try to drive yourself.  
  · You passed out (lost consciousness).  
 Call your doctor now or seek immediate medical care if:   · You have new or increased shortness of breath.  
  · You are dizzy or lightheaded, or you feel like you may faint.  
  · Your fatigue and weakness continue or get worse.  
  · You have any abnormal bleeding, such as: 
? Nosebleeds. ? Vaginal bleeding that is different (heavier, more frequent, at a different time of the month) than what you are used to. 
? Bloody or black stools, or rectal bleeding. ? Bloody or pink urine.  
 Watch closely for changes in your health, and be sure to contact your doctor if: 
  · You do not get better as expected. Where can you learn more? Go to http://stephen-laurie.info/. Enter R301 in the search box to learn more about \"Anemia: Care Instructions. \" Current as of: March 28, 2019 Content Version: 12.2 © 0871-8020 [employer]. Care instructions adapted under license by [employer] (which disclaims liability or warranty for this information). If you have questions about a medical condition or this instruction, always ask your healthcare professional. [place] [address] any warranty or liability for your use of this information.

## 2020-01-16 NOTE — PROGRESS NOTES
Chief Complaint   Patient presents with    Diabetes     3 month f/up    Hypertension    Cholesterol Problem    Chronic Kidney Disease    Cerebrovascular Accident     1. Have you been to the ER, urgent care clinic since your last visit? Hospitalized since your last visit? No    2. Have you seen or consulted any other health care providers outside of the [address] [place] [address] since your last visit? Include any pap smears or colon screening.  No

## 2020-01-17 LAB
ALBUMIN SERPL-MCNC: 4 G/DL (ref 3.5–5.5)
ALBUMIN/GLOB SERPL: 1.4 {RATIO} (ref 1.2–2.2)
ALP SERPL-CCNC: 78 IU/L (ref 39–117)
ALT SERPL-CCNC: 9 IU/L (ref 0–32)
AST SERPL-CCNC: 11 IU/L (ref 0–40)
BILIRUB SERPL-MCNC: 0.2 MG/DL (ref 0–1.2)
BUN SERPL-MCNC: 16 MG/DL (ref 6–24)
BUN/CREAT SERPL: 18 (ref 9–23)
CALCIUM SERPL-MCNC: 9.7 MG/DL (ref 8.7–10.2)
CHLORIDE SERPL-SCNC: 104 MMOL/L (ref 96–106)
CHOLEST SERPL-MCNC: 193 MG/DL (ref 100–199)
CK SERPL-CCNC: 46 U/L (ref 24–173)
CO2 SERPL-SCNC: 18 MMOL/L (ref 20–29)
CREAT SERPL-MCNC: 0.89 MG/DL (ref 0.57–1)
EST. AVERAGE GLUCOSE BLD GHB EST-MCNC: 235 MG/DL
GLOBULIN SER CALC-MCNC: 2.9 G/DL (ref 1.5–4.5)
GLUCOSE SERPL-MCNC: 184 MG/DL (ref 65–99)
HBA1C MFR BLD: 9.8 % (ref 4.8–5.6)
HDLC SERPL-MCNC: 40 MG/DL
LDLC SERPL CALC-MCNC: ABNORMAL MG/DL (ref 0–99)
POTASSIUM SERPL-SCNC: 4.7 MMOL/L (ref 3.5–5.2)
PROT SERPL-MCNC: 6.9 G/DL (ref 6–8.5)
SODIUM SERPL-SCNC: 139 MMOL/L (ref 134–144)
TRIGL SERPL-MCNC: 447 MG/DL (ref 0–149)
VLDLC SERPL CALC-MCNC: ABNORMAL MG/DL (ref 5–40)

## 2020-01-20 RX ORDER — INSULIN GLARGINE 100 [IU]/ML
INJECTION, SOLUTION SUBCUTANEOUS
Qty: 35 ML | Refills: 3
Start: 2020-01-20 | End: 2020-10-30 | Stop reason: ALTCHOICE

## 2020-01-20 NOTE — PROGRESS NOTES
Poor diabetes control with elevated blood sugar, glycohemoglobin and triglycerides to increase Lantus to 40 units twice daily. Watch diet. Discussed with patient.

## 2020-01-20 NOTE — PROGRESS NOTES
Poor diabetes control with elevated blood sugar, glycohemoglobin and triglycerides to increase Lantus to 40 units twice daily. Watch diet.

## 2020-01-22 DIAGNOSIS — I10 ESSENTIAL HYPERTENSION: ICD-10-CM

## 2020-01-22 NOTE — TELEPHONE ENCOUNTER
PCP: Bianka Sparks MD     Patient's  called for refill on her Mag Oxide, and also to inquire about a change in her insulin. The new insurance will not cover her Lantus, so he was told we would need to prescribe a different insulin. Last appt: 1/16/2020  Future Appointments   Date Time Provider Yolanda Sofia   4/17/2020  8:40 AM Bianka Sparks MD [facility]       Requested Prescriptions     Pending Prescriptions Disp Refills    magnesium oxide (MAG-OX) 400 mg tablet 180 Tab 3     Sig: Take 1 Tab by mouth two (2) times a day.        Pharmacy [facility]    Prior labs and Blood pressures:  BP Readings from Last 3 Encounters:   01/16/20 148/82   12/09/19 156/88   11/11/19 (!) 180/96     Lab Results   Component Value Date/Time    Sodium 139 01/16/2020 10:41 AM    Potassium 4.7 01/16/2020 10:41 AM    Chloride 104 01/16/2020 10:41 AM    CO2 18 (L) 01/16/2020 10:41 AM    Anion gap 18 07/11/2019 09:32 AM    Glucose 184 (H) 01/16/2020 10:41 AM    BUN 16 01/16/2020 10:41 AM    Creatinine 0.89 01/16/2020 10:41 AM    BUN/Creatinine ratio 18 01/16/2020 10:41 AM    GFR est AA 84 01/16/2020 10:41 AM    GFR est non-AA 73 01/16/2020 10:41 AM    Calcium 9.7 01/16/2020 10:41 AM     Lab Results   Component Value Date/Time    Hemoglobin A1c 9.8 (H) 01/16/2020 10:41 AM    Hemoglobin A1c (POC) 7 (A) 05/09/2018 10:00 AM     Lab Results   Component Value Date/Time    Cholesterol, total 193 01/16/2020 10:41 AM    Cholesterol (POC) 206 (A) 05/09/2018 10:00 AM    HDL Cholesterol 40 01/16/2020 10:41 AM    HDL Cholesterol (POC) 41.0 01/23/2018 11:48 AM    LDL,Direct 90 03/07/2017 05:25 AM    LDL Cholesterol (POC) 59.2 01/23/2018 11:48 AM    LDL, calculated Comment 01/16/2020 10:41 AM    VLDL, calculated Comment 01/16/2020 10:41 AM    Triglyceride 447 (H) 01/16/2020 10:41 AM    Triglycerides (POC) 653 (A) 05/09/2018 10:00 AM    CHOL/HDL Ratio 6 (H) 03/14/2019 11:06 AM     Lab Results   Component Value Date/Time VITAMIN D, 25-HYDROXY 36 07/11/2019 09:32 AM       Lab Results   Component Value Date/Time    TSH 1.16 03/07/2017 05:25 AM    TSH, 3rd generation 1.22 07/11/2019 09:32 AM

## 2020-01-23 RX ORDER — LANOLIN ALCOHOL/MO/W.PET/CERES
400 CREAM (GRAM) TOPICAL 2 TIMES DAILY
Qty: 180 TAB | Refills: 3 | Status: SHIPPED | OUTPATIENT
Start: 2020-01-23 | End: 2021-04-21

## 2020-01-24 NOTE — TELEPHONE ENCOUNTER
RX refill request from the patient/pharmacy. Patient last seen 01- with labs, and next appt. scheduled for 04-  Requested Prescriptions     Pending Prescriptions Disp Refills    insulin detemir U-100 (LEVEMIR FLEXTOUCH) 100 unit/mL (3 mL) inpn 4 Pen 5     Sig: Take 40 units two times daily   .

## 2020-02-06 RX ORDER — POTASSIUM CHLORIDE 20 MEQ/1
40 TABLET, EXTENDED RELEASE ORAL 2 TIMES DAILY
Qty: 120 TAB | Refills: 12 | Status: SHIPPED | OUTPATIENT
Start: 2020-02-06 | End: 2020-05-14 | Stop reason: SDUPTHER

## 2020-02-06 NOTE — TELEPHONE ENCOUNTER
PCP: Liyah Power MD    Last appt: 1/16/2020  Future Appointments   Date Time Provider Yolanda Sofia   4/17/2020  8:40 AM Liyah Power MD [facility]       Requested Prescriptions     Pending Prescriptions Disp Refills    potassium chloride (KLOR-CON M20) 20 mEq tablet 120 Tab 12     Sig: Take 2 Tabs by mouth two (2) times a day.

## 2020-04-23 NOTE — TELEPHONE ENCOUNTER
PCP: Cheryl Crawford MD    Last appt: 1/16/2020  Future Appointments   Date Time Provider Yolanda Sofia   4/28/2020  1:10 PM Cheryl Crawford MD [facility]       Requested Prescriptions     Pending Prescriptions Disp Refills    valsartan (DIOVAN) 320 mg tablet 90 Tab 2     Sig: Take 1 Tab by mouth daily.

## 2020-04-24 RX ORDER — VALSARTAN 320 MG/1
320 TABLET ORAL DAILY
Qty: 90 TAB | Refills: 2 | Status: SHIPPED | OUTPATIENT
Start: 2020-04-24 | End: 2021-03-12

## 2020-04-28 ENCOUNTER — VIRTUAL VISIT (OUTPATIENT)
Dept: INTERNAL MEDICINE CLINIC | Age: 55
End: 2020-04-28

## 2020-04-28 VITALS — SYSTOLIC BLOOD PRESSURE: 160 MMHG | DIASTOLIC BLOOD PRESSURE: 84 MMHG

## 2020-04-28 DIAGNOSIS — N18.30 CKD (CHRONIC KIDNEY DISEASE), STAGE III (HCC): ICD-10-CM

## 2020-04-28 DIAGNOSIS — E11.22 CONTROLLED TYPE 2 DIABETES MELLITUS WITH STAGE 3 CHRONIC KIDNEY DISEASE, WITHOUT LONG-TERM CURRENT USE OF INSULIN (HCC): ICD-10-CM

## 2020-04-28 DIAGNOSIS — N18.30 CONTROLLED TYPE 2 DIABETES MELLITUS WITH STAGE 3 CHRONIC KIDNEY DISEASE, WITHOUT LONG-TERM CURRENT USE OF INSULIN (HCC): ICD-10-CM

## 2020-04-28 DIAGNOSIS — J45.20 MILD INTERMITTENT ASTHMA WITHOUT COMPLICATION: ICD-10-CM

## 2020-04-28 DIAGNOSIS — E66.01 SEVERE OBESITY (BMI 35.0-39.9) WITH COMORBIDITY (HCC): ICD-10-CM

## 2020-04-28 DIAGNOSIS — E78.2 MIXED HYPERLIPIDEMIA: ICD-10-CM

## 2020-04-28 DIAGNOSIS — I12.9 HYPERTENSION WITH RENAL DISEASE: Primary | ICD-10-CM

## 2020-04-28 DIAGNOSIS — Z86.73 HISTORY OF CVA (CEREBROVASCULAR ACCIDENT): ICD-10-CM

## 2020-04-28 NOTE — PROGRESS NOTES
Consent: Lynnette Willoughby, who was seen by synchronous (real-time) audio-video technology, and/or her healthcare decision maker, is aware that this patient-initiated, Telehealth encounter on 4/28/2020 is a billable service, with coverage as determined by her insurance carrier. She is aware that she may receive a bill and has provided verbal consent to proceed: Yes. Assessment & Plan:   Diagnoses and all orders for this visit:    1. Hypertension with renal disease    2. Controlled type 2 diabetes mellitus with stage 3 chronic kidney disease, without long-term current use of insulin ([facility]    3. Mixed hyperlipidemia    4. Mild intermittent asthma without complication    5. History of CVA (cerebrovascular accident)    6. CKD (chronic kidney disease), stage III ([address]    7. Severe obesity (BMI 35.0-39. 9) with comorbidity ([facility]          Follow-up and Dispositions    · Return in about 3 months (around 7/28/2020). I spent at least 25 minutes with this established patient, and >50% of the time was spent counseling and/or coordinating care regarding Follow-up of her hypertension, diabetes, hyperlipidemia, prior CVA, asthma, obesity, CKD stage III and other multiple medical problems. 712  Subjective:   Lynnette Willoughby is a 63 y.o. female who was seen for Diabetes (3 month f/up); Hypertension; Chronic Kidney Disease; and Cholesterol Problem  This virtual visit is in follow-up of her hypertension, diabetes, hyperlipidemia, prior CVA, CKD stage III, asthma, history of anxiety with depression as well as morbid obesity and other multiple medical problems. She claims to be taking her medication although she did run out of her blood pressure medication and thus her blood pressure was up yesterday but she is back on it today. She denies any headaches, dizziness or neurologic complaints other than her chronic hemiplegia from her prior CVA.   She notes no chest pain, shortness of breath, palpitations, PND, orthopnea or other cardiorespiratory complaints. She has no GI or  complaints. She has no current arthritic complaints and there are no other complaints on complete review of systems. She feels like her anxiety and depression are currently controlled. Prior to Admission medications    Medication Sig Start Date End Date Taking? Authorizing Provider   valsartan (DIOVAN) 320 mg tablet Take 1 Tab by mouth daily. 4/24/20  Yes Clarisa Becerra MD   potassium chloride (KLOR-CON M20) 20 mEq tablet Take 2 Tabs by mouth two (2) times a day. 2/6/20  Yes Clarisa Becerra MD   insulin detemir U-100 (LEVEMIR FLEXTOUCH) 100 unit/mL (3 mL) inpn Take 40 units two times daily 1/24/20  Yes Clarisa Becerra MD   magnesium oxide (MAG-OX) 400 mg tablet Take 1 Tab by mouth two (2) times a day. 1/23/20  Yes Clarisa Becerra MD   cloNIDine HCL (CATAPRES) 0.1 mg tablet TAKE 1 TABLET BY MOUTH TWICE A DAY 12/11/19  Yes Provider, Historical   amLODIPine (NORVASC) 10 mg tablet Take one tablet daily 11/11/19  Yes Clarisa Becerra MD   pravastatin (PRAVACHOL) 80 mg tablet Take 1 Tab by mouth nightly. 10/14/19  Yes Clarisa Becerra MD   metFORMIN ER (GLUCOPHAGE XR) 500 mg tablet Take 1 tablet by mouth in the morning and take 2 tablets before dinner. 10/14/19  Yes Clarisa Becerra MD   lisinopril-hydroCHLOROthiazide (PRINZIDE, ZESTORETIC) 20-12.5 mg per tablet Take 2 Tabs by mouth daily. 9/10/19  Yes Clarisa Becerra MD   metoprolol succinate (TOPROL-XL) 50 mg XL tablet Take 1 Tab by mouth daily. 5/13/19  Yes Clarisa Becerra MD   albuterol [facility] HFA) 90 mcg/actuation inhaler Take 2 Puffs by inhalation every four (4) hours as needed for Wheezing. 3/14/19  Yes Clarisa Becerra MD   Insulin Needles, Disposable, (PEN NEEDLES) 31 gauge x 1/4\" ndle Use daily as directed with her insulin pen. 2/12/19  Yes Clarisa Becerra MD   aspirin (ASPIRIN) 325 mg tablet Take 325 mg by mouth daily.    Yes Provider, Historical   ALPRAZolam (XANAX) 0.5 mg tablet Take 1 Tab by mouth daily as needed for Anxiety. 1/23/18  Yes Kwasi Bailey MD   mirtazapine (REMERON) 15 mg tablet Take  by mouth nightly. Yes Provider, Historical   FERROUS SULFATE (IRON PO) Take 1 Tab by mouth. Yes Provider, Historical   cholecalciferol (VITAMIN D3) 1,000 unit cap Take 2,000 Units by mouth daily. Yes Other, MD Saran   ascorbic acid (VITAMIN C) 250 mg tablet Take  by mouth. Yes Provider, Historical   insulin glargine (LANTUS SOLOSTAR U-100 INSULIN) 100 unit/mL (3 mL) inpn USE 40 units twice daily 1/20/20   Kwasi Bailey MD     No Known Allergies    Patient Active Problem List   Diagnosis Code    Mixed hyperlipidemia E78.2    Anemia D64.9    Chronic anticoagulation Z79.01    Controlled type 2 diabetes mellitus with stage 3 chronic kidney disease, without long-term current use of insulin ([facility]) E11.22, N18.3    Unspecified constipation K59.00    Urinary retention R33.9    History of CVA (cerebrovascular accident) Z86.73    Ovarian cyst N83.209    Pelvic mass R19.00    Abnormal mammogram with microcalcification R92.0    Vertigo R42    Near syncope R55    Stenosis of both internal carotid arteries I65.23    Hypomagnesemia E83.42    Osteopenia of multiple sites M85.89    CKD (chronic kidney disease), stage III ([facility]) N18.3    Hypertension with renal disease I12.9    Vitamin D deficiency E55.9    Mild intermittent asthma without complication F36.36    Anxiety F41.9    Mild depression ([facility]) F32.0    Alcohol screening Z13.39    Severe obesity (BMI 35.0-39. 9) with comorbidity ([facility] E66.01     Patient Active Problem List    Diagnosis Date Noted    Mild intermittent asthma without complication 22/74/9325     Priority: 1 - One    CKD (chronic kidney disease), stage III ([facility] 09/07/2017     Priority: 1 - One    Hypertension with renal disease 09/07/2017     Priority: 1 - One    Controlled type 2 diabetes mellitus with stage 3 chronic kidney disease, without long-term current use of insulin ([facility] 07/06/2011     Priority: 1 - One    History of CVA (cerebrovascular accident) 07/06/2011     Priority: 1 - One    Mixed hyperlipidemia 05/25/2010     Priority: 1 - One    Severe obesity (BMI 35.0-39. 9) with comorbidity ([facility] 05/09/2018     Priority: 2 - Two    Osteopenia of multiple sites 09/07/2017     Priority: 2 - Two    Vitamin D deficiency 09/07/2017     Priority: 2 - Two    Chronic anticoagulation 06/26/2011     Priority: 2 - Two    Mild depression ([facility] 05/09/2018    Alcohol screening 05/09/2018    Anxiety 01/23/2018    Hypomagnesemia 09/07/2017    Near syncope 03/07/2017    Stenosis of both internal carotid arteries 03/07/2017    Vertigo 03/06/2017    Abnormal mammogram with microcalcification 06/04/2013    Ovarian cyst 04/27/2012    Pelvic mass 04/27/2012    Unspecified constipation 07/06/2011    Urinary retention 07/06/2011    Anemia 05/25/2010     Current Outpatient Medications   Medication Sig Dispense Refill    valsartan (DIOVAN) 320 mg tablet Take 1 Tab by mouth daily. 90 Tab 2    potassium chloride (KLOR-CON M20) 20 mEq tablet Take 2 Tabs by mouth two (2) times a day. 120 Tab 12    insulin detemir U-100 (LEVEMIR FLEXTOUCH) 100 unit/mL (3 mL) inpn Take 40 units two times daily 4 Pen 5    magnesium oxide (MAG-OX) 400 mg tablet Take 1 Tab by mouth two (2) times a day. 180 Tab 3    cloNIDine HCL (CATAPRES) 0.1 mg tablet TAKE 1 TABLET BY MOUTH TWICE A DAY      amLODIPine (NORVASC) 10 mg tablet Take one tablet daily 30 Tab 5    pravastatin (PRAVACHOL) 80 mg tablet Take 1 Tab by mouth nightly. 90 Tab 3    metFORMIN ER (GLUCOPHAGE XR) 500 mg tablet Take 1 tablet by mouth in the morning and take 2 tablets before dinner. 270 Tab 3    lisinopril-hydroCHLOROthiazide (PRINZIDE, ZESTORETIC) 20-12.5 mg per tablet Take 2 Tabs by mouth daily.  180 Tab 3    metoprolol succinate (TOPROL-XL) 50 mg XL tablet Take 1 Tab by mouth daily. 30 Tab prn    albuterol (PROAIR HFA) 90 mcg/actuation inhaler Take 2 Puffs by inhalation every four (4) hours as needed for Wheezing. 1 Inhaler 5    Insulin Needles, Disposable, (PEN NEEDLES) 31 gauge x 1/4\" ndle Use daily as directed with her insulin pen. 100 Pen Needle prn    aspirin (ASPIRIN) 325 mg tablet Take 325 mg by mouth daily.  ALPRAZolam (XANAX) 0.5 mg tablet Take 1 Tab by mouth daily as needed for Anxiety. 30 Tab 0    mirtazapine (REMERON) 15 mg tablet Take  by mouth nightly.  FERROUS SULFATE (IRON PO) Take 1 Tab by mouth.  cholecalciferol (VITAMIN D3) 1,000 unit cap Take 2,000 Units by mouth daily.  ascorbic acid (VITAMIN C) 250 mg tablet Take  by mouth.         insulin glargine (LANTUS SOLOSTAR U-100 INSULIN) 100 unit/mL (3 mL) inpn USE 40 units twice daily 35 mL 3     No Known Allergies  Past Medical History:   Diagnosis Date    Abnormal mammogram with microcalcification 6/4/2013    Right  UOQ    Aphasia due to stroke     SPEAKS SOME    Arthritis     back    Asthma 9/7/2017    CKD (chronic kidney disease) 9/7/2017    CKD (chronic kidney disease), stage III ([address] 9/7/2017    CVA (cerebral vascular accident) ([address] 9/7/2017    Depression 9/7/2017    Diabetes ([address]     TYPE 2; IDDM    Edema 9/7/2017    Hypertension     Hypertension with renal disease 9/7/2017    Hypomagnesemia 9/7/2017    Morbid obesity ([address] 9/7/2017    On statin therapy 9/7/2017    Osteopenia 9/7/2017    Psychiatric disorder     depression    Stroke ([address] 5/11/2010    RIGHT SIDE WEAKNESS    Vitamin D deficiency 9/7/2017     Past Surgical History:   Procedure Laterality Date    HX BREAST BIOPSY Right     2013 neg    HX CATARACT REMOVAL  Dec 2010/ Jan 2011    bilateral cataracts removed    HX GI      HEMMORHOIDECTOMY    HX GYN      pmb    HX HEENT      tonsillectomy    HX OOPHORECTOMY Bilateral     HX OTHER SURGICAL      HX TONSILLECTOMY      IL LAPAROSCOPY W TOT HYSTERECTUTERUS <=250 Nobie Keshia TUBE/OVARY  2011    leiomyomata     Family History   Problem Relation Age of Onset    Hypertension Mother     MS Mother     Diabetes Father     Stroke Father     Heart Disease Father     Hypertension Father     Post-op Nausea/Vomiting Sister     Breast Cancer Paternal Aunt      Social History     Tobacco Use    Smoking status: Former Smoker     Last attempt to quit: 2010     Years since quittin.9    Smokeless tobacco: Never Used   Substance Use Topics    Alcohol use: No       Review of Systems      General: Not Present- Anorexia, Chills, Dietary,Fatigue, Fever, Medication Changes, Night Sweats, Weight Gain > 10lbs. and Weight Loss > 10lbs. .  Skin: Not Present- Bruising and Excessive Sweating. HEENT: Not Present- Headache, Visual Loss and Vertigo. Respiratory: Not Present- Cough, shortness of breath or dyspnea on exertion  Cardiovascular: Not Present- Chest Pain, Difficulty Breathing On Exertion, Edema, Orthopnea, Palpitations, Paroxysmal Nocturnal Dyspnea,  Shortness of Breath   Gastrointestinal: Not Present- Black, Tarry Stool, Bloody Stool, Diarrhea, abdominal pain or nausea vomiting  Genitourinary: Not presenturinary frequency, dysuria or other urinary symptoms  Musculoskeletal: Not Present-joint pain, joint swelling, muscle Pain or Muscle Weakness. Neurological: Not Present- Dizziness, new headaches or TIA symptoms but prior hemiplegia from CVA stable and unchanged  Psychiatric: Not Present- Depression or anxiety  Endocrine: Not Present- Cold Intolerance, Heat Intolerance and Thyroid Problems. Hematology: Not Present- Abnormal Bleeding or Easy Bruising.         Objective:   Vital Signs: (As obtained by patient/caregiver at home)  Visit Vitals  /84   LMP 2011          Constitutional: [x] Appears well-developed and well-nourished [x] No apparent distress         Mental status: [x] Alert and awake  [x] Oriented to person/place/time [x] Able to follow commands        Eyes:   EOM    [x]  Normal      Sclera  [x]  Normal              Discharge [x]  None visible       HENT: [x] Normocephalic, atraumatic   [x] Mouth/Throat: Mucous membranes are moist    External Ears [x] Normal    Neck: [x] No visualized mass     Pulmonary/Chest: [x] Respiratory effort normal   [x] No visualized signs of difficulty breathing or respiratory distress             Musculoskeletal:   [x] Normal gait with no signs of ataxia         [x] Normal range of motion of neck            Neurological:        [x] No Facial Asymmetry (Cranial nerve 7 motor function) (limited exam due to video visit)          [x] No gaze palsy               Skin:        [x] No significant exanthematous lesions or discoloration noted on facial skin                   Psychiatric:       [x] Normal Affect       [x] No Hallucinations    Other pertinent observable physical exam findings:-        We discussed the expected course, resolution and complications of the diagnosis(es) in detail. Medication risks, benefits, costs, interactions, and alternatives were discussed as indicated. I advised her to contact the office if her condition worsens, changes or fails to improve as anticipated. She expressed understanding with the diagnosis(es) and plan. ASSESSMENT:   1. Hypertension with renal disease    2. Controlled type 2 diabetes mellitus with stage 3 chronic kidney disease, without long-term current use of insulin ([address]    3. Mixed hyperlipidemia    4. Mild intermittent asthma without complication    5. History of CVA (cerebrovascular accident)    6. CKD (chronic kidney disease), stage III ([address]    7. Severe obesity (BMI 35.0-39. 9) with comorbidity ([address]      Impression  1.   Hypertension that is not controlled but she had run of her blood pressure medicine and now claims to be back on itself stressed importance of not letting her medicine run out particular with previous history of accelerated hypertension resulting in CVA. 2.  Diabetes mellitus she gives no sequela or blood sugar being out of control with no polyuria or polydipsia no visual changes. Prior numbers reviewed and will repeat at our next follow-up as we cannot check blood sugar today virtually  3. Hyperlipidemia prior numbers reviewed and stressed on diet. 4.  Asthma that is stable  5   Prior CVA with residual hemiplegia stable  6. CKD we will recheck that on follow-up  7. Obesity we did discuss diet and weight reduction  I will recheck her again in 3 months or sooner should it be a problem. PLAN:  . No orders of the defined types were placed in this encounter. ATTENTION:   This medical record was transcribed using an electronic medical records system. Although proofread, it may and can contain electronic and spelling errors. Other human spelling and other errors may be present. Corrections may be executed at a later time. Please feel free to contact us for any clarifications as needed. Follow-up and Dispositions    · Return in about 3 months (around 7/28/2020). Angel Howard MD    Bob Siddiqui is a 63 y.o. female being evaluated by a video visit encounter for concerns as above. A caregiver was present when appropriate. Due to this being a TeleHealth encounter (During YWZUW-04 public health emergency), evaluation of the following organ systems was limited: Vitals/Constitutional/EENT/Resp/CV/GI//MS/Neuro/Skin/Heme-Lymph-Imm. Pursuant to the emergency declaration under the [facility], [facility]5 waiver authority and the [employer] Act, this Virtual  Visit was conducted, with patient's (and/or legal guardian's) consent, to reduce the patient's risk of exposure to COVID-19 and provide necessary medical care. Services were provided through a video synchronous discussion virtually to substitute for in-person clinic visit. Patient and provider were located at their individual homes.         Dashawn Aviles MD

## 2020-04-28 NOTE — PROGRESS NOTES
Chief Complaint   Patient presents with    Diabetes     3 month f/up    Hypertension    Chronic Kidney Disease    Cholesterol Problem     1. Have you been to the ER, urgent care clinic since your last visit? Hospitalized since your last visit? No    2. Have you seen or consulted any other health care providers outside of the [address] [place] [address] since your last visit? Include any pap smears or colon screening.  No

## 2020-05-14 RX ORDER — PEN NEEDLE, DIABETIC 29 G X1/2"
NEEDLE, DISPOSABLE MISCELLANEOUS
Qty: 100 PEN NEEDLE | Status: SHIPPED | OUTPATIENT
Start: 2020-05-14 | End: 2021-10-12

## 2020-05-14 RX ORDER — POTASSIUM CHLORIDE 20 MEQ/1
40 TABLET, EXTENDED RELEASE ORAL 2 TIMES DAILY
Qty: 120 TAB | Refills: 12 | Status: SHIPPED | OUTPATIENT
Start: 2020-05-14 | End: 2022-01-28

## 2020-05-14 NOTE — TELEPHONE ENCOUNTER
RX refill request from the patient/pharmacy. Patient last seen 04- with labs, and next appt. scheduled for 07-  Requested Prescriptions     Pending Prescriptions Disp Refills    Insulin Needles, Disposable, (Pen Needles) 31 gauge x 1/4\" ndle 100 Pen Needle prn     Sig: Use daily as directed with her insulin pen.  potassium chloride (Klor-Con M20) 20 mEq tablet 120 Tab 12     Sig: Take 2 Tabs by mouth two (2) times a day. Millie Toscano

## 2020-05-15 ENCOUNTER — OFFICE VISIT (OUTPATIENT)
Dept: INTERNAL MEDICINE CLINIC | Age: 55
End: 2020-05-15

## 2020-05-15 VITALS
SYSTOLIC BLOOD PRESSURE: 138 MMHG | BODY MASS INDEX: 38.93 KG/M2 | HEART RATE: 90 BPM | WEIGHT: 228 LBS | OXYGEN SATURATION: 97 % | DIASTOLIC BLOOD PRESSURE: 70 MMHG | HEIGHT: 64 IN | TEMPERATURE: 99.1 F

## 2020-05-15 DIAGNOSIS — E66.01 SEVERE OBESITY (BMI 35.0-39.9) WITH COMORBIDITY (HCC): ICD-10-CM

## 2020-05-15 DIAGNOSIS — I12.9 HYPERTENSION WITH RENAL DISEASE: ICD-10-CM

## 2020-05-15 DIAGNOSIS — N18.30 CONTROLLED TYPE 2 DIABETES MELLITUS WITH STAGE 3 CHRONIC KIDNEY DISEASE, WITHOUT LONG-TERM CURRENT USE OF INSULIN (HCC): ICD-10-CM

## 2020-05-15 DIAGNOSIS — Z86.73 HISTORY OF CVA (CEREBROVASCULAR ACCIDENT): Primary | ICD-10-CM

## 2020-05-15 DIAGNOSIS — E78.2 MIXED HYPERLIPIDEMIA: ICD-10-CM

## 2020-05-15 DIAGNOSIS — J45.20 MILD INTERMITTENT ASTHMA WITHOUT COMPLICATION: ICD-10-CM

## 2020-05-15 DIAGNOSIS — E11.22 CONTROLLED TYPE 2 DIABETES MELLITUS WITH STAGE 3 CHRONIC KIDNEY DISEASE, WITHOUT LONG-TERM CURRENT USE OF INSULIN (HCC): ICD-10-CM

## 2020-05-15 DIAGNOSIS — N18.30 CKD (CHRONIC KIDNEY DISEASE), STAGE III (HCC): ICD-10-CM

## 2020-05-15 RX ORDER — ALBUTEROL SULFATE 90 UG/1
2 AEROSOL, METERED RESPIRATORY (INHALATION)
Qty: 1 INHALER | Refills: 5 | Status: SHIPPED | OUTPATIENT
Start: 2020-05-15 | End: 2021-02-02 | Stop reason: SDUPTHER

## 2020-05-15 NOTE — PATIENT INSTRUCTIONS

## 2020-05-15 NOTE — PROGRESS NOTES
Lynnette Willoughby is a 63 y.o. female    HIPAA verified by two patient identifiers. Health Maintenance Due   Topic Date Due    Eye Exam Retinal or Dilated  01/31/1974    Colonoscopy  01/31/1982    DTaP/Tdap/Td series (1 - Tdap) 01/31/1985    Shingrix Vaccine Age 50> (1 of 2) 01/31/2014    A1C test (Diabetic or Prediabetic)  04/16/2020       Chief Complaint   Patient presents with    Form Completion     DISABILITY PAPERWORK         Visit Vitals  /79 (BP 1 Location: Left arm, BP Patient Position: Sitting)   Pulse 90   Temp 99.1 °F (37.3 °C) (Oral)   Ht 5' 4\" (1.626 m)   Wt 228 lb (103.4 kg)   LMP 06/13/2011   SpO2 97%   BMI 39.14 kg/m²       Pain Scale: 0 - No pain/10  Pain Location:     1. Have you been to the ER, urgent care clinic since your last visit? Hospitalized since your last visit? No    2. Have you seen or consulted any other health care providers outside of the [address] [place] [address] since your last visit? Include any pap smears or colon screening.  No

## 2020-05-15 NOTE — PROGRESS NOTES
Chief Complaint   Patient presents with    Form Completion     DISABILITY PAPERWORK       SUBJECTIVE:    Leeann Marcum is a 63 y.o. female who has had a severe debility secondary to a left-sided CVA with right hemiplegia. She has been wheelchair-bound and unable to care for self since that time. She also has underlying problems of hypertension, diabetes, hyperlipidemia, asthma, chronic kidney disease stage III and other medical problems. She presents today for evaluation and reconsideration and forms to be completed for her disability. She has had no improvement in function with her complete dependence on caretakers to take care of her daily. She is as noted wheelchair dependent. She has no use of her right side and does have some function of the left side per form filled out. She currently denies any chest pain, shortness of breath, palpitations, PND, orthopnea or other cardiorespiratory complaints except for asthma and she does need a refill of her albuterol inhaler. She has no GI or  complaints. She has no headaches or dizziness or new neurologic complaints with a chronic right-sided deficit. She has no current arthritic complaints. There are no other complaints on complete review of systems. Current Outpatient Medications   Medication Sig Dispense Refill    albuterol (ProAir HFA) 90 mcg/actuation inhaler Take 2 Puffs by inhalation every four (4) hours as needed for Wheezing. 1 Inhaler 5    Insulin Needles, Disposable, (Pen Needles) 31 gauge x 1/4\" ndle Use daily as directed with her insulin pen. 100 Pen Needle prn    potassium chloride (Klor-Con M20) 20 mEq tablet Take 2 Tabs by mouth two (2) times a day. 120 Tab 12    valsartan (DIOVAN) 320 mg tablet Take 1 Tab by mouth daily. 90 Tab 2    insulin detemir U-100 (LEVEMIR FLEXTOUCH) 100 unit/mL (3 mL) inpn Take 40 units two times daily 4 Pen 5    magnesium oxide (MAG-OX) 400 mg tablet Take 1 Tab by mouth two (2) times a day. 180 Tab 3    insulin glargine (LANTUS SOLOSTAR U-100 INSULIN) 100 unit/mL (3 mL) inpn USE 40 units twice daily 35 mL 3    cloNIDine HCL (CATAPRES) 0.1 mg tablet TAKE 1 TABLET BY MOUTH TWICE A DAY      amLODIPine (NORVASC) 10 mg tablet Take one tablet daily 30 Tab 5    pravastatin (PRAVACHOL) 80 mg tablet Take 1 Tab by mouth nightly. 90 Tab 3    metFORMIN ER (GLUCOPHAGE XR) 500 mg tablet Take 1 tablet by mouth in the morning and take 2 tablets before dinner. 270 Tab 3    lisinopril-hydroCHLOROthiazide (PRINZIDE, ZESTORETIC) 20-12.5 mg per tablet Take 2 Tabs by mouth daily. 180 Tab 3    metoprolol succinate (TOPROL-XL) 50 mg XL tablet Take 1 Tab by mouth daily. 30 Tab prn    aspirin (ASPIRIN) 325 mg tablet Take 325 mg by mouth daily.  ALPRAZolam (XANAX) 0.5 mg tablet Take 1 Tab by mouth daily as needed for Anxiety. 30 Tab 0    mirtazapine (REMERON) 15 mg tablet Take  by mouth nightly.  FERROUS SULFATE (IRON PO) Take 1 Tab by mouth.  cholecalciferol (VITAMIN D3) 1,000 unit cap Take 2,000 Units by mouth daily.  ascorbic acid (VITAMIN C) 250 mg tablet Take  by mouth.          Past Medical History:   Diagnosis Date    Abnormal mammogram with microcalcification 6/4/2013    Right  UOQ    Aphasia due to stroke     SPEAKS SOME    Arthritis     back    Asthma 9/7/2017    CKD (chronic kidney disease) 9/7/2017    CKD (chronic kidney disease), stage III ([address] 9/7/2017    CVA (cerebral vascular accident) ([address] 9/7/2017    Depression 9/7/2017    Diabetes ([facility] [address]     TYPE 2; IDDM    Edema 9/7/2017    Hypertension     Hypertension with renal disease 9/7/2017    Hypomagnesemia 9/7/2017    Morbid obesity ([address] 9/7/2017    On statin therapy 9/7/2017    Osteopenia 9/7/2017    Psychiatric disorder     depression    Stroke ([facility] [address] 5/11/2010    RIGHT SIDE WEAKNESS    Vitamin D deficiency 9/7/2017     Past Surgical History:   Procedure Laterality Date    HX BREAST BIOPSY Right     2013 neg    HX CATARACT REMOVAL  Dec 2010/ Jan 2011    bilateral cataracts removed    HX GI      HEMMORHOIDECTOMY    HX GYN      pmb    HX HEENT      tonsillectomy    HX OOPHORECTOMY Bilateral     HX OTHER SURGICAL      HX TONSILLECTOMY      OK LAPAROSCOPY W TOT HYSTERECTUTERUS <=250 GRAM  W TUBE/OVARY  6/2011    leiomyomata     No Known Allergies    REVIEW OF SYSTEMS:  General: negative for - chills or fever, or weight loss or gain  ENT: negative for - headaches, nasal congestion or tinnitus  Eyes: no blurred or visual changes  Neck: No stiffness or swollen nodes  Respiratory: negative for - cough, hemoptysis, shortness of breath or wheezing  Cardiovascular : negative for - chest pain, edema, palpitations or shortness of breath  Gastrointestinal: negative for - abdominal pain, blood in stools, heartburn or nausea/vomiting  Genito-Urinary: no dysuria, trouble voiding, or hematuria  Musculoskeletal: negative for - gait disturbance, joint pain, joint stiffness or joint swelling  Neurological: no new TIA or stroke symptoms with chronic residual right hemiplegia from prior CVA  Hematologic: no bruises, no bleeding  Lymphatic: no swollen glands  Integument: no lumps, mole changes, nail changes or rash  Endocrine:no malaise/lethargy poly uria or polydipsia or unexpected weight changes        Social History     Socioeconomic History    Marital status:      Spouse name: Not on file    Number of children: Not on file    Years of education: Not on file    Highest education level: Not on file   Tobacco Use    Smoking status: Former Smoker     Last attempt to quit: 5/12/2010     Years since quitting: 10.0    Smokeless tobacco: Never Used   Substance and Sexual Activity    Alcohol use: No    Drug use: No    Sexual activity: Never   Social History Narrative    ** Merged History Encounter **          Family History   Problem Relation Age of Onset    Hypertension Mother     MS Mother     Diabetes Father     Stroke Father     Heart Disease Father     Hypertension Father     Post-op Nausea/Vomiting Sister     Breast Cancer Paternal Aunt        OBJECTIVE:     Visit Vitals  /70   Pulse 90   Temp 99.1 °F (37.3 °C) (Oral)   Ht 5' 4\" (1.626 m)   Wt 228 lb (103.4 kg)   LMP 06/13/2011   SpO2 97%   BMI 39.14 kg/m²     CONSTITUTIONAL:   well nourished, appears age appropriate  EYES: sclera anicteric, PERRL, EOMI  ENMT:nares clear, moist mucous membranes, pharynx clear  NECK: supple. Thyroid normal, No JVD or bruits  RESPIRATORY: Chest: clear to ascultation and percussion, normal inspiratory effort  CARDIOVASCULAR: Heart: regular rate and rhythm no murmurs, rubs or gallops, PMI not displaced, No thrills, no peripheral edema  GASTROINTESTINAL: Abdomen: non distended, soft, non tender, bowel sounds normal  HEMATOLOGIC: no purpura, petechiae or bruising  LYMPHATIC: No lymph node enlargemant  MUSCULOSKELETAL: Extremities: no active synovitis, pulse 1+   INTEGUMENT: No unusual rashes or suspicious skin lesions noted. Nails appear normal.  PERIPHERAL VASCULAR: normal pulses femoral, PT and DP  NEUROLOGIC: A & O X 3. Right hemiplegia and some mild dysarthria. Form completed with specifics. PSYCHIATRIC:, appropriate affect     ASSESSMENT:   1. History of CVA (cerebrovascular accident)    2. Hypertension with renal disease    3. Mild intermittent asthma without complication    4. Controlled type 2 diabetes mellitus with stage 3 chronic kidney disease, without long-term current use of insulin ([address]    5. Mixed hyperlipidemia    6. CKD (chronic kidney disease), stage III ([address]    7. Severe obesity (BMI 35.0-39. 9) with comorbidity ([address]      Impression  1. Prior CVA with permanent right hemiplegia  2. Hypertension that is controlled  3. Asthma that is stable and I renewed her albuterol  4. Diabetes with last A1c done in January getting poor control detail with her  5. Hyperlipidemia reviewed prior numbers  6.   CKD stable on last check  7. Obesity we did discuss dietary measures for weight reduction  Forms completed for disability and she should be permanently disabled. I will recheck her at her prior scheduled appointment. 30 minutes spent in direct care of this patient today. Greater than 50% in counseling coordination of care. PLAN:  .  Orders Placed This Encounter    albuterol (ProAir HFA) 90 mcg/actuation inhaler         ATTENTION:   This medical record was transcribed using an electronic medical records system. Although proofread, it may and can contain electronic and spelling errors. Other human spelling and other errors may be present. Corrections may be executed at a later time. Please feel free to contact us for any clarifications as needed. No results found for any visits on 05/15/20. Micah Aaron MD    The patient verbalized understanding of the problems and plans as explained.

## 2020-06-03 RX ORDER — AMLODIPINE BESYLATE 10 MG/1
TABLET ORAL
Qty: 30 TAB | Refills: 5 | Status: SHIPPED | OUTPATIENT
Start: 2020-06-03 | End: 2021-02-02 | Stop reason: SDUPTHER

## 2020-06-16 RX ORDER — METOPROLOL SUCCINATE 50 MG/1
50 TABLET, EXTENDED RELEASE ORAL DAILY
Qty: 90 TAB | Refills: 1 | Status: SHIPPED | OUTPATIENT
Start: 2020-06-16 | End: 2021-02-02 | Stop reason: SDUPTHER

## 2020-07-09 NOTE — TELEPHONE ENCOUNTER
PCP: Rajeev Bassett MD    Last appt: 5/15/2020  Future Appointments   Date Time Provider Yolanda Sofia   7/29/2020  8:20 AM Rajeev Bassett MD [facility] SCHED       Requested Prescriptions     Pending Prescriptions Disp Refills    insulin detemir U-100 (LEVEMIR FLEXTOUCH) 100 unit/mL (3 mL) inpn 4 Pen 5     Sig: Take 40 units two times daily

## 2020-07-28 NOTE — PROGRESS NOTES
This is a Subsequent Medicare Annual Wellness Visit providing Personalized Prevention Plan Services (PPPS) (Performed 12 months after initial AWV and PPPS )    I have reviewed the patient's medical history in detail and updated the computerized patient record. She returns today accompanied by her  for her Medicare subsequent annual wellness examination and screening questionnaire. She is also here in follow-up of her medical problems to include hypertension, diabetes, hyperlipidemia, prior CVA with residual right hemiplegia, asthma, obesity, CKD stage III, anxiety, and vitamin D deficiency as well as other medical problems. She currently denies any chest pain, shortness of breath, palpitations, PND, orthopnea or other cardiorespiratory complaints. She notes no GI or  complaints. She notes no headaches, dizziness or new neurologic complaints. She has no current active arthritic complaints. She does have a persistent right hemiplegia from her stroke but no new complaints. There are no other complaints on complete review of systems.     History     Past Medical History:   Diagnosis Date    Abnormal mammogram with microcalcification 6/4/2013    Right  UOQ    Aphasia due to stroke     SPEAKS SOME    Arthritis     back    Asthma 9/7/2017    CKD (chronic kidney disease) 9/7/2017    CKD (chronic kidney disease), stage III ([address] 9/7/2017    CVA (cerebral vascular accident) ([address] 9/7/2017    Depression 9/7/2017    Diabetes ([address]     TYPE 2; IDDM    Edema 9/7/2017    H/O mammogram 07/29/2019    Pt. report pap was done last year at [facility] Hypertension     Hypertension with renal disease 9/7/2017    Hypomagnesemia 9/7/2017    Morbid obesity ([address] 9/7/2017    On statin therapy 9/7/2017    Osteopenia 9/7/2017    Psychiatric disorder     depression    Smear, vaginal, as part of routine gynecological examination 07/29/2019    Pt. reported [facility]  Stroke ([facility] 5/11/2010    RIGHT SIDE WEAKNESS    Vitamin D deficiency 9/7/2017      Past Surgical History:   Procedure Laterality Date    BREAST SURGERY PROCEDURE UNLISTED      HX CATARACT REMOVAL  Dec 2010/ Jan 2011    bilateral cataracts removed    HX GI      HEMMORHOIDECTOMY    HX GYN      pmb    HX HEENT      tonsillectomy    HX OOPHORECTOMY Bilateral     HX OTHER SURGICAL      HX TONSILLECTOMY      [place] LAPAROSCOPY W TOT HYSTERECTUTERUS <=250 GRAM  W TUBE/OVARY  6/2011    leiomyomata     Social History     Tobacco Use    Smoking status: Former Smoker     Last attempt to quit: 5/12/2010     Years since quitting: 10.2    Smokeless tobacco: Never Used   Substance Use Topics    Alcohol use: No    Drug use: No     Current Outpatient Medications   Medication Sig Dispense Refill    insulin detemir U-100 (LEVEMIR FLEXTOUCH) 100 unit/mL (3 mL) inpn Take 40 units two times daily 4 Pen 5    metoprolol succinate (TOPROL-XL) 50 mg XL tablet Take 1 Tab by mouth daily. 90 Tab 1    amLODIPine (NORVASC) 10 mg tablet Take one tablet daily 30 Tab 5    albuterol (ProAir HFA) 90 mcg/actuation inhaler Take 2 Puffs by inhalation every four (4) hours as needed for Wheezing. 1 Inhaler 5    Insulin Needles, Disposable, (Pen Needles) 31 gauge x 1/4\" ndle Use daily as directed with her insulin pen. 100 Pen Needle prn    potassium chloride (Klor-Con M20) 20 mEq tablet Take 2 Tabs by mouth two (2) times a day. 120 Tab 12    valsartan (DIOVAN) 320 mg tablet Take 1 Tab by mouth daily. 90 Tab 2    magnesium oxide (MAG-OX) 400 mg tablet Take 1 Tab by mouth two (2) times a day. 180 Tab 3    insulin glargine (LANTUS SOLOSTAR U-100 INSULIN) 100 unit/mL (3 mL) inpn USE 40 units twice daily 35 mL 3    cloNIDine HCL (CATAPRES) 0.1 mg tablet TAKE 1 TABLET BY MOUTH TWICE A DAY      pravastatin (PRAVACHOL) 80 mg tablet Take 1 Tab by mouth nightly.  90 Tab 3    metFORMIN ER (GLUCOPHAGE XR) 500 mg tablet Take 1 tablet by mouth in the morning and take 2 tablets before dinner. 270 Tab 3    aspirin (ASPIRIN) 325 mg tablet Take 325 mg by mouth daily.  ALPRAZolam (XANAX) 0.5 mg tablet Take 1 Tab by mouth daily as needed for Anxiety. 30 Tab 0    mirtazapine (REMERON) 15 mg tablet Take  by mouth nightly.  FERROUS SULFATE (IRON PO) Take 1 Tab by mouth.  cholecalciferol (VITAMIN D3) 1,000 unit cap Take 2,000 Units by mouth daily.  ascorbic acid (VITAMIN C) 250 mg tablet Take  by mouth.  lisinopril-hydroCHLOROthiazide (PRINZIDE, ZESTORETIC) 20-12.5 mg per tablet Take 2 Tabs by mouth daily. 180 Tab 3     No Known Allergies  Family History   Problem Relation Age of Onset    Hypertension Mother    Godoy MS Mother     Diabetes Father     Stroke Father     Heart Disease Father     Hypertension Father     Post-op Nausea/Vomiting Sister     Breast Cancer Paternal Aunt        Patient Active Problem List    Diagnosis    Mild intermittent asthma without complication    CKD (chronic kidney disease), stage III ([address]    Hypertension with renal disease    Controlled type 2 diabetes mellitus with stage 3 chronic kidney disease, without long-term current use of insulin ([facility])    History of CVA (cerebrovascular accident)     Residual right-sided weakness and dysarthria from prior left CVA      Mixed hyperlipidemia    Severe obesity (BMI 35.0-39. 9) with comorbidity ([address]    Osteopenia of multiple sites    Vitamin D deficiency    Chronic anticoagulation    Mild depression ([address]    Medicare annual wellness visit, subsequent    Alcohol screening    Anxiety    Hypomagnesemia    Near syncope    Stenosis of both internal carotid arteries    Vertigo    Abnormal mammogram with microcalcification     Right  UOQ      Ovarian cyst    Pelvic mass     Multiple leiomyomas treated with hysterectomy 6/27/2011      Unspecified constipation    Urinary retention    Anemia       Patient Care Team:  Ezequiel Conde MD as PCP - General (Internal Medicine)  Lenora Reynoso MD as PCP - [facility] Provider    Depression Risk Factor Screening:     3 most recent [facility] Screens 7/29/2020   Little interest or pleasure in doing things Not at all   Feeling down, depressed, irritable, or hopeless Several days   Total Score PHQ 2 1   Trouble falling or staying asleep, or sleeping too much Not at all   Feeling tired or having little energy Not at all   Poor appetite, weight loss, or overeating Not at all   Feeling bad about yourself - or that you are a failure or have let yourself or your family down Not at all   Trouble concentrating on things such as school, work, reading, or watching TV Not at all   Moving or speaking so slowly that other people could have noticed; or the opposite being so fidgety that others notice Not at all   Thoughts of being better off dead, or hurting yourself in some way Not at all   PHQ 9 Score 1     Alcohol Risk Factor Screening:     Alcohol Risk Factor Screening:   Do you average 1 drink per night or more than 7 drinks a week:  No    On any one occasion in the past three months have you have had more than 3 drinks containing alcohol:  No    Functional Ability and Level of Safety:     Fall Risk     Fall Risk Assessment, last 12 mths 7/29/2020   Able to walk? Yes   Fall in past 12 months? No       Hearing Loss   mild    Activities of Daily Living   Partial assistance.    ADL Assessment 7/29/2020   Feeding yourself No Help Needed   Getting from bed to chair No Help Needed   Getting dressed Help Needed   Bathing or showering No Help Needed   Walk across the room (includes cane/walker) No Help Needed   Using the telphone No Help Needed   Taking your medications No Help Needed   Preparing meals Help Needed   Managing money (expenses/bills) No Help Needed   Moderately strenuous housework (laundry) Help Needed   Shopping for personal items (toiletries/medicines) No Help Needed   Shopping for groceries No Help Needed   Driving Help Needed   Climbing a flight of stairs No Help Needed   Getting to places beyond walking distances Help Needed       Abuse Screen   Patient is not abused    Social History     Social History Narrative    ** Merged History Encounter **            Review of Systems      ROS:    Constitutional: She denies fevers, weight loss, sweats. Eyes: No blurred or double vision. ENT: No difficulty with swallowing, taste, speech or smell. NECK: no stiffness swelling or lymph node enlargement  Respiratory: No cough wheezing or shortness of breath. Cardiovascular: Denies chest pain, palpitations, unexplained indigestion or syncope. Breast: She has noted no masses or lumps and no discharge or axillary swelling  Gastrointestinal:  No changes in bowel movements, no abdominal pain, no bloating. Genitourinary: No discharge or abnormal bleeding or pain  Extremities: No joint pain, stiffness or swelling. Neurological:  No numbness, tingling, burring paresthesias or loss of motor strength on the left but chronic right hemiplegia. No syncope, dizziness or frequent headache  Skin:  No recent rashes or mole changes. Psychiatric/Behavioral:  Negative for depression. The patient is not nervous/anxious.    HEMATOLOGIC: no easy bruising or bleeding gums  Endocrine: no sweats of urinary frequency or excessive thirst    Physical Examination     Evaluation of Cognitive Function:  Mood/affect:  happy  Appearance: age appropriate  Family member/caregiver input:     Visit Vitals  /84 (BP 1 Location: Left arm, BP Patient Position: Sitting)   Pulse 74   Temp 99.3 °F (37.4 °C) (Oral)   Ht 5' 4\" (1.626 m)   LMP 06/13/2011   SpO2 98%   BMI 39.14 kg/m²     Vitals:    07/29/20 0842   BP: 146/84   Pulse: 74   Temp: 99.3 °F (37.4 °C)   TempSrc: Oral   SpO2: 98%   Height: 5' 4\" (1.626 m)   PainSc:   0 - No pain   LMP: 06/13/2011        PHYSICAL EXAM:    General appearance - alert, well appearing, and in no distress  Mental status - alert, oriented to person, place, and time  HEENT:  Ears - bilateral TM's and external ear canals clear  Eyes - pupillary responses were normal.  Extraocular muscle function intact. Lids and conjunctiva not injected. Fundoscopic exam revealed sharp disc margins. eye movements intact  Pharynx- clear with teeth in good repair. No masses were noted  Neck - supple without thyromegaly or burit. No JVD noted  Lungs - clear to auscultation and percussion  Cardiac- normal rate, regular rhythm without murmurs. PMI not displaced. No gallop, rub or click  Breast: deferred to GYN  Abdomen - flat, soft, non-tender without palpable organomegaly or mass. No pulsatile mass was felt, and not bruit was heard. Bowel sounds were active   Female - deferred to GYN  Rectal - deferred to GYN  Extremities -  no clubbing cyanosis or edema  Lymphatics - no palpable lymphadenopathy, no hepatosplenomegaly  Peripheral vascular - Dorsalis pedis and posterior tibial pulses felt without difficulty  Skin - no rash or unusual mole change noted  Neurological - Cranial nerves II-XII grossly intact. Motor strength 5/5 on the left but chronic right hemiplegia. DTR's 2+ and symmetric.   Wheelchair bound due to CVA  Back exam - full range of motion, no tenderness, palpable spasm or pain on motion  Musculoskeletal - no joint tenderness, deformity or swelling  Hematologic: no purpura, petechiae or bruising    Results for orders placed or performed in visit on 08/06/72   METABOLIC PANEL, COMPREHENSIVE   Result Value Ref Range    Glucose 184 (H) 65 - 99 mg/dL    BUN 16 6 - 24 mg/dL    Creatinine 0.89 0.57 - 1.00 mg/dL    GFR est non-AA 73 >59 mL/min/1.73    GFR est AA 84 >59 mL/min/1.73    BUN/Creatinine ratio 18 9 - 23    Sodium 139 134 - 144 mmol/L    Potassium 4.7 3.5 - 5.2 mmol/L    Chloride 104 96 - 106 mmol/L    CO2 18 (L) 20 - 29 mmol/L    Calcium 9.7 8.7 - 10.2 mg/dL    Protein, total 6.9 6.0 - 8.5 g/dL    Albumin 4.0 3.5 - 5.5 g/dL    GLOBULIN, TOTAL 2.9 1.5 - 4.5 g/dL    A-G Ratio 1.4 1.2 - 2.2    Bilirubin, total 0.2 0.0 - 1.2 mg/dL    Alk. phosphatase 78 39 - 117 IU/L    AST (SGOT) 11 0 - 40 IU/L    ALT (SGPT) 9 0 - 32 IU/L   LIPID PANEL   Result Value Ref Range    Cholesterol, total 193 100 - 199 mg/dL    Triglyceride 447 (H) 0 - 149 mg/dL    HDL Cholesterol 40 >39 mg/dL    VLDL, calculated Comment 5 - 40 mg/dL    LDL, calculated Comment 0 - 99 mg/dL   CK   Result Value Ref Range    Creatine Kinase,Total 46 24 - 173 U/L   HEMOGLOBIN A1C WITH EAG   Result Value Ref Range    Hemoglobin A1c 9.8 (H) 4.8 - 5.6 %    Estimated average glucose 235 mg/dL       Advice/Referrals/Counseling   Education and counseling provided:  Are appropriate based on today's review and evaluation  End-of-Life planning (with patient's consent)  Pneumococcal Vaccine  Influenza Vaccine  Colorectal cancer screening tests      Assessment/Plan     ASSESSMENT:   1. Hypertension with renal disease    2. Controlled type 2 diabetes mellitus with stage 3 chronic kidney disease, without long-term current use of insulin ([address]    3. Mixed hyperlipidemia    4. Mild intermittent asthma without complication    5. History of CVA (cerebrovascular accident)    6. CKD (chronic kidney disease), stage III ([address]    7. Vitamin D deficiency    8. Severe obesity (BMI 35.0-39. 9) with comorbidity ([address]    9. Osteopenia of multiple sites    10. Mild depression ([address]    11. Alcohol screening    12. Medicare annual wellness visit, subsequent      Impression  1. Hypertension that is controlled on recheck by me so continue current therapy reviewed with her and her . 2.  Diabetes mellitus repeat status pending a prior lab reviewed no make adjustments if necessary. 3.  Hyperlipidemia prior lab reviewed and repeat status pending I will adjust if needed. 4.  Asthma that is stable  5. History of CVA currently stable  6. CKD stage III repeat status pending  7.   Vitamin D deficiency repeat status pending  8. Obesity we did discuss diet, exercise and weight reduction although no exercise is limited secondary to her CVA  9. Osteopenia reviewed prior bone density with her  10. Depression that is stable  11. Annual alcohol screening is done and currently she does not drink alcohol at all. I did caution her and her  regarding more than 1 drink and females in more than 2 drinks in males with increased cardiovascular risk and increased risk of liver disease and other GI effects. Medicare subsequent annual wellness examination and screening questionnaire is completed today. The results were reviewed with her and her  and their questions were answered. Lifestyle recommendations and modifications discussed and made. I will call with lab results and make further recommendations or adjustments if necessary. Follow-up scheduled for 3 months or sooner if there is a problem. PLAN:  .  Orders Placed This Encounter    LIPID PANEL    URINE, MICROALBUMIN, SEMIQUANTITATIVE ([facility])    METABOLIC PANEL, COMPREHENSIVE    CK    T4, FREE    TSH 3RD GENERATION    VITAMIN D, 25 HYDROXY    CBC WITH AUTOMATED DIFF    HEMOGLOBIN A1C WITH EAG    URINALYSIS W/ RFLX MICROSCOPIC         ATTENTION:   This medical record was transcribed using an electronic medical records system. Although proofread, it may and can contain electronic and spelling errors. Other human spelling and other errors may be present. Corrections may be executed at a later time. Please feel free to contact us for any clarifications as needed. Follow-up and Dispositions    · Return in about 3 months (around 10/29/2020). Concha Soto MD    Recommended healthy diet low in carbohydrates, fats, sodium and cholesterol. Recommended regular cardiovascular exercise 3-6 times per week for 30-60 minutes daily.       Current Outpatient Medications   Medication Sig Dispense Refill    insulin detemir U-100 (LEVEMIR FLEXTOUCH) 100 unit/mL (3 mL) inpn Take 40 units two times daily 4 Pen 5    metoprolol succinate (TOPROL-XL) 50 mg XL tablet Take 1 Tab by mouth daily. 90 Tab 1    amLODIPine (NORVASC) 10 mg tablet Take one tablet daily 30 Tab 5    albuterol (ProAir HFA) 90 mcg/actuation inhaler Take 2 Puffs by inhalation every four (4) hours as needed for Wheezing. 1 Inhaler 5    Insulin Needles, Disposable, (Pen Needles) 31 gauge x 1/4\" ndle Use daily as directed with her insulin pen. 100 Pen Needle prn    potassium chloride (Klor-Con M20) 20 mEq tablet Take 2 Tabs by mouth two (2) times a day. 120 Tab 12    valsartan (DIOVAN) 320 mg tablet Take 1 Tab by mouth daily. 90 Tab 2    magnesium oxide (MAG-OX) 400 mg tablet Take 1 Tab by mouth two (2) times a day. 180 Tab 3    insulin glargine (LANTUS SOLOSTAR U-100 INSULIN) 100 unit/mL (3 mL) inpn USE 40 units twice daily 35 mL 3    cloNIDine HCL (CATAPRES) 0.1 mg tablet TAKE 1 TABLET BY MOUTH TWICE A DAY      pravastatin (PRAVACHOL) 80 mg tablet Take 1 Tab by mouth nightly. 90 Tab 3    metFORMIN ER (GLUCOPHAGE XR) 500 mg tablet Take 1 tablet by mouth in the morning and take 2 tablets before dinner. 270 Tab 3    aspirin (ASPIRIN) 325 mg tablet Take 325 mg by mouth daily.  ALPRAZolam (XANAX) 0.5 mg tablet Take 1 Tab by mouth daily as needed for Anxiety. 30 Tab 0    mirtazapine (REMERON) 15 mg tablet Take  by mouth nightly.  FERROUS SULFATE (IRON PO) Take 1 Tab by mouth.  cholecalciferol (VITAMIN D3) 1,000 unit cap Take 2,000 Units by mouth daily.  ascorbic acid (VITAMIN C) 250 mg tablet Take  by mouth.  lisinopril-hydroCHLOROthiazide (PRINZIDE, ZESTORETIC) 20-12.5 mg per tablet Take 2 Tabs by mouth daily. 180 Tab 3       No results found for any visits on 07/29/20. Verbal and written instructions (see AVS) provided. Patient expresses understanding of diagnosis and treatment plan.     Shira Almonte MD

## 2020-07-29 ENCOUNTER — OFFICE VISIT (OUTPATIENT)
Dept: INTERNAL MEDICINE CLINIC | Age: 55
End: 2020-07-29

## 2020-07-29 VITALS
BODY MASS INDEX: 39.14 KG/M2 | OXYGEN SATURATION: 98 % | SYSTOLIC BLOOD PRESSURE: 136 MMHG | DIASTOLIC BLOOD PRESSURE: 82 MMHG | TEMPERATURE: 99.3 F | HEIGHT: 64 IN | HEART RATE: 74 BPM

## 2020-07-29 DIAGNOSIS — N18.30 CKD (CHRONIC KIDNEY DISEASE), STAGE III (HCC): ICD-10-CM

## 2020-07-29 DIAGNOSIS — E55.9 VITAMIN D DEFICIENCY: ICD-10-CM

## 2020-07-29 DIAGNOSIS — E11.22 CONTROLLED TYPE 2 DIABETES MELLITUS WITH STAGE 3 CHRONIC KIDNEY DISEASE, WITHOUT LONG-TERM CURRENT USE OF INSULIN (HCC): ICD-10-CM

## 2020-07-29 DIAGNOSIS — J45.20 MILD INTERMITTENT ASTHMA WITHOUT COMPLICATION: ICD-10-CM

## 2020-07-29 DIAGNOSIS — Z86.73 HISTORY OF CVA (CEREBROVASCULAR ACCIDENT): ICD-10-CM

## 2020-07-29 DIAGNOSIS — N18.30 CONTROLLED TYPE 2 DIABETES MELLITUS WITH STAGE 3 CHRONIC KIDNEY DISEASE, WITHOUT LONG-TERM CURRENT USE OF INSULIN (HCC): ICD-10-CM

## 2020-07-29 DIAGNOSIS — F32.A MILD DEPRESSION: ICD-10-CM

## 2020-07-29 DIAGNOSIS — E78.2 MIXED HYPERLIPIDEMIA: ICD-10-CM

## 2020-07-29 DIAGNOSIS — E66.01 SEVERE OBESITY (BMI 35.0-39.9) WITH COMORBIDITY (HCC): ICD-10-CM

## 2020-07-29 DIAGNOSIS — Z13.39 ALCOHOL SCREENING: ICD-10-CM

## 2020-07-29 DIAGNOSIS — M85.89 OSTEOPENIA OF MULTIPLE SITES: ICD-10-CM

## 2020-07-29 DIAGNOSIS — I12.9 HYPERTENSION WITH RENAL DISEASE: Primary | ICD-10-CM

## 2020-07-29 DIAGNOSIS — Z00.00 MEDICARE ANNUAL WELLNESS VISIT, SUBSEQUENT: ICD-10-CM

## 2020-07-29 LAB — MICROALBUMIN, URINE: 20 MG/L (ref 0–20)

## 2020-07-29 NOTE — PATIENT INSTRUCTIONS
Anemia: Care Instructions Your Care Instructions Anemia is a low level of red blood cells, which carry oxygen throughout your body. Many things can cause anemia. Lack of iron is one of the most common causes. Your body needs iron to make hemoglobin, a substance in red blood cells that carries oxygen from the lungs to your body's cells. Without enough iron, the body produces fewer and smaller red blood cells. As a result, your body's cells do not get enough oxygen, and you feel tired and weak. And you may have trouble concentrating. Bleeding is the most common cause of a lack of iron. You may have heavy menstrual bleeding or bleeding caused by conditions such as ulcers, hemorrhoids, or cancer. Regular use of aspirin or other anti-inflammatory medicines (such as ibuprofen) also can cause bleeding in some people. A lack of iron in your diet also can cause anemia, especially at times when the body needs more iron, such as during pregnancy, infancy, and the teen years. Your doctor may have prescribed iron pills. It may take several months of treatment for your iron levels to return to normal. Your doctor also may suggest that you eat foods that are rich in iron, such as meat and beans. There are many other causes of anemia. It is not always due to a lack of iron. Finding the specific cause of your anemia will help your doctor find the right treatment for you. Follow-up care is a key part of your treatment and safety. Be sure to make and go to all appointments, and call your doctor if you are having problems. It's also a good idea to know your test results and keep a list of the medicines you take. How can you care for yourself at home? · Take your medicines exactly as prescribed. Call your doctor if you think you are having a problem with your medicine. · If your doctor recommends iron pills, take them as directed: ? Try to take the pills on an empty stomach about 1 hour before or 2 hours after meals. But you may need to take iron with food to avoid an upset stomach. ? Do not take antacids or drink milk or caffeine drinks (such as coffee, tea, or cola) at the same time or within 2 hours of the time that you take your iron. They can make it hard for your body to absorb the iron. ? Vitamin C (from food or supplements) helps your body absorb iron. Try taking iron pills with a glass of orange juice or some other food that is high in vitamin C, such as citrus fruits. ? Iron pills may cause stomach problems, such as heartburn, nausea, diarrhea, constipation, and cramps. Be sure to drink plenty of fluids, and include fruits, vegetables, and fiber in your diet each day. Iron pills often make your bowel movements dark or green. ? If you forget to take an iron pill, do not take a double dose of iron the next time you take a pill. ? Keep iron pills out of the reach of small children. An overdose of iron can be very dangerous. · Follow your doctor's advice about eating iron-rich foods. These include red meat, shellfish, poultry, eggs, beans, raisins, whole-grain bread, and leafy green vegetables. · Steam vegetables to help them keep their iron content. When should you call for help? [id number] anytime you think you may need emergency care. For example, call if: 
· You have symptoms of a heart attack. These may include: 
? Chest pain or pressure, or a strange feeling in the chest. 
? Sweating. ? Shortness of breath. ? Nausea or vomiting. ? Pain, pressure, or a strange feeling in the back, neck, jaw, or upper belly or in one or both shoulders or arms. ? Lightheadedness or sudden weakness. ? A fast or irregular heartbeat. After you call 911, the  may tell you to chew 1 adult-strength or 2 to 4 low-dose aspirin. Wait for an ambulance. Do not try to drive yourself. · You passed out (lost consciousness). Call your doctor now or seek immediate medical care if: · You have new or increased shortness of breath. · You are dizzy or lightheaded, or you feel like you may faint. · Your fatigue and weakness continue or get worse. · You have any abnormal bleeding, such as: 
? Nosebleeds. ? Vaginal bleeding that is different (heavier, more frequent, at a different time of the month) than what you are used to. 
? Bloody or black stools, or rectal bleeding. ? Bloody or pink urine. Watch closely for changes in your health, and be sure to contact your doctor if: 
· You do not get better as expected. Where can you learn more? Go to http://stephen-laurie.info/ Enter R301 in the search box to learn more about \"Anemia: Care Instructions. \" Current as of: November 8, 2019               Content Version: 12.5 © 6339-2385 [employer]. Care instructions adapted under license by [employer] (which disclaims liability or warranty for this information). If you have questions about a medical condition or this instruction, always ask your healthcare professional. [address] any warranty or liability for your use of this information.

## 2020-07-29 NOTE — PROGRESS NOTES
Chief Complaint   Patient presents with    Cholesterol Problem     3 month f/u    Diabetes       1. Have you been to the ER, urgent care clinic since your last visit? Hospitalized since your last visit? No    2. Have you seen or consulted any other health care providers outside of the [address] [place] [address] since your last visit? Include any pap smears or colon screening.  No

## 2020-07-30 LAB
25(OH)D3+25(OH)D2 SERPL-MCNC: 28.9 NG/ML (ref 30–100)
ALBUMIN SERPL-MCNC: 4.6 G/DL (ref 3.8–4.9)
ALBUMIN/GLOB SERPL: 1.3 {RATIO} (ref 1.2–2.2)
ALP SERPL-CCNC: 78 IU/L (ref 39–117)
ALT SERPL-CCNC: 25 IU/L (ref 0–32)
APPEARANCE UR: CLEAR
AST SERPL-CCNC: 23 IU/L (ref 0–40)
BACTERIA #/AREA URNS HPF: ABNORMAL /[HPF]
BASOPHILS # BLD AUTO: 0 X10E3/UL (ref 0–0.2)
BASOPHILS NFR BLD AUTO: 0 %
BILIRUB SERPL-MCNC: <0.2 MG/DL (ref 0–1.2)
BILIRUB UR QL STRIP: NEGATIVE
BUN SERPL-MCNC: 18 MG/DL (ref 6–24)
BUN/CREAT SERPL: 19 (ref 9–23)
CALCIUM SERPL-MCNC: 10.3 MG/DL (ref 8.7–10.2)
CASTS URNS QL MICRO: ABNORMAL /LPF
CHLORIDE SERPL-SCNC: 99 MMOL/L (ref 96–106)
CHOLEST SERPL-MCNC: 264 MG/DL (ref 100–199)
CK SERPL-CCNC: 53 U/L (ref 32–182)
CO2 SERPL-SCNC: 21 MMOL/L (ref 20–29)
COLOR UR: YELLOW
CREAT SERPL-MCNC: 0.96 MG/DL (ref 0.57–1)
EOSINOPHIL # BLD AUTO: 0.2 X10E3/UL (ref 0–0.4)
EOSINOPHIL NFR BLD AUTO: 3 %
EPI CELLS #/AREA URNS HPF: ABNORMAL /HPF (ref 0–10)
ERYTHROCYTE [DISTWIDTH] IN BLOOD BY AUTOMATED COUNT: 13.1 % (ref 11.7–15.4)
EST. AVERAGE GLUCOSE BLD GHB EST-MCNC: 226 MG/DL
GLOBULIN SER CALC-MCNC: 3.5 G/DL (ref 1.5–4.5)
GLUCOSE SERPL-MCNC: 122 MG/DL (ref 65–99)
GLUCOSE UR QL: NEGATIVE
HBA1C MFR BLD: 9.5 % (ref 4.8–5.6)
HCT VFR BLD AUTO: 38.7 % (ref 34–46.6)
HDLC SERPL-MCNC: 41 MG/DL
HGB BLD-MCNC: 12.4 G/DL (ref 11.1–15.9)
HGB UR QL STRIP: NEGATIVE
IMM GRANULOCYTES # BLD AUTO: 0 X10E3/UL (ref 0–0.1)
IMM GRANULOCYTES NFR BLD AUTO: 1 %
KETONES UR QL STRIP: NEGATIVE
LDLC SERPL CALC-MCNC: ABNORMAL MG/DL (ref 0–99)
LEUKOCYTE ESTERASE UR QL STRIP: ABNORMAL
LYMPHOCYTES # BLD AUTO: 3 X10E3/UL (ref 0.7–3.1)
LYMPHOCYTES NFR BLD AUTO: 41 %
MCH RBC QN AUTO: 26.9 PG (ref 26.6–33)
MCHC RBC AUTO-ENTMCNC: 32 G/DL (ref 31.5–35.7)
MCV RBC AUTO: 84 FL (ref 79–97)
MICRO URNS: ABNORMAL
MONOCYTES # BLD AUTO: 0.5 X10E3/UL (ref 0.1–0.9)
MONOCYTES NFR BLD AUTO: 6 %
MUCOUS THREADS URNS QL MICRO: PRESENT
NEUTROPHILS # BLD AUTO: 3.5 X10E3/UL (ref 1.4–7)
NEUTROPHILS NFR BLD AUTO: 49 %
NITRITE UR QL STRIP: NEGATIVE
PH UR STRIP: 7 [PH] (ref 5–7.5)
PLATELET # BLD AUTO: 341 X10E3/UL (ref 150–450)
POTASSIUM SERPL-SCNC: 4.5 MMOL/L (ref 3.5–5.2)
PROT SERPL-MCNC: 8.1 G/DL (ref 6–8.5)
PROT UR QL STRIP: NEGATIVE
RBC # BLD AUTO: 4.61 X10E6/UL (ref 3.77–5.28)
RBC #/AREA URNS HPF: ABNORMAL /HPF (ref 0–2)
SODIUM SERPL-SCNC: 138 MMOL/L (ref 134–144)
SP GR UR: 1.02 (ref 1–1.03)
T4 FREE SERPL-MCNC: 1.16 NG/DL (ref 0.82–1.77)
TRIGL SERPL-MCNC: 526 MG/DL (ref 0–149)
TSH SERPL DL<=0.005 MIU/L-ACNC: 1.39 UIU/ML (ref 0.45–4.5)
UROBILINOGEN UR STRIP-MCNC: 0.2 MG/DL (ref 0.2–1)
VLDLC SERPL CALC-MCNC: ABNORMAL MG/DL (ref 5–40)
WBC # BLD AUTO: 7.2 X10E3/UL (ref 3.4–10.8)
WBC #/AREA URNS HPF: ABNORMAL /HPF (ref 0–5)

## 2020-07-31 NOTE — PROGRESS NOTES
Very high glycohemoglobin and triglycerides remain high. What is she taking on a daily basis for her cholesterol and for her diabetes because we need to adjust those. Vitamin D level is low so increase dose by thousand units daily.

## 2020-08-10 NOTE — PROGRESS NOTES
Very high glycohemoglobin and triglycerides remain high. What is she taking on a daily basis for her cholesterol and for her diabetes because we need to adjust those. Vitamin D level is low so increase dose by thousand units daily. Discussed medications for Diabetes with patient. She is supposed to be taking Lantus 40 units BID and Metformin 500 mg 1 in the am and 2 in the pm.  States she forgets the evening doses a lot. Plans to try to do better with her medication as work on diet.

## 2020-08-27 PROBLEM — Z00.00 MEDICARE ANNUAL WELLNESS VISIT, SUBSEQUENT: Status: RESOLVED | Noted: 2018-05-09 | Resolved: 2020-08-27

## 2020-09-09 NOTE — PROGRESS NOTES
Lab results okay except for a marked increase cholesterol triglycerides and increase blood sugar and glycol. I have listed current dose of Lantus is 50 units daily. If that is the case switch her dose to 35 units of Lantus 2 times daily. Make sure she is taking her medications. Patient returned call to the office and is requesting to speak to the nurse with Dr. Ralph only. Call connected to [facility] Triage queue.  Routed to Provider's clinical pool.

## 2020-09-22 DIAGNOSIS — I10 HYPERTENSION, UNSPECIFIED TYPE: ICD-10-CM

## 2020-09-22 RX ORDER — LISINOPRIL AND HYDROCHLOROTHIAZIDE 12.5; 2 MG/1; MG/1
2 TABLET ORAL DAILY
Qty: 180 TAB | Refills: 3 | Status: SHIPPED | OUTPATIENT
Start: 2020-09-22 | End: 2020-09-25 | Stop reason: SDUPTHER

## 2020-09-25 DIAGNOSIS — I10 HYPERTENSION, UNSPECIFIED TYPE: ICD-10-CM

## 2020-09-25 RX ORDER — LISINOPRIL AND HYDROCHLOROTHIAZIDE 12.5; 2 MG/1; MG/1
2 TABLET ORAL DAILY
Qty: 180 TAB | Refills: 3 | Status: SHIPPED | OUTPATIENT
Start: 2020-09-25 | End: 2021-01-18 | Stop reason: SDUPTHER

## 2020-09-25 NOTE — TELEPHONE ENCOUNTER
RX refill request from the patient/pharmacy. Patient last seen 07- with labs, and next appt. scheduled for 10-  Requested Prescriptions     Pending Prescriptions Disp Refills    lisinopril-hydroCHLOROthiazide (PRINZIDE, ZESTORETIC) 20-12.5 mg per tablet 180 Tab 3     Sig: Take 2 Tabs by mouth daily. Laurie Alvarez

## 2020-10-29 NOTE — PROGRESS NOTES
Chief Complaint   Patient presents with    Hypertension     3 month follow up    Diabetes    Cholesterol Problem       SUBJECTIVE:    Sen Sepulveda is a 63 y.o. female who returns in follow-up from her medical problems include hypertension, diabetes, hyperlipidemia, asthma, CKD stage III, prior CVA with resultant hemiplegia, obesity and other multiple medical problems. She has taken her medications and trying to follow her diet and remain physically active. She currently does have limitation secondary to hemiplegia. She currently denies any chest pain, shortness of breath, palpitations, PND, orthopnea or other cardiac or respiratory complaints. She notes no GI or  complaints. She notes no headaches, dizziness or neurologic complaints. She has no current active arthritic complaints and there are no other complaints on complete review of systems. Current Outpatient Medications   Medication Sig Dispense Refill    lisinopril-hydroCHLOROthiazide (PRINZIDE, ZESTORETIC) 20-12.5 mg per tablet Take 2 Tabs by mouth daily. 180 Tab 3    insulin detemir U-100 (LEVEMIR FLEXTOUCH) 100 unit/mL (3 mL) inpn Take 40 units two times daily 4 Pen 5    metoprolol succinate (TOPROL-XL) 50 mg XL tablet Take 1 Tab by mouth daily. 90 Tab 1    amLODIPine (NORVASC) 10 mg tablet Take one tablet daily 30 Tab 5    albuterol (ProAir HFA) 90 mcg/actuation inhaler Take 2 Puffs by inhalation every four (4) hours as needed for Wheezing. 1 Inhaler 5    Insulin Needles, Disposable, (Pen Needles) 31 gauge x 1/4\" ndle Use daily as directed with her insulin pen. 100 Pen Needle prn    potassium chloride (Klor-Con M20) 20 mEq tablet Take 2 Tabs by mouth two (2) times a day. 120 Tab 12    valsartan (DIOVAN) 320 mg tablet Take 1 Tab by mouth daily. 90 Tab 2    magnesium oxide (MAG-OX) 400 mg tablet Take 1 Tab by mouth two (2) times a day.  180 Tab 3    cloNIDine HCL (CATAPRES) 0.1 mg tablet TAKE 1 TABLET BY MOUTH TWICE A DAY      pravastatin (PRAVACHOL) 80 mg tablet Take 1 Tab by mouth nightly. 90 Tab 3    metFORMIN ER (GLUCOPHAGE XR) 500 mg tablet Take 1 tablet by mouth in the morning and take 2 tablets before dinner. 270 Tab 3    aspirin (ASPIRIN) 325 mg tablet Take 325 mg by mouth daily.  ALPRAZolam (XANAX) 0.5 mg tablet Take 1 Tab by mouth daily as needed for Anxiety. 30 Tab 0    mirtazapine (REMERON) 15 mg tablet Take  by mouth nightly.  FERROUS SULFATE (IRON PO) Take 1 Tab by mouth.  cholecalciferol (VITAMIN D3) 1,000 unit cap Take 2,000 Units by mouth daily.  ascorbic acid (VITAMIN C) 250 mg tablet Take  by mouth.          Past Medical History:   Diagnosis Date    Abnormal mammogram with microcalcification 6/4/2013    Right  UOQ    Aphasia due to stroke     SPEAKS SOME    Arthritis     back    Asthma 9/7/2017    CKD (chronic kidney disease) 9/7/2017    CKD (chronic kidney disease), stage III 9/7/2017    [address]A [facility]cerebral [facility] accident) ([facility] 9/7/2017    Depression 9/7/2017    Diabetes ([facility]     TYPE 2; IDDM    Edema 9/7/2017    H/O mammogram 07/29/2019    Pt. report pap was done last year at [facility] Hypertension     Hypertension with renal disease 9/7/2017    Hypomagnesemia 9/7/2017    Morbid obesity ([facility] 9/7/2017    On statin therapy 9/7/2017    Osteopenia 9/7/2017    Psychiatric disorder     depression    Smear, vaginal, as part of routine gynecological examination 07/29/2019    Pt. reported pap was done last year at [facility] Stroke [facility]) 5/11/2010    RIGHT SIDE WEAKNESS    Vitamin D deficiency 9/7/2017     Past Surgical History:   Procedure Laterality Date    BREAST SURGERY PROCEDURE UNLISTED      HX CATARACT REMOVAL  Dec 2010/ Jan 2011    bilateral cataracts removed    HX GI      HEMMORHOIDECTOMY    HX GYN      pmb    HX HEENT      tonsillectomy    HX OOPHORECTOMY Bilateral     HX OTHER SURGICAL      HX TONSILLECTOMY      IN LAPAROSCOPY W TOT HYSTERECTUTERUS <=250 Kathia Ohms TUBE/OVARY  6/2011    leiomyomata     No Known Allergies    REVIEW OF SYSTEMS:  General: negative for - chills or fever, or weight loss or gain  ENT: negative for - headaches, nasal congestion or tinnitus  Eyes: no blurred or visual changes  Neck: No stiffness or swollen nodes  Respiratory: negative for - cough, hemoptysis, shortness of breath or wheezing  Cardiovascular : negative for - chest pain, edema, palpitations or shortness of breath  Gastrointestinal: negative for - abdominal pain, blood in stools, heartburn or nausea/vomiting  Genito-Urinary: no dysuria, trouble voiding, or hematuria  Musculoskeletal: negative for - gait disturbance, joint pain, joint stiffness or joint swelling  Neurological: no TIA or or change of her chronic stroke symptoms  Hematologic: no bruises, no bleeding  Lymphatic: no swollen glands  Integument: no lumps, mole changes, nail changes or rash  Endocrine:no malaise/lethargy poly uria or polydipsia or unexpected weight changes        Social History     Socioeconomic History    Marital status:      Spouse name: Not on file    Number of children: Not on file    Years of education: Not on file    Highest education level: Not on file   Tobacco Use    Smoking status: Former Smoker     Last attempt to quit: 5/12/2010     Years since quitting: 10.4    Smokeless tobacco: Never Used   Substance and Sexual Activity    Alcohol use: No    Drug use: No    Sexual activity: Never   Social History Narrative    ** Merged History Encounter **          Family History   Problem Relation Age of Onset    Hypertension Mother     MS Mother     Diabetes Father     Stroke Father     Heart Disease Father     Hypertension Father     Post-op Nausea/Vomiting Sister     Breast Cancer Paternal Aunt        OBJECTIVE:     Visit Vitals  /82   Pulse 71   Temp 98.6 °F (37 °C) (Oral)   Resp 18   Ht 5' 4\" (1.626 m)   Wt 227 lb 14.4 oz (103.4 kg)   LMP 06/13/2011 SpO2 98%   BMI 39.12 kg/m²     CONSTITUTIONAL:   well nourished, appears age appropriate  EYES: sclera anicteric, PERRL, EOMI  ENMT:nares clear, moist mucous membranes, pharynx clear  NECK: supple. Thyroid normal, No JVD or bruits  RESPIRATORY: Chest: clear to ascultation and percussion, normal inspiratory effort  CARDIOVASCULAR: Heart: regular rate and rhythm no murmurs, rubs or gallops, PMI not displaced, No thrills, no peripheral edema  GASTROINTESTINAL: Abdomen: non distended, soft, non tender, bowel sounds normal  HEMATOLOGIC: no purpura, petechiae or bruising  LYMPHATIC: No lymph node enlargemant  MUSCULOSKELETAL: Extremities: no active synovitis, pulse 1+   INTEGUMENT: No unusual rashes or suspicious skin lesions noted. Nails appear normal.  PERIPHERAL VASCULAR: normal pulses femoral, PT and DP  NEUROLOGIC: Right hemiplegia without change, A & O X 3  PSYCHIATRIC:, appropriate affect     ASSESSMENT:   1. Hypertension with renal disease    2. Controlled type 2 diabetes mellitus with stage 3 chronic kidney disease, without long-term current use of insulin ([address]    3. Mixed hyperlipidemia    4. History of CVA (cerebrovascular accident)    5. Stage 3 chronic kidney disease, unspecified whether stage 3a or 3b CKD    6. Mild intermittent asthma without complication    7. Severe obesity (BMI 35.0-39. 9) with comorbidity ([address]    8. Needs flu shot      Impression  1. Hypertension control adequate on recheck by me so continue current medicines reviewed with her and her . 2.  Diabetes repeat status pending a prior lab reviewed now make adjustments if necessary. 3   Hyperlipidemia prior lab reviewed and repeat status pending and I will adjust if needed. 4.  CVA with resultant right hemiplegia chronic but stable continue aspirin  5. CKD stage III repeat status pending next #6 asthma that is stable  7. Obesity we did discuss diet, exercise and weight reduction for overall health benefit.   Flu shot given today.  I will call with lab and make further recommendations or adjustments if necessary. Follow-up in 3 months or sooner if there is a problem. PLAN:  .  Orders Placed This Encounter    Influenza Virus Vaccine QUAD, PF Syr 6 Months + (Flulaval, Fluzone, Fluarix 23162)    METABOLIC PANEL, COMPREHENSIVE ([facility])    LIPID PANEL ([facility])    CK ([facility])    HEMOGLOBIN A1C W/O EAG ([facility])         ATTENTION:   This medical record was transcribed using an electronic medical records system. Although proofread, it may and can contain electronic and spelling errors. Other human spelling and other errors may be present. Corrections may be executed at a later time. Please feel free to contact us for any clarifications as needed. Follow-up and Dispositions    · Return in about 3 months (around 1/30/2021). No results found for any visits on 10/30/20. Sabiha Galvez MD    The patient verbalized understanding of the problems and plans as explained.

## 2020-10-30 ENCOUNTER — OFFICE VISIT (OUTPATIENT)
Dept: INTERNAL MEDICINE CLINIC | Age: 55
End: 2020-10-30
Payer: MEDICARE

## 2020-10-30 VITALS
OXYGEN SATURATION: 98 % | WEIGHT: 227.9 LBS | DIASTOLIC BLOOD PRESSURE: 82 MMHG | TEMPERATURE: 98.6 F | HEIGHT: 64 IN | HEART RATE: 71 BPM | RESPIRATION RATE: 18 BRPM | SYSTOLIC BLOOD PRESSURE: 136 MMHG | BODY MASS INDEX: 38.91 KG/M2

## 2020-10-30 DIAGNOSIS — Z23 NEEDS FLU SHOT: ICD-10-CM

## 2020-10-30 DIAGNOSIS — N18.30 CONTROLLED TYPE 2 DIABETES MELLITUS WITH STAGE 3 CHRONIC KIDNEY DISEASE, WITHOUT LONG-TERM CURRENT USE OF INSULIN (HCC): ICD-10-CM

## 2020-10-30 DIAGNOSIS — E11.22 CONTROLLED TYPE 2 DIABETES MELLITUS WITH STAGE 3 CHRONIC KIDNEY DISEASE, WITHOUT LONG-TERM CURRENT USE OF INSULIN (HCC): ICD-10-CM

## 2020-10-30 DIAGNOSIS — E78.2 MIXED HYPERLIPIDEMIA: ICD-10-CM

## 2020-10-30 DIAGNOSIS — E66.01 SEVERE OBESITY (BMI 35.0-39.9) WITH COMORBIDITY (HCC): ICD-10-CM

## 2020-10-30 DIAGNOSIS — N18.30 STAGE 3 CHRONIC KIDNEY DISEASE, UNSPECIFIED WHETHER STAGE 3A OR 3B CKD (HCC): ICD-10-CM

## 2020-10-30 DIAGNOSIS — J45.20 MILD INTERMITTENT ASTHMA WITHOUT COMPLICATION: ICD-10-CM

## 2020-10-30 DIAGNOSIS — I12.9 HYPERTENSION WITH RENAL DISEASE: Primary | ICD-10-CM

## 2020-10-30 DIAGNOSIS — Z86.73 HISTORY OF CVA (CEREBROVASCULAR ACCIDENT): ICD-10-CM

## 2020-10-30 LAB
A-G RATIO,AGRAT: 1.3 RATIO
ALBUMIN SERPL-MCNC: 4.3 G/DL (ref 3.9–5.4)
ALP SERPL-CCNC: 81 U/L (ref 38–126)
ALT SERPL-CCNC: 22 U/L (ref 0–35)
ANION GAP SERPL CALC-SCNC: 10 MMOL/L
AST SERPL W P-5'-P-CCNC: 24 U/L (ref 14–36)
BILIRUB SERPL-MCNC: 0.4 MG/DL (ref 0.2–1.3)
BUN SERPL-MCNC: 18 MG/DL (ref 7–17)
BUN/CREATININE RATIO,BUCR: 23 RATIO
CALCIUM SERPL-MCNC: 10.1 MG/DL (ref 8.4–10.2)
CHLORIDE SERPL-SCNC: 108 MMOL/L (ref 98–107)
CK SERPL-CCNC: 38 U/L (ref 30–135)
CO2 SERPL-SCNC: 27 MMOL/L (ref 22–32)
CREAT SERPL-MCNC: 0.8 MG/DL (ref 0.7–1.2)
GLOBULIN,GLOB: 3.3
GLUCOSE SERPL-MCNC: 91 MG/DL (ref 65–105)
HBA1C MFR BLD HPLC: 9.7 % (ref 4–5.7)
POTASSIUM SERPL-SCNC: 5.1 MMOL/L (ref 3.6–5)
PROT SERPL-MCNC: 7.6 G/DL (ref 6.3–8.2)
SODIUM SERPL-SCNC: 145 MMOL/L (ref 137–145)

## 2020-10-30 PROCEDURE — 82550 ASSAY OF CK (CPK): CPT | Performed by: INTERNAL MEDICINE

## 2020-10-30 PROCEDURE — 3046F HEMOGLOBIN A1C LEVEL >9.0%: CPT | Performed by: INTERNAL MEDICINE

## 2020-10-30 PROCEDURE — 2022F DILAT RTA XM EVC RTNOPTHY: CPT | Performed by: INTERNAL MEDICINE

## 2020-10-30 PROCEDURE — 3017F COLORECTAL CA SCREEN DOC REV: CPT | Performed by: INTERNAL MEDICINE

## 2020-10-30 PROCEDURE — G8427 DOCREV CUR MEDS BY ELIG CLIN: HCPCS | Performed by: INTERNAL MEDICINE

## 2020-10-30 PROCEDURE — G8754 DIAS BP LESS 90: HCPCS | Performed by: INTERNAL MEDICINE

## 2020-10-30 PROCEDURE — G8752 SYS BP LESS 140: HCPCS | Performed by: INTERNAL MEDICINE

## 2020-10-30 PROCEDURE — 83036 HEMOGLOBIN GLYCOSYLATED A1C: CPT | Performed by: INTERNAL MEDICINE

## 2020-10-30 PROCEDURE — G9899 SCRN MAM PERF RSLTS DOC: HCPCS | Performed by: INTERNAL MEDICINE

## 2020-10-30 PROCEDURE — G8419 CALC BMI OUT NRM PARAM NOF/U: HCPCS | Performed by: INTERNAL MEDICINE

## 2020-10-30 PROCEDURE — 80053 COMPREHEN METABOLIC PANEL: CPT | Performed by: INTERNAL MEDICINE

## 2020-10-30 PROCEDURE — 99214 OFFICE O/P EST MOD 30 MIN: CPT | Performed by: INTERNAL MEDICINE

## 2020-10-30 PROCEDURE — G9717 DOC PT DX DEP/BP F/U NT REQ: HCPCS | Performed by: INTERNAL MEDICINE

## 2020-10-30 PROCEDURE — 90686 IIV4 VACC NO PRSV 0.5 ML IM: CPT

## 2020-10-30 PROCEDURE — G0008 ADMIN INFLUENZA VIRUS VAC: HCPCS

## 2020-10-30 NOTE — PROGRESS NOTES
Chief Complaint   Patient presents with    Hypertension     3 month follow up    Diabetes    Cholesterol Problem     Visit Vitals  BP (!) 146/84 (BP 1 Location: Left arm, BP Patient Position: Sitting)   Pulse 71   Temp 98.6 °F (37 °C) (Oral)   Resp 18   Ht 5' 4\" (1.626 m)   Wt 227 lb 14.4 oz (103.4 kg)   LMP 06/13/2011   SpO2 98%   BMI 39.12 kg/m²     1. Have you been to the ER, urgent care clinic since your last visit? Hospitalized since your last visit? No    2. Have you seen or consulted any other health care providers outside of the [address] [place] [address] since your last visit? Include any pap smears or colon screening. No     After obtaining consent and per verbal order from Dr. Tin Diez, patient received influenza vaccine given by Richie Omer and verified by Bard Hunter. Flulaval Influenza 0.5ml was given IM in right deltoid. Patient tolerated injection and was observed for 10 minutes post injection. VIS was given.

## 2020-10-31 LAB
CHOLEST SERPL-MCNC: 233 MG/DL (ref 100–199)
HDLC SERPL-MCNC: 40 MG/DL
LDLC SERPL CALC-MCNC: 98 MG/DL (ref 0–99)
TRIGL SERPL-MCNC: 565 MG/DL (ref 0–149)
VLDLC SERPL CALC-MCNC: 95 MG/DL (ref 5–40)

## 2020-11-03 NOTE — PROGRESS NOTES
Increase triglycerides as well as markedly increased glycohemoglobin and a very low HDL. Cholesterol still increased. Let us see if insurance will cover Vascepa 2 tablets twice daily. Also increased insulin by an additional 6 units twice daily.

## 2020-11-06 NOTE — TELEPHONE ENCOUNTER
PCP: Fazal Veloz MD    Last appt: 10/30/2020  Future Appointments   Date Time Provider Yolanda Sofia   2/2/2021  8:30 AM Fazal Veloz MD [facility] AMB       Requested Prescriptions     Pending Prescriptions Disp Refills    icosapent ethyL (VASCEPA) 1 gram capsule 120 Cap 2     Sig: Take 2 Caps by mouth two (2) times daily (with meals).     insulin detemir U-100 (LEVEMIR FLEXTOUCH) 100 unit/mL (3 mL) inpn 4 Pen 5     Sig: Take 46 units two times daily *dose increase 11/6/20*

## 2020-11-06 NOTE — TELEPHONE ENCOUNTER
PCP: Ignacio Emmanuel MD    Last appt: 10/30/2020  Future Appointments   Date Time Provider Yolanda Sofia   2/2/2021  8:30 AM Ignacio Emmanuel MD [facility] AMB       Requested Prescriptions     Pending Prescriptions Disp Refills    pravastatin (PRAVACHOL) 80 mg tablet 90 Tab 3     Sig: Take 1 Tab by mouth nightly.

## 2020-11-06 NOTE — PROGRESS NOTES
Called and spoke to patient  Two pt identifiers confirmed  Informed patient per Dr. Mary Miller that labs show increased triglycerides as well as markedly increased glycohemoglobin and a very low HDL. Cholesterol is also still  increased. Advised patient to try Vascepa 2 tablets twice daily and Also increased insulin by an additional 6 units twice daily. Rx was sent to [facility] on file. Patient verbalized understanding of information discussed  with no further questions at this time.

## 2020-11-08 RX ORDER — PRAVASTATIN SODIUM 80 MG/1
80 TABLET ORAL
Qty: 90 TAB | Refills: 3 | Status: SHIPPED | OUTPATIENT
Start: 2020-11-08 | End: 2021-10-06

## 2020-11-08 RX ORDER — ICOSAPENT ETHYL 1000 MG/1
2 CAPSULE ORAL 2 TIMES DAILY WITH MEALS
Qty: 120 CAP | Refills: 2 | Status: SHIPPED | OUTPATIENT
Start: 2020-11-08 | End: 2021-08-06 | Stop reason: ALTCHOICE

## 2020-11-24 RX ORDER — METFORMIN HYDROCHLORIDE 500 MG/1
TABLET, EXTENDED RELEASE ORAL
Qty: 270 TAB | Refills: 3 | Status: SHIPPED | OUTPATIENT
Start: 2020-11-24 | End: 2021-01-18 | Stop reason: SDUPTHER

## 2020-11-24 NOTE — TELEPHONE ENCOUNTER
RX refill request from the patient/pharmacy. Patient last seen 10- with labs, and next appt. scheduled for 02-  Requested Prescriptions     Pending Prescriptions Disp Refills    metFORMIN ER (GLUCOPHAGE XR) 500 mg tablet 270 Tab 3     Sig: Take 1 tablet by mouth in the morning and take 2 tablets before dinner. Laure Montenegro

## 2021-01-18 DIAGNOSIS — I10 HYPERTENSION, UNSPECIFIED TYPE: ICD-10-CM

## 2021-01-18 RX ORDER — METFORMIN HYDROCHLORIDE 500 MG/1
TABLET, EXTENDED RELEASE ORAL
Qty: 270 TAB | Refills: 3 | Status: SHIPPED | OUTPATIENT
Start: 2021-01-18 | End: 2021-01-21 | Stop reason: SDUPTHER

## 2021-01-18 RX ORDER — LISINOPRIL AND HYDROCHLOROTHIAZIDE 12.5; 2 MG/1; MG/1
2 TABLET ORAL DAILY
Qty: 180 TAB | Refills: 3 | Status: SHIPPED | OUTPATIENT
Start: 2021-01-18 | End: 2021-03-12

## 2021-01-18 NOTE — TELEPHONE ENCOUNTER
Requested Prescriptions     Pending Prescriptions Disp Refills    lisinopril-hydroCHLOROthiazide (PRINZIDE, ZESTORETIC) 20-12.5 mg per tablet 180 Tab 3     Sig: Take 2 Tabs by mouth daily.  metFORMIN ER (GLUCOPHAGE XR) 500 mg tablet 270 Tab 3     Sig: Take 1 tablet by mouth in the morning and take 2 tablets before dinner.        Next appointment scheduled 2/12 @8:30

## 2021-01-21 NOTE — TELEPHONE ENCOUNTER
PCP: Dave Lowe MD    Last appt: 10/30/2020  Future Appointments   Date Time Provider Yolanda Sofia   2/2/2021  8:30 AM Dave Lowe MD [facility] AMB           Requested Prescriptions     Pending Prescriptions Disp Refills    metFORMIN ER (GLUCOPHAGE XR) 500 mg tablet 270 Tab 3     Sig: Take 1 tablet by mouth in the morning and take 2 tablets before dinner.

## 2021-01-22 RX ORDER — METFORMIN HYDROCHLORIDE 500 MG/1
TABLET, EXTENDED RELEASE ORAL
Qty: 270 TAB | Refills: 3 | Status: SHIPPED | OUTPATIENT
Start: 2021-01-22 | End: 2022-01-28

## 2021-02-01 NOTE — PROGRESS NOTES
Chief Complaint   Patient presents with    Diabetes     3 month f/up    Hypertension    Cholesterol Problem    Chronic Kidney Disease    Neurologic Problem     history of CVA       SUBJECTIVE:    Adalgisa Grier is a 61 y.o. female returns in follow-up for medical problems include hypertension, diabetes, hyperlipidemia, CKD, status post CVA with resultant hemolytic plegia, anxiety with depression and other multiple problems. She is accompanied by her  today. She is taking her medications and trying to follow her diet and try and remain physically active although she is limited because she is wheelchair-bound for the most part secondary to her hemiplegia from her stroke. She denies any chest pain, shortness of breath, palpitations, PND, orthopnea or other cardiac or respiratory complaints. There are no GI or  complaints. She has no neuro logic changes and and no other complaints on complete review of systems. Current Outpatient Medications   Medication Sig Dispense Refill    metoprolol succinate (TOPROL-XL) 50 mg XL tablet Take 1 Tab by mouth daily. 90 Tab 1    albuterol (ProAir HFA) 90 mcg/actuation inhaler Take 2 Puffs by inhalation every four (4) hours as needed for Wheezing. 1 Inhaler 5    amLODIPine (NORVASC) 10 mg tablet Take one tablet daily 30 Tab 5    metFORMIN ER (GLUCOPHAGE XR) 500 mg tablet Take 1 tablet by mouth in the morning and take 2 tablets before dinner. 270 Tab 3    lisinopril-hydroCHLOROthiazide (PRINZIDE, ZESTORETIC) 20-12.5 mg per tablet Take 2 Tabs by mouth daily. 180 Tab 3    insulin detemir U-100 (LEVEMIR FLEXTOUCH) 100 unit/mL (3 mL) inpn Take 46 units two times daily *dose increase 11/6/20* 4 Pen 5    pravastatin (PRAVACHOL) 80 mg tablet Take 1 Tab by mouth nightly. 90 Tab 3    Insulin Needles, Disposable, (Pen Needles) 31 gauge x 1/4\" ndle Use daily as directed with her insulin pen.  100 Pen Needle prn    potassium chloride (Klor-Con M20) 20 mEq tablet Take 2 Tabs by mouth two (2) times a day. 120 Tab 12    valsartan (DIOVAN) 320 mg tablet Take 1 Tab by mouth daily. 90 Tab 2    magnesium oxide (MAG-OX) 400 mg tablet Take 1 Tab by mouth two (2) times a day. 180 Tab 3    cloNIDine HCL (CATAPRES) 0.1 mg tablet TAKE 1 TABLET BY MOUTH TWICE A DAY      aspirin (ASPIRIN) 325 mg tablet Take 325 mg by mouth daily.  ALPRAZolam (XANAX) 0.5 mg tablet Take 1 Tab by mouth daily as needed for Anxiety. 30 Tab 0    mirtazapine (REMERON) 15 mg tablet Take  by mouth nightly.  FERROUS SULFATE (IRON PO) Take 1 Tab by mouth.  cholecalciferol (VITAMIN D3) 1,000 unit cap Take 2,000 Units by mouth daily.  ascorbic acid (VITAMIN C) 250 mg tablet Take  by mouth.  icosapent ethyL (VASCEPA) 1 gram capsule Take 2 Caps by mouth two (2) times daily (with meals).  120 Cap 2     Past Medical History:   Diagnosis Date    Abnormal mammogram with microcalcification 6/4/2013    Right  UOQ    Aphasia due to stroke     SPEAKS SOME    Arthritis     back    Asthma 9/7/2017    CKD (chronic kidney disease) 9/7/2017    CKD (chronic kidney disease), stage III 9/7/2017    CVA (cerebral vascular accident) ([address] 9/7/2017    Depression 9/7/2017    Diabetes ([address]     TYPE 2; IDDM    Edema 9/7/2017    H/O mammogram 07/29/2019    Pt. report pap was done last year at [facility] Hypertension     Hypertension with renal disease 9/7/2017    Hypomagnesemia 9/7/2017    Morbid obesity ([address] 9/7/2017    On statin therapy 9/7/2017    Osteopenia 9/7/2017    Psychiatric disorder     depression    Smear, vaginal, as part of routine gynecological examination 07/29/2019    Pt. reported pap was done last year at [facility] Stroke [facility]) 5/11/2010    RIGHT SIDE WEAKNESS    Vitamin D deficiency 9/7/2017     Past Surgical History:   Procedure Laterality Date    HX CATARACT REMOVAL  Dec 2010/ Jan 2011    bilateral cataracts removed    HX GI HEMMORHOIDECTOMY    HX GYN      pmb    HX HEENT      tonsillectomy    HX OOPHORECTOMY Bilateral     HX OTHER SURGICAL      HX TONSILLECTOMY      CO BREAST SURGERY PROCEDURE UNLISTED      CO LAPAROSCOPY W TOT HYSTERECTUTERUS <=250 GRAM  W TUBE/OVARY  6/2011    leiomyomata     No Known Allergies    REVIEW OF SYSTEMS:  General: negative for - chills or fever, or weight loss or gain  ENT: negative for - headaches, nasal congestion or tinnitus  Eyes: no blurred or visual changes  Neck: No stiffness or swollen nodes  Respiratory: negative for - cough, hemoptysis, shortness of breath or wheezing  Cardiovascular : negative for - chest pain, edema, palpitations or shortness of breath  Gastrointestinal: negative for - abdominal pain, blood in stools, heartburn or nausea/vomiting  Genito-Urinary: no dysuria, trouble voiding, or hematuria  Musculoskeletal: negative for - gait disturbance, joint pain, joint stiffness or joint swelling  Neurological: no TIA or stroke symptoms  Hematologic: no bruises, no bleeding  Lymphatic: no swollen glands  Integument: no lumps, mole changes, nail changes or rash  Endocrine:no malaise/lethargy poly uria or polydipsia or unexpected weight changes        Social History     Socioeconomic History    Marital status:      Spouse name: Not on file    Number of children: Not on file    Years of education: Not on file    Highest education level: Not on file   Tobacco Use    Smoking status: Former Smoker     Quit date: 5/12/2010     Years since quitting: 10.7    Smokeless tobacco: Never Used   Substance and Sexual Activity    Alcohol use: No    Drug use: No    Sexual activity: Never   Social History Narrative    ** Merged History Encounter **          Family History   Problem Relation Age of Onset    Hypertension Mother     MS Mother     Diabetes Father     Stroke Father     Heart Disease Father     Hypertension Father     Post-op Nausea/Vomiting Sister     Breast Cancer Paternal Aunt        OBJECTIVE:     Visit Vitals  /76 (BP 1 Location: Left upper arm, BP Patient Position: Sitting)   Pulse 86   Temp 97.7 °F (36.5 °C)   Resp 18   Ht 5' 4\" (1.626 m)   Wt 223 lb 6.4 oz (101.3 kg)   LMP 06/13/2011   SpO2 95%   BMI 38.35 kg/m²     CONSTITUTIONAL:   well nourished, appears age appropriate  EYES: sclera anicteric, PERRL, EOMI  ENMT:nares clear, moist mucous membranes, pharynx clear  NECK: supple. Thyroid normal, No JVD or bruits  RESPIRATORY: Chest: clear to ascultation and percussion, normal inspiratory effort  CARDIOVASCULAR: Heart: regular rate and rhythm no murmurs, rubs or gallops, PMI not displaced, No thrills, no peripheral edema  GASTROINTESTINAL: Abdomen: non distended, soft, non tender, bowel sounds normal  HEMATOLOGIC: no purpura, petechiae or bruising  LYMPHATIC: No lymph node enlargemant  MUSCULOSKELETAL: Extremities: no active synovitis, pulse 1+. No diabetic foot changes noted  INTEGUMENT: No unusual rashes or suspicious skin lesions noted. Nails appear normal.  PERIPHERAL VASCULAR: normal pulses femoral, PT and DP  NEUROLOGIC: Residual right hemiplegia, A & O X 3. Decreased sensation in all toes right foot  PSYCHIATRIC:, appropriate affect     ASSESSMENT:   1. Hypertension with renal disease    2. Controlled type 2 diabetes mellitus with stage 3 chronic kidney disease, without long-term current use of insulin ([address]    3. Mixed hyperlipidemia    4. Mild intermittent asthma without complication    5. History of CVA (cerebrovascular accident)    6. Stage 3 chronic kidney disease, unspecified whether stage 3a or 3b CKD    7. Severe obesity (BMI 35.0-39. 9) with comorbidity ([address]    8. Mild depression (HCC)      impression  1. Hypertension that is very well controlled today. 2.  Diabetes repeat status pending and prior lab reviewed now make adjustments if necessary. 3.  Hyperlipidemia prior lab reviewed repeat status pending I will adjust if needed.   4.  Asthma that is stable  5. Prior CVA currently on aspirin continue current treatment. Stable  6. CKD stage III repeat status pending  7. Obesity I did discuss diet and weight reduction. 8.  Depression that is stable  I will call with lab and make further recommendations or adjustments if necessary. Follow-up in 3 months or sooner if there is a problem. PLAN:  .  Orders Placed This Encounter    METABOLIC PANEL, COMPREHENSIVE ([facility])    LIPID PANEL ([facility])    CK ([facility])    HEMOGLOBIN A1C W/O EAG ([facility])    metoprolol succinate (TOPROL-XL) 50 mg XL tablet    albuterol (ProAir HFA) 90 mcg/actuation inhaler    amLODIPine (NORVASC) 10 mg tablet         ATTENTION:   This medical record was transcribed using an electronic medical records system. Although proofread, it may and can contain electronic and spelling errors. Other human spelling and other errors may be present. Corrections may be executed at a later time. Please feel free to contact us for any clarifications as needed. Follow-up and Dispositions    · Return in about 3 months (around 5/2/2021). No results found for any visits on 02/02/21. Harjit Abad MD    The patient verbalized understanding of the problems and plans as explained.

## 2021-02-02 ENCOUNTER — OFFICE VISIT (OUTPATIENT)
Dept: INTERNAL MEDICINE CLINIC | Age: 56
End: 2021-02-02
Payer: MEDICARE

## 2021-02-02 VITALS
RESPIRATION RATE: 18 BRPM | OXYGEN SATURATION: 95 % | WEIGHT: 223.4 LBS | SYSTOLIC BLOOD PRESSURE: 120 MMHG | TEMPERATURE: 97.7 F | HEART RATE: 86 BPM | BODY MASS INDEX: 38.14 KG/M2 | DIASTOLIC BLOOD PRESSURE: 76 MMHG | HEIGHT: 64 IN

## 2021-02-02 DIAGNOSIS — E66.01 SEVERE OBESITY (BMI 35.0-39.9) WITH COMORBIDITY (HCC): ICD-10-CM

## 2021-02-02 DIAGNOSIS — J45.20 MILD INTERMITTENT ASTHMA WITHOUT COMPLICATION: ICD-10-CM

## 2021-02-02 DIAGNOSIS — F32.A MILD DEPRESSION: ICD-10-CM

## 2021-02-02 DIAGNOSIS — E78.2 MIXED HYPERLIPIDEMIA: ICD-10-CM

## 2021-02-02 DIAGNOSIS — I12.9 HYPERTENSION WITH RENAL DISEASE: Primary | ICD-10-CM

## 2021-02-02 DIAGNOSIS — N18.30 STAGE 3 CHRONIC KIDNEY DISEASE, UNSPECIFIED WHETHER STAGE 3A OR 3B CKD (HCC): ICD-10-CM

## 2021-02-02 DIAGNOSIS — N18.30 CONTROLLED TYPE 2 DIABETES MELLITUS WITH STAGE 3 CHRONIC KIDNEY DISEASE, WITHOUT LONG-TERM CURRENT USE OF INSULIN (HCC): ICD-10-CM

## 2021-02-02 DIAGNOSIS — Z86.73 HISTORY OF CVA (CEREBROVASCULAR ACCIDENT): ICD-10-CM

## 2021-02-02 DIAGNOSIS — E11.22 CONTROLLED TYPE 2 DIABETES MELLITUS WITH STAGE 3 CHRONIC KIDNEY DISEASE, WITHOUT LONG-TERM CURRENT USE OF INSULIN (HCC): ICD-10-CM

## 2021-02-02 LAB
A-G RATIO,AGRAT: 1.4 RATIO
ALBUMIN SERPL-MCNC: 4.2 G/DL (ref 3.9–5.4)
ALP SERPL-CCNC: 82 U/L (ref 38–126)
ALT SERPL-CCNC: 36 U/L (ref 0–35)
ANION GAP SERPL CALC-SCNC: 11 MMOL/L
AST SERPL W P-5'-P-CCNC: 43 U/L (ref 14–36)
BILIRUB SERPL-MCNC: 0.5 MG/DL (ref 0.2–1.3)
BUN SERPL-MCNC: 13 MG/DL (ref 7–17)
BUN/CREATININE RATIO,BUCR: 14 RATIO
CALCIUM SERPL-MCNC: 9.2 MG/DL (ref 8.4–10.2)
CHLORIDE SERPL-SCNC: 101 MMOL/L (ref 98–107)
CHOL/HDL RATIO,CHHD: 5 RATIO (ref 0–4)
CHOLEST SERPL-MCNC: 154 MG/DL (ref 0–200)
CK SERPL-CCNC: 105 U/L (ref 30–135)
CO2 SERPL-SCNC: 29 MMOL/L (ref 22–32)
CREAT SERPL-MCNC: 0.9 MG/DL (ref 0.7–1.2)
GLOBULIN,GLOB: 3.1
GLUCOSE SERPL-MCNC: 103 MG/DL (ref 65–105)
HBA1C MFR BLD HPLC: 9 % (ref 4–5.7)
HDLC SERPL-MCNC: 31 MG/DL (ref 35–130)
LDL/HDL RATIO,LDHD: 2 RATIO
LDLC SERPL CALC-MCNC: 55 MG/DL (ref 0–130)
POTASSIUM SERPL-SCNC: 4.8 MMOL/L (ref 3.6–5)
PROT SERPL-MCNC: 7.3 G/DL (ref 6.3–8.2)
SODIUM SERPL-SCNC: 141 MMOL/L (ref 137–145)
TRIGL SERPL-MCNC: 338 MG/DL (ref 0–200)
VLDLC SERPL CALC-MCNC: 68 MG/DL

## 2021-02-02 PROCEDURE — 82550 ASSAY OF CK (CPK): CPT | Performed by: INTERNAL MEDICINE

## 2021-02-02 PROCEDURE — 3017F COLORECTAL CA SCREEN DOC REV: CPT | Performed by: INTERNAL MEDICINE

## 2021-02-02 PROCEDURE — 99214 OFFICE O/P EST MOD 30 MIN: CPT | Performed by: INTERNAL MEDICINE

## 2021-02-02 PROCEDURE — 2022F DILAT RTA XM EVC RTNOPTHY: CPT | Performed by: INTERNAL MEDICINE

## 2021-02-02 PROCEDURE — G9717 DOC PT DX DEP/BP F/U NT REQ: HCPCS | Performed by: INTERNAL MEDICINE

## 2021-02-02 PROCEDURE — 80061 LIPID PANEL: CPT | Performed by: INTERNAL MEDICINE

## 2021-02-02 PROCEDURE — G8754 DIAS BP LESS 90: HCPCS | Performed by: INTERNAL MEDICINE

## 2021-02-02 PROCEDURE — G8417 CALC BMI ABV UP PARAM F/U: HCPCS | Performed by: INTERNAL MEDICINE

## 2021-02-02 PROCEDURE — 83036 HEMOGLOBIN GLYCOSYLATED A1C: CPT | Performed by: INTERNAL MEDICINE

## 2021-02-02 PROCEDURE — 3046F HEMOGLOBIN A1C LEVEL >9.0%: CPT | Performed by: INTERNAL MEDICINE

## 2021-02-02 PROCEDURE — G8752 SYS BP LESS 140: HCPCS | Performed by: INTERNAL MEDICINE

## 2021-02-02 PROCEDURE — 80053 COMPREHEN METABOLIC PANEL: CPT | Performed by: INTERNAL MEDICINE

## 2021-02-02 PROCEDURE — G8427 DOCREV CUR MEDS BY ELIG CLIN: HCPCS | Performed by: INTERNAL MEDICINE

## 2021-02-02 RX ORDER — METOPROLOL SUCCINATE 50 MG/1
50 TABLET, EXTENDED RELEASE ORAL DAILY
Qty: 90 TAB | Refills: 1 | Status: SHIPPED | OUTPATIENT
Start: 2021-02-02 | End: 2021-03-12

## 2021-02-02 RX ORDER — AMLODIPINE BESYLATE 10 MG/1
TABLET ORAL
Qty: 30 TAB | Refills: 5 | Status: SHIPPED | OUTPATIENT
Start: 2021-02-02 | End: 2021-03-12

## 2021-02-02 RX ORDER — ALBUTEROL SULFATE 90 UG/1
2 AEROSOL, METERED RESPIRATORY (INHALATION)
Qty: 1 INHALER | Refills: 5 | Status: SHIPPED | OUTPATIENT
Start: 2021-02-02 | End: 2022-02-08

## 2021-02-02 NOTE — PROGRESS NOTES
Chief Complaint   Patient presents with    Diabetes     3 month f/up    Hypertension    Cholesterol Problem    Chronic Kidney Disease    Neurologic Problem     history of CVA     1. Have you been to the ER, urgent care clinic since your last visit? Hospitalized since your last visit? No    2. Have you seen or consulted any other health care providers outside of the [address] [place] [address] since your last visit? Include any pap smears or colon screening.  No

## 2021-02-02 NOTE — PATIENT INSTRUCTIONS
Anemia: Care Instructions Your Care Instructions Anemia is a low level of red blood cells, which carry oxygen throughout your body. Many things can cause anemia. Lack of iron is one of the most common causes. Your body needs iron to make hemoglobin, a substance in red blood cells that carries oxygen from the lungs to your body's cells. Without enough iron, the body produces fewer and smaller red blood cells. As a result, your body's cells do not get enough oxygen, and you feel tired and weak. And you may have trouble concentrating. Bleeding is the most common cause of a lack of iron. You may have heavy menstrual bleeding or bleeding caused by conditions such as ulcers, hemorrhoids, or cancer. Regular use of aspirin or other anti-inflammatory medicines (such as ibuprofen) also can cause bleeding in some people. A lack of iron in your diet also can cause anemia, especially at times when the body needs more iron, such as during pregnancy, infancy, and the teen years. Your doctor may have prescribed iron pills. It may take several months of treatment for your iron levels to return to normal. Your doctor also may suggest that you eat foods that are rich in iron, such as meat and beans. There are many other causes of anemia. It is not always due to a lack of iron. Finding the specific cause of your anemia will help your doctor find the right treatment for you. Follow-up care is a key part of your treatment and safety. Be sure to make and go to all appointments, and call your doctor if you are having problems. It's also a good idea to know your test results and keep a list of the medicines you take. How can you care for yourself at home? · Take your medicines exactly as prescribed. Call your doctor if you think you are having a problem with your medicine. · If your doctor recommends iron pills, take them as directed: ? Try to take the pills on an empty stomach about 1 hour before or 2 hours after meals. But you may need to take iron with food to avoid an upset stomach. ? Do not take antacids or drink milk or caffeine drinks (such as coffee, tea, or cola) at the same time or within 2 hours of the time that you take your iron. They can make it hard for your body to absorb the iron. ? Vitamin C (from food or supplements) helps your body absorb iron. Try taking iron pills with a glass of orange juice or some other food that is high in vitamin C, such as citrus fruits. ? Iron pills may cause stomach problems, such as heartburn, nausea, diarrhea, constipation, and cramps. Be sure to drink plenty of fluids, and include fruits, vegetables, and fiber in your diet each day. Iron pills often make your bowel movements dark or green. ? If you forget to take an iron pill, do not take a double dose of iron the next time you take a pill. ? Keep iron pills out of the reach of small children. An overdose of iron can be very dangerous. · Follow your doctor's advice about eating iron-rich foods. These include red meat, shellfish, poultry, eggs, beans, raisins, whole-grain bread, and leafy green vegetables. · Steam vegetables to help them keep their iron content. When should you call for help? Call 911 anytime you think you may need emergency care. For example, call if: 
  · You have symptoms of a heart attack. These may include: 
? Chest pain or pressure, or a strange feeling in the chest. 
? Sweating. ? Shortness of breath. ? Nausea or vomiting. ? Pain, pressure, or a strange feeling in the back, neck, jaw, or upper belly or in one or both shoulders or arms. ? Lightheadedness or sudden weakness. ? A fast or irregular heartbeat. After you call 911, the  may tell you to chew 1 adult-strength or 2 to 4 low-dose aspirin. Wait for an ambulance. Do not try to drive yourself.  
  · You passed out (lost consciousness). Call your doctor now or seek immediate medical care if: 
  · You have new or increased shortness of breath.  
  · You are dizzy or lightheaded, or you feel like you may faint.  
  · Your fatigue and weakness continue or get worse.  
  · You have any abnormal bleeding, such as: 
? Nosebleeds. ? Vaginal bleeding that is different (heavier, more frequent, at a different time of the month) than what you are used to. 
? Bloody or black stools, or rectal bleeding. ? Bloody or pink urine. Watch closely for changes in your health, and be sure to contact your doctor if: 
  · You do not get better as expected. Where can you learn more? Go to http://www.watkins.com/ Enter R301 in the search box to learn more about \"Anemia: Care Instructions. \" Current as of: November 8, 2019               Content Version: 12.6 © 3899-6990 [employer]. Care instructions adapted under license by [employer] (which disclaims liability or warranty for this information). If you have questions about a medical condition or this instruction, always ask your healthcare professional. [address] any warranty or liability for your use of this information.

## 2021-02-03 NOTE — PROGRESS NOTES
Blood sugar is better and glycohemoglobin is still little better although far from goal so increase insulin to 54 units twice a day along with diet and weight reduction.

## 2021-02-05 NOTE — TELEPHONE ENCOUNTER
RX refill request from the patient/pharmacy. Patient last seen 02- with labs, and next appt. scheduled for 05-  Requested Prescriptions     Pending Prescriptions Disp Refills    insulin detemir U-100 (LEVEMIR FLEXTOUCH) 100 unit/mL (3 mL) inpn 4 Pen 5     Sig: Take 54 units two times daily *dose increase 02-   .

## 2021-02-05 NOTE — PROGRESS NOTES
Blood sugar is better and glycohemoglobin is still little better although far from goal so increase insulin to 54 units twice a day along with diet and weight reduction. Discussed lab with patient's .

## 2021-03-11 DIAGNOSIS — I10 HYPERTENSION, UNSPECIFIED TYPE: ICD-10-CM

## 2021-03-11 NOTE — TELEPHONE ENCOUNTER
PCP: Ivanna White MD    Last appt: 7/29/2020  Future Appointments   Date Time Provider Yolanda Sofia   5/4/2021  9:00 AM Ivanna White MD [facility] BS AMB       Last refilled:1/18/21    Requested Prescriptions     Pending Prescriptions Disp Refills    metoprolol succinate (TOPROL-XL) 50 mg XL tablet [Pharmacy Med Name: METOPROLOL SUCC ER 50 MG TAB] 90 Tab 1     Sig: TAKE 1 TABLET BY MOUTH EVERY DAY    lisinopril-hydroCHLOROthiazide (PRINZIDE, ZESTORETIC) 20-12.5 mg per tablet [Pharmacy Med Name: LISINOPRIL-HCTZ 20-12.5 MG TAB] 180 Tab 3     Sig: TAKE 2 TABLETS BY MOUTH EVERY DAY    amLODIPine (NORVASC) 10 mg tablet [Pharmacy Med Name: AMLODIPINE BESYLATE 10 MG TAB] 90 Tab 1     Sig: TAKE 1 TABLET BY MOUTH EVERY DAY    valsartan (DIOVAN) 320 mg tablet [Pharmacy Med Name: VALSARTAN 320 MG TABLET] 90 Tab 2     Sig: TAKE 1 TABLET BY MOUTH EVERY DAY

## 2021-03-12 RX ORDER — AMLODIPINE BESYLATE 10 MG/1
TABLET ORAL
Qty: 90 TAB | Refills: 1 | Status: SHIPPED | OUTPATIENT
Start: 2021-03-12 | End: 2021-08-02

## 2021-03-12 RX ORDER — LISINOPRIL AND HYDROCHLOROTHIAZIDE 12.5; 2 MG/1; MG/1
TABLET ORAL
Qty: 180 TAB | Refills: 3 | Status: SHIPPED | OUTPATIENT
Start: 2021-03-12 | End: 2022-01-28

## 2021-03-12 RX ORDER — METOPROLOL SUCCINATE 50 MG/1
TABLET, EXTENDED RELEASE ORAL
Qty: 90 TAB | Refills: 1 | Status: SHIPPED | OUTPATIENT
Start: 2021-03-12 | End: 2021-08-02

## 2021-03-12 RX ORDER — VALSARTAN 320 MG/1
TABLET ORAL
Qty: 90 TAB | Refills: 2 | Status: SHIPPED | OUTPATIENT
Start: 2021-03-12 | End: 2021-04-19

## 2021-04-19 RX ORDER — VALSARTAN 320 MG/1
TABLET ORAL
Qty: 90 TAB | Refills: 3 | Status: SHIPPED | OUTPATIENT
Start: 2021-04-19 | End: 2022-04-25 | Stop reason: SDUPTHER

## 2021-04-19 RX ORDER — INSULIN DETEMIR 100 [IU]/ML
INJECTION, SOLUTION SUBCUTANEOUS
Qty: 6 PEN | Refills: 5 | Status: SHIPPED | OUTPATIENT
Start: 2021-04-19 | End: 2021-08-10 | Stop reason: SDUPTHER

## 2021-04-19 NOTE — TELEPHONE ENCOUNTER
RX refill request from the patient/pharmacy. Patient last seen 02- with labs, and next appt. scheduled for 05-  Requested Prescriptions     Pending Prescriptions Disp Refills    valsartan (DIOVAN) 320 mg tablet [Pharmacy Med Name: VALSARTAN 320 MG TABLET] 90 Tab 3     Sig: TAKE 1 TABLET BY MOUTH EVERY DAY    insulin detemir U-100 (Levemir FlexTouch U-100 Insuln) 100 unit/mL (3 mL) inpn [Pharmacy Med Name: Kiersten Calloway 100 UNIT/ML] 6 Pen 5     Sig: INJECT 40 UNITS SUBCUTANEOUSLY TWO TIMES DAILY. *REPLACES LANTUS*   .

## 2021-04-21 DIAGNOSIS — I10 ESSENTIAL HYPERTENSION: ICD-10-CM

## 2021-04-21 RX ORDER — LANOLIN ALCOHOL/MO/W.PET/CERES
CREAM (GRAM) TOPICAL
Qty: 180 TAB | Refills: 3 | Status: SHIPPED | OUTPATIENT
Start: 2021-04-21 | End: 2022-05-27

## 2021-04-21 NOTE — TELEPHONE ENCOUNTER
RX refill request from the patient/pharmacy.  Patient last seen 02- with labs, and next appt. scheduled for 05-  Requested Prescriptions     Pending Prescriptions Disp Refills    magnesium oxide (MAG-OX) 400 mg tablet [Pharmacy Med Name: MAGNESIUM OXIDE 400 MG TABLET] 180 Tab 3     Sig: TAKE 1 TABLET BY MOUTH TWICE A DAY

## 2021-05-03 NOTE — PROGRESS NOTES
Chief Complaint   Patient presents with    Hypertension     3 month follow up    Diabetes    Cholesterol Problem       SUBJECTIVE:    Alon Zimmerman is a 61 y.o. female who returns in follow-up for medical problems include hypertension, diabetes, hyperlipidemia, prior CVA with right hemiplegia, CKD stage III, obesity and other medical problems. She is taking her medications and trying to follow her diet and try to work on getting her weight down. She currently denies any headaches, dizziness or neurologic complaints other than her chronic right hemiplegia. She denies any chest pain, shortness of breath, palpitations, PND, orthopnea or other cardiorespiratory complaints. No GI or  complaints. She has no current arthritic complaints and and no other complaints on complete review of systems. Current Outpatient Medications   Medication Sig Dispense Refill    magnesium oxide (MAG-OX) 400 mg tablet TAKE 1 TABLET BY MOUTH TWICE A  Tab 3    valsartan (DIOVAN) 320 mg tablet TAKE 1 TABLET BY MOUTH EVERY DAY 90 Tab 3    insulin detemir U-100 (Levemir FlexTouch U-100 Insuln) 100 unit/mL (3 mL) inpn INJECT 40 UNITS SUBCUTANEOUSLY TWO TIMES DAILY. *REPLACES LANTUS* 6 Pen 5    metoprolol succinate (TOPROL-XL) 50 mg XL tablet TAKE 1 TABLET BY MOUTH EVERY DAY 90 Tab 1    lisinopril-hydroCHLOROthiazide (PRINZIDE, ZESTORETIC) 20-12.5 mg per tablet TAKE 2 TABLETS BY MOUTH EVERY  Tab 3    amLODIPine (NORVASC) 10 mg tablet TAKE 1 TABLET BY MOUTH EVERY DAY 90 Tab 1    albuterol (ProAir HFA) 90 mcg/actuation inhaler Take 2 Puffs by inhalation every four (4) hours as needed for Wheezing. 1 Inhaler 5    metFORMIN ER (GLUCOPHAGE XR) 500 mg tablet Take 1 tablet by mouth in the morning and take 2 tablets before dinner. 270 Tab 3    icosapent ethyL (VASCEPA) 1 gram capsule Take 2 Caps by mouth two (2) times daily (with meals).  120 Cap 2    pravastatin (PRAVACHOL) 80 mg tablet Take 1 Tab by mouth nightly. 90 Tab 3    Insulin Needles, Disposable, (Pen Needles) 31 gauge x 1/4\" ndle Use daily as directed with her insulin pen. 100 Pen Needle prn    potassium chloride (Klor-Con M20) 20 mEq tablet Take 2 Tabs by mouth two (2) times a day. 120 Tab 12    cloNIDine HCL (CATAPRES) 0.1 mg tablet TAKE 1 TABLET BY MOUTH TWICE A DAY      aspirin (ASPIRIN) 325 mg tablet Take 325 mg by mouth daily.  ALPRAZolam (XANAX) 0.5 mg tablet Take 1 Tab by mouth daily as needed for Anxiety. 30 Tab 0    mirtazapine (REMERON) 15 mg tablet Take  by mouth nightly.  FERROUS SULFATE (IRON PO) Take 1 Tab by mouth.  cholecalciferol (VITAMIN D3) 1,000 unit cap Take 2,000 Units by mouth daily.  ascorbic acid (VITAMIN C) 250 mg tablet Take  by mouth.          Past Medical History:   Diagnosis Date    Abnormal mammogram with microcalcification 6/4/2013    Right  UOQ    Aphasia due to stroke     SPEAKS SOME    Arthritis     back    Asthma 9/7/2017    CKD (chronic kidney disease) 9/7/2017    CKD (chronic kidney disease), stage III ([address] 9/7/2017    CVA (cerebral vascular accident) ([address] 9/7/2017    Depression 9/7/2017    Diabetes ([address]     TYPE 2; IDDM    Edema 9/7/2017    H/O mammogram 07/29/2019    Pt. report pap was done last year at [facility] Hypertension     Hypertension with renal disease 9/7/2017    Hypomagnesemia 9/7/2017    Morbid obesity ([address] 9/7/2017    On statin therapy 9/7/2017    Osteopenia 9/7/2017    Psychiatric disorder     depression    Smear, vaginal, as part of routine gynecological examination 07/29/2019    Pt. reported pap was done last year at [facility] Stroke [facility]) 5/11/2010    RIGHT SIDE WEAKNESS    Vitamin D deficiency 9/7/2017     Past Surgical History:   Procedure Laterality Date    HX CATARACT REMOVAL  Dec 2010/ Jan 2011    bilateral cataracts removed    HX GI      HEMMORHOIDECTOMY    HX GYN      pmb    HX HEENT      tonsillectomy    HX OOPHORECTOMY Bilateral     HX OTHER SURGICAL      HX TONSILLECTOMY      OK BREAST SURGERY PROCEDURE UNLISTED      OK LAPAROSCOPY W TOT HYSTERECTUTERUS <=250 GRAM  W TUBE/OVARY  6/2011    leiomyomata     No Known Allergies    REVIEW OF SYSTEMS:  General: negative for - chills or fever, or weight loss or gain  ENT: negative for - headaches, nasal congestion or tinnitus  Eyes: no blurred or visual changes  Neck: No stiffness or swollen nodes  Respiratory: negative for - cough, hemoptysis, shortness of breath or wheezing  Cardiovascular : negative for - chest pain, edema, palpitations or shortness of breath  Gastrointestinal: negative for - abdominal pain, blood in stools, heartburn or nausea/vomiting  Genito-Urinary: no dysuria, trouble voiding, or hematuria  Musculoskeletal: negative for - gait disturbance, joint pain, joint stiffness or joint swelling  Neurological: no new TIA or stroke symptoms but chronic right hemiplegia  Hematologic: no bruises, no bleeding  Lymphatic: no swollen glands  Integument: no lumps, mole changes, nail changes or rash  Endocrine:no malaise/lethargy poly uria or polydipsia or unexpected weight changes        Social History     Socioeconomic History    Marital status:      Spouse name: Not on file    Number of children: Not on file    Years of education: Not on file    Highest education level: Not on file   Tobacco Use    Smoking status: Former Smoker     Quit date: 5/12/2010     Years since quitting: 10.9    Smokeless tobacco: Never Used   Substance and Sexual Activity    Alcohol use: No    Drug use: No    Sexual activity: Never   Social History Narrative    ** Merged History Encounter **          Family History   Problem Relation Age of Onset    Hypertension Mother     MS Mother     Diabetes Father     Stroke Father     Heart Disease Father     Hypertension Father     Post-op Nausea/Vomiting Sister     Breast Cancer Paternal Aunt        OBJECTIVE:     Visit Vitals  /70 (BP 1 Location: Left upper arm, BP Patient Position: Sitting, BP Cuff Size: Large adult)   Pulse 85   Temp 98.4 °F (36.9 °C) (Temporal)   Resp 17   Ht 5' 4\" (1.626 m)   Wt 216 lb 9.6 oz (98.2 kg)   LMP 06/13/2011   SpO2 97%   BMI 37.18 kg/m²     CONSTITUTIONAL:   well nourished, appears age appropriate  EYES: sclera anicteric, PERRL, EOMI  ENMT:nares clear, moist mucous membranes, pharynx clear  NECK: supple. Thyroid normal, No JVD or bruits  RESPIRATORY: Chest: clear to ascultation and percussion, normal inspiratory effort  CARDIOVASCULAR: Heart: regular rate and rhythm no murmurs, rubs or gallops, PMI not displaced, No thrills, no peripheral edema  GASTROINTESTINAL: Abdomen: non distended, soft, non tender, bowel sounds normal  HEMATOLOGIC: no purpura, petechiae or bruising  LYMPHATIC: No lymph node enlargemant  MUSCULOSKELETAL: Extremities: no active synovitis, pulse 1+   INTEGUMENT: No unusual rashes or suspicious skin lesions noted. Nails appear normal.  PERIPHERAL VASCULAR: normal pulses femoral, PT and DP  NEUROLOGIC: Unchanged chronic right hemiplegia, A & O X 3  PSYCHIATRIC:, appropriate affect     ASSESSMENT:   1. Hypertension with renal disease    2. Controlled type 2 diabetes mellitus with stage 3 chronic kidney disease, without long-term current use of insulin ([address]    3. Mixed hyperlipidemia    4. Mild intermittent asthma without complication    5. History of CVA (cerebrovascular accident)    6. Stage 3 chronic kidney disease, unspecified whether stage 3a or 3b CKD ([address]    7. Severe obesity (BMI 35.0-39. 9) with comorbidity ([address]    8. Encounter for screening mammogram for malignant neoplasm of breast      Impression  1. Hypertension that is controlled so continue current therapy reviewed with her and her   2. Diabetes repeat status pending and prior lab reviewed now make adjustments if necessary.   3.  Hyperlipidemia prior lab reviewed repeat status pending I will adjust if needed. 4.  Asthma that is stable  5. Prior CVA with right hemiplegia stable  6. CKD stage III repeat status pending  7. Obesity weight is improved and I encouraged her to continue to work on this. Mammogram scheduled  I will call the lab and make further recommendations or adjustments if necessary. Follow-up in 3 months or sooner if there is a problem. PLAN:  .  Orders Placed This Encounter    BRAD 3D CAITLIN W MAMMO BI SCREENING INCL CAD    METABOLIC PANEL, COMPREHENSIVE    LIPID PANEL    CK    AMB POC HEMOGLOBIN A1C         ATTENTION:   This medical record was transcribed using an electronic medical records system. Although proofread, it may and can contain electronic and spelling errors. Other human spelling and other errors may be present. Corrections may be executed at a later time. Please feel free to contact us for any clarifications as needed. Follow-up and Dispositions    · Return in about 3 months (around 8/4/2021). No results found for any visits on 05/04/21. Albertina Silva MD    The patient verbalized understanding of the problems and plans as explained.

## 2021-05-04 ENCOUNTER — OFFICE VISIT (OUTPATIENT)
Dept: INTERNAL MEDICINE CLINIC | Age: 56
End: 2021-05-04
Payer: MEDICARE

## 2021-05-04 VITALS
OXYGEN SATURATION: 97 % | HEIGHT: 64 IN | DIASTOLIC BLOOD PRESSURE: 70 MMHG | WEIGHT: 216.6 LBS | BODY MASS INDEX: 36.98 KG/M2 | RESPIRATION RATE: 17 BRPM | SYSTOLIC BLOOD PRESSURE: 128 MMHG | TEMPERATURE: 98.4 F | HEART RATE: 85 BPM

## 2021-05-04 DIAGNOSIS — Z12.31 ENCOUNTER FOR SCREENING MAMMOGRAM FOR MALIGNANT NEOPLASM OF BREAST: ICD-10-CM

## 2021-05-04 DIAGNOSIS — N18.30 STAGE 3 CHRONIC KIDNEY DISEASE, UNSPECIFIED WHETHER STAGE 3A OR 3B CKD (HCC): ICD-10-CM

## 2021-05-04 DIAGNOSIS — N18.30 CONTROLLED TYPE 2 DIABETES MELLITUS WITH STAGE 3 CHRONIC KIDNEY DISEASE, WITHOUT LONG-TERM CURRENT USE OF INSULIN (HCC): ICD-10-CM

## 2021-05-04 DIAGNOSIS — Z86.73 HISTORY OF CVA (CEREBROVASCULAR ACCIDENT): ICD-10-CM

## 2021-05-04 DIAGNOSIS — E11.22 CONTROLLED TYPE 2 DIABETES MELLITUS WITH STAGE 3 CHRONIC KIDNEY DISEASE, WITHOUT LONG-TERM CURRENT USE OF INSULIN (HCC): ICD-10-CM

## 2021-05-04 DIAGNOSIS — E78.2 MIXED HYPERLIPIDEMIA: ICD-10-CM

## 2021-05-04 DIAGNOSIS — I12.9 HYPERTENSION WITH RENAL DISEASE: Primary | ICD-10-CM

## 2021-05-04 DIAGNOSIS — J45.20 MILD INTERMITTENT ASTHMA WITHOUT COMPLICATION: ICD-10-CM

## 2021-05-04 DIAGNOSIS — E66.01 SEVERE OBESITY (BMI 35.0-39.9) WITH COMORBIDITY (HCC): ICD-10-CM

## 2021-05-04 LAB
ALBUMIN SERPL-MCNC: 3.3 G/DL (ref 3.5–5)
ALBUMIN/GLOB SERPL: 0.8 {RATIO} (ref 1.1–2.2)
ALP SERPL-CCNC: 85 U/L (ref 45–117)
ALT SERPL-CCNC: 21 U/L (ref 12–78)
ANION GAP SERPL CALC-SCNC: 10 MMOL/L (ref 5–15)
AST SERPL-CCNC: 12 U/L (ref 15–37)
BILIRUB SERPL-MCNC: 0.4 MG/DL (ref 0.2–1)
BUN SERPL-MCNC: 17 MG/DL (ref 6–20)
BUN/CREAT SERPL: 17 (ref 12–20)
CALCIUM SERPL-MCNC: 9.1 MG/DL (ref 8.5–10.1)
CHLORIDE SERPL-SCNC: 102 MMOL/L (ref 97–108)
CHOLEST SERPL-MCNC: 200 MG/DL
CK SERPL-CCNC: 40 U/L (ref 26–192)
CO2 SERPL-SCNC: 26 MMOL/L (ref 21–32)
CREAT SERPL-MCNC: 1 MG/DL (ref 0.55–1.02)
GLOBULIN SER CALC-MCNC: 4.3 G/DL (ref 2–4)
GLUCOSE SERPL-MCNC: 85 MG/DL (ref 65–100)
HBA1C MFR BLD HPLC: 10.3 % (ref 4.5–5.7)
HDLC SERPL-MCNC: 51 MG/DL
HDLC SERPL: 3.9 {RATIO} (ref 0–5)
LDLC SERPL CALC-MCNC: 94 MG/DL (ref 0–100)
LIPID PROFILE,FLP: ABNORMAL
POTASSIUM SERPL-SCNC: 4.4 MMOL/L (ref 3.5–5.1)
PROT SERPL-MCNC: 7.6 G/DL (ref 6.4–8.2)
SODIUM SERPL-SCNC: 138 MMOL/L (ref 136–145)
TRIGL SERPL-MCNC: 275 MG/DL (ref ?–150)
VLDLC SERPL CALC-MCNC: 55 MG/DL

## 2021-05-04 PROCEDURE — G8752 SYS BP LESS 140: HCPCS | Performed by: INTERNAL MEDICINE

## 2021-05-04 PROCEDURE — G8427 DOCREV CUR MEDS BY ELIG CLIN: HCPCS | Performed by: INTERNAL MEDICINE

## 2021-05-04 PROCEDURE — 83036 HEMOGLOBIN GLYCOSYLATED A1C: CPT | Performed by: INTERNAL MEDICINE

## 2021-05-04 PROCEDURE — 3017F COLORECTAL CA SCREEN DOC REV: CPT | Performed by: INTERNAL MEDICINE

## 2021-05-04 PROCEDURE — 3052F HG A1C>EQUAL 8.0%<EQUAL 9.0%: CPT | Performed by: INTERNAL MEDICINE

## 2021-05-04 PROCEDURE — 2022F DILAT RTA XM EVC RTNOPTHY: CPT | Performed by: INTERNAL MEDICINE

## 2021-05-04 PROCEDURE — G8754 DIAS BP LESS 90: HCPCS | Performed by: INTERNAL MEDICINE

## 2021-05-04 PROCEDURE — G9899 SCRN MAM PERF RSLTS DOC: HCPCS | Performed by: INTERNAL MEDICINE

## 2021-05-04 PROCEDURE — G9717 DOC PT DX DEP/BP F/U NT REQ: HCPCS | Performed by: INTERNAL MEDICINE

## 2021-05-04 PROCEDURE — 99214 OFFICE O/P EST MOD 30 MIN: CPT | Performed by: INTERNAL MEDICINE

## 2021-05-04 PROCEDURE — G8417 CALC BMI ABV UP PARAM F/U: HCPCS | Performed by: INTERNAL MEDICINE

## 2021-05-04 NOTE — PROGRESS NOTES
Chief Complaint   Patient presents with    Hypertension     3 month follow up    Diabetes    Cholesterol Problem     Visit Vitals  /70 (BP 1 Location: Left upper arm, BP Patient Position: Sitting, BP Cuff Size: Large adult)   Pulse 85   Temp 98.4 °F (36.9 °C) (Temporal)   Resp 17   Ht 5' 4\" (1.626 m)   Wt 216 lb 9.6 oz (98.2 kg)   LMP 06/13/2011   SpO2 97%   BMI 37.18 kg/m²     1. Have you been to the ER, urgent care clinic since your last visit? Hospitalized since your last visit? No    2. Have you seen or consulted any other health care providers outside of the [address] [place] [address] since your last visit? Include any pap smears or colon screening.  No

## 2021-05-04 NOTE — PATIENT INSTRUCTIONS

## 2021-05-06 RX ORDER — GLIMEPIRIDE 2 MG/1
2 TABLET ORAL
Qty: 90 TAB | Refills: 1 | Status: SHIPPED | OUTPATIENT
Start: 2021-05-06 | End: 2021-10-05

## 2021-05-06 NOTE — PROGRESS NOTES
Extremely poor DM control, what exactly is she taking? Called patient and discussed her medication. She is taking Levemir 40 units BID and Metformin XR 1 am and 2 in the pm.  Discussed with Dr. Felipe Sanchez and he recommends adding Glimperide 2 mg daily. Rx sent to patient's pharmacy. Discussed working on diet as well.

## 2021-05-06 NOTE — TELEPHONE ENCOUNTER
RX refill request from the patient/pharmacy. Patient last seen 05- with labs, and next appt. scheduled for 08-  Requested Prescriptions     Pending Prescriptions Disp Refills    glimepiride (AMARYL) 2 mg tablet 90 Tab 1     Sig: Take 1 Tab by mouth every morning. Vira Libman

## 2021-06-25 ENCOUNTER — OFFICE VISIT (OUTPATIENT)
Dept: INTERNAL MEDICINE CLINIC | Age: 56
End: 2021-06-25
Payer: MEDICARE

## 2021-06-25 VITALS
DIASTOLIC BLOOD PRESSURE: 80 MMHG | HEART RATE: 82 BPM | WEIGHT: 215.5 LBS | BODY MASS INDEX: 36.79 KG/M2 | HEIGHT: 64 IN | SYSTOLIC BLOOD PRESSURE: 138 MMHG | RESPIRATION RATE: 17 BRPM | OXYGEN SATURATION: 97 % | TEMPERATURE: 97.7 F

## 2021-06-25 DIAGNOSIS — E11.22 CONTROLLED TYPE 2 DIABETES MELLITUS WITH STAGE 3 CHRONIC KIDNEY DISEASE, WITHOUT LONG-TERM CURRENT USE OF INSULIN (HCC): ICD-10-CM

## 2021-06-25 DIAGNOSIS — E78.2 MIXED HYPERLIPIDEMIA: ICD-10-CM

## 2021-06-25 DIAGNOSIS — I12.9 HYPERTENSION WITH RENAL DISEASE: ICD-10-CM

## 2021-06-25 DIAGNOSIS — Z86.73 HISTORY OF CVA (CEREBROVASCULAR ACCIDENT): Primary | ICD-10-CM

## 2021-06-25 DIAGNOSIS — N18.30 CONTROLLED TYPE 2 DIABETES MELLITUS WITH STAGE 3 CHRONIC KIDNEY DISEASE, WITHOUT LONG-TERM CURRENT USE OF INSULIN (HCC): ICD-10-CM

## 2021-06-25 DIAGNOSIS — J45.20 MILD INTERMITTENT ASTHMA WITHOUT COMPLICATION: ICD-10-CM

## 2021-06-25 PROCEDURE — G9899 SCRN MAM PERF RSLTS DOC: HCPCS | Performed by: INTERNAL MEDICINE

## 2021-06-25 PROCEDURE — G8754 DIAS BP LESS 90: HCPCS | Performed by: INTERNAL MEDICINE

## 2021-06-25 PROCEDURE — G9717 DOC PT DX DEP/BP F/U NT REQ: HCPCS | Performed by: INTERNAL MEDICINE

## 2021-06-25 PROCEDURE — G8752 SYS BP LESS 140: HCPCS | Performed by: INTERNAL MEDICINE

## 2021-06-25 PROCEDURE — 2022F DILAT RTA XM EVC RTNOPTHY: CPT | Performed by: INTERNAL MEDICINE

## 2021-06-25 PROCEDURE — 99213 OFFICE O/P EST LOW 20 MIN: CPT | Performed by: INTERNAL MEDICINE

## 2021-06-25 PROCEDURE — 3017F COLORECTAL CA SCREEN DOC REV: CPT | Performed by: INTERNAL MEDICINE

## 2021-06-25 PROCEDURE — G8417 CALC BMI ABV UP PARAM F/U: HCPCS | Performed by: INTERNAL MEDICINE

## 2021-06-25 PROCEDURE — G8427 DOCREV CUR MEDS BY ELIG CLIN: HCPCS | Performed by: INTERNAL MEDICINE

## 2021-06-25 PROCEDURE — 3046F HEMOGLOBIN A1C LEVEL >9.0%: CPT | Performed by: INTERNAL MEDICINE

## 2021-06-25 NOTE — PATIENT INSTRUCTIONS
Body Mass Index: Care Instructions  Your Care Instructions     Body mass index (BMI) can help you see if your weight is raising your risk for health problems. It uses a formula to compare how much you weigh with how tall you are. · A BMI lower than 18.5 is considered underweight. · A BMI between 18.5 and 24.9 is considered healthy. · A BMI between 25 and 29.9 is considered overweight. A BMI of 30 or higher is considered obese. If your BMI is in the normal range, it means that you have a lower risk for weight-related health problems. If your BMI is in the overweight or obese range, you may be at increased risk for weight-related health problems, such as high blood pressure, heart disease, stroke, arthritis or joint pain, and diabetes. If your BMI is in the underweight range, you may be at increased risk for health problems such as fatigue, lower protection (immunity) against illness, muscle loss, bone loss, hair loss, and hormone problems. BMI is just one measure of your risk for weight-related health problems. You may be at higher risk for health problems if you are not active, you eat an unhealthy diet, or you drink too much alcohol or use tobacco products. Follow-up care is a key part of your treatment and safety. Be sure to make and go to all appointments, and call your doctor if you are having problems. It's also a good idea to know your test results and keep a list of the medicines you take. How can you care for yourself at home? · Practice healthy eating habits. This includes eating plenty of fruits, vegetables, whole grains, lean protein, and low-fat dairy. · If your doctor recommends it, get more exercise. Walking is a good choice. Bit by bit, increase the amount you walk every day. Try for at least 30 minutes on most days of the week. · Do not smoke. Smoking can increase your risk for health problems. If you need help quitting, talk to your doctor about stop-smoking programs and medicines. These can increase your chances of quitting for good. · Limit alcohol to 2 drinks a day for men and 1 drink a day for women. Too much alcohol can cause health problems. If you have a BMI higher than 25  · Your doctor may do other tests to check your risk for weight-related health problems. This may include measuring the distance around your waist. A waist measurement of more than 40 inches in men or 35 inches in women can increase the risk of weight-related health problems. · Talk with your doctor about steps you can take to stay healthy or improve your health. You may need to make lifestyle changes to lose weight and stay healthy, such as changing your diet and getting regular exercise. If you have a BMI lower than 18.5  · Your doctor may do other tests to check your risk for health problems. · Talk with your doctor about steps you can take to stay healthy or improve your health. You may need to make lifestyle changes to gain or maintain weight and stay healthy, such as getting more healthy foods in your diet and doing exercises to build muscle. Where can you learn more? Go to http://www.watkins.com/  Enter S176 in the search box to learn more about \"Body Mass Index: Care Instructions. \"  Current as of: September 23, 2020               Content Version: 12.8  © 2006-2021 [employer]. Care instructions adapted under license by [employer] (which disclaims liability or warranty for this information). If you have questions about a medical condition or this instruction, always ask your healthcare professional. [place] [address] any warranty or liability for your use of this information.

## 2021-06-25 NOTE — PROGRESS NOTES
Chief Complaint   Patient presents with    Form Completion     complete disability forms       SUBJECTIVE:    Inga Luna is a 61 y.o. female who is status post prior left-sided CVA right hemiplegia and dysarthria which are both permanent as well as underlying hypertension, diabetes, hyperlipidemia, obesity and other multiple medical problems who now presents for recertification and completing her disability forms once again. Nothing is changed as she persists with the right hemiplegia and dysarthria and therefore is completely permanently disabled. She denies any current cardiorespiratory complaints. There are no GI or  complaints. She has no headaches, dizziness or neurologic complaints except for the right hemiplegia and dysarthria. No no current arthritic complaints remainder review of systems is negative. Current Outpatient Medications   Medication Sig Dispense Refill    glimepiride (AMARYL) 2 mg tablet Take 1 Tab by mouth every morning. 90 Tab 1    magnesium oxide (MAG-OX) 400 mg tablet TAKE 1 TABLET BY MOUTH TWICE A  Tab 3    valsartan (DIOVAN) 320 mg tablet TAKE 1 TABLET BY MOUTH EVERY DAY 90 Tab 3    insulin detemir U-100 (Levemir FlexTouch U-100 Insuln) 100 unit/mL (3 mL) inpn INJECT 40 UNITS SUBCUTANEOUSLY TWO TIMES DAILY. *REPLACES LANTUS* 6 Pen 5    metoprolol succinate (TOPROL-XL) 50 mg XL tablet TAKE 1 TABLET BY MOUTH EVERY DAY 90 Tab 1    lisinopril-hydroCHLOROthiazide (PRINZIDE, ZESTORETIC) 20-12.5 mg per tablet TAKE 2 TABLETS BY MOUTH EVERY  Tab 3    amLODIPine (NORVASC) 10 mg tablet TAKE 1 TABLET BY MOUTH EVERY DAY 90 Tab 1    albuterol (ProAir HFA) 90 mcg/actuation inhaler Take 2 Puffs by inhalation every four (4) hours as needed for Wheezing. 1 Inhaler 5    metFORMIN ER (GLUCOPHAGE XR) 500 mg tablet Take 1 tablet by mouth in the morning and take 2 tablets before dinner.  270 Tab 3    icosapent ethyL (VASCEPA) 1 gram capsule Take 2 Caps by mouth two (2) times daily (with meals). 120 Cap 2    pravastatin (PRAVACHOL) 80 mg tablet Take 1 Tab by mouth nightly. 90 Tab 3    Insulin Needles, Disposable, (Pen Needles) 31 gauge x 1/4\" ndle Use daily as directed with her insulin pen. 100 Pen Needle prn    potassium chloride (Klor-Con M20) 20 mEq tablet Take 2 Tabs by mouth two (2) times a day. 120 Tab 12    cloNIDine HCL (CATAPRES) 0.1 mg tablet TAKE 1 TABLET BY MOUTH TWICE A DAY      aspirin (ASPIRIN) 325 mg tablet Take 325 mg by mouth daily.  ALPRAZolam (XANAX) 0.5 mg tablet Take 1 Tab by mouth daily as needed for Anxiety. 30 Tab 0    mirtazapine (REMERON) 15 mg tablet Take  by mouth nightly.  FERROUS SULFATE (IRON PO) Take 1 Tab by mouth.  cholecalciferol (VITAMIN D3) 1,000 unit cap Take 2,000 Units by mouth daily.  ascorbic acid (VITAMIN C) 250 mg tablet Take  by mouth.          Past Medical History:   Diagnosis Date    Abnormal mammogram with microcalcification 6/4/2013    Right  UOQ    Aphasia due to stroke     SPEAKS SOME    Arthritis     back    Asthma 9/7/2017    CKD (chronic kidney disease) 9/7/2017    CKD (chronic kidney disease), stage III ([address] 9/7/2017    CVA (cerebral vascular accident) ([address] 9/7/2017    Depression 9/7/2017    Diabetes ([address]     TYPE 2; IDDM    Edema 9/7/2017    H/O mammogram 07/29/2019    Pt. report pap was done last year at [facility] Hypertension     Hypertension with renal disease 9/7/2017    Hypomagnesemia 9/7/2017    Morbid obesity ([address] 9/7/2017    On statin therapy 9/7/2017    Osteopenia 9/7/2017    Psychiatric disorder     depression    Smear, vaginal, as part of routine gynecological examination 07/29/2019    Pt. reported pap was done last year at [facility] Stroke [facility]) 5/11/2010    RIGHT SIDE WEAKNESS    Vitamin D deficiency 9/7/2017     Past Surgical History:   Procedure Laterality Date    HX CATARACT REMOVAL  Dec 2010/ Jan 2011    bilateral cataracts removed    HX GI      HEMMORHOIDECTOMY    HX GYN      pmb    HX HEENT      tonsillectomy    HX OOPHORECTOMY Bilateral     HX OTHER SURGICAL      HX TONSILLECTOMY      CA BREAST SURGERY PROCEDURE UNLISTED      CA LAPAROSCOPY W TOT HYSTERECTUTERUS <=250 GRAM  W TUBE/OVARY  2011    leiomyomata     No Known Allergies    REVIEW OF SYSTEMS:  General: negative for - chills or fever, or weight loss or gain  ENT: negative for - headaches, nasal congestion or tinnitus  Eyes: no blurred or visual changes  Neck: No stiffness or swollen nodes  Respiratory: negative for - cough, hemoptysis, shortness of breath or wheezing  Cardiovascular : negative for - chest pain, edema, palpitations or shortness of breath  Gastrointestinal: negative for - abdominal pain, blood in stools, heartburn or nausea/vomiting  Genito-Urinary: no dysuria, trouble voiding, or hematuria  Musculoskeletal: negative for - gait disturbance, joint pain, joint stiffness or joint swelling  Neurological: no new TIA or stroke symptoms with old right hemiplegia and dysarthria  Hematologic: no bruises, no bleeding  Lymphatic: no swollen glands  Integument: no lumps, mole changes, nail changes or rash  Endocrine:no malaise/lethargy poly uria or polydipsia or unexpected weight changes        Social History     Socioeconomic History    Marital status:      Spouse name: Not on file    Number of children: Not on file    Years of education: Not on file    Highest education level: Not on file   Tobacco Use    Smoking status: Former Smoker     Quit date: 2010     Years since quittin.1    Smokeless tobacco: Never Used   Vaping Use    Vaping Use: Never used   Substance and Sexual Activity    Alcohol use: No    Drug use: No    Sexual activity: Never   Social History Narrative    ** Merged History Encounter **          Social Determinants of Health     Financial Resource Strain:     Difficulty of Paying Living Expenses:    Food Insecurity:  Worried About [facility] in the Last Year:    [address] in the Last Year:    Transportation Needs:     Lack of Transportation (Medical):  Lack of Transportation (Non-Medical):    Physical Activity:     Days of Exercise per Week:     Minutes of Exercise per Session:    Stress:     Feeling of Stress :    Social Connections:     Frequency of Communication with Friends and Family:     Frequency of Social Gatherings with Friends and Family:     Attends Anabaptism Services:     Active Member of Clubs or Organizations:     Attends Club or Organization Meetings:     Marital Status:      Family History   Problem Relation Age of Onset    Hypertension Mother     MS Mother     Diabetes Father     Stroke Father     Heart Disease Father     Hypertension Father     Post-op Nausea/Vomiting Sister     Breast Cancer Paternal Aunt        OBJECTIVE:     Visit Vitals  /80   Pulse 82   Temp 97.7 °F (36.5 °C) (Temporal)   Resp 17   Ht 5' 4\" (1.626 m)   Wt 215 lb 8 oz (97.8 kg)   LMP 06/13/2011   SpO2 97%   BMI 36.99 kg/m²     CONSTITUTIONAL:   well nourished, appears age appropriate  EYES: sclera anicteric, PERRL, EOMI  ENMT:nares clear, moist mucous membranes, pharynx clear  NECK: supple. Thyroid normal, No JVD or bruits  RESPIRATORY: Chest: clear to ascultation and percussion, normal inspiratory effort  CARDIOVASCULAR: Heart: regular rate and rhythm no murmurs, rubs or gallops, PMI not displaced, No thrills, no peripheral edema  GASTROINTESTINAL: Abdomen: non distended, soft, non tender, bowel sounds normal  HEMATOLOGIC: no purpura, petechiae or bruising  LYMPHATIC: No lymph node enlargemant  MUSCULOSKELETAL: Extremities: no active synovitis, pulse 1+   INTEGUMENT: No unusual rashes or suspicious skin lesions noted.  Nails appear normal.  PERIPHERAL VASCULAR: normal pulses femoral, PT and DP  NEUROLOGIC: Dysarthria and right hemiplegia without change, A & O X 3  PSYCHIATRIC:, appropriate affect     ASSESSMENT:   1. History of CVA (cerebrovascular accident)    2. Hypertension with renal disease    3. Controlled type 2 diabetes mellitus with stage 3 chronic kidney disease, without long-term current use of insulin ([address]    4. Mixed hyperlipidemia    5. Mild intermittent asthma without complication      Impression  1. Prior CVA with residual schedule right hemiplegia and dysarthria permanently disabled forms completed  2. Hypertension control adequate  3. Diabetes followed regularly  4. Hyperlipidemia followed regularly  5. Asthma stable  Disability forms completed and I will recheck her again at her prior scheduled appointment with lab studies at that appointment. PLAN:  . No orders of the defined types were placed in this encounter. ATTENTION:   This medical record was transcribed using an electronic medical records system. Although proofread, it may and can contain electronic and spelling errors. Other human spelling and other errors may be present. Corrections may be executed at a later time. Please feel free to contact us for any clarifications as needed. Follow-up and Dispositions    · Return At previous scheduled appointment. No results found for any visits on 06/25/21. Hilario Gamboa MD    The patient verbalized understanding of the problems and plans as explained.

## 2021-08-02 RX ORDER — METOPROLOL SUCCINATE 50 MG/1
TABLET, EXTENDED RELEASE ORAL
Qty: 90 TABLET | Refills: 3 | Status: SHIPPED | OUTPATIENT
Start: 2021-08-02 | End: 2022-05-27

## 2021-08-02 RX ORDER — AMLODIPINE BESYLATE 10 MG/1
TABLET ORAL
Qty: 90 TABLET | Refills: 3 | Status: SHIPPED | OUTPATIENT
Start: 2021-08-02 | End: 2022-05-27

## 2021-08-02 NOTE — TELEPHONE ENCOUNTER
RX refill request from the patient/pharmacy. Patient last seen 06- with labs, and next appt. scheduled for 08-  Requested Prescriptions     Pending Prescriptions Disp Refills    metoprolol succinate (TOPROL-XL) 50 mg XL tablet [Pharmacy Med Name: METOPROLOL SUCC ER 50 MG TAB] 90 Tablet 3     Sig: TAKE 1 TABLET BY MOUTH EVERY DAY    amLODIPine (NORVASC) 10 mg tablet [Pharmacy Med Name: AMLODIPINE BESYLATE 10 MG TAB] 90 Tablet 3     Sig: TAKE 1 TABLET BY MOUTH EVERY DAY   .

## 2021-08-05 NOTE — PROGRESS NOTES
This is a Subsequent Medicare Annual Wellness Visit providing Personalized Prevention Plan Services (PPPS) (Performed 12 months after initial AWV and PPPS )    I have reviewed the patient's medical history in detail and updated the computerized patient record. She returns today accompanied by her  for a Medicare subsequent annual wellness examination and screening questionnaire. 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 She is trying to follow a diet although her exercise is limited secondary to her disability from her stroke. She currently denies any chest pain, shortness of breath, palpitations, PND, orthopnea or other cardiac or respiratory complaints. She notes no GI or  complaints. She has no headaches, dizziness or neurologic complaints except for the right hemiplegia which is chronic and unchanged. There are no current active arthritic complaints and no other complaints on complete view of systems.     History     Past Medical History:   Diagnosis Date    Abnormal mammogram with microcalcification 6/4/2013    Right  UOQ    Aphasia due to stroke     SPEAKS SOME    Arthritis     back    Asthma 9/7/2017    CKD (chronic kidney disease) 9/7/2017    CKD (chronic kidney disease), stage III ([address] 9/7/2017    CVA (cerebral vascular accident) ([address] 9/7/2017    Depression 9/7/2017    Diabetes ([address]     TYPE 2; IDDM    Edema 9/7/2017    H/O mammogram 07/29/2019    Pt. report pap was done [facility] Hypertension     Hypertension with renal disease 9/7/2017    Hypomagnesemia 9/7/2017    Morbid obesity ([address] 9/7/2017    On statin therapy 9/7/2017    Osteopenia 9/7/2017    Psychiatric disorder     depression    Smear, vaginal, as part of routine gynecological examination 2019    Pt. reported pap was done last year at [facility] Stroke [facility]) 2010    RIGHT SIDE WEAKNESS    Vitamin D deficiency 2017      Past Surgical History:   Procedure Laterality Date    HX CATARACT REMOVAL  Dec 2010/ 2011    bilateral cataracts removed    HX GI      HEMMORHOIDECTOMY    HX GYN      pmb    HX HEENT      tonsillectomy    HX OOPHORECTOMY Bilateral     HX OTHER SURGICAL      HX TONSILLECTOMY      NC BREAST SURGERY PROCEDURE UNLISTED      NC LAPAROSCOPY W TOT HYSTERECTUTERUS <=250 GRAM  W TUBE/OVARY  2011    leiomyomata     Social History     Tobacco Use    Smoking status: Former Smoker     Quit date: 2010     Years since quittin.2    Smokeless tobacco: Never Used   Vaping Use    Vaping Use: Never used   Substance Use Topics    Alcohol use: No    Drug use: No     Current Outpatient Medications   Medication Sig Dispense Refill    metoprolol succinate (TOPROL-XL) 50 mg XL tablet TAKE 1 TABLET BY MOUTH EVERY DAY 90 Tablet 3    amLODIPine (NORVASC) 10 mg tablet TAKE 1 TABLET BY MOUTH EVERY DAY 90 Tablet 3    glimepiride (AMARYL) 2 mg tablet Take 1 Tab by mouth every morning. 90 Tab 1    magnesium oxide (MAG-OX) 400 mg tablet TAKE 1 TABLET BY MOUTH TWICE A  Tab 3    valsartan (DIOVAN) 320 mg tablet TAKE 1 TABLET BY MOUTH EVERY DAY 90 Tab 3    insulin detemir U-100 (Levemir FlexTouch U-100 Insuln) 100 unit/mL (3 mL) inpn INJECT 40 UNITS SUBCUTANEOUSLY TWO TIMES DAILY. *REPLACES LANTUS* 6 Pen 5    lisinopril-hydroCHLOROthiazide (PRINZIDE, ZESTORETIC) 20-12.5 mg per tablet TAKE 2 TABLETS BY MOUTH EVERY  Tab 3    albuterol (ProAir HFA) 90 mcg/actuation inhaler Take 2 Puffs by inhalation every four (4) hours as needed for Wheezing. 1 Inhaler 5    metFORMIN ER (GLUCOPHAGE XR) 500 mg tablet Take 1 tablet by mouth in the morning and take 2 tablets before dinner.  270 Tab 3    pravastatin (PRAVACHOL) 80 mg tablet Take 1 Tab by mouth nightly. 90 Tab 3    Insulin Needles, Disposable, (Pen Needles) 31 gauge x 1/4\" ndle Use daily as directed with her insulin pen. 100 Pen Needle prn    potassium chloride (Klor-Con M20) 20 mEq tablet Take 2 Tabs by mouth two (2) times a day. 120 Tab 12    cloNIDine HCL (CATAPRES) 0.1 mg tablet TAKE 1 TABLET BY MOUTH TWICE A DAY      aspirin (ASPIRIN) 325 mg tablet Take 325 mg by mouth daily.  ALPRAZolam (XANAX) 0.5 mg tablet Take 1 Tab by mouth daily as needed for Anxiety. 30 Tab 0    mirtazapine (REMERON) 15 mg tablet Take  by mouth nightly.  FERROUS SULFATE (IRON PO) Take 1 Tab by mouth.  ascorbic acid (VITAMIN C) 250 mg tablet Take  by mouth.  cholecalciferol (VITAMIN D3) 1,000 unit cap Take 2,000 Units by mouth daily. No Known Allergies  Family History   Problem Relation Age of Onset    Hypertension Mother    Eden Roro MS Mother     Diabetes Father     Stroke Father     Heart Disease Father     Hypertension Father     Post-op Nausea/Vomiting Sister     Breast Cancer Paternal Aunt        Patient Active Problem List    Diagnosis    Mild intermittent asthma without complication    CKD (chronic kidney disease), stage III ([address]    Hypertension with renal disease    Controlled type 2 diabetes mellitus with stage 3 chronic kidney disease, without long-term current use of insulin ([facility])    History of CVA (cerebrovascular accident)     Residual right-sided weakness and dysarthria from prior left CVA      Mixed hyperlipidemia    Severe obesity (BMI 35.0-39. 9) with comorbidity ([address]    Osteopenia of multiple sites    Vitamin D deficiency    Chronic anticoagulation    Mild depression ([address]    Medicare annual wellness visit, subsequent    Alcohol screening    Anxiety    Hypomagnesemia    Near syncope    Stenosis of both internal carotid arteries    Vertigo    Abnormal mammogram with microcalcification     Right  UOQ      Ovarian cyst    Pelvic mass Multiple leiomyomas treated with hysterectomy 6/27/2011      Unspecified constipation    Urinary retention    Anemia       Patient Care Team:  Kat Aleman MD as PCP - General (Internal Medicine)  Kat Aleman MD as PCP - Rakesh Gould Provider    Depression Risk Factor Screening:     3 most recent PHQ Screens 8/6/2021   Little interest or pleasure in doing things Not at all   Feeling down, depressed, irritable, or hopeless Not at all   Total Score PHQ 2 0   Trouble falling or staying asleep, or sleeping too much -   Feeling tired or having little energy -   Poor appetite, weight loss, or overeating -   Feeling bad about yourself - or that you are a failure or have let yourself or your family down -   Trouble concentrating on things such as school, work, reading, or watching TV -   Moving or speaking so slowly that other people could have noticed; or the opposite being so fidgety that others notice -   Thoughts of being better off dead, or hurting yourself in some way -   PHQ 9 Score -     Alcohol Risk Factor Screening:   Do you average more than 1 drink per night or more than 7 drinks a week:  No    On any one occasion in the past three months have you have had more than 3 drinks containing alcohol:  No    Functional Ability and Level of Safety:     Fall Risk     Fall Risk Assessment, last 12 mths 8/6/2021   Able to walk? Yes   Fall in past 12 months? 0   Do you feel unsteady? 0   Are you worried about falling 1   Is the gait abnormal? 1   Number of falls in past 12 months 0       Hearing Loss   mild    Activities of Daily Living   Partial assistance.    ADL Assessment 8/6/2021   Feeding yourself No Help Needed   Getting from bed to chair No Help Needed   Getting dressed Help Needed   Bathing or showering No Help Needed   Walk across the room (includes cane/walker) No Help Needed   Using the telphone No Help Needed   Taking your medications No Help Needed   Preparing meals Help Needed   Managing money (expenses/bills) No Help Needed   Moderately strenuous housework (laundry) Help Needed   Shopping for personal items (toiletries/medicines) No Help Needed   Shopping for groceries Help Needed   Driving Help Needed   Climbing a flight of stairs No Help Needed   Getting to places beyond walking distances Help Needed       Abuse Screen   Patient is not abused    Social History     Social History Narrative    ** Merged History Encounter **            Review of Systems      ROS:    Constitutional: She denies fevers, weight loss, sweats. Eyes: No blurred or double vision. ENT: No difficulty with swallowing, taste, speech or smell. NECK: no stiffness swelling or lymph node enlargement  Respiratory: No cough wheezing or shortness of breath. Cardiovascular: Denies chest pain, palpitations, unexplained indigestion or syncope. Breast: She has noted no masses or lumps and no discharge or axillary swelling  Gastrointestinal:  No changes in bowel movements, no abdominal pain, no bloating. Genitourinary: No discharge or abnormal bleeding or pain  Extremities: No joint pain, stiffness or swelling. Neurological:  No numbness, tingling, burring paresthesias and no change in her right hemiplegia. No syncope, dizziness or frequent headache  Skin:  No recent rashes or mole changes. Psychiatric/Behavioral:  Negative for depression. The patient is not nervous/anxious.    HEMATOLOGIC: no easy bruising or bleeding gums  Endocrine: no sweats of urinary frequency or excessive thirst    Physical Examination     Evaluation of Cognitive Function:  Mood/affect:  happy  Appearance: age appropriate  Family member/caregiver input:     Vitals:    08/06/21 0922 08/06/21 0945   BP: (!) 140/74 138/76   Pulse: 70    Resp: 18    Temp: 98.3 °F (36.8 °C)    SpO2: 98%    Weight: 221 lb 6.4 oz (100.4 kg)    Height: 5' 4\" (1.626 m)    PainSc:   0 - No pain    LMP: 06/13/2011        PHYSICAL EXAM:    General appearance - alert, well appearing, and in no distress  Mental status - alert, oriented to person, place, and time  HEENT:  Ears - bilateral TM's and external ear canals clear  Eyes - pupillary responses were normal.  Extraocular muscle function intact. Lids and conjunctiva not injected. Fundoscopic exam revealed sharp disc margins. eye movements intact  Pharynx- clear with teeth in good repair. No masses were noted  Neck - supple without thyromegaly or burit. No JVD noted  Lungs - clear to auscultation and percussion  Cardiac- normal rate, regular rhythm without murmurs. PMI not displaced. No gallop, rub or click  Breast: deferred to GYN  Abdomen - flat, soft, non-tender without palpable organomegaly or mass. No pulsatile mass was felt, and not bruit was heard. Bowel sounds were active   Female - deferred to GYN  Rectal - deferred to GYN  Extremities -  no clubbing cyanosis or edema  Lymphatics - no palpable lymphadenopathy, no hepatosplenomegaly  Peripheral vascular - Dorsalis pedis and posterior tibial pulses felt without difficulty  Skin - no rash or unusual mole change noted  Neurological - Cranial nerves II-XII grossly intact. Motor strength 5/5 on the left and right hemiplegia. DTR's 2+ and symmetric.   Station and gait normal  Back exam - full range of motion, no tenderness, palpable spasm or pain on motion  Musculoskeletal - no joint tenderness, deformity or swelling  Hematologic: no purpura, petechiae or bruising    Results for orders placed or performed in visit on 05/04/21   CK   Result Value Ref Range    CK 40 26 - 192 U/L   LIPID PANEL   Result Value Ref Range    LIPID PROFILE          Cholesterol, total 200 (H) <200 MG/DL    Triglyceride 275 (H) <150 MG/DL    HDL Cholesterol 51 MG/DL    LDL, calculated 94 0 - 100 MG/DL    VLDL, calculated 55 MG/DL    CHOL/HDL Ratio 3.9 0.0 - 5.0     METABOLIC PANEL, COMPREHENSIVE   Result Value Ref Range    Sodium 138 136 - 145 mmol/L    Potassium 4.4 3.5 - 5.1 mmol/L    Chloride 102 97 - 108 mmol/L    CO2 26 21 - 32 mmol/L    Anion gap 10 5 - 15 mmol/L    Glucose 85 65 - 100 mg/dL    BUN 17 6 - 20 MG/DL    Creatinine 1.00 0.55 - 1.02 MG/DL    BUN/Creatinine ratio 17 12 - 20      GFR est AA >60 >60 ml/min/1.73m2    GFR est non-AA 57 (L) >60 ml/min/1.73m2    Calcium 9.1 8.5 - 10.1 MG/DL    Bilirubin, total 0.4 0.2 - 1.0 MG/DL    ALT (SGPT) 21 12 - 78 U/L    AST (SGOT) 12 (L) 15 - 37 U/L    Alk. phosphatase 85 45 - 117 U/L    Protein, total 7.6 6.4 - 8.2 g/dL    Albumin 3.3 (L) 3.5 - 5.0 g/dL    Globulin 4.3 (H) 2.0 - 4.0 g/dL    A-G Ratio 0.8 (L) 1.1 - 2.2     AMB POC HEMOGLOBIN A1C   Result Value Ref Range    Hemoglobin A1c (POC) 10.3 (A) 4.5 - 5.7 %       Advice/Referrals/Counseling   Education and counseling provided:  Are appropriate based on today's review and evaluation  End-of-Life planning (with patient's consent)  Pneumococcal Vaccine  Influenza Vaccine  Colorectal cancer screening tests      Assessment/Plan     ASSESSMENT:   1. Hypertension with renal disease    2. Controlled type 2 diabetes mellitus with stage 3 chronic kidney disease, without long-term current use of insulin ([address]    3. Mixed hyperlipidemia    4. Mild intermittent asthma without complication    5. Stage 3 chronic kidney disease, unspecified whether stage 3a or 3b CKD ([address]    6. History of CVA (cerebrovascular accident)    7. Chronic anticoagulation    8. Osteopenia of multiple sites    9. Vitamin D deficiency    10. Severe obesity (BMI 35.0-39. 9) with comorbidity ([address]    11. Mild depression ([address]    12. Stenosis of both internal carotid arteries    13. Anxiety    14. Alcohol screening    15. Medicare annual wellness visit, subsequent    16. Encounter for screening mammogram for malignant neoplasm of breast      impression  1. Hypertension that is controlled so continue current therapy reviewed with her and her .   2. Diabetes repeat status is pending and prior lab review not make adjustments if necessary. 3. Hyperlipidemia prior lab reviewed and repeat status pending I will adjust if needed. 4. Asthma that is stable   5. CKD stage III repeat status pending  6. Prior CVA with right hemiplegia seems stable  7. Chronic anticoagulation stable  8. Osteopenia reviewed prior bone density  9. Vitamin D deficiency repeat status pending  10. Obesity we did discuss diet and weight reduction  11. Depression that is stable  12. Mild carotid stenosis bilateral  13. Anxiety chronic but stable  14. Annual alcohol screening is done at this point she drinks no alcohol at all. We did her caution regarding more than 1 drink per day in females with increased cardiovascular risk and increased risk of liver disease as well as other GI effects. Mammogram scheduled  Medicare subsequent annual wellness examination screening questionnaires completed today. The results were reviewed with her and her  the questions were answered. Lifestyle recommendations modifications discussed and made. I will see her in 3 months or sooner should the be a problem. I will call her lab results in the interim. PLAN:  .  Orders Placed This Encounter    BRAD 3D CAITLIN W MAMMO BI SCREENING INCL CAD    CBC WITH AUTOMATED DIFF    METABOLIC PANEL, COMPREHENSIVE    LIPID PANEL    HEMOGLOBIN A1C WITH EAG    T4 (THYROXINE)    TSH 3RD GENERATION    URINALYSIS W/ REFLEX CULTURE    VITAMIN D, 25 HYDROXY    AMB POC URINE, MICROALBUMIN, SEMIQUANT (1 RESULT)         ATTENTION:   This medical record was transcribed using an electronic medical records system. Although proofread, it may and can contain electronic and spelling errors. Other human spelling and other errors may be present. Corrections may be executed at a later time. Please feel free to contact us for any clarifications as needed. Follow-up and Dispositions    · Return in about 3 months (around 11/6/2021).            Claudia Gardiner MD    Recommended healthy diet low in carbohydrates, fats, sodium and cholesterol. Recommended regular cardiovascular exercise 3-6 times per week for 30-60 minutes daily. Current Outpatient Medications   Medication Sig Dispense Refill    metoprolol succinate (TOPROL-XL) 50 mg XL tablet TAKE 1 TABLET BY MOUTH EVERY DAY 90 Tablet 3    amLODIPine (NORVASC) 10 mg tablet TAKE 1 TABLET BY MOUTH EVERY DAY 90 Tablet 3    glimepiride (AMARYL) 2 mg tablet Take 1 Tab by mouth every morning. 90 Tab 1    magnesium oxide (MAG-OX) 400 mg tablet TAKE 1 TABLET BY MOUTH TWICE A  Tab 3    valsartan (DIOVAN) 320 mg tablet TAKE 1 TABLET BY MOUTH EVERY DAY 90 Tab 3    insulin detemir U-100 (Levemir FlexTouch U-100 Insuln) 100 unit/mL (3 mL) inpn INJECT 40 UNITS SUBCUTANEOUSLY TWO TIMES DAILY. *REPLACES LANTUS* 6 Pen 5    lisinopril-hydroCHLOROthiazide (PRINZIDE, ZESTORETIC) 20-12.5 mg per tablet TAKE 2 TABLETS BY MOUTH EVERY  Tab 3    albuterol (ProAir HFA) 90 mcg/actuation inhaler Take 2 Puffs by inhalation every four (4) hours as needed for Wheezing. 1 Inhaler 5    metFORMIN ER (GLUCOPHAGE XR) 500 mg tablet Take 1 tablet by mouth in the morning and take 2 tablets before dinner. 270 Tab 3    pravastatin (PRAVACHOL) 80 mg tablet Take 1 Tab by mouth nightly. 90 Tab 3    Insulin Needles, Disposable, (Pen Needles) 31 gauge x 1/4\" ndle Use daily as directed with her insulin pen. 100 Pen Needle prn    potassium chloride (Klor-Con M20) 20 mEq tablet Take 2 Tabs by mouth two (2) times a day. 120 Tab 12    cloNIDine HCL (CATAPRES) 0.1 mg tablet TAKE 1 TABLET BY MOUTH TWICE A DAY      aspirin (ASPIRIN) 325 mg tablet Take 325 mg by mouth daily.  ALPRAZolam (XANAX) 0.5 mg tablet Take 1 Tab by mouth daily as needed for Anxiety. 30 Tab 0    mirtazapine (REMERON) 15 mg tablet Take  by mouth nightly.  FERROUS SULFATE (IRON PO) Take 1 Tab by mouth.  ascorbic acid (VITAMIN C) 250 mg tablet Take  by mouth.         cholecalciferol (VITAMIN D3) 1,000 unit cap Take 2,000 Units by mouth daily. No results found for any visits on 08/06/21. Verbal and written instructions (see AVS) provided. Patient expresses understanding of diagnosis and treatment plan.     Mark Valenzuela MD

## 2021-08-06 ENCOUNTER — OFFICE VISIT (OUTPATIENT)
Dept: INTERNAL MEDICINE CLINIC | Age: 56
End: 2021-08-06
Payer: MEDICARE

## 2021-08-06 VITALS
OXYGEN SATURATION: 98 % | RESPIRATION RATE: 18 BRPM | BODY MASS INDEX: 37.8 KG/M2 | SYSTOLIC BLOOD PRESSURE: 138 MMHG | TEMPERATURE: 98.3 F | WEIGHT: 221.4 LBS | HEIGHT: 64 IN | DIASTOLIC BLOOD PRESSURE: 76 MMHG | HEART RATE: 70 BPM

## 2021-08-06 DIAGNOSIS — M85.89 OSTEOPENIA OF MULTIPLE SITES: ICD-10-CM

## 2021-08-06 DIAGNOSIS — Z79.01 CHRONIC ANTICOAGULATION: Chronic | ICD-10-CM

## 2021-08-06 DIAGNOSIS — F32.A MILD DEPRESSION: ICD-10-CM

## 2021-08-06 DIAGNOSIS — N18.30 CONTROLLED TYPE 2 DIABETES MELLITUS WITH STAGE 3 CHRONIC KIDNEY DISEASE, WITHOUT LONG-TERM CURRENT USE OF INSULIN (HCC): ICD-10-CM

## 2021-08-06 DIAGNOSIS — F41.9 ANXIETY: ICD-10-CM

## 2021-08-06 DIAGNOSIS — I12.9 HYPERTENSION WITH RENAL DISEASE: Primary | ICD-10-CM

## 2021-08-06 DIAGNOSIS — I65.23 STENOSIS OF BOTH INTERNAL CAROTID ARTERIES: ICD-10-CM

## 2021-08-06 DIAGNOSIS — J45.20 MILD INTERMITTENT ASTHMA WITHOUT COMPLICATION: ICD-10-CM

## 2021-08-06 DIAGNOSIS — E78.2 MIXED HYPERLIPIDEMIA: ICD-10-CM

## 2021-08-06 DIAGNOSIS — Z00.00 MEDICARE ANNUAL WELLNESS VISIT, SUBSEQUENT: ICD-10-CM

## 2021-08-06 DIAGNOSIS — N18.30 STAGE 3 CHRONIC KIDNEY DISEASE, UNSPECIFIED WHETHER STAGE 3A OR 3B CKD (HCC): ICD-10-CM

## 2021-08-06 DIAGNOSIS — Z13.39 ALCOHOL SCREENING: ICD-10-CM

## 2021-08-06 DIAGNOSIS — Z86.73 HISTORY OF CVA (CEREBROVASCULAR ACCIDENT): ICD-10-CM

## 2021-08-06 DIAGNOSIS — E66.01 SEVERE OBESITY (BMI 35.0-39.9) WITH COMORBIDITY (HCC): ICD-10-CM

## 2021-08-06 DIAGNOSIS — E55.9 VITAMIN D DEFICIENCY: ICD-10-CM

## 2021-08-06 DIAGNOSIS — E11.22 CONTROLLED TYPE 2 DIABETES MELLITUS WITH STAGE 3 CHRONIC KIDNEY DISEASE, WITHOUT LONG-TERM CURRENT USE OF INSULIN (HCC): ICD-10-CM

## 2021-08-06 DIAGNOSIS — Z12.31 ENCOUNTER FOR SCREENING MAMMOGRAM FOR MALIGNANT NEOPLASM OF BREAST: ICD-10-CM

## 2021-08-06 LAB
25(OH)D3 SERPL-MCNC: 24.4 NG/ML (ref 30–100)
ALBUMIN SERPL-MCNC: 4 G/DL (ref 3.5–5)
ALBUMIN/GLOB SERPL: 0.9 {RATIO} (ref 1.1–2.2)
ALP SERPL-CCNC: 92 U/L (ref 45–117)
ALT SERPL-CCNC: 33 U/L (ref 12–78)
ANION GAP SERPL CALC-SCNC: 8 MMOL/L (ref 5–15)
APPEARANCE UR: CLEAR
AST SERPL-CCNC: 20 U/L (ref 15–37)
BACTERIA URNS QL MICRO: NEGATIVE /HPF
BASOPHILS # BLD: 0 K/UL (ref 0–0.1)
BASOPHILS NFR BLD: 0 % (ref 0–1)
BILIRUB SERPL-MCNC: 0.3 MG/DL (ref 0.2–1)
BILIRUB UR QL: NEGATIVE
BUN SERPL-MCNC: 22 MG/DL (ref 6–20)
BUN/CREAT SERPL: 27 (ref 12–20)
CALCIUM SERPL-MCNC: 9.9 MG/DL (ref 8.5–10.1)
CHLORIDE SERPL-SCNC: 103 MMOL/L (ref 97–108)
CHOLEST SERPL-MCNC: 221 MG/DL
CO2 SERPL-SCNC: 26 MMOL/L (ref 21–32)
COLOR UR: ABNORMAL
CREAT SERPL-MCNC: 0.81 MG/DL (ref 0.55–1.02)
CREAT UR-MCNC: 49.5 MG/DL
DIFFERENTIAL METHOD BLD: ABNORMAL
EOSINOPHIL # BLD: 0.2 K/UL (ref 0–0.4)
EOSINOPHIL NFR BLD: 2 % (ref 0–7)
EPITH CASTS URNS QL MICRO: ABNORMAL /LPF
ERYTHROCYTE [DISTWIDTH] IN BLOOD BY AUTOMATED COUNT: 13.7 % (ref 11.5–14.5)
EST. AVERAGE GLUCOSE BLD GHB EST-MCNC: 246 MG/DL
GLOBULIN SER CALC-MCNC: 4.4 G/DL (ref 2–4)
GLUCOSE SERPL-MCNC: 78 MG/DL (ref 65–100)
GLUCOSE UR STRIP.AUTO-MCNC: NEGATIVE MG/DL
HBA1C MFR BLD: 10.2 % (ref 4–5.6)
HCT VFR BLD AUTO: 40.1 % (ref 35–47)
HDLC SERPL-MCNC: 50 MG/DL
HDLC SERPL: 4.4 {RATIO} (ref 0–5)
HGB BLD-MCNC: 12 G/DL (ref 11.5–16)
HGB UR QL STRIP: NEGATIVE
IMM GRANULOCYTES # BLD AUTO: 0 K/UL (ref 0–0.04)
IMM GRANULOCYTES NFR BLD AUTO: 0 % (ref 0–0.5)
KETONES UR QL STRIP.AUTO: NEGATIVE MG/DL
LDLC SERPL CALC-MCNC: ABNORMAL MG/DL (ref 0–100)
LDLC SERPL DIRECT ASSAY-MCNC: 113 MG/DL (ref 0–100)
LEUKOCYTE ESTERASE UR QL STRIP.AUTO: ABNORMAL
LYMPHOCYTES # BLD: 2.6 K/UL (ref 0.8–3.5)
LYMPHOCYTES NFR BLD: 33 % (ref 12–49)
MCH RBC QN AUTO: 26.9 PG (ref 26–34)
MCHC RBC AUTO-ENTMCNC: 29.9 G/DL (ref 30–36.5)
MCV RBC AUTO: 89.9 FL (ref 80–99)
MICROALBUMIN UR-MCNC: 0.6 MG/DL
MICROALBUMIN/CREAT UR-RTO: 12 MG/G (ref 0–30)
MONOCYTES # BLD: 0.5 K/UL (ref 0–1)
MONOCYTES NFR BLD: 7 % (ref 5–13)
NEUTS SEG # BLD: 4.5 K/UL (ref 1.8–8)
NEUTS SEG NFR BLD: 58 % (ref 32–75)
NITRITE UR QL STRIP.AUTO: NEGATIVE
NRBC # BLD: 0 K/UL (ref 0–0.01)
NRBC BLD-RTO: 0 PER 100 WBC
PH UR STRIP: 7 [PH] (ref 5–8)
PLATELET # BLD AUTO: 302 K/UL (ref 150–400)
PMV BLD AUTO: 12.2 FL (ref 8.9–12.9)
POTASSIUM SERPL-SCNC: 4.2 MMOL/L (ref 3.5–5.1)
PROT SERPL-MCNC: 8.4 G/DL (ref 6.4–8.2)
PROT UR STRIP-MCNC: NEGATIVE MG/DL
RBC # BLD AUTO: 4.46 M/UL (ref 3.8–5.2)
RBC #/AREA URNS HPF: ABNORMAL /HPF (ref 0–5)
SODIUM SERPL-SCNC: 137 MMOL/L (ref 136–145)
SP GR UR REFRACTOMETRY: 1.01 (ref 1–1.03)
T4 SERPL-MCNC: 8.4 UG/DL (ref 4.8–13.9)
TRIGL SERPL-MCNC: 445 MG/DL (ref ?–150)
TSH SERPL DL<=0.05 MIU/L-ACNC: 1.04 UIU/ML (ref 0.36–3.74)
UA: UC IF INDICATED,UAUC: ABNORMAL
UROBILINOGEN UR QL STRIP.AUTO: 0.2 EU/DL (ref 0.2–1)
VLDLC SERPL CALC-MCNC: ABNORMAL MG/DL
WBC # BLD AUTO: 7.8 K/UL (ref 3.6–11)
WBC URNS QL MICRO: ABNORMAL /HPF (ref 0–4)

## 2021-08-06 PROCEDURE — G9717 DOC PT DX DEP/BP F/U NT REQ: HCPCS | Performed by: INTERNAL MEDICINE

## 2021-08-06 PROCEDURE — G0439 PPPS, SUBSEQ VISIT: HCPCS | Performed by: INTERNAL MEDICINE

## 2021-08-06 PROCEDURE — 99214 OFFICE O/P EST MOD 30 MIN: CPT | Performed by: INTERNAL MEDICINE

## 2021-08-06 PROCEDURE — 3046F HEMOGLOBIN A1C LEVEL >9.0%: CPT | Performed by: INTERNAL MEDICINE

## 2021-08-06 PROCEDURE — G8754 DIAS BP LESS 90: HCPCS | Performed by: INTERNAL MEDICINE

## 2021-08-06 PROCEDURE — G8417 CALC BMI ABV UP PARAM F/U: HCPCS | Performed by: INTERNAL MEDICINE

## 2021-08-06 PROCEDURE — G8752 SYS BP LESS 140: HCPCS | Performed by: INTERNAL MEDICINE

## 2021-08-06 PROCEDURE — 2022F DILAT RTA XM EVC RTNOPTHY: CPT | Performed by: INTERNAL MEDICINE

## 2021-08-06 PROCEDURE — 3017F COLORECTAL CA SCREEN DOC REV: CPT | Performed by: INTERNAL MEDICINE

## 2021-08-06 PROCEDURE — G8427 DOCREV CUR MEDS BY ELIG CLIN: HCPCS | Performed by: INTERNAL MEDICINE

## 2021-08-06 PROCEDURE — G9899 SCRN MAM PERF RSLTS DOC: HCPCS | Performed by: INTERNAL MEDICINE

## 2021-08-06 NOTE — PATIENT INSTRUCTIONS

## 2021-08-06 NOTE — PROGRESS NOTES
1. Have you been to the ER, urgent care clinic since your last visit? Hospitalized since your last visit? No    2. Have you seen or consulted any other health care providers outside of the [address] [place] [address] Kp since your last visit? Include any pap smears or colon screening. No       No chief complaint on file. Depression Risk Factor Screening:     3 most recent PHQ Screens 8/6/2021   Little interest or pleasure in doing things Not at all   Feeling down, depressed, irritable, or hopeless Not at all   Total Score PHQ 2 0   Trouble falling or staying asleep, or sleeping too much -   Feeling tired or having little energy -   Poor appetite, weight loss, or overeating -   Feeling bad about yourself - or that you are a failure or have let yourself or your family down -   Trouble concentrating on things such as school, work, reading, or watching TV -   Moving or speaking so slowly that other people could have noticed; or the opposite being so fidgety that others notice -   Thoughts of being better off dead, or hurting yourself in some way -   PHQ 9 Score -       Functional Ability and Level of Safety:     Activities of Daily Living  ADL Assessment 8/6/2021   Feeding yourself No Help Needed   Getting from bed to chair No Help Needed   Getting dressed Help Needed   Bathing or showering No Help Needed   Walk across the room (includes cane/walker) No Help Needed   Using the telphone No Help Needed   Taking your medications No Help Needed   Preparing meals Help Needed   Managing money (expenses/bills) No Help Needed   Moderately strenuous housework (laundry) Help Needed   Shopping for personal items (toiletries/medicines) No Help Needed   Shopping for groceries Help Needed   Driving Help Needed   Climbing a flight of stairs No Help Needed   Getting to places beyond walking distances Help Needed       Fall Risk  Fall Risk Assessment, last 12 mths 8/6/2021   Able to walk?  Yes   Fall in past 12 months? 0   Do you feel unsteady? 0   Are you worried about falling 1   Is the gait abnormal? 1   Number of falls in past 12 months 0       Abuse Screen  Abuse Screening Questionnaire 8/6/2021   Do you ever feel afraid of your partner? N   Are you in a relationship with someone who physically or mentally threatens you? N   Is it safe for you to go home?  Y         Patient Care Team   Patient Care Team:  Marvel Brewer MD as PCP - General (Internal Medicine)  Marvel Brewer MD as PCP - [facility]

## 2021-08-09 NOTE — PROGRESS NOTES
Cholesterol and LDL are up so change Pravachol to Crestor 20 daily. Blood sugar and triglycerides and glycohemoglobin markedly elevated so increase Lantus to 46 units twice daily. Diet. Vitamin D is low so start 1000 units daily.

## 2021-08-10 RX ORDER — INSULIN DETEMIR 100 [IU]/ML
INJECTION, SOLUTION SUBCUTANEOUS
Qty: 6 PEN | Refills: 5 | Status: SHIPPED | OUTPATIENT
Start: 2021-08-10 | End: 2022-01-28 | Stop reason: SDUPTHER

## 2021-08-10 RX ORDER — ROSUVASTATIN CALCIUM 20 MG/1
20 TABLET, COATED ORAL
Qty: 90 TABLET | Refills: 3 | Status: SHIPPED | OUTPATIENT
Start: 2021-08-10 | End: 2022-05-27

## 2021-08-10 RX ORDER — MELATONIN
1000 DAILY
Qty: 90 TABLET | Refills: 3 | Status: SHIPPED | OUTPATIENT
Start: 2021-08-10 | End: 2022-10-05

## 2021-08-10 NOTE — TELEPHONE ENCOUNTER
RX refill request from the patient/pharmacy. Patient last seen 08- with labs, and next appt. scheduled for 11-  Requested Prescriptions     Pending Prescriptions Disp Refills    insulin detemir U-100 (Levemir FlexTouch U-100 Insuln) 100 unit/mL (3 mL) inpn 6 Pen 5     Sig: INJECT 46 UNITS SUBCUTANEOUSLY TWO TIMES DAILY. *REPLACES LANTUS*    rosuvastatin (CRESTOR) 20 mg tablet 90 Tablet 3     Sig: Take 1 Tablet by mouth nightly.  cholecalciferol (VITAMIN D3) (1000 Units /25 mcg) tablet 90 Tablet 3     Sig: Take 1 Tablet by mouth daily. Sueeen Payment

## 2021-08-10 NOTE — PROGRESS NOTES
Cholesterol and LDL are up so change Pravachol to Crestor 20 daily. Blood sugar and triglycerides and glycohemoglobin markedly elevated so increase Lantus to 46 units twice daily. Diet. Vitamin D is low so start 1000 units daily. Discussed medication changes with patient's . Rx's sent to patient's pharmacy. F/up as scheduled.

## 2021-09-04 PROBLEM — Z00.00 MEDICARE ANNUAL WELLNESS VISIT, SUBSEQUENT: Status: RESOLVED | Noted: 2018-05-09 | Resolved: 2021-09-04

## 2021-09-17 ENCOUNTER — HOSPITAL ENCOUNTER (OUTPATIENT)
Dept: MAMMOGRAPHY | Age: 56
Discharge: HOME | End: 2021-09-17
Attending: INTERNAL MEDICINE
Payer: MEDICARE

## 2021-09-17 DIAGNOSIS — Z12.31 ENCOUNTER FOR SCREENING MAMMOGRAM FOR MALIGNANT NEOPLASM OF BREAST: ICD-10-CM

## 2021-09-17 PROCEDURE — 77063 BREAST TOMOSYNTHESIS BI: CPT

## 2021-10-05 NOTE — TELEPHONE ENCOUNTER
RX refill request from the patient/pharmacy. Patient last seen 08- with labs, and next appt. scheduled for 11-  Requested Prescriptions     Pending Prescriptions Disp Refills    pravastatin (PRAVACHOL) 80 mg tablet [Pharmacy Med Name: PRAVASTATIN SODIUM 80 MG TAB] 90 Tablet 3     Sig: TAKE 1 TABLET BY MOUTH EVERY DAY AT NIGHT    glimepiride (AMARYL) 2 mg tablet [Pharmacy Med Name: GLIMEPIRIDE 2 MG TABLET] 90 Tablet 3     Sig: TAKE 1 TABLET BY MOUTH EVERY DAY IN THE MORNING   .

## 2021-10-06 RX ORDER — GLIMEPIRIDE 2 MG/1
TABLET ORAL
Qty: 90 TABLET | Refills: 3 | Status: SHIPPED | OUTPATIENT
Start: 2021-10-06 | End: 2021-11-12 | Stop reason: SDUPTHER

## 2021-10-06 RX ORDER — PRAVASTATIN SODIUM 80 MG/1
TABLET ORAL
Qty: 90 TABLET | Refills: 3 | Status: SHIPPED | OUTPATIENT
Start: 2021-10-06 | End: 2022-02-16 | Stop reason: ALTCHOICE

## 2021-10-08 ENCOUNTER — HOSPITAL ENCOUNTER (OUTPATIENT)
Dept: ULTRASOUND IMAGING | Age: 56
Discharge: HOME | End: 2021-10-08
Attending: INTERNAL MEDICINE
Payer: MEDICARE

## 2021-10-08 ENCOUNTER — HOSPITAL ENCOUNTER (OUTPATIENT)
Dept: MAMMOGRAPHY | Age: 56
Discharge: HOME | End: 2021-10-08
Attending: INTERNAL MEDICINE
Payer: MEDICARE

## 2021-10-08 DIAGNOSIS — R92.8 ABNORMAL MAMMOGRAM OF LEFT BREAST: ICD-10-CM

## 2021-10-08 PROCEDURE — 77061 BREAST TOMOSYNTHESIS UNI: CPT

## 2021-10-12 RX ORDER — PEN NEEDLE, DIABETIC 30 GX3/16"
NEEDLE, DISPOSABLE MISCELLANEOUS
Qty: 1 EACH | Status: SHIPPED | OUTPATIENT
Start: 2021-10-12 | End: 2022-01-28 | Stop reason: SDUPTHER

## 2021-10-12 NOTE — TELEPHONE ENCOUNTER
RX refill request from the patient/pharmacy. Patient last seen 08- with labs, and next appt. scheduled for 11-  Requested Prescriptions     Pending Prescriptions Disp Refills    Insulin Needles, Disposable, (BD Ultra-Fine Short Pen Needle) 31 gauge x 5/16\" ndle [Pharmacy Med Name: BD UF SHORT PEN NEEDLE [id number]] 1 Each PRN     Sig: USE DAILY AS DIRECTED WITH HER INSULIN PEN.    Susanna Goode

## 2021-11-11 NOTE — PROGRESS NOTES
Chief Complaint   Patient presents with    Hypertension     3 month    Diabetes    Chronic Kidney Disease       SUBJECTIVE:    Claudio Donohue is a 61 y.o. female who returns in follow-up for medical problems include hypertension, diabetes, hyperlipidemia, CKD stage III, prior CVA with right hemiplegia, obesity, anxiety with depression and other multiple medical problems. She is accompanied by her  today. She is taking her medications and trying to follow her diet and remains physically active. Activity is limited however secondary to her right hemiplegia. She denies any chest pain, shortness of breath, palpitations, PND, orthopnea or other cardiac or respiratory complaints. She has no GI or  complaints. She has no new neurologic complaints with a chronic right hemiplegia. Remainder of complete review of systems is negative. There are no current active arthritic complaints. Current Outpatient Medications   Medication Sig Dispense Refill    glimepiride (AMARYL) 2 mg tablet Take 1 Tablet by mouth daily. 90 Tablet 3    mirtazapine (Remeron) 15 mg tablet Take 1 Tablet by mouth nightly for 30 days. 30 Tablet 2    Insulin Needles, Disposable, (BD Ultra-Fine Short Pen Needle) 31 gauge x 5/16\" ndle USE DAILY AS DIRECTED WITH HER INSULIN PEN. 1 Each PRN    pravastatin (PRAVACHOL) 80 mg tablet TAKE 1 TABLET BY MOUTH EVERY DAY AT NIGHT 90 Tablet 3    insulin detemir U-100 (Levemir FlexTouch U-100 Insuln) 100 unit/mL (3 mL) inpn INJECT 46 UNITS SUBCUTANEOUSLY TWO TIMES DAILY. *REPLACES LANTUS* 6 Pen 5    rosuvastatin (CRESTOR) 20 mg tablet Take 1 Tablet by mouth nightly. 90 Tablet 3    cholecalciferol (VITAMIN D3) (1000 Units /25 mcg) tablet Take 1 Tablet by mouth daily.  90 Tablet 3    metoprolol succinate (TOPROL-XL) 50 mg XL tablet TAKE 1 TABLET BY MOUTH EVERY DAY 90 Tablet 3    amLODIPine (NORVASC) 10 mg tablet TAKE 1 TABLET BY MOUTH EVERY DAY 90 Tablet 3    magnesium oxide (MAG-OX) 400 mg tablet TAKE 1 TABLET BY MOUTH TWICE A  Tab 3    valsartan (DIOVAN) 320 mg tablet TAKE 1 TABLET BY MOUTH EVERY DAY 90 Tab 3    lisinopril-hydroCHLOROthiazide (PRINZIDE, ZESTORETIC) 20-12.5 mg per tablet TAKE 2 TABLETS BY MOUTH EVERY  Tab 3    albuterol (ProAir HFA) 90 mcg/actuation inhaler Take 2 Puffs by inhalation every four (4) hours as needed for Wheezing. 1 Inhaler 5    metFORMIN ER (GLUCOPHAGE XR) 500 mg tablet Take 1 tablet by mouth in the morning and take 2 tablets before dinner. 270 Tab 3    potassium chloride (Klor-Con M20) 20 mEq tablet Take 2 Tabs by mouth two (2) times a day. 120 Tab 12    cloNIDine HCL (CATAPRES) 0.1 mg tablet TAKE 1 TABLET BY MOUTH TWICE A DAY      aspirin (ASPIRIN) 325 mg tablet Take 325 mg by mouth daily.  ALPRAZolam (XANAX) 0.5 mg tablet Take 1 Tab by mouth daily as needed for Anxiety. 30 Tab 0    FERROUS SULFATE (IRON PO) Take 1 Tab by mouth.  cholecalciferol (VITAMIN D3) 1,000 unit cap Take 2,000 Units by mouth daily.  ascorbic acid (VITAMIN C) 250 mg tablet Take  by mouth.          Past Medical History:   Diagnosis Date    Abnormal mammogram with microcalcification 6/4/2013    Right  UOQ    Aphasia due to stroke     SPEAKS SOME    Arthritis     back    Asthma 9/7/2017    CKD (chronic kidney disease) 9/7/2017    CKD (chronic kidney disease), stage III ([address] 9/7/2017    CVA (cerebral vascular accident) ([address] 9/7/2017    Depression 9/7/2017    Diabetes ([address]     TYPE 2; IDDM    Edema 9/7/2017    H/O mammogram 07/29/2019    Pt. report pap [facility] done last [facility] at [facility] Hypertension     Hypertension with renal disease 9/7/2017    Hypomagnesemia 9/7/2017    Morbid obesity ([address] 9/7/2017    On statin therapy 9/7/2017    Osteopenia 9/7/2017    Psychiatric disorder     depression    Smear, vaginal, as part of routine gynecological examination 07/29/2019    Pt. reported pap [facility]    Stroke ([facility] 2010    RIGHT SIDE WEAKNESS    Vitamin D deficiency 2017     Past Surgical History:   Procedure Laterality Date    HX BREAST BIOPSY Right     benign bx, many years ago    HX CATARACT REMOVAL  Dec 2010/ 2011    bilateral cataracts removed    HX GI      HEMMORHOIDECTOMY    HX GYN      pmb    HX HEENT      tonsillectomy    HX OOPHORECTOMY Bilateral     HX OTHER SURGICAL      HX TONSILLECTOMY      NC BREAST SURGERY PROCEDURE UNLISTED      NC LAPAROSCOPY W TOT HYSTERECTUTERUS <=250 GRAM  W TUBE/OVARY  2011    leiomyomata     No Known Allergies    REVIEW OF SYSTEMS:  General: negative for - chills or fever, or weight loss or gain  ENT: negative for - headaches, nasal congestion or tinnitus  Eyes: no blurred or visual changes  Neck: No stiffness or swollen nodes  Respiratory: negative for - cough, hemoptysis, shortness of breath or wheezing  Cardiovascular : negative for - chest pain, edema, palpitations or shortness of breath  Gastrointestinal: negative for - abdominal pain, blood in stools, heartburn or nausea/vomiting  Genito-Urinary: no dysuria, trouble voiding, or hematuria  Musculoskeletal: negative for - gait disturbance, joint pain, joint stiffness or joint swelling  Neurological: no new TIA or stroke symptoms  Hematologic: no bruises, no bleeding  Lymphatic: no swollen glands  Integument: no lumps, mole changes, nail changes or rash  Endocrine:no malaise/lethargy poly uria or polydipsia or unexpected weight changes        Social History     Socioeconomic History    Marital status:    Tobacco Use    Smoking status: Former Smoker     Quit date: 2010     Years since quittin.5    Smokeless tobacco: Never Used   Vaping Use    Vaping Use: Never used   Substance and Sexual Activity    Alcohol use: No    Drug use: No    Sexual activity: Never   Social History Narrative    ** Merged History Encounter **          Family History   Problem Relation Age of Onset    Hypertension Mother     Mult Sclerosis Mother     Diabetes Father     Stroke Father     Heart Disease Father     Hypertension Father     Post-op Nausea/Vomiting Sister     Breast Cancer Paternal Aunt        OBJECTIVE:     Visit Vitals  /82   Pulse 70   Temp 98.9 °F (37.2 °C) (Oral)   Resp 17   Ht 5' 8\" (1.727 m)   Wt 221 lb 8 oz (100.5 kg)   LMP 06/13/2011   SpO2 98%   BMI 33.68 kg/m²     CONSTITUTIONAL:   well nourished, appears age appropriate  EYES: sclera anicteric, PERRL, EOMI  ENMT:nares clear, moist mucous membranes, pharynx clear  NECK: supple. Thyroid normal, No JVD or bruits  RESPIRATORY: Chest: clear to ascultation and percussion, normal inspiratory effort  CARDIOVASCULAR: Heart: regular rate and rhythm no murmurs, rubs or gallops, PMI not displaced, No thrills, no peripheral edema  GASTROINTESTINAL: Abdomen: non distended, soft, non tender, bowel sounds normal  HEMATOLOGIC: no purpura, petechiae or bruising  LYMPHATIC: No lymph node enlargemant  MUSCULOSKELETAL: Extremities: no active synovitis, pulse 1+   INTEGUMENT: No unusual rashes or suspicious skin lesions noted. Nails appear normal.  PERIPHERAL VASCULAR: normal pulses femoral, PT and DP  NEUROLOGIC: Chronic right hemiplegia without change. A & O X 3  PSYCHIATRIC:, appropriate affect     ASSESSMENT:   1. Hypertension with renal disease    2. Controlled type 2 diabetes mellitus with stage 3 chronic kidney disease, without long-term current use of insulin ([address]    3. Mixed hyperlipidemia    4. History of CVA (cerebrovascular accident)    5. Mild intermittent asthma without complication    6. Stage 3 chronic kidney disease, unspecified whether stage 3a or 3b CKD ([address]    7. Severe obesity (BMI 35.0-39. 9) with comorbidity ([address]    8. Mild depression ([address]    9. Needs flu shot      Impression  1. Hypertension that is controlled so continue current therapy reviewed with her and her .   2.  Diabetes repeat status pending a prior lab review not make adjustments if necessary. 3.  Hyperlipidemia prior lab reviewed repeat status pending I will adjust if needed. 4.  Prior CVA chronic but stable continue aspirin daily   5. Asthma that is stable   6. CKD stage III repeat status pending  7. Obesity that is stable  8. Depression that is controlled  Flu shot given today. I will call with lab results and make further recommendations or adjustments if necessary. Follow-up in 3 months or sooner if there is a problem. PLAN:  .  Orders Placed This Encounter    Influenza Virus Vaccine QUAD, PF Syr 6 Months + (Flulaval, Fluzone, Fluarix 76420)    METABOLIC PANEL, COMPREHENSIVE    LIPID PANEL    CK    HEMOGLOBIN A1C WITH EAG    glimepiride (AMARYL) 2 mg tablet    mirtazapine (Remeron) 15 mg tablet         ATTENTION:   This medical record was transcribed using an electronic medical records system. Although proofread, it may and can contain electronic and spelling errors. Other human spelling and other errors may be present. Corrections may be executed at a later time. Please feel free to contact us for any clarifications as needed. Follow-up and Dispositions    · Return in about 3 months (around 2/12/2022). No results found for any visits on 11/12/21. Adele Dickerson MD    The patient verbalized understanding of the problems and plans as explained.

## 2021-11-12 ENCOUNTER — OFFICE VISIT (OUTPATIENT)
Dept: INTERNAL MEDICINE CLINIC | Age: 56
End: 2021-11-12
Payer: MEDICARE

## 2021-11-12 VITALS
TEMPERATURE: 98.9 F | RESPIRATION RATE: 17 BRPM | HEIGHT: 68 IN | HEART RATE: 70 BPM | SYSTOLIC BLOOD PRESSURE: 138 MMHG | WEIGHT: 221.5 LBS | DIASTOLIC BLOOD PRESSURE: 82 MMHG | OXYGEN SATURATION: 98 % | BODY MASS INDEX: 33.57 KG/M2

## 2021-11-12 DIAGNOSIS — I12.9 HYPERTENSION WITH RENAL DISEASE: Primary | ICD-10-CM

## 2021-11-12 DIAGNOSIS — E11.22 CONTROLLED TYPE 2 DIABETES MELLITUS WITH STAGE 3 CHRONIC KIDNEY DISEASE, WITHOUT LONG-TERM CURRENT USE OF INSULIN (HCC): ICD-10-CM

## 2021-11-12 DIAGNOSIS — N18.30 STAGE 3 CHRONIC KIDNEY DISEASE, UNSPECIFIED WHETHER STAGE 3A OR 3B CKD (HCC): ICD-10-CM

## 2021-11-12 DIAGNOSIS — Z23 NEEDS FLU SHOT: ICD-10-CM

## 2021-11-12 DIAGNOSIS — J45.20 MILD INTERMITTENT ASTHMA WITHOUT COMPLICATION: ICD-10-CM

## 2021-11-12 DIAGNOSIS — N18.30 CONTROLLED TYPE 2 DIABETES MELLITUS WITH STAGE 3 CHRONIC KIDNEY DISEASE, WITHOUT LONG-TERM CURRENT USE OF INSULIN (HCC): ICD-10-CM

## 2021-11-12 DIAGNOSIS — E66.01 SEVERE OBESITY (BMI 35.0-39.9) WITH COMORBIDITY (HCC): ICD-10-CM

## 2021-11-12 DIAGNOSIS — E78.2 MIXED HYPERLIPIDEMIA: ICD-10-CM

## 2021-11-12 DIAGNOSIS — Z86.73 HISTORY OF CVA (CEREBROVASCULAR ACCIDENT): ICD-10-CM

## 2021-11-12 DIAGNOSIS — F32.A MILD DEPRESSION: ICD-10-CM

## 2021-11-12 LAB
ALBUMIN SERPL-MCNC: 3.7 G/DL (ref 3.5–5)
ALBUMIN/GLOB SERPL: 0.9 {RATIO} (ref 1.1–2.2)
ALP SERPL-CCNC: 68 U/L (ref 45–117)
ALT SERPL-CCNC: 31 U/L (ref 12–78)
ANION GAP SERPL CALC-SCNC: 6 MMOL/L (ref 5–15)
AST SERPL-CCNC: 20 U/L (ref 15–37)
BILIRUB SERPL-MCNC: 0.3 MG/DL (ref 0.2–1)
BUN SERPL-MCNC: 15 MG/DL (ref 6–20)
BUN/CREAT SERPL: 18 (ref 12–20)
CALCIUM SERPL-MCNC: 10.3 MG/DL (ref 8.5–10.1)
CHLORIDE SERPL-SCNC: 108 MMOL/L (ref 97–108)
CHOLEST SERPL-MCNC: 112 MG/DL
CK SERPL-CCNC: 59 U/L (ref 26–192)
CO2 SERPL-SCNC: 25 MMOL/L (ref 21–32)
CREAT SERPL-MCNC: 0.82 MG/DL (ref 0.55–1.02)
EST. AVERAGE GLUCOSE BLD GHB EST-MCNC: 217 MG/DL
GLOBULIN SER CALC-MCNC: 4 G/DL (ref 2–4)
GLUCOSE SERPL-MCNC: 64 MG/DL (ref 65–100)
HBA1C MFR BLD: 9.2 % (ref 4–5.6)
HDLC SERPL-MCNC: 43 MG/DL
HDLC SERPL: 2.6 {RATIO} (ref 0–5)
LDLC SERPL CALC-MCNC: 27.6 MG/DL (ref 0–100)
POTASSIUM SERPL-SCNC: 4.8 MMOL/L (ref 3.5–5.1)
PROT SERPL-MCNC: 7.7 G/DL (ref 6.4–8.2)
SODIUM SERPL-SCNC: 139 MMOL/L (ref 136–145)
TRIGL SERPL-MCNC: 207 MG/DL (ref ?–150)
VLDLC SERPL CALC-MCNC: 41.4 MG/DL

## 2021-11-12 PROCEDURE — G8754 DIAS BP LESS 90: HCPCS | Performed by: INTERNAL MEDICINE

## 2021-11-12 PROCEDURE — G8427 DOCREV CUR MEDS BY ELIG CLIN: HCPCS | Performed by: INTERNAL MEDICINE

## 2021-11-12 PROCEDURE — 3046F HEMOGLOBIN A1C LEVEL >9.0%: CPT | Performed by: INTERNAL MEDICINE

## 2021-11-12 PROCEDURE — G8752 SYS BP LESS 140: HCPCS | Performed by: INTERNAL MEDICINE

## 2021-11-12 PROCEDURE — G9899 SCRN MAM PERF RSLTS DOC: HCPCS | Performed by: INTERNAL MEDICINE

## 2021-11-12 PROCEDURE — 90686 IIV4 VACC NO PRSV 0.5 ML IM: CPT | Performed by: INTERNAL MEDICINE

## 2021-11-12 PROCEDURE — G8417 CALC BMI ABV UP PARAM F/U: HCPCS | Performed by: INTERNAL MEDICINE

## 2021-11-12 PROCEDURE — 3017F COLORECTAL CA SCREEN DOC REV: CPT | Performed by: INTERNAL MEDICINE

## 2021-11-12 PROCEDURE — G0008 ADMIN INFLUENZA VIRUS VAC: HCPCS | Performed by: INTERNAL MEDICINE

## 2021-11-12 PROCEDURE — 99214 OFFICE O/P EST MOD 30 MIN: CPT | Performed by: INTERNAL MEDICINE

## 2021-11-12 PROCEDURE — 2022F DILAT RTA XM EVC RTNOPTHY: CPT | Performed by: INTERNAL MEDICINE

## 2021-11-12 PROCEDURE — G9717 DOC PT DX DEP/BP F/U NT REQ: HCPCS | Performed by: INTERNAL MEDICINE

## 2021-11-12 RX ORDER — MIRTAZAPINE 15 MG/1
15 TABLET, FILM COATED ORAL
Qty: 30 TABLET | Refills: 2 | Status: SHIPPED | OUTPATIENT
Start: 2021-11-12 | End: 2021-12-12

## 2021-11-12 RX ORDER — GLIMEPIRIDE 2 MG/1
2 TABLET ORAL DAILY
Qty: 90 TABLET | Refills: 3 | Status: SHIPPED | OUTPATIENT
Start: 2021-11-12

## 2021-11-12 NOTE — PROGRESS NOTES
After obtaining consent and per verbal order from Dr. Gianluca Palacios, patient received influenza vaccine given by Johnnie Pisano and verified by Silva Form. Flulaval Influenza 0.5ml was given IM in left deltoid. Patient tolerated injection and was observed for 10 minutes post injection. VIS was given.

## 2021-11-12 NOTE — PROGRESS NOTES
HIPAA verified by two patient identifiers. Maria Eugenia Jackman is a 61 y.o. female    Chief Complaint   Patient presents with    Hypertension     3 month    Diabetes    Chronic Kidney Disease       Visit Vitals  BP (!) 140/85 (BP 1 Location: Left upper arm, BP Patient Position: Sitting, BP Cuff Size: Large adult)   Pulse 70   Temp 98.9 °F (37.2 °C) (Oral)   Resp 17   Ht 5' 8\" (1.727 m)   Wt 221 lb 8 oz (100.5 kg)   LMP 06/13/2011   SpO2 98%   BMI 33.68 kg/m²       Pain Scale: 0 - No pain/10  Pain Location:       Health Maintenance Due   Topic Date Due    Eye Exam Retinal or Dilated  Never done    DTaP/Tdap/Td series (1 - Tdap) Never done    Colorectal Cancer Screening Combo  Never done    Shingrix Vaccine Age 50> (1 of 2) Never done    COVID-19 Vaccine (2 - Booster for Liquid[employer] series) 05/27/2021    Flu Vaccine (1) 09/01/2021    A1C test (Diabetic or Prediabetic)  11/06/2021         Coordination of Care Questionnaire:  :   1) Have you been to an emergency room, urgent care, or hospitalized since your last visit? If yes, where when, and reason for visit? no       2. Have seen or consulted any other health care provider since your last visit? If yes, where when, and reason for visit? NO      Patient is accompanied by  I have received verbal consent from Maria Eugenia Jackman to discuss any/all medical information while they are present in the room.

## 2021-11-12 NOTE — PATIENT INSTRUCTIONS
Anemia: Care Instructions  Your Care Instructions     Anemia is a low level of red blood cells, which carry oxygen throughout your body. Many things can cause anemia. Lack of iron is one of the most common causes. Your body needs iron to make hemoglobin, a substance in red blood cells that carries oxygen from the lungs to your body's cells. Without enough iron, the body produces fewer and smaller red blood cells. As a result, your body's cells do not get enough oxygen, and you feel tired and weak. And you may have trouble concentrating. Bleeding is the most common cause of a lack of iron. You may have heavy menstrual bleeding or bleeding caused by conditions such as ulcers, hemorrhoids, or cancer. Regular use of aspirin or other anti-inflammatory medicines (such as ibuprofen) also can cause bleeding in some people. A lack of iron in your diet also can cause anemia, especially at times when the body needs more iron, such as during pregnancy, infancy, and the teen years. Your doctor may have prescribed iron pills. It may take several months of treatment for your iron levels to return to normal. Your doctor also may suggest that you eat foods that are rich in iron, such as meat and beans. There are many other causes of anemia. It is not always due to a lack of iron. Finding the specific cause of your anemia will help your doctor find the right treatment for you. Follow-up care is a key part of your treatment and safety. Be sure to make and go to all appointments, and call your doctor if you are having problems. It's also a good idea to know your test results and keep a list of the medicines you take. How can you care for yourself at home? · Take your medicines exactly as prescribed. Call your doctor if you think you are having a problem with your medicine. · If your doctor recommends iron pills, take them as directed:  ? Try to take the pills on an empty stomach about 1 hour before or 2 hours after meals. But you may need to take iron with food to avoid an upset stomach. ? Do not take antacids or drink milk or caffeine drinks (such as coffee, tea, or cola) at the same time or within 2 hours of the time that you take your iron. They can make it hard for your body to absorb the iron. ? Vitamin C (from food or supplements) helps your body absorb iron. Try taking iron pills with a glass of orange juice or some other food that is high in vitamin C, such as citrus fruits. ? Iron pills may cause stomach problems, such as heartburn, nausea, diarrhea, constipation, and cramps. Be sure to drink plenty of fluids, and include fruits, vegetables, and fiber in your diet each day. Iron pills often make your bowel movements dark or green. ? If you forget to take an iron pill, do not take a double dose of iron the next time you take a pill. ? Keep iron pills out of the reach of small children. An overdose of iron can be very dangerous. · Follow your doctor's advice about eating iron-rich foods. These include red meat, shellfish, poultry, eggs, beans, raisins, whole-grain bread, and leafy green vegetables. · Steam vegetables to help them keep their iron content. When should you call for help? Call 911 anytime you think you may need emergency care. For example, call if:    · You have symptoms of a heart attack. These may include:  ? Chest pain or pressure, or a strange feeling in the chest.  ? Sweating. ? Shortness of breath. ? Nausea or vomiting. ? Pain, pressure, or a strange feeling in the back, neck, jaw, or upper belly or in one or both shoulders or arms. ? Lightheadedness or sudden weakness. ? A fast or irregular heartbeat. After you call 911, the  may tell you to chew 1 adult-strength or 2 to 4 low-dose aspirin. Wait for an ambulance. Do not try to drive yourself.     · You passed out (lost consciousness).    Call your doctor now or seek immediate medical care if:    · You have new or increased shortness of breath.     · You are dizzy or lightheaded, or you feel like you may faint.     · Your fatigue and weakness continue or get worse.     · You have any abnormal bleeding, such as:  ? Nosebleeds. ? Vaginal bleeding that is different (heavier, more frequent, at a different time of the month) than what you are used to.  ? Bloody or black stools, or rectal bleeding. ? Bloody or pink urine. Watch closely for changes in your health, and be sure to contact your doctor if:    · You do not get better as expected. Where can you learn more? Go to http://www.watkins.com/  Enter R301 in the search box to learn more about \"Anemia: Care Instructions. \"  Current as of: April 29, 2021               Content Version: 13.0  © 4533-6688 [employer]. Care instructions adapted under license by [employer] (which disclaims liability or warranty for this information). If you have questions about a medical condition or this instruction, always ask your healthcare professional. [place] [address] any warranty or liability for your use of this information.

## 2021-11-16 NOTE — PROGRESS NOTES
Glycohemoglobin is still very high so of taking 46 units of Levemir twice daily increase that dose to 54 units twice daily and continue to work on diet.

## 2021-11-30 NOTE — PROGRESS NOTES
Tried to call patient regarding her lab work and voice mail box is full and unable to leave a message.

## 2021-12-09 NOTE — PROGRESS NOTES
Glycohemoglobin is still very high so of taking 46 units of Levemir twice daily increase that dose to 54 units twice daily and continue to work on diet. We have been unable to reach patient by phone. Note sent asking patient to call to discuss.

## 2022-01-12 RX ORDER — MIRTAZAPINE 15 MG/1
15 TABLET, FILM COATED ORAL
Qty: 30 TABLET | Refills: 1 | Status: SHIPPED | OUTPATIENT
Start: 2022-01-12 | End: 2022-04-19

## 2022-01-12 NOTE — TELEPHONE ENCOUNTER
PCP: Basilia Queen MD    Last appt: 11/12/2021  Future Appointments   Date Time Provider Yolanda Sofia   2/16/2022  8:40 AM Basilia Queen MD [facility]       Last refilled:11/12/21    Requested Prescriptions     Pending Prescriptions Disp Refills    mirtazapine (REMERON) 15 mg tablet 30 Tablet 1     Sig: Take 1 Tablet by mouth nightly.

## 2022-01-28 DIAGNOSIS — I10 HYPERTENSION, UNSPECIFIED TYPE: ICD-10-CM

## 2022-01-28 RX ORDER — POTASSIUM CHLORIDE 1500 MG/1
TABLET, EXTENDED RELEASE ORAL
Qty: 360 TABLET | Refills: 4 | Status: SHIPPED | OUTPATIENT
Start: 2022-01-28

## 2022-01-28 RX ORDER — METFORMIN HYDROCHLORIDE 500 MG/1
TABLET, EXTENDED RELEASE ORAL
Qty: 270 TABLET | Refills: 3 | Status: SHIPPED | OUTPATIENT
Start: 2022-01-28

## 2022-01-28 RX ORDER — PEN NEEDLE, DIABETIC 30 GX3/16"
NEEDLE, DISPOSABLE MISCELLANEOUS
Qty: 1 EACH | Status: SHIPPED | OUTPATIENT
Start: 2022-01-28

## 2022-01-28 RX ORDER — INSULIN DETEMIR 100 [IU]/ML
INJECTION, SOLUTION SUBCUTANEOUS
Qty: 6 PEN | Refills: 5
Start: 2022-01-28 | End: 2022-05-27

## 2022-01-28 RX ORDER — LISINOPRIL AND HYDROCHLOROTHIAZIDE 12.5; 2 MG/1; MG/1
TABLET ORAL
Qty: 180 TABLET | Refills: 3 | Status: SHIPPED | OUTPATIENT
Start: 2022-01-28

## 2022-01-28 NOTE — TELEPHONE ENCOUNTER
RX refill request from the patient/pharmacy. Patient last seen 11- with labs, and next appt. scheduled for 02-  Requested Prescriptions     Pending Prescriptions Disp Refills    Insulin Needles, Disposable, (BD Ultra-Fine Short Pen Needle) 31 gauge x 5/16\" ndle 1 Each PRN     Sig: USE DAILY AS DIRECTED WITH HER INSULIN PEN. Patient uses twice a day. Rocío Cruz

## 2022-01-28 NOTE — PROGRESS NOTES
Glycohemoglobin is still very high so of taking 46 units of Levemir twice daily increase that dose to 54 units twice daily and continue to work on diet.  came in for appointment and finally got to discuss lab work with him. To get her to make the insulin change.

## 2022-02-08 RX ORDER — ALBUTEROL SULFATE 90 UG/1
AEROSOL, METERED RESPIRATORY (INHALATION)
Qty: 6.7 EACH | Refills: 5 | Status: SHIPPED | OUTPATIENT
Start: 2022-02-08

## 2022-02-08 NOTE — TELEPHONE ENCOUNTER
RX refill request from the patient/pharmacy. Patient last seen 11- with labs, and next appt. scheduled for 02-  Requested Prescriptions     Pending Prescriptions Disp Refills    albuterol (PROVENTIL HFA, VENTOLIN HFA, PROAIR HFA) 90 mcg/actuation inhaler [Pharmacy Med Name: ALBUTEROL HFA (PROVENTIL) INH] 6.7 Each 5     Sig: INHALE 2 PUFFS BY MOUTH EVERY 4 HOURS AS NEEDED FOR WHEEZE   .

## 2022-02-15 NOTE — PROGRESS NOTES
Chief Complaint   Patient presents with    Hypertension    Diabetes     3 months    Chronic Kidney Disease    Cholesterol Problem    Cerebrovascular Accident       SUBJECTIVE:    Ritika Fong is a 61 y.o. female who returns in follow-up for medical problems include hypertension, diabetes, hyperlipidemia, asthma, prior CVA, DJD and other multiple medical problems. She is taking her medications and trying to follow her diet and try and remain active although that is limited secondary to her right hemiplegia from her prior CVA. She denies any chest pain, shortness of breath, palpitations, PND, orthopnea cardiac or respiratory complaints. She has no current GI or  complaints. She has no headaches, dizziness or neurologic complaints except for chronic right hemiplegia which is unchanged. She has no current arthritic complaints and there are no other complaints on complete review of systems. Current Outpatient Medications   Medication Sig Dispense Refill    albuterol (PROVENTIL HFA, VENTOLIN HFA, PROAIR HFA) 90 mcg/actuation inhaler INHALE 2 PUFFS BY MOUTH EVERY 4 HOURS AS NEEDED FOR WHEEZE 6.7 Each 5    Insulin Needles, Disposable, (BD Ultra-Fine Short Pen Needle) 31 gauge x 5/16\" ndle USE DAILY AS DIRECTED WITH HER INSULIN PEN. Patient uses twice a day. 1 Each PRN    insulin detemir U-100 (Levemir FlexTouch U-100 Insuln) 100 unit/mL (3 mL) inpn INJECT 54  UNITS SUBCUTANEOUSLY TWO TIMES DAILY. *REPLACES LANTUS* 6 Pen 5    Klor-Con M20 20 mEq tablet TAKE 2 TABLETS BY MOUTH TWICE A  Tablet 4    lisinopril-hydroCHLOROthiazide (PRINZIDE, ZESTORETIC) 20-12.5 mg per tablet TAKE 2 TABLETS BY MOUTH EVERY  Tablet 3    metFORMIN ER (GLUCOPHAGE XR) 500 mg tablet TAKE 1 TABLET BY MOUTH IN THE MORNING AND TAKE 2 TABLETS BEFORE DINNER. 270 Tablet 3    mirtazapine (REMERON) 15 mg tablet Take 1 Tablet by mouth nightly.  30 Tablet 1    glimepiride (AMARYL) 2 mg tablet Take 1 Tablet by mouth daily. 90 Tablet 3    rosuvastatin (CRESTOR) 20 mg tablet Take 1 Tablet by mouth nightly. 90 Tablet 3    cholecalciferol (VITAMIN D3) (1000 Units /25 mcg) tablet Take 1 Tablet by mouth daily. 90 Tablet 3    metoprolol succinate (TOPROL-XL) 50 mg XL tablet TAKE 1 TABLET BY MOUTH EVERY DAY 90 Tablet 3    amLODIPine (NORVASC) 10 mg tablet TAKE 1 TABLET BY MOUTH EVERY DAY 90 Tablet 3    magnesium oxide (MAG-OX) 400 mg tablet TAKE 1 TABLET BY MOUTH TWICE A  Tab 3    valsartan (DIOVAN) 320 mg tablet TAKE 1 TABLET BY MOUTH EVERY DAY 90 Tab 3    cloNIDine HCL (CATAPRES) 0.1 mg tablet TAKE 1 TABLET BY MOUTH TWICE A DAY      aspirin (ASPIRIN) 325 mg tablet Take 325 mg by mouth daily.  ALPRAZolam (XANAX) 0.5 mg tablet Take 1 Tab by mouth daily as needed for Anxiety. 30 Tab 0    FERROUS SULFATE (IRON PO) Take 1 Tab by mouth.  cholecalciferol (VITAMIN D3) 1,000 unit cap Take 2,000 Units by mouth daily.  ascorbic acid (VITAMIN C) 250 mg tablet Take  by mouth.          Past Medical History:   Diagnosis Date    Abnormal mammogram with microcalcification 6/4/2013    Right  UOQ    Aphasia due to stroke     SPEAKS SOME    Arthritis     back    Asthma 9/7/2017    CKD (chronic kidney disease) 9/7/2017    CKD (chronic kidney disease), stage III ([address] 9/7/2017    CVA (cerebral vascular accident) ([address] 9/7/2017    Depression 9/7/2017    Diabetes ([address]     TYPE 2; IDDM    Edema 9/7/2017    H/O mammogram 07/29/2019    Pt. report pap was done last year at [facility] Hypertension     Hypertension with renal disease 9/7/2017    Hypomagnesemia 9/7/2017    Morbid obesity ([address] 9/7/2017    On statin therapy 9/7/2017    Osteopenia 9/7/2017    Psychiatric disorder     depression    Smear, vaginal, as part of routine gynecological examination 07/29/2019    Pt. reported pap was done last year at [facility] Stroke [facility]) 5/11/2010    RIGHT SIDE WEAKNESS    Vitamin D deficiency 2017     Past Surgical History:   Procedure Laterality Date    HX BREAST BIOPSY Right     benign bx, many years ago    HX CATARACT REMOVAL  Dec 2010/ 2011    bilateral cataracts removed    HX GI      HEMMORHOIDECTOMY    HX GYN      pmb    HX HEENT      tonsillectomy    HX OOPHORECTOMY Bilateral     HX OTHER SURGICAL      HX TONSILLECTOMY      ME BREAST SURGERY PROCEDURE UNLISTED      ME LAPAROSCOPY W TOT HYSTERECTUTERUS <=250 GRAM  W TUBE/OVARY  2011    leiomyomata     No Known Allergies    REVIEW OF SYSTEMS:  General: negative for - chills or fever, or weight loss or gain  ENT: negative for - headaches, nasal congestion or tinnitus  Eyes: no blurred or visual changes  Neck: No stiffness or swollen nodes  Respiratory: negative for - cough, hemoptysis, shortness of breath or wheezing  Cardiovascular : negative for - chest pain, edema, palpitations or shortness of breath  Gastrointestinal: negative for - abdominal pain, blood in stools, heartburn or nausea/vomiting  Genito-Urinary: no dysuria, trouble voiding, or hematuria  Musculoskeletal: negative for - gait disturbance, joint pain, joint stiffness or joint swelling  Neurological: no TIA or stroke symptoms  Hematologic: no bruises, no bleeding  Lymphatic: no swollen glands  Integument: no lumps, mole changes, nail changes or rash  Endocrine:no malaise/lethargy poly uria or polydipsia or unexpected weight changes        Social History     Socioeconomic History    Marital status:    Tobacco Use    Smoking status: Former Smoker     Quit date: 2010     Years since quittin.7    Smokeless tobacco: Never Used   Vaping Use    Vaping Use: Never used   Substance and Sexual Activity    Alcohol use: No    Drug use: No    Sexual activity: Never   Social History Narrative    ** Merged History Encounter **          Family History   Problem Relation Age of Onset    Hypertension Mother     Mult Sclerosis Mother     Diabetes Father     Stroke Father     Heart Disease Father     Hypertension Father     Post-op Nausea/Vomiting Sister     Breast Cancer Paternal Aunt        OBJECTIVE:     Visit Vitals  /70 (BP 1 Location: Left upper arm, BP Patient Position: Sitting, BP Cuff Size: Large adult)   Pulse 65   Temp 98.4 °F (36.9 °C) (Oral)   Resp 17   Ht 5' 8\" (1.727 m)   LMP 06/13/2011   SpO2 98%   BMI 33.68 kg/m²     CONSTITUTIONAL:   well nourished, appears age appropriate  EYES: sclera anicteric, PERRL, EOMI  ENMT:nares clear, moist mucous membranes, pharynx clear  NECK: supple. Thyroid normal, No JVD or bruits  RESPIRATORY: Chest: clear to ascultation and percussion, normal inspiratory effort  CARDIOVASCULAR: Heart: regular rate and rhythm no murmurs, rubs or gallops, PMI not displaced, No thrills, no peripheral edema  GASTROINTESTINAL: Abdomen: non distended, soft, non tender, bowel sounds normal  HEMATOLOGIC: no purpura, petechiae or bruising  LYMPHATIC: No lymph node enlargemant  MUSCULOSKELETAL: Extremities: no active synovitis, pulse 1+. No diabetic foot changes noted  INTEGUMENT: No unusual rashes or suspicious skin lesions noted. Nails appear normal.  PERIPHERAL VASCULAR: normal pulses femoral, PT and DP  NEUROLOGIC: Chronic right hemiplegia from previous CVA without change, A & O X 3. Normal distal sensation and proprioception all toes on left foot with slight decreased on the right foot  PSYCHIATRIC:, appropriate affect     ASSESSMENT:   1. Hypertension with renal disease    2. Controlled type 2 diabetes mellitus with stage 3 chronic kidney disease, without long-term current use of insulin ([address]    3. Mixed hyperlipidemia    4. Mild intermittent asthma without complication    5. History of CVA (cerebrovascular accident)    6. Stage 3 chronic kidney disease, unspecified whether stage 3a or 3b CKD ([address]    7. Severe obesity (BMI 35.0-39. 9) with comorbidity ([address]    8. Mild depression (HCC)      Impression  1. Hypertension that is controlled so continue current therapy reviewed with her and her . 2.  Diabetes repeat status pending a prior lab reviewed Nalgest if needed. 3.  Hyperlipidemia prior lab reviewed repeat status pending I will adjust if needed. 4.  Asthma that is stable  5   History of CVA right hemiplegia stable  6. CKD stage III repeat status pending  7. Obesity I did discuss diet, exercise and weight reduction for overall health benefit. 8.  Depression that is stable  I will recheck her again myself in 3 months or sooner should the be a problem. I will call her lab results in interim. PLAN:  .  Orders Placed This Encounter    METABOLIC PANEL, COMPREHENSIVE    LIPID PANEL    CK    HEMOGLOBIN A1C WITH EAG         ATTENTION:   This medical record was transcribed using an electronic medical records system. Although proofread, it may and can contain electronic and spelling errors. Other human spelling and other errors may be present. Corrections may be executed at a later time. Please feel free to contact us for any clarifications as needed. Follow-up and Dispositions    · Return in about 3 months (around 5/16/2022). No results found for any visits on 02/16/22. Jordan Stevens MD    The patient verbalized understanding of the problems and plans as explained.

## 2022-02-16 ENCOUNTER — OFFICE VISIT (OUTPATIENT)
Dept: INTERNAL MEDICINE CLINIC | Age: 57
End: 2022-02-16
Payer: MEDICARE

## 2022-02-16 VITALS
BODY MASS INDEX: 33.68 KG/M2 | TEMPERATURE: 98.4 F | DIASTOLIC BLOOD PRESSURE: 70 MMHG | HEART RATE: 65 BPM | OXYGEN SATURATION: 98 % | SYSTOLIC BLOOD PRESSURE: 120 MMHG | HEIGHT: 68 IN | RESPIRATION RATE: 17 BRPM

## 2022-02-16 DIAGNOSIS — J45.20 MILD INTERMITTENT ASTHMA WITHOUT COMPLICATION: ICD-10-CM

## 2022-02-16 DIAGNOSIS — I12.9 HYPERTENSION WITH RENAL DISEASE: Primary | ICD-10-CM

## 2022-02-16 DIAGNOSIS — Z86.73 HISTORY OF CVA (CEREBROVASCULAR ACCIDENT): ICD-10-CM

## 2022-02-16 DIAGNOSIS — F32.A MILD DEPRESSION: ICD-10-CM

## 2022-02-16 DIAGNOSIS — N18.30 CONTROLLED TYPE 2 DIABETES MELLITUS WITH STAGE 3 CHRONIC KIDNEY DISEASE, WITHOUT LONG-TERM CURRENT USE OF INSULIN (HCC): ICD-10-CM

## 2022-02-16 DIAGNOSIS — E78.2 MIXED HYPERLIPIDEMIA: ICD-10-CM

## 2022-02-16 DIAGNOSIS — E11.22 CONTROLLED TYPE 2 DIABETES MELLITUS WITH STAGE 3 CHRONIC KIDNEY DISEASE, WITHOUT LONG-TERM CURRENT USE OF INSULIN (HCC): ICD-10-CM

## 2022-02-16 DIAGNOSIS — N18.30 STAGE 3 CHRONIC KIDNEY DISEASE, UNSPECIFIED WHETHER STAGE 3A OR 3B CKD (HCC): ICD-10-CM

## 2022-02-16 DIAGNOSIS — E66.01 SEVERE OBESITY (BMI 35.0-39.9) WITH COMORBIDITY (HCC): ICD-10-CM

## 2022-02-16 PROCEDURE — G8427 DOCREV CUR MEDS BY ELIG CLIN: HCPCS | Performed by: INTERNAL MEDICINE

## 2022-02-16 PROCEDURE — G8752 SYS BP LESS 140: HCPCS | Performed by: INTERNAL MEDICINE

## 2022-02-16 PROCEDURE — G9717 DOC PT DX DEP/BP F/U NT REQ: HCPCS | Performed by: INTERNAL MEDICINE

## 2022-02-16 PROCEDURE — 3017F COLORECTAL CA SCREEN DOC REV: CPT | Performed by: INTERNAL MEDICINE

## 2022-02-16 PROCEDURE — 99214 OFFICE O/P EST MOD 30 MIN: CPT | Performed by: INTERNAL MEDICINE

## 2022-02-16 PROCEDURE — 3046F HEMOGLOBIN A1C LEVEL >9.0%: CPT | Performed by: INTERNAL MEDICINE

## 2022-02-16 PROCEDURE — G8417 CALC BMI ABV UP PARAM F/U: HCPCS | Performed by: INTERNAL MEDICINE

## 2022-02-16 PROCEDURE — 2022F DILAT RTA XM EVC RTNOPTHY: CPT | Performed by: INTERNAL MEDICINE

## 2022-02-16 PROCEDURE — G9899 SCRN MAM PERF RSLTS DOC: HCPCS | Performed by: INTERNAL MEDICINE

## 2022-02-16 PROCEDURE — G8754 DIAS BP LESS 90: HCPCS | Performed by: INTERNAL MEDICINE

## 2022-02-16 NOTE — PATIENT INSTRUCTIONS

## 2022-02-16 NOTE — PROGRESS NOTES
HIPAA verified by two patient identifiers. Tana Spence is a 61 y.o. female    Chief Complaint   Patient presents with    Hypertension    Diabetes     3 months    Chronic Kidney Disease    Cholesterol Problem    Cerebrovascular Accident       Visit Vitals  /70 (BP 1 Location: Left upper arm, BP Patient Position: Sitting, BP Cuff Size: Large adult)   Pulse 65   Temp 98.4 °F (36.9 °C) (Oral)   Resp 17   Ht 5' 8\" (1.727 m)   LMP 06/13/2011   SpO2 98%   BMI 33.68 kg/m²       Pain Scale: 0 - No pain/10  Pain Location:       Health Maintenance Due   Topic Date Due    Eye Exam Retinal or Dilated  Never done    DTaP/Tdap/Td series (1 - Tdap) Never done    Colorectal Cancer Screening Combo  Never done    Shingrix Vaccine Age 50> (1 of 2) Never done    COVID-19 Vaccine (2 - Booster for "[employer] series) 05/27/2021    Foot Exam Q1  02/02/2022    A1C test (Diabetic or Prediabetic)  02/12/2022         Coordination of Care Questionnaire:  :   1) Have you been to an emergency room, urgent care, or hospitalized since your last visit? If yes, where when, and reason for visit? no       2. Have seen or consulted any other health care provider since your last visit? If yes, where when, and reason for visit? NO      Patient is accompanied by  I have received verbal consent from shasha Jordy to discuss any/all medical information while they are present in the room.

## 2022-02-17 LAB
ALBUMIN SERPL-MCNC: 4.3 G/DL (ref 3.8–4.9)
ALBUMIN/GLOB SERPL: 1.6 {RATIO} (ref 1.2–2.2)
ALP SERPL-CCNC: 67 IU/L (ref 44–121)
ALT SERPL-CCNC: 16 IU/L (ref 0–32)
AST SERPL-CCNC: 17 IU/L (ref 0–40)
BILIRUB SERPL-MCNC: 0.2 MG/DL (ref 0–1.2)
BUN SERPL-MCNC: 17 MG/DL (ref 6–24)
BUN/CREAT SERPL: 20 (ref 9–23)
CALCIUM SERPL-MCNC: 9.8 MG/DL (ref 8.7–10.2)
CHLORIDE SERPL-SCNC: 105 MMOL/L (ref 96–106)
CHOLEST SERPL-MCNC: 132 MG/DL (ref 100–199)
CK SERPL-CCNC: 57 U/L (ref 32–182)
CO2 SERPL-SCNC: 17 MMOL/L (ref 20–29)
CREAT SERPL-MCNC: 0.86 MG/DL (ref 0.57–1)
EST. AVERAGE GLUCOSE BLD GHB EST-MCNC: 278 MG/DL
GLOBULIN SER CALC-MCNC: 2.7 G/DL (ref 1.5–4.5)
GLUCOSE SERPL-MCNC: 54 MG/DL (ref 65–99)
HBA1C MFR BLD: 11.3 % (ref 4.8–5.6)
HDLC SERPL-MCNC: 43 MG/DL
LDLC SERPL CALC-MCNC: 54 MG/DL (ref 0–99)
POTASSIUM SERPL-SCNC: 5.2 MMOL/L (ref 3.5–5.2)
PROT SERPL-MCNC: 7 G/DL (ref 6–8.5)
SODIUM SERPL-SCNC: 143 MMOL/L (ref 134–144)
TRIGL SERPL-MCNC: 215 MG/DL (ref 0–149)
VLDLC SERPL CALC-MCNC: 35 MG/DL (ref 5–40)

## 2022-02-17 NOTE — PROGRESS NOTES
Glycohemoglobin 11.3 is extremely poorly controlled and certainly going to lead to major problems if not improved. I need to know what she is exactly taking every single day to know how to adjust her diabetic medications.   Other labs are okay

## 2022-02-22 ENCOUNTER — TELEPHONE (OUTPATIENT)
Dept: INTERNAL MEDICINE CLINIC | Age: 57
End: 2022-02-22

## 2022-02-22 NOTE — PROGRESS NOTES
Sent a message to Dr. Wallace Rojas regarding patient's current medication for her diabetes. Taking Levemir insulin only 54 units once a day instead of BID due to cost; taking Metformin 500 mg in the am and 2 in the pm and Amaryl 2 mg daily.

## 2022-02-22 NOTE — TELEPHONE ENCOUNTER
Spoke to patient's  regarding her lab results and most recent hgb A1c of 11.3. Medication wise she is getting the Amaryl 2 mg daily; metformin 500 mg 1 in the am and 2 in the pm; Levemir is supposed to be 54 units BID but they are giving it only once daily to save money. Discussed with Mr. Henriquezia Fatou why this was not beneficial for the patient's lab.

## 2022-03-18 PROBLEM — E83.42 HYPOMAGNESEMIA: Status: ACTIVE | Noted: 2017-09-07

## 2022-03-18 PROBLEM — R55 NEAR SYNCOPE: Status: ACTIVE | Noted: 2017-03-07

## 2022-03-18 PROBLEM — E66.01 SEVERE OBESITY (BMI 35.0-39.9) WITH COMORBIDITY (HCC): Status: ACTIVE | Noted: 2018-05-09

## 2022-03-18 PROBLEM — M85.89 OSTEOPENIA OF MULTIPLE SITES: Status: ACTIVE | Noted: 2017-09-07

## 2022-03-18 PROBLEM — N18.30 CKD (CHRONIC KIDNEY DISEASE), STAGE III (HCC): Status: ACTIVE | Noted: 2017-09-07

## 2022-03-18 PROBLEM — I65.23 STENOSIS OF BOTH INTERNAL CAROTID ARTERIES: Status: ACTIVE | Noted: 2017-03-07

## 2022-03-19 PROBLEM — R42 VERTIGO: Status: ACTIVE | Noted: 2017-03-06

## 2022-03-19 PROBLEM — Z13.39 ALCOHOL SCREENING: Status: ACTIVE | Noted: 2018-05-09

## 2022-03-19 PROBLEM — E55.9 VITAMIN D DEFICIENCY: Status: ACTIVE | Noted: 2017-09-07

## 2022-03-19 PROBLEM — I12.9 HYPERTENSION WITH RENAL DISEASE: Status: ACTIVE | Noted: 2017-09-07

## 2022-03-19 PROBLEM — F41.9 ANXIETY: Status: ACTIVE | Noted: 2018-01-23

## 2022-03-19 PROBLEM — J45.20 MILD INTERMITTENT ASTHMA WITHOUT COMPLICATION: Status: ACTIVE | Noted: 2018-01-22

## 2022-03-19 PROBLEM — F32.A MILD DEPRESSION: Status: ACTIVE | Noted: 2018-05-09

## 2022-04-19 RX ORDER — MIRTAZAPINE 15 MG/1
15 TABLET, FILM COATED ORAL
Qty: 30 TABLET | Refills: 1 | Status: SHIPPED | OUTPATIENT
Start: 2022-04-19 | End: 2022-06-06 | Stop reason: SDUPTHER

## 2022-04-25 RX ORDER — VALSARTAN 320 MG/1
320 TABLET ORAL DAILY
Qty: 90 TABLET | Refills: 3 | Status: SHIPPED | OUTPATIENT
Start: 2022-04-25

## 2022-04-25 NOTE — TELEPHONE ENCOUNTER
RX refill request from the patient/pharmacy. Patient last seen 02- with labs, and next appt. scheduled for 05-  Requested Prescriptions     Pending Prescriptions Disp Refills    valsartan (DIOVAN) 320 mg tablet 90 Tablet 3     Sig: Take 1 Tablet by mouth daily. Adam Culp

## 2022-05-16 NOTE — PROGRESS NOTES
Chief Complaint   Patient presents with    Follow-up       SUBJECTIVE:    Wicho Duarte is a 61 y.o. female who returns in follow-up for medical problems to include hypertension, diabetes, hyperlipidemia, prior CVA with hemiplegia, CKD stage III, obesity, anxiety depression and other medical problems. She is taking her medications trying to follow her diet and try and remain physically active although limited somewhat secondary to her prior stroke. She currently denies any chest pain, shortness of breath, palpitations, PND, orthopnea or other cardiac or respiratory complaints. There are no current GI or  complaints. There are no headaches, dizziness or new neurologic complaints with her chronic poststroke hemiplegia without change. There are no new arthritic complaints and no other complaints or complete your systems. Current Outpatient Medications   Medication Sig Dispense Refill    valsartan (DIOVAN) 320 mg tablet Take 1 Tablet by mouth daily. 90 Tablet 3    mirtazapine (REMERON) 15 mg tablet TAKE 1 TABLET BY MOUTH NIGHTLY 30 Tablet 1    albuterol (PROVENTIL HFA, VENTOLIN HFA, PROAIR HFA) 90 mcg/actuation inhaler INHALE 2 PUFFS BY MOUTH EVERY 4 HOURS AS NEEDED FOR WHEEZE 6.7 Each 5    Insulin Needles, Disposable, (BD Ultra-Fine Short Pen Needle) 31 gauge x 5/16\" ndle USE DAILY AS DIRECTED WITH HER INSULIN PEN. Patient uses twice a day. 1 Each PRN    insulin detemir U-100 (Levemir FlexTouch U-100 Insuln) 100 unit/mL (3 mL) inpn INJECT 54  UNITS SUBCUTANEOUSLY TWO TIMES DAILY.  *REPLACES LANTUS* 6 Pen 5    Klor-Con M20 20 mEq tablet TAKE 2 TABLETS BY MOUTH TWICE A  Tablet 4    lisinopril-hydroCHLOROthiazide (PRINZIDE, ZESTORETIC) 20-12.5 mg per tablet TAKE 2 TABLETS BY MOUTH EVERY  Tablet 3    metFORMIN ER (GLUCOPHAGE XR) 500 mg tablet TAKE 1 TABLET BY MOUTH IN THE MORNING AND TAKE 2 TABLETS BEFORE DINNER. 270 Tablet 3    glimepiride (AMARYL) 2 mg tablet Take 1 Tablet by mouth daily. 90 Tablet 3    rosuvastatin (CRESTOR) 20 mg tablet Take 1 Tablet by mouth nightly. 90 Tablet 3    cholecalciferol (VITAMIN D3) (1000 Units /25 mcg) tablet Take 1 Tablet by mouth daily. 90 Tablet 3    metoprolol succinate (TOPROL-XL) 50 mg XL tablet TAKE 1 TABLET BY MOUTH EVERY DAY 90 Tablet 3    amLODIPine (NORVASC) 10 mg tablet TAKE 1 TABLET BY MOUTH EVERY DAY 90 Tablet 3    magnesium oxide (MAG-OX) 400 mg tablet TAKE 1 TABLET BY MOUTH TWICE A  Tab 3    cloNIDine HCL (CATAPRES) 0.1 mg tablet TAKE 1 TABLET BY MOUTH TWICE A DAY      aspirin (ASPIRIN) 325 mg tablet Take 325 mg by mouth daily.  ALPRAZolam (XANAX) 0.5 mg tablet Take 1 Tab by mouth daily as needed for Anxiety. 30 Tab 0    FERROUS SULFATE (IRON PO) Take 1 Tab by mouth.  cholecalciferol (VITAMIN D3) 1,000 unit cap Take 2,000 Units by mouth daily.  ascorbic acid (VITAMIN C) 250 mg tablet Take  by mouth.          Past Medical History:   Diagnosis Date    Abnormal mammogram with microcalcification 6/4/2013    Right  UOQ    Aphasia due to stroke     SPEAKS SOME    Arthritis     back    Asthma 9/7/2017    CKD (chronic kidney disease) 9/7/2017    CKD (chronic kidney disease), stage III ([address] 9/7/2017    CVA (cerebral vascular accident) ([address] 9/7/2017    Depression 9/7/2017    Diabetes ([address]     TYPE 2; IDDM    Edema 9/7/2017    H/O mammogram 07/29/2019    Pt. report pap was done last year at [facility] Hypertension     Hypertension with renal disease 9/7/2017    Hypomagnesemia 9/7/2017    Morbid obesity ([address] 9/7/2017    On statin therapy 9/7/2017    Osteopenia 9/7/2017    Psychiatric disorder     depression    Smear, vaginal, as part of routine gynecological examination 07/29/2019    Pt. reported pap was done last year at [facility] Stroke [facility]) 5/11/2010    RIGHT SIDE WEAKNESS    Vitamin D deficiency 9/7/2017     Past Surgical History:   Procedure Laterality Date    HX BREAST BIOPSY Right     benign bx, many years ago    HX CATARACT REMOVAL  Dec 2010/ 2011    bilateral cataracts removed    HX GI      HEMMORHOIDECTOMY    HX GYN      pmb    HX HEENT      tonsillectomy    HX OOPHORECTOMY Bilateral     HX OTHER SURGICAL      HX TONSILLECTOMY      SD BREAST SURGERY PROCEDURE UNLISTED      SD LAPAROSCOPY W TOT HYSTERECTUTERUS <=250 GRAM  W TUBE/OVARY  2011    leiomyomata     No Known Allergies    REVIEW OF SYSTEMS:  General: negative for - chills or fever, or weight loss or gain  ENT: negative for - headaches, nasal congestion or tinnitus  Eyes: no blurred or visual changes  Neck: No stiffness or swollen nodes  Respiratory: negative for - cough, hemoptysis, shortness of breath or wheezing  Cardiovascular : negative for - chest pain, edema, palpitations or shortness of breath  Gastrointestinal: negative for - abdominal pain, blood in stools, heartburn or nausea/vomiting  Genito-Urinary: no dysuria, trouble voiding, or hematuria  Musculoskeletal: negative for - gait disturbance, joint pain, joint stiffness or joint swelling  Neurological: no TIA or stroke symptoms  Hematologic: no bruises, no bleeding  Lymphatic: no swollen glands  Integument: no lumps, mole changes, nail changes or rash  Endocrine:no malaise/lethargy poly uria or polydipsia or unexpected weight changes        Social History     Socioeconomic History    Marital status:    Tobacco Use    Smoking status: Former Smoker     Quit date: 2010     Years since quittin.0    Smokeless tobacco: Never Used   Vaping Use    Vaping Use: Never used   Substance and Sexual Activity    Alcohol use: No    Drug use: No    Sexual activity: Never   Social History Narrative    ** Merged History Encounter **          Family History   Problem Relation Age of Onset    Hypertension Mother     Mult Sclerosis Mother     Diabetes Father     Stroke Father     Heart Disease Father     Hypertension Father  Post-op Nausea/Vomiting Sister     Breast Cancer Paternal Aunt        OBJECTIVE:     Visit Vitals  /88 (BP 1 Location: Left arm, BP Patient Position: Sitting, BP Cuff Size: Adult)   Pulse 69   Temp 98.8 °F (37.1 °C) (Oral)   Resp 16   Ht 5' 8\" (1.727 m)   LMP 06/13/2011   SpO2 97%   BMI 33.68 kg/m²     CONSTITUTIONAL:   well nourished, appears age appropriate  EYES: sclera anicteric, PERRL, EOMI  ENMT:nares clear, moist mucous membranes, pharynx clear  NECK: supple. Thyroid normal, No JVD or bruits  RESPIRATORY: Chest: clear to ascultation and percussion, normal inspiratory effort  CARDIOVASCULAR: Heart: regular rate and rhythm no murmurs, rubs or gallops, PMI not displaced, No thrills, no peripheral edema  GASTROINTESTINAL: Abdomen: non distended, soft, non tender, bowel sounds normal  HEMATOLOGIC: no purpura, petechiae or bruising  LYMPHATIC: No lymph node enlargemant  MUSCULOSKELETAL: Extremities: no active synovitis, pulse 1+   INTEGUMENT: No unusual rashes or suspicious skin lesions noted. Nails appear normal.  PERIPHERAL VASCULAR: normal pulses femoral, PT and DP  NEUROLOGIC: Chronic unchanged right hemiplegia and dysarthria, A & O X 3  PSYCHIATRIC:, appropriate affect     ASSESSMENT:   1. Hypertension with renal disease    2. Controlled type 2 diabetes mellitus with stage 3 chronic kidney disease, without long-term current use of insulin ([address]    3. Mixed hyperlipidemia    4. Mild intermittent asthma without complication    5. History of CVA (cerebrovascular accident)    6. Stage 3 chronic kidney disease, unspecified whether stage 3a or 3b CKD ([address]    7. Severe obesity (BMI 35.0-39. 9) with comorbidity ([address]      Impression  1. Hypertension control adequate  2. Diabetes mellitus repeat status pending  3. Hyperlipidemia prior lab reviewed  4. Asthma stable  5   Prior CVA with hemiplegia stable  6.   CKD stage III repeat status pending  7 obesity we did discuss diet, exercise weight reduction  Follow-up scheduled again for 3 months or sooner if there is a problem. PLAN:  .  Orders Placed This Encounter    METABOLIC PANEL, COMPREHENSIVE    LIPID PANEL    CK    HEMOGLOBIN A1C WITH EAG         ATTENTION:   This medical record was transcribed using an electronic medical records system. Although proofread, it may and can contain electronic and spelling errors. Other human spelling and other errors may be present. Corrections may be executed at a later time. Please feel free to contact us for any clarifications as needed. Follow-up and Dispositions    · Return in about 3 months (around 8/17/2022). No results found for any visits on 05/17/22. Dylan Adams MD    The patient verbalized understanding of the problems and plans as explained.

## 2022-05-17 ENCOUNTER — OFFICE VISIT (OUTPATIENT)
Dept: INTERNAL MEDICINE CLINIC | Age: 57
End: 2022-05-17
Payer: MEDICARE

## 2022-05-17 VITALS
TEMPERATURE: 98.8 F | BODY MASS INDEX: 33.68 KG/M2 | OXYGEN SATURATION: 97 % | RESPIRATION RATE: 16 BRPM | HEART RATE: 69 BPM | HEIGHT: 68 IN | DIASTOLIC BLOOD PRESSURE: 88 MMHG | SYSTOLIC BLOOD PRESSURE: 130 MMHG

## 2022-05-17 DIAGNOSIS — J45.20 MILD INTERMITTENT ASTHMA WITHOUT COMPLICATION: ICD-10-CM

## 2022-05-17 DIAGNOSIS — E66.01 SEVERE OBESITY (BMI 35.0-39.9) WITH COMORBIDITY (HCC): ICD-10-CM

## 2022-05-17 DIAGNOSIS — Z86.73 HISTORY OF CVA (CEREBROVASCULAR ACCIDENT): ICD-10-CM

## 2022-05-17 DIAGNOSIS — N18.30 STAGE 3 CHRONIC KIDNEY DISEASE, UNSPECIFIED WHETHER STAGE 3A OR 3B CKD (HCC): ICD-10-CM

## 2022-05-17 DIAGNOSIS — N18.30 CONTROLLED TYPE 2 DIABETES MELLITUS WITH STAGE 3 CHRONIC KIDNEY DISEASE, WITHOUT LONG-TERM CURRENT USE OF INSULIN (HCC): ICD-10-CM

## 2022-05-17 DIAGNOSIS — I12.9 HYPERTENSION WITH RENAL DISEASE: Primary | ICD-10-CM

## 2022-05-17 DIAGNOSIS — E11.22 CONTROLLED TYPE 2 DIABETES MELLITUS WITH STAGE 3 CHRONIC KIDNEY DISEASE, WITHOUT LONG-TERM CURRENT USE OF INSULIN (HCC): ICD-10-CM

## 2022-05-17 DIAGNOSIS — E78.2 MIXED HYPERLIPIDEMIA: ICD-10-CM

## 2022-05-17 PROCEDURE — 3017F COLORECTAL CA SCREEN DOC REV: CPT | Performed by: INTERNAL MEDICINE

## 2022-05-17 PROCEDURE — G8754 DIAS BP LESS 90: HCPCS | Performed by: INTERNAL MEDICINE

## 2022-05-17 PROCEDURE — G8417 CALC BMI ABV UP PARAM F/U: HCPCS | Performed by: INTERNAL MEDICINE

## 2022-05-17 PROCEDURE — G8752 SYS BP LESS 140: HCPCS | Performed by: INTERNAL MEDICINE

## 2022-05-17 PROCEDURE — G8427 DOCREV CUR MEDS BY ELIG CLIN: HCPCS | Performed by: INTERNAL MEDICINE

## 2022-05-17 PROCEDURE — G9899 SCRN MAM PERF RSLTS DOC: HCPCS | Performed by: INTERNAL MEDICINE

## 2022-05-17 PROCEDURE — G9717 DOC PT DX DEP/BP F/U NT REQ: HCPCS | Performed by: INTERNAL MEDICINE

## 2022-05-17 PROCEDURE — 2022F DILAT RTA XM EVC RTNOPTHY: CPT | Performed by: INTERNAL MEDICINE

## 2022-05-17 PROCEDURE — 99214 OFFICE O/P EST MOD 30 MIN: CPT | Performed by: INTERNAL MEDICINE

## 2022-05-17 PROCEDURE — 3046F HEMOGLOBIN A1C LEVEL >9.0%: CPT | Performed by: INTERNAL MEDICINE

## 2022-05-17 NOTE — PATIENT INSTRUCTIONS

## 2022-05-18 LAB
ALBUMIN SERPL-MCNC: 4.4 G/DL (ref 3.8–4.9)
ALBUMIN/GLOB SERPL: 1.3 {RATIO} (ref 1.2–2.2)
ALP SERPL-CCNC: 78 IU/L (ref 44–121)
ALT SERPL-CCNC: 16 IU/L (ref 0–32)
AST SERPL-CCNC: 13 IU/L (ref 0–40)
BILIRUB SERPL-MCNC: <0.2 MG/DL (ref 0–1.2)
BUN SERPL-MCNC: 17 MG/DL (ref 6–24)
BUN/CREAT SERPL: 18 (ref 9–23)
CALCIUM SERPL-MCNC: 9.8 MG/DL (ref 8.7–10.2)
CHLORIDE SERPL-SCNC: 106 MMOL/L (ref 96–106)
CHOLEST SERPL-MCNC: 181 MG/DL (ref 100–199)
CK SERPL-CCNC: 54 U/L (ref 32–182)
CO2 SERPL-SCNC: 24 MMOL/L (ref 20–29)
CREAT SERPL-MCNC: 0.97 MG/DL (ref 0.57–1)
EGFR: 68 ML/MIN/1.73
EST. AVERAGE GLUCOSE BLD GHB EST-MCNC: 243 MG/DL
GLOBULIN SER CALC-MCNC: 3.3 G/DL (ref 1.5–4.5)
GLUCOSE SERPL-MCNC: 77 MG/DL (ref 65–99)
HBA1C MFR BLD: 10.1 % (ref 4.8–5.6)
HDLC SERPL-MCNC: 44 MG/DL
LDLC SERPL CALC-MCNC: 96 MG/DL (ref 0–99)
POTASSIUM SERPL-SCNC: 4.3 MMOL/L (ref 3.5–5.2)
PROT SERPL-MCNC: 7.7 G/DL (ref 6–8.5)
SODIUM SERPL-SCNC: 146 MMOL/L (ref 134–144)
TRIGL SERPL-MCNC: 243 MG/DL (ref 0–149)
VLDLC SERPL CALC-MCNC: 41 MG/DL (ref 5–40)

## 2022-05-23 ENCOUNTER — TELEPHONE (OUTPATIENT)
Dept: INTERNAL MEDICINE CLINIC | Age: 57
End: 2022-05-23

## 2022-05-23 NOTE — TELEPHONE ENCOUNTER
Patient is taking Levemir 54 units twice a day; Metformin 500 mg 1 in the am and 2 in the pm; and Glimperide 2 mg every day.

## 2022-05-23 NOTE — TELEPHONE ENCOUNTER
----- Message from Kemal Conde MD sent at 5/19/2022 11:09 PM EDT -----  Glyco a little better but still very high.  What is she taking

## 2022-05-27 DIAGNOSIS — I10 ESSENTIAL HYPERTENSION: ICD-10-CM

## 2022-05-27 RX ORDER — METOPROLOL SUCCINATE 50 MG/1
TABLET, EXTENDED RELEASE ORAL
Qty: 90 TABLET | Refills: 3 | Status: SHIPPED | OUTPATIENT
Start: 2022-05-27

## 2022-05-27 RX ORDER — ROSUVASTATIN CALCIUM 20 MG/1
TABLET, COATED ORAL
Qty: 90 TABLET | Refills: 3 | Status: SHIPPED | OUTPATIENT
Start: 2022-05-27

## 2022-05-27 RX ORDER — LANOLIN ALCOHOL/MO/W.PET/CERES
CREAM (GRAM) TOPICAL
Qty: 180 TABLET | Refills: 3 | Status: SHIPPED | OUTPATIENT
Start: 2022-05-27

## 2022-05-27 RX ORDER — AMLODIPINE BESYLATE 10 MG/1
TABLET ORAL
Qty: 90 TABLET | Refills: 3 | Status: SHIPPED | OUTPATIENT
Start: 2022-05-27

## 2022-05-27 RX ORDER — INSULIN DETEMIR 100 [IU]/ML
INJECTION, SOLUTION SUBCUTANEOUS
Qty: 10 PEN | Refills: 17 | Status: SHIPPED | OUTPATIENT
Start: 2022-05-27 | End: 2022-08-23 | Stop reason: SDUPTHER

## 2022-05-27 NOTE — TELEPHONE ENCOUNTER
PCP: Vega Perry MD    Last appt: 5/17/2022  Future Appointments   Date Time Provider Yolanda Sofia   8/17/2022  8:40 AM Vega Perry MD [facility] BS AMB       Requested Prescriptions     Pending Prescriptions Disp Refills    rosuvastatin (CRESTOR) 20 mg tablet [Pharmacy Med Name: ROSUVASTATIN CALCIUM 20 MG TAB] 90 Tablet 3     Sig: TAKE 1 TABLET BY MOUTH EVERY DAY AT NIGHT *REPLACES PRAVASTATIN    insulin detemir U-100 (Levemir FlexTouch U-100 Insuln) 100 unit/mL (3 mL) inpn [Pharmacy Med Name: Janellemarci Spencerjose miguel 100 UNIT/ML]  17     Sig: INJECT 40 UNITS SUBCUTANEOUSLY TWO TIMES DAILY.  *REPLACES LANTUS*    metoprolol succinate (TOPROL-XL) 50 mg XL tablet [Pharmacy Med Name: METOPROLOL SUCC ER 50 MG TAB] 90 Tablet 3     Sig: TAKE 1 TABLET BY MOUTH EVERY DAY    magnesium oxide (MAG-OX) 400 mg tablet [Pharmacy Med Name: MAGNESIUM OXIDE 400 MG TABLET] 180 Tablet 3     Sig: TAKE 1 TABLET BY MOUTH TWICE A DAY    amLODIPine (NORVASC) 10 mg tablet [Pharmacy Med Name: AMLODIPINE BESYLATE 10 MG TAB] 90 Tablet 3     Sig: TAKE 1 TABLET BY MOUTH EVERY DAY         Other Comments:

## 2022-06-06 RX ORDER — MIRTAZAPINE 15 MG/1
15 TABLET, FILM COATED ORAL
Qty: 30 TABLET | Refills: 1 | Status: SHIPPED | OUTPATIENT
Start: 2022-06-06 | End: 2022-09-16 | Stop reason: SDUPTHER

## 2022-06-06 NOTE — TELEPHONE ENCOUNTER
PCP: Miles Begum MD    Last appt: 5/17/2022  Future Appointments   Date Time Provider Yolanda Sofia   8/17/2022  8:40 AM Miles Begum MD [facility] BS AMB       Requested Prescriptions     Pending Prescriptions Disp Refills    mirtazapine (REMERON) 15 mg tablet 30 Tablet 1     Sig: Take 1 Tablet by mouth nightly.          Other Comments:

## 2022-06-30 ENCOUNTER — APPOINTMENT (OUTPATIENT)
Dept: GENERAL RADIOLOGY | Age: 57
End: 2022-06-30
Attending: EMERGENCY MEDICINE
Payer: MEDICARE

## 2022-06-30 ENCOUNTER — HOSPITAL ENCOUNTER (EMERGENCY)
Age: 57
Discharge: HOME | End: 2022-06-30
Attending: EMERGENCY MEDICINE
Payer: MEDICARE

## 2022-06-30 VITALS
RESPIRATION RATE: 20 BRPM | DIASTOLIC BLOOD PRESSURE: 96 MMHG | HEART RATE: 99 BPM | HEIGHT: 64 IN | OXYGEN SATURATION: 99 % | SYSTOLIC BLOOD PRESSURE: 176 MMHG | BODY MASS INDEX: 34.15 KG/M2 | WEIGHT: 200 LBS | TEMPERATURE: 97.7 F

## 2022-06-30 DIAGNOSIS — M25.571 ACUTE RIGHT ANKLE PAIN: Primary | ICD-10-CM

## 2022-06-30 DIAGNOSIS — I10 ACCELERATED HYPERTENSION: ICD-10-CM

## 2022-06-30 PROCEDURE — 99283 EMERGENCY DEPT VISIT LOW MDM: CPT

## 2022-06-30 PROCEDURE — 74011250637 HC RX REV CODE- 250/637: Performed by: EMERGENCY MEDICINE

## 2022-06-30 PROCEDURE — 73610 X-RAY EXAM OF ANKLE: CPT

## 2022-06-30 RX ORDER — OXYCODONE AND ACETAMINOPHEN 5; 325 MG/1; MG/1
1 TABLET ORAL
Status: COMPLETED | OUTPATIENT
Start: 2022-06-30 | End: 2022-06-30

## 2022-06-30 RX ADMIN — OXYCODONE AND ACETAMINOPHEN 1 TABLET: 5; 325 TABLET ORAL at 02:07

## 2022-06-30 NOTE — ED PROVIDER NOTES
EMERGENCY DEPARTMENT HISTORY AND PHYSICAL EXAM      Please note that this dictation was completed with the assistance of \"Dragon\", the computer voice recognition software. Quite often unanticipated grammatical, syntax, homophones, and other interpretive errors are inadvertently transcribed by the computer software. Please disregard these errors and any errors that have escaped final proofreading. Thank you. Date: 06/30/22  Patient: Adalgisa Grier  Patient Age and Sex: 61 y.o. female   MRN: [id number]  CSN: [id number]    History of Presenting Illness     Chief Complaint   Patient presents with    Ankle Pain     Patient arrives via EMS for R ankle pain/injury. Patient states she was walking with walker when she heard a \"pop\" from R ankle, now 10/10 pain. Hx of stroke 2010 with R sided defecits     History Provided By: Patient/family/EMS (if applicable)    HPI: Adalgisa Grier, 61 y.o. female with past medical history as documented below presents to the ED with c/o of one day hx of mild achy right ankle pain. Pt has known right sided deficits from prior CVA. States she was walking with her walker when she heard a \"pop\" in her right ankle. No numbness or weakness. No other trauma. Pt denies any other exacerbating or ameliorating factors. Additionally, pt specifically denies any recent fever, chills, headache, nausea, vomiting, abdominal pain, CP, SOB, lightheadedness, dizziness, numbness, weakness, lower extremity swelling, heart palpitations, urinary sxs, diarrhea, constipation, melena, hematochezia, cough, or congestion. There are no other complaints, changes or physical findings pertinent to the HPI at this time.     PCP: Clementina Hager MD  Past History   Past Medical History:  Past Medical History:   Diagnosis Date    Abnormal mammogram with microcalcification 6/4/2013    Right  UOQ    Aphasia due to stroke     SPEAKS SOME    Arthritis     back    Asthma 9/7/2017    CKD (chronic kidney disease) 2017    CKD (chronic kidney disease), stage III ([facility] 2017    [facility]re[facility]    Depression 2017    Diabetes ([facility]     TYPE 2; IDDM    Edema 2017    H/O mammogram 2019    Pt. report pap was done last year at [facility] Hypertension     Hypertension with renal disease 2017    Hypomagnesemia 2017    Morbid obesity ([facility] 2017    On statin therapy 2017    Osteopenia 2017    Psychiatric disorder     depression    Smear, vaginal, as part of routine gynecological examination 2019    Pt. reported pap was done last year at [facility] Stroke [facility]) 2010    RIGHT SIDE WEAKNESS    Vitamin D deficiency 2017       Past Surgical History:  Past Surgical History:   Procedure Laterality Date    HX BREAST BIOPSY Right     benign bx, many years ago    HX CATARACT REMOVAL  Dec 2010/ 2011    bilateral cataracts removed    HX GI      HEMMORHOIDECTOMY    HX GYN      pmb    HX HEENT      tonsillectomy    HX OOPHORECTOMY Bilateral     HX OTHER SURGICAL      HX TONSILLECTOMY      AK BREAST SURGERY PROCEDURE UNLISTED      AK LAPAROSCOPY W TOT HYSTERECTUTERUS <=250 GRAM  W TUBE/OVARY  2011    leiomyomata       Family History:   Family history reviewed and was non-contributory, unless specified below:  Family History   Problem Relation Age of Onset    Hypertension Mother     Mult Sclerosis Mother     Diabetes Father     Stroke Father     Heart Disease Father     Hypertension Father     Post-op Nausea/Vomiting Sister     Breast Cancer Paternal Aunt        Social History:  Social History     Tobacco Use    Smoking status: Former Smoker     Quit date: 2010     Years since quittin.1    Smokeless tobacco: Never Used   Vaping Use    Vaping Use: Never used   Substance Use Topics    Alcohol use: No    Drug use: No       Allergies:  No Known Allergies    Current Medications:  No current facility-administered medications on file prior to encounter. Current Outpatient Medications on File Prior to Encounter   Medication Sig Dispense Refill    mirtazapine (REMERON) 15 mg tablet Take 1 Tablet by mouth nightly. 30 Tablet 1    rosuvastatin (CRESTOR) 20 mg tablet TAKE 1 TABLET BY MOUTH EVERY DAY AT NIGHT *REPLACES PRAVASTATIN 90 Tablet 3    insulin detemir U-100 (Levemir FlexTouch U-100 Insuln) 100 unit/mL (3 mL) inpn INJECT 40 UNITS SUBCUTANEOUSLY TWO TIMES DAILY. *REPLACES LANTUS* 10 Pen 17    metoprolol succinate (TOPROL-XL) 50 mg XL tablet TAKE 1 TABLET BY MOUTH EVERY DAY 90 Tablet 3    magnesium oxide (MAG-OX) 400 mg tablet TAKE 1 TABLET BY MOUTH TWICE A  Tablet 3    amLODIPine (NORVASC) 10 mg tablet TAKE 1 TABLET BY MOUTH EVERY DAY 90 Tablet 3    valsartan (DIOVAN) 320 mg tablet Take 1 Tablet by mouth daily. 90 Tablet 3    albuterol (PROVENTIL HFA, VENTOLIN HFA, PROAIR HFA) 90 mcg/actuation inhaler INHALE 2 PUFFS BY MOUTH EVERY 4 HOURS AS NEEDED FOR WHEEZE 6.7 Each 5    Insulin Needles, Disposable, (BD Ultra-Fine Short Pen Needle) 31 gauge x 5/16\" ndle USE DAILY AS DIRECTED WITH HER INSULIN PEN. Patient uses twice a day. 1 Each PRN    Klor-Con M20 20 mEq tablet TAKE 2 TABLETS BY MOUTH TWICE A  Tablet 4    lisinopril-hydroCHLOROthiazide (PRINZIDE, ZESTORETIC) 20-12.5 mg per tablet TAKE 2 TABLETS BY MOUTH EVERY  Tablet 3    metFORMIN ER (GLUCOPHAGE XR) 500 mg tablet TAKE 1 TABLET BY MOUTH IN THE MORNING AND TAKE 2 TABLETS BEFORE DINNER. 270 Tablet 3    glimepiride (AMARYL) 2 mg tablet Take 1 Tablet by mouth daily. 90 Tablet 3    cholecalciferol (VITAMIN D3) (1000 Units /25 mcg) tablet Take 1 Tablet by mouth daily. 90 Tablet 3    cloNIDine HCL (CATAPRES) 0.1 mg tablet TAKE 1 TABLET BY MOUTH TWICE A DAY      aspirin (ASPIRIN) 325 mg tablet Take 325 mg by mouth daily.  ALPRAZolam (XANAX) 0.5 mg tablet Take 1 Tab by mouth daily as needed for Anxiety. 30 Tab 0    FERROUS SULFATE (IRON PO) Take 1 Tab by mouth.  cholecalciferol (VITAMIN D3) 1,000 unit cap Take 2,000 Units by mouth daily.  ascorbic acid (VITAMIN C) 250 mg tablet Take  by mouth. Review of Systems   A complete ROS was reviewed by me today and all other systems negative, unless otherwise specified below:  Review of Systems   Constitutional: Negative. Negative for chills and fever. HENT: Negative. Negative for congestion and sore throat. Eyes: Negative. Respiratory: Negative. Negative for cough, chest tightness, shortness of breath and wheezing. Cardiovascular: Negative. Negative for chest pain, palpitations and leg swelling. Gastrointestinal: Negative. Negative for abdominal distention, abdominal pain, blood in stool, constipation, diarrhea, nausea and vomiting. Endocrine: Negative. Genitourinary: Negative. Negative for dysuria, flank pain, frequency, hematuria and urgency. Musculoskeletal: Positive for arthralgias. Negative for back pain and myalgias. Skin: Negative. Negative for color change and rash. Neurological: Negative. Negative for dizziness, syncope, speech difficulty, weakness, light-headedness, numbness and headaches. Hematological: Negative. Psychiatric/Behavioral: Negative. Negative for confusion and self-injury. The patient is not nervous/anxious. All other systems reviewed and are negative. Physical Exam   Physical Exam  Vitals and nursing note reviewed. Constitutional:       General: She is not in acute distress. Appearance: She is well-developed. She is not diaphoretic. HENT:      Head: Normocephalic and atraumatic. Mouth/Throat:      Pharynx: No oropharyngeal exudate. Eyes:      Conjunctiva/sclera: Conjunctivae normal.   Cardiovascular:      Rate and Rhythm: Normal rate and regular rhythm. Heart sounds: Normal heart sounds. Pulmonary:      Effort: Pulmonary effort is normal. No respiratory distress. Breath sounds: Normal breath sounds. No wheezing or rales. Chest:      Chest wall: No tenderness. Abdominal:      General: Bowel sounds are normal. There is no distension. Palpations: Abdomen is soft. There is no mass. Tenderness: There is no abdominal tenderness. There is no guarding or rebound. Musculoskeletal:         General: Tenderness (TTP right ankle without deformity, no swelling, no knee or hip TTP, NVI) present. Normal range of motion. Cervical back: Normal range of motion. Comments: No calf TTP or palpable cords   Skin:     General: Skin is warm. Neurological:      Mental Status: She is alert and oriented to person, place, and time. Cranial Nerves: No cranial nerve deficit. Motor: No abnormal muscle tone. Diagnostic Study Results     Laboratory Data  I have personally reviewed and interpreted all available laboratory results. No results found for this or any previous visit (from the past 24 hour(s)). Radiologic Studies   I have personally reviewed and interpreted all available imaging studies and agree with radiology interpretation. XR ANKLE RT MIN 3 V   Final Result   No acute fracture or dislocation. CT Results  (Last 48 hours)    None        CXR Results  (Last 48 hours)    None        Medical Decision Making   I am the first and primary ED physician for this patient's ED visit today. I reviewed our electronic medical record system for any past medical records that may contribute to the patient's current condition, including their past medical history, surgical history, social and family history. This also includes their most recent ED visits, previous hospitalizations and prior diagnostic data. I have reviewed and summarized the most pertinent findings in my HPI and MDM.     Vital Signs Reviewed:  Patient Vitals for the past 24 hrs:   Temp Pulse Resp BP SpO2   06/30/22 0009 97.7 °F (36.5 °C) 99 20 (!) 176/96 99 %     Pulse Oximetry Analysis: 99% on RA    Cardiac Monitor:   Rate: 88 bpm  The cardiac monitor revealed the following rhythm as interpreted by me: Normal Sinus Rhythm  Cardiac monitoring was ordered to monitor patient for signs of cardiac dysrhythmia, which they are at risk for based on their history and/or risk for cardiovascular disease and/or metabolic abnormalities. Records Reviewed: Nursing Notes, Old Medical Records, Previous electrocardiograms, Previous Radiology Studies and Previous Laboratory Studies, EMS reports    Provider Notes (Medical Decision Making):   Patient presents with acute right pain after trauma. DDx: dislocation, fracture, contusion. Will provide ice, analgesics and xrays. Neurovascularly intact on exam. Will reassess. Will recommend RICE therapy, pain control. Follow up with PMD and ortho as needed. Discussed return precautions; pt agrees to above plan. I have also conveyed that they will be following up with an orthopedist who will continue with the next phase of their care. I have explained how very important it is for the patient to follow-up with this next phase as it may include further casting and/or surgery. ED Course:   Initial assessment performed. I discussed presenting problems and concerns, and my formulated plan for today's visit with the patient and any available family members. I have encouraged them to ask questions as they arise throughout the visit.    Social History     Tobacco Use    Smoking status: Former Smoker     Quit date: 2010     Years since quittin.1    Smokeless tobacco: Never Used   Vaping Use    Vaping Use: Never used   Substance Use Topics    Alcohol use: No    Drug use: No       ED Orders Placed:  Orders Placed This Encounter    XR ANKLE RT MIN 3 V    DURABLE MEDICAL EQUIPMENT     oxyCODONE-acetaminophen (PERCOCET) 5-325 mg per tablet 1 Tablet       ED Medications Administered During ED Course:  Medications   oxyCODONE-acetaminophen (PERCOCET) 5-325 mg per tablet 1 Tablet (1 Tablet Oral Given 6/30/22 0207)        Progress Note:  I have just re-evaluated the patient. Patient reports improvement of sx's. I have reviewed Her vital signs and determined there is currently no worsening in their condition or physical exam. Results have been reviewed with them and their questions have been answered. We will continue to review further results as they come available. Progress Note:  Pt reassessed and symptoms noted to have improved significantly after ED treatment. Pt is clinically stable for discharge. Debbie Penny's labs and imaging have been reviewed with her and available family. She verbally conveys understanding and agreement of the signs, symptoms, diagnosis, treatment and prognosis and additionally agrees to follow up as recommended with Dr. Julianna Kramer MD and/or specialist as instructed. She agrees with the care plan we have created and conveys that all of her questions have been answered. Additionally, I have put together a packet of discharge instructions for her that include: 1) educational information regarding their diagnosis, 2) how to care for their diagnosis at home, as well a 3) list of reasons why they would want to return to the ED prior to their follow-up appointment should the patient's condition change or symptoms worsen. I have answered all questions to the patient's satisfaction. Strict return precautions given. She conveyed understanding and agreement with care plan. Vital signs stable for discharge. Disposition:  DISCHARGE  The pt is ready for discharge. The pt's signs, symptoms, diagnosis, and discharge instructions have been discussed and pt has conveyed their understanding. The pt is to follow up as recommended or return to ER should their symptoms worsen. Plan has been discussed and pt is in agreement. Plan:  1. Return precautions as discussed with patient and available family/caregiver.      2. Discharge Medication List as of 6/30/2022  2:11 AM        3. Follow-up Information     Follow up With Specialties Details Why Contact Info    [facility] EMERGENCY DEPT Emergency Medicine  As needed, If symptoms worsen [address]    Kwasi Bailey MD Internal Medicine Physician  As needed, If symptoms worsen [place] [phone] [address] [address]  [address] [facility]  [phone]        Instructed to return to ED if worse  Diagnosis   Clinical Impression:  1. Acute right ankle pain    2. Accelerated hypertension      Attestation:  Damaris Martinez MD, am the attending of record for this patient. I personally performed the services described in this documentation on this date, 6/30/2022 for patient, Ciro Sands. I have reviewed the chart and verified that the record is accurate and complete.

## 2022-06-30 NOTE — ED NOTES
Patient discharged by Rebecca Scott MD - pt sent to the front lobby, with strong and steady gait, no acute distress noted at time of discharge - Discharge information / home RX / and reasons to return to the ED were reviewed by the ED provider.

## 2022-06-30 NOTE — DISCHARGE INSTRUCTIONS
Thank You! It was a pleasure taking care of you in our Emergency Department today. We know that when you come to [address] [place] [address], you are entrusting us with your health, comfort, and safety. Our physicians and nurses honor that trust, and truly appreciate the opportunity to care for you and your loved ones. We also value your feedback. If you receive a survey about your Emergency Department experience today, please fill it out. We care about our patients' feedback, and we listen to what you have to say. Thank you. Dr. Khadra Garcia M.D.      ________________________________________________________________________  I have included a copy of your lab results and/or radiologic studies from today's visit so you can have them easily available at your follow-up visit. We hope you feel better and please do not hesitate to contact the ED if you have any questions at all! No results found for this or any previous visit (from the past 12 hour(s)). XR ANKLE RT MIN 3 V   Final Result   No acute fracture or dislocation. CT Results  (Last 48 hours)      None          The exam and treatment you received in the Emergency Department were for an urgent problem and are not intended as complete care. It is important that you follow up with a doctor, nurse practitioner, or physician assistant for ongoing care. If your symptoms become worse or you do not improve as expected and you are unable to reach your usual health care provider, you should return to the Emergency Department. We are available 24 hours a day. Please take your discharge instructions with you when you go to your follow-up appointment. If a prescription has been provided, please have it filled as soon as possible to prevent a delay in treatment. Read the entire medication instruction sheet provided to you by the pharmacy.  If you have any questions or reservations about taking the medication due to side effects or interactions with other medications, please call your primary care physician or contact the ER to speak with the charge nurse. Please make an appointment with your family doctor or the physician you were referred to for follow-up of this visit as instructed on your discharge paperwork. Return to the ER if you are unable to be seen or if you are unable to be seen in a timely manner. If you have any problem arranging the follow-up visit, contact the Emergency Department immediately.

## 2022-08-16 PROBLEM — Z00.00 MEDICARE ANNUAL WELLNESS VISIT, SUBSEQUENT: Status: ACTIVE | Noted: 2018-05-09

## 2022-08-16 NOTE — PROGRESS NOTES
This is a Subsequent Medicare Annual Wellness Visit providing Personalized Prevention Plan Services (PPPS) (Performed 12 months after initial AWV and PPPS )    I have reviewed the patient's medical history in detail and updated the computerized patient record. She presents today accompanied by her  for Medicare subsequent annual wellness examination and screening questionnaire. She is also in follow-up of her multiple medical problems include hypertension, diabetes, hyperlipidemia, carotid artery disease status post CVA with right hemiplegia, asthma, osteopenia, vitamin D deficiency, chronic kidney disease stage III, anemia, anxiety, depression and marked obesity. She is taking her medications trying to follow her diet and try and remain physically active although that is limited since she is wheelchair-bound because of her right hemiplegia from her CVA. She denies any chest pain, shortness of breath, palpitations, PND, orthopnea or other cardiac or respiratory complaints. She notes no current GI or  complaints. She notes no headaches, dizziness or new neurologic complaints but has a chronic right hemiplegia. She notes no change of her chronic arthritic complaints and there are no other complaints on complete review of systems. She also has disability papers that we have completed on her while she is here in the office today.     History     Past Medical History:   Diagnosis Date    Abnormal mammogram with microcalcification 6/4/2013    Right  UOQ    Aphasia due to stroke     SPEAKS SOME    Arthritis     back    Asthma 9/7/2017    CKD (chronic kidney disease) 9/7/2017    CKD (chronic kidney disease), stage III ([address] 9/7/2017    CVA (cerebral vascular accident) ([address] 9/7/2017    Depression 9/7/2017    Diabetes ([address]     TYPE 2; IDDM    Edema 9/7/2017    H/O mammogram 07/29/2019    Pt. report pap was done last year at [facility]    Hypertension     Hypertension with renal disease 9/7/2017 Hypomagnesemia 2017    Morbid obesity ([address] 2017    On statin therapy 2017    Osteopenia 2017    Psychiatric disorder     depression    Smear, vaginal, as part of routine gynecological examination 2019    Pt. reported pap was done last year at [facility]    Stroke [facility]) 2010    RIGHT SIDE WEAKNESS    Vitamin D deficiency 2017      Past Surgical History:   Procedure Laterality Date    HX BREAST BIOPSY Right     benign bx, many years ago    HX CATARACT REMOVAL  Dec 2010/ 2011    bilateral cataracts removed    HX GI      HEMMORHOIDECTOMY    HX GYN      pmb    HX HEENT      tonsillectomy    HX OOPHORECTOMY Bilateral     HX OTHER SURGICAL      HX TONSILLECTOMY      VT BREAST SURGERY PROCEDURE UNLISTED      VT LAPAROSCOPY W TOT HYSTERECTUTERUS <=250 GRAM  W TUBE/OVARY  2011    leiomyomata     Social History     Tobacco Use    Smoking status: Former     Types: Cigarettes     Quit date: 2010     Years since quittin.2    Smokeless tobacco: Never   Vaping Use    Vaping Use: Never used   Substance Use Topics    Alcohol use: No    Drug use: No     Current Outpatient Medications   Medication Sig Dispense Refill    mirtazapine (REMERON) 15 mg tablet Take 1 Tablet by mouth nightly. 30 Tablet 1    rosuvastatin (CRESTOR) 20 mg tablet TAKE 1 TABLET BY MOUTH EVERY DAY AT NIGHT *REPLACES PRAVASTATIN 90 Tablet 3    insulin detemir U-100 (Levemir FlexTouch U-100 Insuln) 100 unit/mL (3 mL) inpn INJECT 40 UNITS SUBCUTANEOUSLY TWO TIMES DAILY. *REPLACES LANTUS* 10 Pen 17    metoprolol succinate (TOPROL-XL) 50 mg XL tablet TAKE 1 TABLET BY MOUTH EVERY DAY 90 Tablet 3    magnesium oxide (MAG-OX) 400 mg tablet TAKE 1 TABLET BY MOUTH TWICE A  Tablet 3    amLODIPine (NORVASC) 10 mg tablet TAKE 1 TABLET BY MOUTH EVERY DAY 90 Tablet 3    valsartan (DIOVAN) 320 mg tablet Take 1 Tablet by mouth daily.  90 Tablet 3    albuterol (PROVENTIL HFA, VENTOLIN HFA, PROAIR HFA) 90 mcg/actuation inhaler INHALE 2 PUFFS BY MOUTH EVERY 4 HOURS AS NEEDED FOR WHEEZE 6.7 Each 5    Insulin Needles, Disposable, (BD Ultra-Fine Short Pen Needle) 31 gauge x 5/16\" ndle USE DAILY AS DIRECTED WITH HER INSULIN PEN. Patient uses twice a day. 1 Each PRN    Klor-Con M20 20 mEq tablet TAKE 2 TABLETS BY MOUTH TWICE A  Tablet 4    lisinopril-hydroCHLOROthiazide (PRINZIDE, ZESTORETIC) 20-12.5 mg per tablet TAKE 2 TABLETS BY MOUTH EVERY  Tablet 3    metFORMIN ER (GLUCOPHAGE XR) 500 mg tablet TAKE 1 TABLET BY MOUTH IN THE MORNING AND TAKE 2 TABLETS BEFORE DINNER. 270 Tablet 3    glimepiride (AMARYL) 2 mg tablet Take 1 Tablet by mouth daily. 90 Tablet 3    cholecalciferol (VITAMIN D3) (1000 Units /25 mcg) tablet Take 1 Tablet by mouth daily. 90 Tablet 3    cloNIDine HCL (CATAPRES) 0.1 mg tablet TAKE 1 TABLET BY MOUTH TWICE A DAY      aspirin (ASPIRIN) 325 mg tablet Take 325 mg by mouth daily. ALPRAZolam (XANAX) 0.5 mg tablet Take 1 Tab by mouth daily as needed for Anxiety. 30 Tab 0    FERROUS SULFATE (IRON PO) Take 1 Tab by mouth. cholecalciferol (VITAMIN D3) 25 mcg (1,000 unit) cap Take 2,000 Units by mouth daily. ascorbic acid, vitamin C, (VITAMIN C) 250 mg tablet Take  by mouth.          No Known Allergies  Family History   Problem Relation Age of Onset    Hypertension Mother     Mult Sclerosis Mother     Diabetes Father     Stroke Father     Heart Disease Father     Hypertension Father     Post-op Nausea/Vomiting Sister     Breast Cancer Paternal Aunt        Patient Active Problem List    Diagnosis    Mild intermittent asthma without complication    CKD (chronic kidney disease), stage III ([address]    Hypertension with renal disease    Controlled type 2 diabetes mellitus with stage 3 chronic kidney disease, without long-term current use of insulin (HCC)    History of CVA (cerebrovascular accident)     Residual right-sided weakness and dysarthria from prior left CVA      Mixed hyperlipidemia    Severe obesity (BMI 35.0-39. 9) with comorbidity ([address]    Osteopenia of multiple sites    Vitamin D deficiency    Chronic anticoagulation    Mild depression    Medicare annual wellness visit, subsequent    Alcohol screening    Anxiety    Hypomagnesemia    Near syncope    Stenosis of both internal carotid arteries    Vertigo    Abnormal mammogram with microcalcification     Right  UOQ      Ovarian cyst    Pelvic mass     Multiple leiomyomas treated with hysterectomy 6/27/2011      Unspecified constipation    Urinary retention    Anemia       Patient Care Team:  Adán Williamson MD as PCP - General (Internal Medicine Physician)  Adán Williamson MD as PCP [facility]    Depression Risk Factor Screening:     3 most recent PHQ Screens 8/17/2022   Little interest or pleasure in doing things Not at all   Feeling down, depressed, irritable, or hopeless Not at all   Total Score PHQ 2 0   Trouble falling or staying asleep, or sleeping too much -   Feeling tired or having little energy -   Poor appetite, weight loss, or overeating -   Feeling bad about yourself - or that you are a failure or have let yourself or your family down -   Trouble concentrating on things such as school, work, reading, or watching TV -   Moving or speaking so slowly that other people could have noticed; or the opposite being so fidgety that others notice -   Thoughts of being better off dead, or hurting yourself in some way -   PHQ 9 Score -     Alcohol Risk Factor Screening:   Do you average more than 1 drink per night or more than 7 drinks a week:  No    On any one occasion in the past three months have you have had more than 3 drinks containing alcohol:  No    Functional Ability and Level of Safety:     Fall Risk     Fall Risk Assessment, last 12 mths 8/6/2021   Able to walk? Yes   Fall in past 12 months? 0   Do you feel unsteady?  0   Are you worried about falling 1   Is the gait abnormal? 1   Number of falls in past 12 months 0 Hearing Loss   mild    Activities of Daily Living   Partial assistance. ADL Assessment 8/17/2022   Feeding yourself No Help Needed   Getting from bed to chair No Help Needed   Getting dressed Help Needed   Bathing or showering Help Needed   Walk across the room (includes cane/walker) Help Needed   Using the telphone No Help Needed   Taking your medications No Help Needed   Preparing meals Help Needed   Managing money (expenses/bills) Help Needed   Moderately strenuous housework (laundry) Help Needed   Shopping for personal items (toiletries/medicines) Help Needed   Shopping for groceries Help Needed   Driving Help Needed   Climbing a flight of stairs Help Needed   Getting to places beyond walking distances Help Needed       Abuse Screen   Patient is not abused    Social History     Social History Narrative    ** Merged History Encounter **            Review of Systems      ROS:    Constitutional: She denies fevers, weight loss, sweats. Eyes: No blurred or double vision. ENT: No difficulty with swallowing, taste, speech or smell. NECK: no stiffness swelling or lymph node enlargement  Respiratory: No cough wheezing or shortness of breath. Cardiovascular: Denies chest pain, palpitations, unexplained indigestion or syncope. Breast: She has noted no masses or lumps and no discharge or axillary swelling  Gastrointestinal:  No changes in bowel movements, no abdominal pain, no bloating. Genitourinary: No discharge or abnormal bleeding or pain  Extremities: No joint pain, stiffness or swelling. Neurological:  No numbness, tingling, burring paresthesias. No syncope, dizziness or frequent headache. Right-sided weakness from prior CVA with normal function on left side  Skin:  No recent rashes or mole changes. Psychiatric/Behavioral:  Negative for depression. The patient is not nervous/anxious.    HEMATOLOGIC: no easy bruising or bleeding gums  Endocrine: no sweats of urinary frequency or excessive thirst Physical Examination     Evaluation of Cognitive Function:  Mood/affect:  happy  Appearance: age appropriate  Family member/caregiver input:     Vitals:    08/17/22 0858   BP: 126/76   Pulse: 71   Resp: 18   Temp: 98.4 °F (36.9 °C)   TempSrc: Oral   SpO2: 97%   Height: 5' 4\" (1.626 m)   PainSc:   0 - No pain   LMP: 06/13/2011        PHYSICAL EXAM:    General appearance - alert, well appearing, and in no distress  Mental status - alert, oriented to person, place, and time  HEENT:  Ears - bilateral TM's and external ear canals clear  Eyes - pupillary responses were normal.  Extraocular muscle function intact. Lids and conjunctiva not injected. Fundoscopic exam revealed sharp disc margins. eye movements intact  Pharynx- clear with teeth in good repair. No masses were noted  Neck - supple without thyromegaly or burit. No JVD noted  Lungs - clear to auscultation and percussion  Cardiac- normal rate, regular rhythm without murmurs. PMI not displaced. No gallop, rub or click  Breast: deferred to GYN  Abdomen - flat, soft, non-tender without palpable organomegaly or mass. No pulsatile mass was felt, and not bruit was heard. Bowel sounds were active   Female - deferred to GYN  Rectal - deferred to GYN  Extremities -  no clubbing cyanosis or edema  Lymphatics - no palpable lymphadenopathy, no hepatosplenomegaly  Peripheral vascular - Dorsalis pedis and posterior tibial pulses felt without difficulty  Skin - no rash or unusual mole change noted  Neurological - Cranial nerves II-XII grossly intact. Motor strength 5/5 on the left and 1/5 on the right. DTR's 2+ and symmetric.   Station and gait normal  Back exam - full range of motion, no tenderness, palpable spasm or pain on motion  Musculoskeletal - no joint tenderness, deformity or swelling  Hematologic: no purpura, petechiae or bruising     Results for orders placed or performed in visit on 05/17/22   HEMOGLOBIN A1C WITH EAG   Result Value Ref Range Hemoglobin A1c 10.1 (H) 4.8 - 5.6 %    Estimated average glucose 243 mg/dL   CK   Result Value Ref Range    Creatine Kinase,Total 54 32 - 182 U/L   LIPID PANEL   Result Value Ref Range    Cholesterol, total 181 100 - 199 mg/dL    Triglyceride 243 (H) 0 - 149 mg/dL    HDL Cholesterol 44 >39 mg/dL    VLDL, calculated 41 (H) 5 - 40 mg/dL    LDL, calculated 96 0 - 99 mg/dL   METABOLIC PANEL, COMPREHENSIVE   Result Value Ref Range    Glucose 77 65 - 99 mg/dL    BUN 17 6 - 24 mg/dL    Creatinine 0.97 0.57 - 1.00 mg/dL    eGFR 68 >59 mL/min/1.73    BUN/Creatinine ratio 18 9 - 23    Sodium 146 (H) 134 - 144 mmol/L    Potassium 4.3 3.5 - 5.2 mmol/L    Chloride 106 96 - 106 mmol/L    CO2 24 20 - 29 mmol/L    Calcium 9.8 8.7 - 10.2 mg/dL    Protein, total 7.7 6.0 - 8.5 g/dL    Albumin 4.4 3.8 - 4.9 g/dL    GLOBULIN, TOTAL 3.3 1.5 - 4.5 g/dL    A-G Ratio 1.3 1.2 - 2.2    Bilirubin, total <0.2 0.0 - 1.2 mg/dL    Alk. phosphatase 78 44 - 121 IU/L    AST (SGOT) 13 0 - 40 IU/L    ALT (SGPT) 16 0 - 32 IU/L       Advice/Referrals/Counseling   Education and counseling provided:  Are appropriate based on today's review and evaluation  End-of-Life planning (with patient's consent)  Pneumococcal Vaccine  Influenza Vaccine  Colorectal cancer screening tests      Assessment/Plan     ASSESSMENT:   1. Hypertension with renal disease    2. Controlled type 2 diabetes mellitus with stage 3 chronic kidney disease, without long-term current use of insulin ([facility]    3. Mixed hyperlipidemia    4. History of CVA (cerebrovascular accident)    5. Stage 3 chronic kidney disease, unspecified whether stage 3a or 3b CKD ([facility]    6. Mild intermittent asthma without complication    7. Chronic anticoagulation    8. Osteopenia of multiple sites    9. Vitamin D deficiency    10. Severe obesity (BMI 35.0-39. 9) with comorbidity ([facility]    11. Anxiety    12. Mild depression    13. Hypomagnesemia    14. Alcohol screening    15.  Medicare annual wellness visit, subsequent      Impression  1. Hypertension that is controlled so continue current therapy reviewed with her. 2.  Diabetes repeat status pending prior lab reviewed and I will adjust if necessary. 3.  Hyperlipidemia prior lab reviewed repeat status is pending  4. History of CVA with chronic right hemiplegia and chronic disability secondary to that  5. CKD stage III repeat status pending  6. Asthma that is stable  7. Chronic anticoagulation now just on aspirin daily  8. Osteopenia reviewed prior bone density  9. Vitamin D deficiency repeat status pending  10. Obesity she did not get out of the wheelchair to weigh today but and stressed importance of watching her diet and working on getting her weight down. 11   Anxiety chronic but stable  12. Depression chronic but stable  13   History of hypomagnesemia repeat status pending  14. Annual alcohol screening is done at this time she claims not to drink any alcohol at all. We did spend 8 minutes discussing complications of excessive alcohol intake in females who averaged more than 1 drink per day with increased cardiovascular risk and increased risk of liver disease and other GI effects. Medicare subsequent annual wellness examination screening questionnaires completed today. The results were reviewed with her and her  and their questions were answered. Lifestyle recommendations and modifications discussed and made. Disability papers reviewed with patient and completed while she was here in office today. 45 minutes spent in direct care of this patient today not including time spent on Medicare wellness examination or procedures. Follow-up with me in 3 months or sooner should to be a problem. I will call the lab results in the interim.     PLAN:  .  Orders Placed This Encounter    CBC WITH AUTOMATED DIFF    METABOLIC PANEL, COMPREHENSIVE    LIPID PANEL    CK    HEMOGLOBIN A1C WITH EAG    T4 (THYROXINE)    TSH 3RD GENERATION    URINALYSIS W/ REFLEX CULTURE    VITAMIN D, 25 HYDROXY    MICROALBUMIN, UR, RAND W/ MICROALB/CREAT RATIO         ATTENTION:   This medical record was transcribed using an electronic medical records system. Although proofread, it may and can contain electronic and spelling errors. Other human spelling and other errors may be present. Corrections may be executed at a later time. Please feel free to contact us for any clarifications as needed. Pedrito Morrison MD       Recommended healthy diet low in carbohydrates, fats, sodium and cholesterol. Recommended regular cardiovascular exercise 3-6 times per week for 30-60 minutes daily. Current Outpatient Medications   Medication Sig Dispense Refill    mirtazapine (REMERON) 15 mg tablet Take 1 Tablet by mouth nightly. 30 Tablet 1    rosuvastatin (CRESTOR) 20 mg tablet TAKE 1 TABLET BY MOUTH EVERY DAY AT NIGHT *REPLACES PRAVASTATIN 90 Tablet 3    insulin detemir U-100 (Levemir FlexTouch U-100 Insuln) 100 unit/mL (3 mL) inpn INJECT 40 UNITS SUBCUTANEOUSLY TWO TIMES DAILY. *REPLACES LANTUS* 10 Pen 17    metoprolol succinate (TOPROL-XL) 50 mg XL tablet TAKE 1 TABLET BY MOUTH EVERY DAY 90 Tablet 3    magnesium oxide (MAG-OX) 400 mg tablet TAKE 1 TABLET BY MOUTH TWICE A  Tablet 3    amLODIPine (NORVASC) 10 mg tablet TAKE 1 TABLET BY MOUTH EVERY DAY 90 Tablet 3    valsartan (DIOVAN) 320 mg tablet Take 1 Tablet by mouth daily. 90 Tablet 3    albuterol (PROVENTIL HFA, VENTOLIN HFA, PROAIR HFA) 90 mcg/actuation inhaler INHALE 2 PUFFS BY MOUTH EVERY 4 HOURS AS NEEDED FOR WHEEZE 6.7 Each 5    Insulin Needles, Disposable, (BD Ultra-Fine Short Pen Needle) 31 gauge x 5/16\" ndle USE DAILY AS DIRECTED WITH HER INSULIN PEN. Patient uses twice a day.  1 Each PRN    Klor-Con M20 20 mEq tablet TAKE 2 TABLETS BY MOUTH TWICE A  Tablet 4    lisinopril-hydroCHLOROthiazide (PRINZIDE, ZESTORETIC) 20-12.5 mg per tablet TAKE 2 TABLETS BY MOUTH EVERY  Tablet 3    metFORMIN ER (GLUCOPHAGE XR) 500 mg tablet TAKE 1 TABLET BY MOUTH IN THE MORNING AND TAKE 2 TABLETS BEFORE DINNER. 270 Tablet 3    glimepiride (AMARYL) 2 mg tablet Take 1 Tablet by mouth daily. 90 Tablet 3    cholecalciferol (VITAMIN D3) (1000 Units /25 mcg) tablet Take 1 Tablet by mouth daily. 90 Tablet 3    cloNIDine HCL (CATAPRES) 0.1 mg tablet TAKE 1 TABLET BY MOUTH TWICE A DAY      aspirin (ASPIRIN) 325 mg tablet Take 325 mg by mouth daily. ALPRAZolam (XANAX) 0.5 mg tablet Take 1 Tab by mouth daily as needed for Anxiety. 30 Tab 0    FERROUS SULFATE (IRON PO) Take 1 Tab by mouth. cholecalciferol (VITAMIN D3) 25 mcg (1,000 unit) cap Take 2,000 Units by mouth daily. ascorbic acid, vitamin C, (VITAMIN C) 250 mg tablet Take  by mouth. No results found for any visits on 08/17/22. Verbal and written instructions (see AVS) provided. Patient expresses understanding of diagnosis and treatment plan.     Krishna Mike MD

## 2022-08-17 ENCOUNTER — OFFICE VISIT (OUTPATIENT)
Dept: INTERNAL MEDICINE CLINIC | Age: 57
End: 2022-08-17
Payer: MEDICARE

## 2022-08-17 VITALS
TEMPERATURE: 98.4 F | BODY MASS INDEX: 34.33 KG/M2 | HEIGHT: 64 IN | OXYGEN SATURATION: 97 % | DIASTOLIC BLOOD PRESSURE: 76 MMHG | SYSTOLIC BLOOD PRESSURE: 126 MMHG | HEART RATE: 71 BPM | RESPIRATION RATE: 18 BRPM

## 2022-08-17 DIAGNOSIS — J45.20 MILD INTERMITTENT ASTHMA WITHOUT COMPLICATION: ICD-10-CM

## 2022-08-17 DIAGNOSIS — F32.A MILD DEPRESSION: ICD-10-CM

## 2022-08-17 DIAGNOSIS — E11.22 CONTROLLED TYPE 2 DIABETES MELLITUS WITH STAGE 3 CHRONIC KIDNEY DISEASE, WITHOUT LONG-TERM CURRENT USE OF INSULIN (HCC): ICD-10-CM

## 2022-08-17 DIAGNOSIS — M85.89 OSTEOPENIA OF MULTIPLE SITES: ICD-10-CM

## 2022-08-17 DIAGNOSIS — Z13.39 ALCOHOL SCREENING: ICD-10-CM

## 2022-08-17 DIAGNOSIS — Z86.73 HISTORY OF CVA (CEREBROVASCULAR ACCIDENT): ICD-10-CM

## 2022-08-17 DIAGNOSIS — Z79.01 CHRONIC ANTICOAGULATION: Chronic | ICD-10-CM

## 2022-08-17 DIAGNOSIS — Z00.00 MEDICARE ANNUAL WELLNESS VISIT, SUBSEQUENT: ICD-10-CM

## 2022-08-17 DIAGNOSIS — E78.2 MIXED HYPERLIPIDEMIA: ICD-10-CM

## 2022-08-17 DIAGNOSIS — N18.30 STAGE 3 CHRONIC KIDNEY DISEASE, UNSPECIFIED WHETHER STAGE 3A OR 3B CKD (HCC): ICD-10-CM

## 2022-08-17 DIAGNOSIS — I12.9 HYPERTENSION WITH RENAL DISEASE: Primary | ICD-10-CM

## 2022-08-17 DIAGNOSIS — E83.42 HYPOMAGNESEMIA: ICD-10-CM

## 2022-08-17 DIAGNOSIS — E55.9 VITAMIN D DEFICIENCY: ICD-10-CM

## 2022-08-17 DIAGNOSIS — E66.01 SEVERE OBESITY (BMI 35.0-39.9) WITH COMORBIDITY (HCC): ICD-10-CM

## 2022-08-17 DIAGNOSIS — F41.9 ANXIETY: ICD-10-CM

## 2022-08-17 DIAGNOSIS — N18.30 CONTROLLED TYPE 2 DIABETES MELLITUS WITH STAGE 3 CHRONIC KIDNEY DISEASE, WITHOUT LONG-TERM CURRENT USE OF INSULIN (HCC): ICD-10-CM

## 2022-08-17 PROCEDURE — G8754 DIAS BP LESS 90: HCPCS | Performed by: INTERNAL MEDICINE

## 2022-08-17 PROCEDURE — G9899 SCRN MAM PERF RSLTS DOC: HCPCS | Performed by: INTERNAL MEDICINE

## 2022-08-17 PROCEDURE — 99215 OFFICE O/P EST HI 40 MIN: CPT | Performed by: INTERNAL MEDICINE

## 2022-08-17 PROCEDURE — 3046F HEMOGLOBIN A1C LEVEL >9.0%: CPT | Performed by: INTERNAL MEDICINE

## 2022-08-17 PROCEDURE — 2022F DILAT RTA XM EVC RTNOPTHY: CPT | Performed by: INTERNAL MEDICINE

## 2022-08-17 PROCEDURE — G8752 SYS BP LESS 140: HCPCS | Performed by: INTERNAL MEDICINE

## 2022-08-17 PROCEDURE — G0439 PPPS, SUBSEQ VISIT: HCPCS | Performed by: INTERNAL MEDICINE

## 2022-08-17 PROCEDURE — G8427 DOCREV CUR MEDS BY ELIG CLIN: HCPCS | Performed by: INTERNAL MEDICINE

## 2022-08-17 PROCEDURE — G8417 CALC BMI ABV UP PARAM F/U: HCPCS | Performed by: INTERNAL MEDICINE

## 2022-08-17 PROCEDURE — 3017F COLORECTAL CA SCREEN DOC REV: CPT | Performed by: INTERNAL MEDICINE

## 2022-08-17 NOTE — PROGRESS NOTES
HIPAA verified by two patient identifiers. Salud Smith is a 61 y.o. female    Chief Complaint   Patient presents with    Annual Wellness Visit       Visit Vitals  /76 (BP 1 Location: Left upper arm, BP Patient Position: Sitting, BP Cuff Size: Adult long)   Pulse 71   Temp 98.4 °F (36.9 °C) (Oral)   Resp 18   Ht 5' 4\" (1.626 m)   LMP 06/13/2011   SpO2 97%   BMI 34.33 kg/m²       Pain Scale: 0 - No pain/10  Pain Location:       Health Maintenance Due   Topic Date Due    Eye Exam Retinal or Dilated  Never done    DTaP/Tdap/Td series (1 - Tdap) Never done    Colorectal Cancer Screening Combo  Never done    Shingrix Vaccine Age 50> (1 of 2) Never done    MICROALBUMIN Q1  08/06/2022    A1C test (Diabetic or Prediabetic)  08/17/2022    Medicare Yearly Exam  08/07/2022         Coordination of Care Questionnaire:  :   1) Have you been to an emergency room, urgent care, or hospitalized since your last visit? If yes, where when, and reason for visit? Yes  6/30   ankle   [facility]        2. Have seen or consulted any other health care provider since your last visit? If yes, where when, and reason for visit? NO      Patient is accompanied by self I have received verbal consent from Salud Smith to discuss any/all medical information while they are present in the room.

## 2022-08-18 LAB
25(OH)D3+25(OH)D2 SERPL-MCNC: 26.2 NG/ML (ref 30–100)
ALBUMIN SERPL-MCNC: 4.7 G/DL (ref 3.8–4.9)
ALBUMIN/GLOB SERPL: 1.7 {RATIO} (ref 1.2–2.2)
ALP SERPL-CCNC: 86 IU/L (ref 44–121)
ALT SERPL-CCNC: 16 IU/L (ref 0–32)
AST SERPL-CCNC: 14 IU/L (ref 0–40)
BASOPHILS # BLD AUTO: 0 X10E3/UL (ref 0–0.2)
BASOPHILS NFR BLD AUTO: 0 %
BILIRUB SERPL-MCNC: <0.2 MG/DL (ref 0–1.2)
BUN SERPL-MCNC: 15 MG/DL (ref 6–24)
BUN/CREAT SERPL: 17 (ref 9–23)
CALCIUM SERPL-MCNC: 9.9 MG/DL (ref 8.7–10.2)
CHLORIDE SERPL-SCNC: 104 MMOL/L (ref 96–106)
CHOLEST SERPL-MCNC: 160 MG/DL (ref 100–199)
CK SERPL-CCNC: 52 U/L (ref 32–182)
CO2 SERPL-SCNC: 23 MMOL/L (ref 20–29)
CREAT SERPL-MCNC: 0.86 MG/DL (ref 0.57–1)
EGFR: 78 ML/MIN/1.73
EOSINOPHIL # BLD AUTO: 0.2 X10E3/UL (ref 0–0.4)
EOSINOPHIL NFR BLD AUTO: 2 %
ERYTHROCYTE [DISTWIDTH] IN BLOOD BY AUTOMATED COUNT: 13.7 % (ref 11.7–15.4)
EST. AVERAGE GLUCOSE BLD GHB EST-MCNC: 252 MG/DL
GLOBULIN SER CALC-MCNC: 2.8 G/DL (ref 1.5–4.5)
GLUCOSE SERPL-MCNC: 83 MG/DL (ref 65–99)
HBA1C MFR BLD: 10.4 % (ref 4.8–5.6)
HCT VFR BLD AUTO: 35.1 % (ref 34–46.6)
HDLC SERPL-MCNC: 44 MG/DL
HGB BLD-MCNC: 11 G/DL (ref 11.1–15.9)
IMM GRANULOCYTES # BLD AUTO: 0.1 X10E3/UL (ref 0–0.1)
IMM GRANULOCYTES NFR BLD AUTO: 1 %
LDLC SERPL CALC-MCNC: 67 MG/DL (ref 0–99)
LYMPHOCYTES # BLD AUTO: 2.4 X10E3/UL (ref 0.7–3.1)
LYMPHOCYTES NFR BLD AUTO: 28 %
MCH RBC QN AUTO: 26.4 PG (ref 26.6–33)
MCHC RBC AUTO-ENTMCNC: 31.3 G/DL (ref 31.5–35.7)
MCV RBC AUTO: 84 FL (ref 79–97)
MONOCYTES # BLD AUTO: 0.5 X10E3/UL (ref 0.1–0.9)
MONOCYTES NFR BLD AUTO: 6 %
NEUTROPHILS # BLD AUTO: 5.3 X10E3/UL (ref 1.4–7)
NEUTROPHILS NFR BLD AUTO: 63 %
PLATELET # BLD AUTO: 290 X10E3/UL (ref 150–450)
POTASSIUM SERPL-SCNC: 5.1 MMOL/L (ref 3.5–5.2)
PROT SERPL-MCNC: 7.5 G/DL (ref 6–8.5)
RBC # BLD AUTO: 4.17 X10E6/UL (ref 3.77–5.28)
SODIUM SERPL-SCNC: 141 MMOL/L (ref 134–144)
T4 SERPL-MCNC: 7.5 UG/DL (ref 4.5–12)
TRIGL SERPL-MCNC: 309 MG/DL (ref 0–149)
TSH SERPL DL<=0.005 MIU/L-ACNC: 1.41 UIU/ML (ref 0.45–4.5)
VLDLC SERPL CALC-MCNC: 49 MG/DL (ref 5–40)
WBC # BLD AUTO: 8.5 X10E3/UL (ref 3.4–10.8)

## 2022-08-23 LAB
ALBUMIN/CREAT UR: 18 MG/G CREAT (ref 0–29)
APPEARANCE UR: CLEAR
BACTERIA #/AREA URNS HPF: ABNORMAL /[HPF]
BACTERIA UR CULT: ABNORMAL
BACTERIA UR CULT: ABNORMAL
BILIRUB UR QL STRIP: NEGATIVE
CASTS URNS QL MICRO: ABNORMAL /LPF
COLOR UR: YELLOW
CREAT UR-MCNC: 65.6 MG/DL
EPI CELLS #/AREA URNS HPF: ABNORMAL /HPF (ref 0–10)
GLUCOSE UR QL STRIP: NEGATIVE
HGB UR QL STRIP: NEGATIVE
KETONES UR QL STRIP: NEGATIVE
LEUKOCYTE ESTERASE UR QL STRIP: ABNORMAL
MICRO URNS: ABNORMAL
MICROALBUMIN UR-MCNC: 11.6 UG/ML
NITRITE UR QL STRIP: NEGATIVE
PH UR STRIP: 7.5 [PH] (ref 5–7.5)
PROT UR QL STRIP: NEGATIVE
RBC #/AREA URNS HPF: ABNORMAL /HPF (ref 0–2)
SP GR UR STRIP: 1.02 (ref 1–1.03)
URINALYSIS REFLEX, 377202: ABNORMAL
UROBILINOGEN UR STRIP-MCNC: 0.2 MG/DL (ref 0.2–1)
WBC #/AREA URNS HPF: ABNORMAL /HPF (ref 0–5)

## 2022-08-23 RX ORDER — INSULIN DETEMIR 100 [IU]/ML
INJECTION, SOLUTION SUBCUTANEOUS
Qty: 10 PEN | Refills: 17 | Status: SHIPPED | OUTPATIENT
Start: 2022-08-23

## 2022-08-23 NOTE — TELEPHONE ENCOUNTER
PCP: Rudy Mendoza MD    Last appt: 8/17/2022  Future Appointments   Date Time Provider Yolanda Sofia   11/17/2022  9:40 AM Rudy Mendoza MD [facility]       Last refilled:5/27/22    Requested Prescriptions     Pending Prescriptions Disp Refills    insulin detemir U-100 (Levemir FlexTouch U-100 Insuln) 100 unit/mL (3 mL) inpn 10 Pen 17     Sig: INJECT 50 UNITS SUBCUTANEOUSLY TWO TIMES DAILY.  *REPLACES LANTUS*

## 2022-08-23 NOTE — PROGRESS NOTES
Called and spoke to patient  Two pt identifiers confirmed  Informed patient per Dr. Naga Cleary that vitamin d is low and to start vitamin d 1000 units daily  Also informed patient per Dr. Naga Cleary that A1C and triglycerides and elevated and to increase Levemir to 50 units twice daily and watch her diet  [facility]  Patient verbalized understanding of information discussed  with no further questions at this time.

## 2022-08-25 RX ORDER — CIPROFLOXACIN 250 MG/1
250 TABLET, FILM COATED ORAL 2 TIMES DAILY
Qty: 14 TABLET | Refills: 0 | OUTPATIENT
Start: 2022-08-25

## 2022-08-25 NOTE — PROGRESS NOTES
Called and spoke to patient  Two pt identifiers confirmed  Informed patient per Dr. Albino Martin that she has uti and to treat with Cipro 250 mg twice daily x 7 days  Rx was sent to [facility]  Pt will get follow up urinalysis 3-5 days after finishing antibiotic  Patient verbalized understanding of information discussed  with no further questions at this time.

## 2022-09-15 PROBLEM — Z00.00 MEDICARE ANNUAL WELLNESS VISIT, SUBSEQUENT: Status: RESOLVED | Noted: 2018-05-09 | Resolved: 2022-09-15

## 2022-09-16 RX ORDER — MIRTAZAPINE 15 MG/1
15 TABLET, FILM COATED ORAL
Qty: 30 TABLET | Refills: 1 | Status: SHIPPED | OUTPATIENT
Start: 2022-09-16 | End: 2022-09-20 | Stop reason: SDUPTHER

## 2022-09-16 NOTE — TELEPHONE ENCOUNTER
PCP: Sam Abbott MD    Last appt: 8/17/2022  Future Appointments   Date Time Provider Yolanda Sofia   11/17/2022  9:40 AM Sam Abbott MD [facility]       Requested Prescriptions     Pending Prescriptions Disp Refills    mirtazapine (REMERON) 15 mg tablet 30 Tablet 1     Sig: Take 1 Tablet by mouth nightly.          Other Comments:

## 2022-09-20 RX ORDER — MIRTAZAPINE 15 MG/1
15 TABLET, FILM COATED ORAL
Qty: 30 TABLET | Refills: 1 | Status: SHIPPED | OUTPATIENT
Start: 2022-09-20

## 2022-09-20 NOTE — TELEPHONE ENCOUNTER
PCP: Dave Lowe MD    Last appt: 8/17/2022  Future Appointments   Date Time Provider Yolanda Sofia   11/17/2022  9:40 AM Dave Lowe MD [facility]       Requested Prescriptions     Pending Prescriptions Disp Refills    mirtazapine (REMERON) 15 mg tablet 30 Tablet 1     Sig: Take 1 Tablet by mouth nightly.          Other Comments:

## 2022-10-05 RX ORDER — CHOLECALCIFEROL (VITAMIN D3) 25 MCG
TABLET ORAL
Qty: 90 TABLET | Refills: 3 | Status: SHIPPED | OUTPATIENT
Start: 2022-10-05

## 2022-10-05 NOTE — TELEPHONE ENCOUNTER
RX refill request from the patient/pharmacy. Patient last seen 08- with labs, and next appt. scheduled for 11-  Requested Prescriptions     Pending Prescriptions Disp Refills    Vitamin D3 25 mcg (1,000 unit) tablet [Pharmacy Med Name: VITAMIN D3 1,000 UNIT TABLET] 90 Tablet 3     Sig: TAKE 1 TABLET BY MOUTH EVERY DAY    .

## 2022-11-14 RX ORDER — GLIMEPIRIDE 2 MG/1
TABLET ORAL
Qty: 90 TABLET | Refills: 3 | Status: SHIPPED | OUTPATIENT
Start: 2022-11-14

## 2022-11-14 NOTE — TELEPHONE ENCOUNTER
PCP: Carol Cuellar MD    Last appt: 8/17/2022  Future Appointments   Date Time Provider Yolanda Sofia   11/17/2022  9:40 AM Carol Cuellar MD [facility]       Last refilled:11/12/21    Requested Prescriptions     Pending Prescriptions Disp Refills    glimepiride (AMARYL) 2 mg tablet [Pharmacy Med Name: GLIMEPIRIDE 2 MG TABLET] 90 Tablet 3     Sig: TAKE 1 TABLET BY MOUTH EVERY DAY

## 2022-11-17 ENCOUNTER — OFFICE VISIT (OUTPATIENT)
Dept: INTERNAL MEDICINE CLINIC | Age: 57
End: 2022-11-17
Payer: MEDICARE

## 2022-11-17 VITALS
RESPIRATION RATE: 16 BRPM | DIASTOLIC BLOOD PRESSURE: 82 MMHG | HEIGHT: 64 IN | TEMPERATURE: 98.7 F | SYSTOLIC BLOOD PRESSURE: 138 MMHG | OXYGEN SATURATION: 98 % | BODY MASS INDEX: 34.33 KG/M2 | HEART RATE: 80 BPM

## 2022-11-17 DIAGNOSIS — J45.20 MILD INTERMITTENT ASTHMA WITHOUT COMPLICATION: ICD-10-CM

## 2022-11-17 DIAGNOSIS — E78.2 MIXED HYPERLIPIDEMIA: ICD-10-CM

## 2022-11-17 DIAGNOSIS — N18.30 CONTROLLED TYPE 2 DIABETES MELLITUS WITH STAGE 3 CHRONIC KIDNEY DISEASE, WITHOUT LONG-TERM CURRENT USE OF INSULIN (HCC): ICD-10-CM

## 2022-11-17 DIAGNOSIS — E66.01 SEVERE OBESITY (BMI 35.0-39.9) WITH COMORBIDITY (HCC): ICD-10-CM

## 2022-11-17 DIAGNOSIS — Z86.73 HISTORY OF CVA (CEREBROVASCULAR ACCIDENT): ICD-10-CM

## 2022-11-17 DIAGNOSIS — I12.9 HYPERTENSION WITH RENAL DISEASE: Primary | ICD-10-CM

## 2022-11-17 DIAGNOSIS — N18.30 STAGE 3 CHRONIC KIDNEY DISEASE, UNSPECIFIED WHETHER STAGE 3A OR 3B CKD (HCC): ICD-10-CM

## 2022-11-17 DIAGNOSIS — E11.22 CONTROLLED TYPE 2 DIABETES MELLITUS WITH STAGE 3 CHRONIC KIDNEY DISEASE, WITHOUT LONG-TERM CURRENT USE OF INSULIN (HCC): ICD-10-CM

## 2022-11-17 DIAGNOSIS — Z23 NEEDS FLU SHOT: ICD-10-CM

## 2022-11-17 PROCEDURE — 3017F COLORECTAL CA SCREEN DOC REV: CPT | Performed by: INTERNAL MEDICINE

## 2022-11-17 PROCEDURE — 90686 IIV4 VACC NO PRSV 0.5 ML IM: CPT | Performed by: INTERNAL MEDICINE

## 2022-11-17 PROCEDURE — 3046F HEMOGLOBIN A1C LEVEL >9.0%: CPT | Performed by: INTERNAL MEDICINE

## 2022-11-17 PROCEDURE — G8427 DOCREV CUR MEDS BY ELIG CLIN: HCPCS | Performed by: INTERNAL MEDICINE

## 2022-11-17 PROCEDURE — G9899 SCRN MAM PERF RSLTS DOC: HCPCS | Performed by: INTERNAL MEDICINE

## 2022-11-17 PROCEDURE — G9717 DOC PT DX DEP/BP F/U NT REQ: HCPCS | Performed by: INTERNAL MEDICINE

## 2022-11-17 PROCEDURE — G8752 SYS BP LESS 140: HCPCS | Performed by: INTERNAL MEDICINE

## 2022-11-17 PROCEDURE — 99214 OFFICE O/P EST MOD 30 MIN: CPT | Performed by: INTERNAL MEDICINE

## 2022-11-17 PROCEDURE — 2022F DILAT RTA XM EVC RTNOPTHY: CPT | Performed by: INTERNAL MEDICINE

## 2022-11-17 PROCEDURE — G8417 CALC BMI ABV UP PARAM F/U: HCPCS | Performed by: INTERNAL MEDICINE

## 2022-11-17 PROCEDURE — G8754 DIAS BP LESS 90: HCPCS | Performed by: INTERNAL MEDICINE

## 2022-11-17 PROCEDURE — G0008 ADMIN INFLUENZA VIRUS VAC: HCPCS | Performed by: INTERNAL MEDICINE

## 2022-11-17 NOTE — PROGRESS NOTES
Catherine Sanchez is a 61 y.o. female     Chief Complaint   Patient presents with    Hypertension     3 month follow up    Diabetes    Cholesterol Problem       Visit Vitals  BP (!) 150/80 (BP 1 Location: Left upper arm, BP Patient Position: Sitting, BP Cuff Size: Large adult)   Pulse 80   Temp 98.7 °F (37.1 °C) (Oral)   Resp 16   Ht 5' 4\" (1.626 m)   LMP 06/13/2011   SpO2 98%   BMI 34.33 kg/m²       Health Maintenance Due   Topic Date Due    Eye Exam Retinal or Dilated  Never done    Hepatitis B Vaccine (1 of 3 - Risk 3-dose series) Never done    DTaP/Tdap/Td series (1 - Tdap) Never done    Cervical cancer screen  Never done    Colorectal Cancer Screening Combo  Never done    Shingrix Vaccine Age 50> (1 of 2) Never done    COVID-19 Vaccine (3 - Booster for Robin series) 07/13/2022    Flu Vaccine (1) 08/01/2022    A1C test (Diabetic or Prediabetic)  11/17/2022         1. \"Have you been to the ER, urgent care clinic since your last visit? Hospitalized since your last visit? \" No    2. \"Have you seen or consulted any other health care providers outside of the [address] [place] [address] since your last visit? \" No     3. For patients aged 39-70: Has the patient had a colonoscopy / FIT/ Cologuard? No      If the patient is female:    4. For patients aged 41-77: Has the patient had a mammogram within the past 2 years? Yes - no Care Gap present      5. For patients aged 21-65: Has the patient had a pap smear?  No

## 2022-11-17 NOTE — PROGRESS NOTES
Chief Complaint   Patient presents with    Hypertension     3 month follow up    Diabetes    Cholesterol Problem       SUBJECTIVE:    Brittani Franks is a 61 y.o. female who returns in follow-up for her medical problems include hypertension, diabetes, hyperlipidemia, prior CVA with hemiplegia, CKD stage III, obesity and other medical problems. She is taking her medication trying to follow her diet remains physically active. Currently she denies any chest pain, shortness of breath or cardiac respiratory complaints. There are no GI or  complaints. There are no headaches, dizziness or neurologic complaints. There are no current changes of chronic arthritic complaints and there are no other complaints on complete review of systems. Current Outpatient Medications   Medication Sig Dispense Refill    glimepiride (AMARYL) 2 mg tablet TAKE 1 TABLET BY MOUTH EVERY DAY 90 Tablet 3    Vitamin D3 25 mcg (1,000 unit) tablet TAKE 1 TABLET BY MOUTH EVERY DAY 90 Tablet 3    mirtazapine (REMERON) 15 mg tablet Take 1 Tablet by mouth nightly. 30 Tablet 1    rosuvastatin (CRESTOR) 20 mg tablet TAKE 1 TABLET BY MOUTH EVERY DAY AT NIGHT *REPLACES PRAVASTATIN 90 Tablet 3    metoprolol succinate (TOPROL-XL) 50 mg XL tablet TAKE 1 TABLET BY MOUTH EVERY DAY 90 Tablet 3    magnesium oxide (MAG-OX) 400 mg tablet TAKE 1 TABLET BY MOUTH TWICE A  Tablet 3    amLODIPine (NORVASC) 10 mg tablet TAKE 1 TABLET BY MOUTH EVERY DAY 90 Tablet 3    valsartan (DIOVAN) 320 mg tablet Take 1 Tablet by mouth daily. 90 Tablet 3    albuterol (PROVENTIL HFA, VENTOLIN HFA, PROAIR HFA) 90 mcg/actuation inhaler INHALE 2 PUFFS BY MOUTH EVERY 4 HOURS AS NEEDED FOR WHEEZE 6.7 Each 5    Insulin Needles, Disposable, (BD Ultra-Fine Short Pen Needle) 31 gauge x 5/16\" ndle USE DAILY AS DIRECTED WITH HER INSULIN PEN. Patient uses twice a day.  1 Each PRN    Klor-Con M20 20 mEq tablet TAKE 2 TABLETS BY MOUTH TWICE A  Tablet 4 lisinopril-hydroCHLOROthiazide (PRINZIDE, ZESTORETIC) 20-12.5 mg per tablet TAKE 2 TABLETS BY MOUTH EVERY  Tablet 3    metFORMIN ER (GLUCOPHAGE XR) 500 mg tablet TAKE 1 TABLET BY MOUTH IN THE MORNING AND TAKE 2 TABLETS BEFORE DINNER. 270 Tablet 3    cloNIDine HCL (CATAPRES) 0.1 mg tablet TAKE 1 TABLET BY MOUTH TWICE A DAY      aspirin (ASPIRIN) 325 mg tablet Take 325 mg by mouth daily. ALPRAZolam (XANAX) 0.5 mg tablet Take 1 Tab by mouth daily as needed for Anxiety. 30 Tab 0    FERROUS SULFATE (IRON PO) Take 1 Tab by mouth. cholecalciferol (VITAMIN D3) 25 mcg (1,000 unit) cap Take 2,000 Units by mouth daily. ascorbic acid, vitamin C, (VITAMIN C) 250 mg tablet Take  by mouth. insulin detemir U-100 (Levemir FlexTouch U-100 Insuln) 100 unit/mL (3 mL) inpn INJECT 50 UNITS SUBCUTANEOUSLY TWO TIMES DAILY.  *REPLACES LANTUS* 10 Pen 17     Past Medical History:   Diagnosis Date    Abnormal mammogram with microcalcification 6/4/2013    Right  UOQ    Aphasia due to stroke     SPEAKS SOME    Arthritis     back    Asthma 9/7/2017    CKD (chronic kidney disease) 9/7/2017    CKD (chronic kidney disease), stage III ([address] 9/7/2017    CVA (cerebral vascular accident) ([address] 9/7/2017    Depression 9/7/2017    Di[address]     TYPE 2; IDDM    Edema 9/7/2017    H/O mammogram 07/29/2019    Pt. report pap was done last year at [facility]    Hypertension     Hypertension with renal disease 9/7/2017    Hypomagnesemia 9/7/2017    Morbid obesity ([address] 9/7/2017    On statin therapy 9/7/2017    Osteopenia 9/7/2017    Psychiatric disorder     depression    Smear, vaginal, as part of routine gynecological examination 07/29/2019    Pt. reported pap was done last year at [facility]    Stroke [facility]) 5/11/2010    RIGHT SIDE WEAKNESS    Vitamin D deficiency 9/7/2017     Past Surgical History:   Procedure Laterality Date    HX BREAST BIOPSY Right     benign bx, many years ago    HX CATARACT REMOVAL  Dec 2010/ 2011    bilateral cataracts removed    HX GI      HEMMORHOIDECTOMY    HX GYN      pmb    HX HEENT      tonsillectomy    HX OOPHORECTOMY Bilateral     HX OTHER SURGICAL      HX TONSILLECTOMY      LA BREAST SURGERY PROCEDURE UNLISTED      LA LAPAROSCOPY W TOT HYSTERECTUTERUS <=250 GRAM  W TUBE/OVARY  2011    leiomyomata     No Known Allergies    REVIEW OF SYSTEMS:  General: negative for - chills or fever, or weight loss or gain  ENT: negative for - headaches, nasal congestion or tinnitus  Eyes: no blurred or visual changes  Neck: No stiffness or swollen nodes  Respiratory: negative for - cough, hemoptysis, shortness of breath or wheezing  Cardiovascular : negative for - chest pain, edema, palpitations or shortness of breath  Gastrointestinal: negative for - abdominal pain, blood in stools, heartburn or nausea/vomiting  Genito-Urinary: no dysuria, trouble voiding, or hematuria  Musculoskeletal: negative for - gait disturbance, joint pain, joint stiffness or joint swelling  Neurological: no TIA or stroke symptoms  Hematologic: no bruises, no bleeding  Lymphatic: no swollen glands  Integument: no lumps, mole changes, nail changes or rash  Endocrine:no malaise/lethargy poly uria or polydipsia or unexpected weight changes        Social History     Socioeconomic History    Marital status:    Tobacco Use    Smoking status: Former     Types: Cigarettes     Quit date: 2010     Years since quittin.5    Smokeless tobacco: Never   Vaping Use    Vaping Use: Never used   Substance and Sexual Activity    Alcohol use: No    Drug use: No    Sexual activity: Never   Social History Narrative    ** Merged History Encounter **          Family History   Problem Relation Age of Onset    Hypertension Mother     Mult Sclerosis Mother     Diabetes Father     Stroke Father     Heart Disease Father     Hypertension Father     Post-op Nausea/Vomiting Sister     Breast Cancer Paternal Aunt        OBJECTIVE:     Visit Vitals  /82   Pulse 80   Temp 98.7 °F (37.1 °C) (Oral)   Resp 16   Ht 5' 4\" (1.626 m)   LMP 06/13/2011   SpO2 98%   BMI 34.33 kg/m²     CONSTITUTIONAL:   well nourished, appears age appropriate  EYES: sclera anicteric, PERRL, EOMI  ENMT:nares clear, moist mucous membranes, pharynx clear  NECK: supple. Thyroid normal, No JVD or bruits  RESPIRATORY: Chest: clear to ascultation and percussion, normal inspiratory effort  CARDIOVASCULAR: Heart: regular rate and rhythm no murmurs, rubs or gallops, PMI not displaced, No thrills, no peripheral edema  GASTROINTESTINAL: Abdomen: non distended, soft, non tender, bowel sounds normal  HEMATOLOGIC: no purpura, petechiae or bruising  LYMPHATIC: No lymph node enlargemant  MUSCULOSKELETAL: Extremities: no active synovitis, pulse 1+   INTEGUMENT: No unusual rashes or suspicious skin lesions noted. Nails appear normal.  PERIPHERAL VASCULAR: normal pulses femoral, PT and DP  NEUROLOGIC: non-focal exam, A & O X 3  PSYCHIATRIC:, appropriate affect     ASSESSMENT:   1. Hypertension with renal disease    2. Controlled type 2 diabetes mellitus with stage 3 chronic kidney disease, without long-term current use of insulin ([address]    3. Mixed hyperlipidemia    4. Mild intermittent asthma without complication    5. Stage 3 chronic kidney disease, unspecified whether stage 3a or 3b CKD ([address]    6. History of CVA (cerebrovascular accident)    7. Severe obesity (BMI 35.0-39. 9) with comorbidity ([address]    8. Needs flu shot      Impression  1. Hypertension that is controlled so continue current therapy reviewed with her. It is borderline because she is quite upset about her mother being recently diagnosed with advanced gynecologic cancer. 2.  Diabetes repeat status pending prior lab reviewed  3.   Hyperlipidemia prior lab reviewed repeat status pending  4.  asthma stable   5 CKD stage III repeat status pending  6 prior CVA continue aspirin daily  7 obesity I did discuss diet, exercise weight reduction for well health benefit. Follow-up with me again in 3 months or sooner as a problem. I will call with lab results in the int    PLAN:  .  Orders Placed This Encounter    Influenza, FLUARIX, FLULAVAL (age 10 mo+), AFLURIA, FLUZONE (age 3y+) IM, PF, 0.5 mL    METABOLIC PANEL, COMPREHENSIVE    LIPID PANEL    CK    HEMOGLOBIN A1C WITH EAG         ATTENTION:   This medical record was transcribed using an electronic medical records system. Although proofread, it may and can contain electronic and spelling errors. Other human spelling and other errors may be present. Corrections may be executed at a later time. Please feel free to contact us for any clarifications as needed. Follow-up and Dispositions    Return in about 3 months (around 2/17/2023). No results found for any visits on 11/17/22. Liliana Alvarez MD    The patient verbalized understanding of the problems and plans as explained.

## 2022-11-17 NOTE — PROGRESS NOTES
Per verbal orders from Dr. Karson De La Rosa, patient received Flulaval influenza vaccine given IM in right deltoid by Irene Sender. Vaccine was verified by Lexie Elder. Patient was observed for 15 minutes with no complications noted.  VIS was given

## 2022-11-18 LAB
ALBUMIN SERPL-MCNC: 4.6 G/DL (ref 3.8–4.9)
ALBUMIN/GLOB SERPL: 1.2 {RATIO} (ref 1.2–2.2)
ALP SERPL-CCNC: 89 IU/L (ref 44–121)
ALT SERPL-CCNC: 23 IU/L (ref 0–32)
AST SERPL-CCNC: 23 IU/L (ref 0–40)
BILIRUB SERPL-MCNC: 0.3 MG/DL (ref 0–1.2)
BUN SERPL-MCNC: 13 MG/DL (ref 6–24)
BUN/CREAT SERPL: 14 (ref 9–23)
CALCIUM SERPL-MCNC: 10.8 MG/DL (ref 8.7–10.2)
CHLORIDE SERPL-SCNC: 102 MMOL/L (ref 96–106)
CHOLEST SERPL-MCNC: 255 MG/DL (ref 100–199)
CK SERPL-CCNC: 49 U/L (ref 32–182)
CO2 SERPL-SCNC: 22 MMOL/L (ref 20–29)
CREAT SERPL-MCNC: 0.91 MG/DL (ref 0.57–1)
EGFR: 73 ML/MIN/1.73
EST. AVERAGE GLUCOSE BLD GHB EST-MCNC: 298 MG/DL
GLOBULIN SER CALC-MCNC: 3.7 G/DL (ref 1.5–4.5)
GLUCOSE SERPL-MCNC: 77 MG/DL (ref 70–99)
HBA1C MFR BLD: 12 % (ref 4.8–5.6)
HDLC SERPL-MCNC: 44 MG/DL
LDLC SERPL CALC-MCNC: 137 MG/DL (ref 0–99)
POTASSIUM SERPL-SCNC: 5.3 MMOL/L (ref 3.5–5.2)
PROT SERPL-MCNC: 8.3 G/DL (ref 6–8.5)
SODIUM SERPL-SCNC: 142 MMOL/L (ref 134–144)
TRIGL SERPL-MCNC: 405 MG/DL (ref 0–149)
VLDLC SERPL CALC-MCNC: 74 MG/DL (ref 5–40)

## 2022-12-10 DIAGNOSIS — I10 HYPERTENSION, UNSPECIFIED TYPE: ICD-10-CM

## 2022-12-12 RX ORDER — LISINOPRIL AND HYDROCHLOROTHIAZIDE 12.5; 2 MG/1; MG/1
TABLET ORAL
Qty: 180 TABLET | Refills: 3 | Status: SHIPPED | OUTPATIENT
Start: 2022-12-12

## 2022-12-12 NOTE — TELEPHONE ENCOUNTER
RX refill request from the patient/pharmacy. Patient last seen 11- with labs, and next appt. scheduled for 02-  Requested Prescriptions     Pending Prescriptions Disp Refills    lisinopril-hydroCHLOROthiazide (PRINZIDE, ZESTORETIC) 20-12.5 mg per tablet [Pharmacy Med Name: LISINOPRIL-HCTZ 20-12.5 MG TAB] 180 Tablet 3     Sig: TAKE 2 TABLETS BY MOUTH EVERY DAY    .

## 2023-01-04 RX ORDER — PEN NEEDLE, DIABETIC 30 GX3/16"
NEEDLE, DISPOSABLE MISCELLANEOUS
Qty: 100 PEN NEEDLE | Status: SHIPPED | OUTPATIENT
Start: 2023-01-04

## 2023-01-04 NOTE — TELEPHONE ENCOUNTER
RX refill request from the patient/pharmacy. Patient last seen 11- with labs, and next appt. scheduled for 02-  Requested Prescriptions     Pending Prescriptions Disp Refills    Insulin Needles, Disposable, (BD Ultra-Fine Short Pen Needle) 31 gauge x 5/16\" ndle 100 Pen Needle PRN     Sig: USE DAILY AS DIRECTED WITH HER INSULIN PEN.     Teressa Duncan

## 2023-02-03 RX ORDER — METFORMIN HYDROCHLORIDE 500 MG/1
TABLET, EXTENDED RELEASE ORAL
Qty: 270 TABLET | Refills: 3 | Status: SHIPPED | OUTPATIENT
Start: 2023-02-03

## 2023-02-03 NOTE — TELEPHONE ENCOUNTER
RX refill request from the patient/pharmacy. Patient last seen 11- with labs, and next appt. scheduled for 02-  Requested Prescriptions     Pending Prescriptions Disp Refills    metFORMIN ER (GLUCOPHAGE XR) 500 mg tablet [Pharmacy Med Name: METFORMIN HCL  MG TABLET] 270 Tablet 3     Sig: TAKE 1 TABLET BY MOUTH IN THE MORNING AND TAKE 2 TABLETS BEFORE DINNER. Jaimie Garcia

## 2023-02-21 RX ORDER — MIRTAZAPINE 15 MG/1
15 TABLET, FILM COATED ORAL
Qty: 30 TABLET | Refills: 5 | Status: SHIPPED | OUTPATIENT
Start: 2023-02-21

## 2023-02-21 RX ORDER — ALBUTEROL SULFATE 90 UG/1
AEROSOL, METERED RESPIRATORY (INHALATION)
Qty: 6.7 EACH | Refills: 5 | Status: SHIPPED | OUTPATIENT
Start: 2023-02-21

## 2023-02-21 NOTE — TELEPHONE ENCOUNTER
RX refill request from the patient/pharmacy. Patient last seen 11- with labs, and next appt. scheduled for 02-  Requested Prescriptions     Pending Prescriptions Disp Refills    albuterol (PROVENTIL HFA, VENTOLIN HFA, PROAIR HFA) 90 mcg/actuation inhaler [Pharmacy Med Name: ALBUTEROL HFA (PROVENTIL) INH] 6.7 Each 5     Sig: INHALE 2 PUFFS BY MOUTH EVERY 4 HOURS AS NEEDED FOR WHEEZING    mirtazapine (REMERON) 15 mg tablet [Pharmacy Med Name: MIRTAZAPINE 15 MG TABLET] 30 Tablet 5     Sig: TAKE 1 TABLET BY MOUTH NIGHTLY    .

## 2023-02-27 ENCOUNTER — OFFICE VISIT (OUTPATIENT)
Dept: INTERNAL MEDICINE CLINIC | Age: 58
End: 2023-02-27
Payer: MEDICARE

## 2023-02-27 VITALS
TEMPERATURE: 98.9 F | OXYGEN SATURATION: 98 % | HEART RATE: 82 BPM | DIASTOLIC BLOOD PRESSURE: 84 MMHG | SYSTOLIC BLOOD PRESSURE: 136 MMHG

## 2023-02-27 DIAGNOSIS — N18.30 CONTROLLED TYPE 2 DIABETES MELLITUS WITH STAGE 3 CHRONIC KIDNEY DISEASE, WITHOUT LONG-TERM CURRENT USE OF INSULIN (HCC): ICD-10-CM

## 2023-02-27 DIAGNOSIS — M85.89 OSTEOPENIA OF MULTIPLE SITES: ICD-10-CM

## 2023-02-27 DIAGNOSIS — J45.20 MILD INTERMITTENT ASTHMA WITHOUT COMPLICATION: ICD-10-CM

## 2023-02-27 DIAGNOSIS — E78.2 MIXED HYPERLIPIDEMIA: ICD-10-CM

## 2023-02-27 DIAGNOSIS — Z86.73 HISTORY OF CVA (CEREBROVASCULAR ACCIDENT): ICD-10-CM

## 2023-02-27 DIAGNOSIS — E11.22 CONTROLLED TYPE 2 DIABETES MELLITUS WITH STAGE 3 CHRONIC KIDNEY DISEASE, WITHOUT LONG-TERM CURRENT USE OF INSULIN (HCC): ICD-10-CM

## 2023-02-27 DIAGNOSIS — I12.9 HYPERTENSION WITH RENAL DISEASE: Primary | ICD-10-CM

## 2023-02-27 DIAGNOSIS — N18.30 STAGE 3 CHRONIC KIDNEY DISEASE, UNSPECIFIED WHETHER STAGE 3A OR 3B CKD (HCC): ICD-10-CM

## 2023-02-27 DIAGNOSIS — E66.01 SEVERE OBESITY (BMI 35.0-39.9) WITH COMORBIDITY (HCC): ICD-10-CM

## 2023-02-27 PROCEDURE — 3017F COLORECTAL CA SCREEN DOC REV: CPT | Performed by: INTERNAL MEDICINE

## 2023-02-27 PROCEDURE — G9717 DOC PT DX DEP/BP F/U NT REQ: HCPCS | Performed by: INTERNAL MEDICINE

## 2023-02-27 PROCEDURE — 99214 OFFICE O/P EST MOD 30 MIN: CPT | Performed by: INTERNAL MEDICINE

## 2023-02-27 PROCEDURE — G8417 CALC BMI ABV UP PARAM F/U: HCPCS | Performed by: INTERNAL MEDICINE

## 2023-02-27 PROCEDURE — 2022F DILAT RTA XM EVC RTNOPTHY: CPT | Performed by: INTERNAL MEDICINE

## 2023-02-27 PROCEDURE — G8427 DOCREV CUR MEDS BY ELIG CLIN: HCPCS | Performed by: INTERNAL MEDICINE

## 2023-02-27 PROCEDURE — 3046F HEMOGLOBIN A1C LEVEL >9.0%: CPT | Performed by: INTERNAL MEDICINE

## 2023-02-27 PROCEDURE — G9899 SCRN MAM PERF RSLTS DOC: HCPCS | Performed by: INTERNAL MEDICINE

## 2023-02-27 RX ORDER — METFORMIN HYDROCHLORIDE 500 MG/1
TABLET, EXTENDED RELEASE ORAL
Qty: 270 TABLET | Refills: 3 | Status: SHIPPED | OUTPATIENT
Start: 2023-02-27

## 2023-02-27 RX ORDER — VALSARTAN 320 MG/1
320 TABLET ORAL DAILY
Qty: 90 TABLET | Refills: 3 | Status: SHIPPED | OUTPATIENT
Start: 2023-02-27

## 2023-02-27 NOTE — PROGRESS NOTES
Chief Complaint   Patient presents with    Hypertension     3 month f/up    Diabetes    Chronic Kidney Disease      1. Have you been to the ER, urgent care clinic since your last visit? Hospitalized since your last visit? No    2. Have you seen or consulted any other health care providers outside of the [address] [place] [address] since your last visit? Include any pap smears or colon screening.  No

## 2023-02-27 NOTE — PROGRESS NOTES
Chief Complaint   Patient presents with    Hypertension     3 month f/up    Diabetes    Chronic Kidney Disease       SUBJECTIVE:    Kassie Goodell is a 60 y.o. female who returns in follow-up of her medical problems include hypertension, diabetes, hyperlipidemia, prior CVA with hemiplegia, obesity and other multiple medical problems. She is taking her medication trying to follow her diet although her physical activity is limited secondary to her previous CVA. She currently denies any chest pain, shortness breath or cardiac respiratory complaints. There are no GI or  complaints. There are no headaches, dizziness or new neurologic complaints. There is no change of her chronic arthritic complaints and there are no other complaints on complete review of systems. Current Outpatient Medications   Medication Sig Dispense Refill    metFORMIN ER (GLUCOPHAGE XR) 500 mg tablet Take one tablet in the morning and two tablets with dinner 270 Tablet 3    valsartan (DIOVAN) 320 mg tablet Take 1 Tablet by mouth daily. 90 Tablet 3    albuterol (PROVENTIL HFA, VENTOLIN HFA, PROAIR HFA) 90 mcg/actuation inhaler INHALE 2 PUFFS BY MOUTH EVERY 4 HOURS AS NEEDED FOR WHEEZING 6.7 Each 5    mirtazapine (REMERON) 15 mg tablet TAKE 1 TABLET BY MOUTH NIGHTLY 30 Tablet 5    Insulin Needles, Disposable, (BD Ultra-Fine Short Pen Needle) 31 gauge x 5/16\" ndle USE DAILY AS DIRECTED WITH HER INSULIN PEN. 100 Pen Needle PRN    lisinopril-hydroCHLOROthiazide (PRINZIDE, ZESTORETIC) 20-12.5 mg per tablet TAKE 2 TABLETS BY MOUTH EVERY  Tablet 3    glimepiride (AMARYL) 2 mg tablet TAKE 1 TABLET BY MOUTH EVERY DAY 90 Tablet 3    Vitamin D3 25 mcg (1,000 unit) tablet TAKE 1 TABLET BY MOUTH EVERY DAY 90 Tablet 3    insulin detemir U-100 (Levemir FlexTouch U-100 Insuln) 100 unit/mL (3 mL) inpn INJECT 50 UNITS SUBCUTANEOUSLY TWO TIMES DAILY.  *REPLACES LANTUS* 10 Pen 17    rosuvastatin (CRESTOR) 20 mg tablet TAKE 1 TABLET BY MOUTH EVERY DAY AT NIGHT *REPLACES PRAVASTATIN 90 Tablet 3    metoprolol succinate (TOPROL-XL) 50 mg XL tablet TAKE 1 TABLET BY MOUTH EVERY DAY 90 Tablet 3    magnesium oxide (MAG-OX) 400 mg tablet TAKE 1 TABLET BY MOUTH TWICE A  Tablet 3    amLODIPine (NORVASC) 10 mg tablet TAKE 1 TABLET BY MOUTH EVERY DAY 90 Tablet 3    Klor-Con M20 20 mEq tablet TAKE 2 TABLETS BY MOUTH TWICE A  Tablet 4    cloNIDine HCL (CATAPRES) 0.1 mg tablet TAKE 1 TABLET BY MOUTH TWICE A DAY      aspirin (ASPIRIN) 325 mg tablet Take 325 mg by mouth daily. ALPRAZolam (XANAX) 0.5 mg tablet Take 1 Tab by mouth daily as needed for Anxiety. 30 Tab 0    FERROUS SULFATE (IRON PO) Take 1 Tab by mouth. cholecalciferol (VITAMIN D3) 25 mcg (1,000 unit) cap Take 2,000 Units by mouth daily. ascorbic acid, vitamin C, (VITAMIN C) 250 mg tablet Take  by mouth.          Past Medical History:   Diagnosis Date    Abnormal mammogram with microcalcification 6/4/2013    Right  UOQ    Aphasia due to stroke     SPEAKS SOME    Arthritis     back    Asthma 9/7/2017    CKD (chronic kidney disease) 9/7/2017    CKD (chronic kidney disease), stage III ([address] 9/7/2017    CVA (cerebral vascular accident) ([address] 9/7/2017    Depression 9/7/2017    Diabetes ([facility] [address]     TYPE 2; IDDM    Edema 9/7/2017    H/O mammogram 07/29/2019    Pt. report pap was done last year at [facility]    Hypertension     Hypertension with renal disease 9/7/2017    Hypomagnesemia 9/7/2017    Morbid obesity ([address] 9/7/2017    On statin therapy 9/7/2017    Osteopenia 9/7/2017    Psychiatric disorder     depression    Smear, vaginal, as part of routine gynecological examination 07/29/2019    Pt. reported pap was done [facility] year [facility]    Stroke [facility]) 5/11/2010    RIGHT SIDE WEAKNESS    Vitamin D deficiency 9/7/2017     Past Surgical History:   Procedure Laterality Date    HX BREAST BIOPSY Right     benign bx, many years ago    HX CATARACT REMOVAL  Dec 2010/ Jan     bilateral cataracts removed    HX GI      HEMMORHOIDECTOMY    HX GYN      pmb    HX HEENT      tonsillectomy    HX OOPHORECTOMY Bilateral     HX OTHER SURGICAL      HX TONSILLECTOMY      SC LAPS TOTAL HYSTERECT 250 GM/< W/RMVL TUBE/OVARY  2011    leiomyomata    SC UNLISTED PROCEDURE BREAST       No Known Allergies    REVIEW OF SYSTEMS:  General: negative for - chills or fever, or weight loss or gain  ENT: negative for - headaches, nasal congestion or tinnitus  Eyes: no blurred or visual changes  Neck: No stiffness or swollen nodes  Respiratory: negative for - cough, hemoptysis, shortness of breath or wheezing  Cardiovascular : negative for - chest pain, edema, palpitations or shortness of breath  Gastrointestinal: negative for - abdominal pain, blood in stools, heartburn or nausea/vomiting  Genito-Urinary: no dysuria, trouble voiding, or hematuria  Musculoskeletal: negative for - gait disturbance, joint pain, joint stiffness or joint swelling  Neurological: no TIA or stroke symptoms  Hematologic: no bruises, no bleeding  Lymphatic: no swollen glands  Integument: no lumps, mole changes, nail changes or rash  Endocrine:no malaise/lethargy poly uria or polydipsia or unexpected weight changes        Social History     Socioeconomic History    Marital status:    Tobacco Use    Smoking status: Former     Types: Cigarettes     Quit date: 2010     Years since quittin.8    Smokeless tobacco: Never   Vaping Use    Vaping Use: Never used   Substance and Sexual Activity    Alcohol use: No    Drug use: Yes     Types: Prescription    Sexual activity: Never   Social History Narrative    ** Merged History Encounter **          Family History   Problem Relation Age of Onset    Hypertension Mother     Mult Sclerosis Mother     Diabetes Father     Stroke Father     Heart Disease Father     Hypertension Father     Post-op Nausea/Vomiting Sister     Breast Cancer Paternal Aunt        OBJECTIVE:     Visit Vitals  /84 (BP 1 Location: Left upper arm, BP Patient Position: Sitting, BP Cuff Size: Adult)   Pulse 82   Temp 98.9 °F (37.2 °C)   LMP 06/13/2011   SpO2 98%     CONSTITUTIONAL:   well nourished, appears age appropriate  EYES: sclera anicteric, PERRL, EOMI  ENMT:nares clear, moist mucous membranes, pharynx clear  NECK: supple. Thyroid normal, No JVD or bruits  RESPIRATORY: Chest: clear to ascultation and percussion, normal inspiratory effort  CARDIOVASCULAR: Heart: regular rate and rhythm no murmurs, rubs or gallops, PMI not displaced, No thrills, no peripheral edema  GASTROINTESTINAL: Abdomen: non distended, soft, non tender, bowel sounds normal  HEMATOLOGIC: no purpura, petechiae or bruising  LYMPHATIC: No lymph node enlargemant  MUSCULOSKELETAL: Extremities: no active synovitis, pulse 1+   INTEGUMENT: No unusual rashes or suspicious skin lesions noted. Nails appear normal.  PERIPHERAL VASCULAR: normal pulses femoral, PT and DP  NEUROLOGIC: non-focal exam, A & O X 3  PSYCHIATRIC:, appropriate affect     ASSESSMENT:   1. Hypertension with renal disease    2. Controlled type 2 diabetes mellitus with stage 3 chronic kidney disease, without long-term current use of insulin ([address]    3. Mixed hyperlipidemia    4. Mild intermittent asthma without complication    5. Stage 3 chronic kidney disease, unspecified whether stage 3a or 3b CKD ([address]    6. History of CVA (cerebrovascular accident)    7. Severe obesity (BMI 35.0-39. 9) with comorbidity ([address]    8. Osteopenia of multiple sites      Impression  1. Hypertension that is controlled so continue current therapy reviewed with her and her . 2.  Diabetes repeat status pending prior lab reviewed  3. Hyperlipidemia prior lab reviewed repeat status pending  4. Asthma that is stable next Umber 5 CKD stage III repeat status pending  6. History of CVA with hemiplegia chronic and unchanged  7.   Obesity again encouraged diet and exercise  8 osteopenia needs repeat bone density we will schedule that  Follow-up me again 3 months or sooner if there is a problem. I will call with lab results in interim. PLAN:  .  Orders Placed This Encounter    DEXA BONE DENSITY STUDY AXIAL    LIPID PANEL    METABOLIC PANEL, COMPREHENSIVE    CK    HEMOGLOBIN A1C WITH EAG    metFORMIN ER (GLUCOPHAGE XR) 500 mg tablet    valsartan (DIOVAN) 320 mg tablet         ATTENTION:   This medical record was transcribed using an electronic medical records system. Although proofread, it may and can contain electronic and spelling errors. Other human spelling and other errors may be present. Corrections may be executed at a later time. Please feel free to contact us for any clarifications as needed. No results found for any visits on 02/27/23. Dipak Mcduffie MD    The patient verbalized understanding of the problems and plans as explained.

## 2023-02-28 LAB
ALBUMIN SERPL-MCNC: 4.7 G/DL (ref 3.8–4.9)
ALBUMIN/GLOB SERPL: 1.5 {RATIO} (ref 1.2–2.2)
ALP SERPL-CCNC: 86 IU/L (ref 44–121)
ALT SERPL-CCNC: 14 IU/L (ref 0–32)
AST SERPL-CCNC: 19 IU/L (ref 0–40)
BILIRUB SERPL-MCNC: 0.4 MG/DL (ref 0–1.2)
BUN SERPL-MCNC: 15 MG/DL (ref 6–24)
BUN/CREAT SERPL: 17 (ref 9–23)
CALCIUM SERPL-MCNC: 10.2 MG/DL (ref 8.7–10.2)
CHLORIDE SERPL-SCNC: 97 MMOL/L (ref 96–106)
CHOLEST SERPL-MCNC: 139 MG/DL (ref 100–199)
CK SERPL-CCNC: 54 U/L (ref 32–182)
CO2 SERPL-SCNC: 24 MMOL/L (ref 20–29)
CREAT SERPL-MCNC: 0.87 MG/DL (ref 0.57–1)
EGFRCR SERPLBLD CKD-EPI 2021: 77 ML/MIN/1.73
EST. AVERAGE GLUCOSE BLD GHB EST-MCNC: 289 MG/DL
GLOBULIN SER CALC-MCNC: 3.2 G/DL (ref 1.5–4.5)
GLUCOSE SERPL-MCNC: 145 MG/DL (ref 70–99)
HBA1C MFR BLD: 11.7 % (ref 4.8–5.6)
HDLC SERPL-MCNC: 42 MG/DL
LDLC SERPL CALC-MCNC: 54 MG/DL (ref 0–99)
POTASSIUM SERPL-SCNC: 4.3 MMOL/L (ref 3.5–5.2)
PROT SERPL-MCNC: 7.9 G/DL (ref 6–8.5)
SODIUM SERPL-SCNC: 136 MMOL/L (ref 134–144)
TRIGL SERPL-MCNC: 275 MG/DL (ref 0–149)
VLDLC SERPL CALC-MCNC: 43 MG/DL (ref 5–40)

## 2023-03-16 RX ORDER — MIRTAZAPINE 15 MG/1
15 TABLET, FILM COATED ORAL
Qty: 30 TABLET | Refills: 5 | Status: SHIPPED | OUTPATIENT
Start: 2023-03-16

## 2023-03-16 NOTE — TELEPHONE ENCOUNTER
Requested Prescriptions     Pending Prescriptions Disp Refills    mirtazapine (REMERON) 15 mg tablet 30 Tablet 5     Sig: Take 1 Tablet by mouth nightly.

## 2023-05-17 RX ORDER — ASPIRIN 325 MG
325 TABLET ORAL DAILY
COMMUNITY

## 2023-05-17 RX ORDER — MAGNESIUM OXIDE 400 MG/1
1 TABLET ORAL 2 TIMES DAILY
COMMUNITY
Start: 2022-05-27

## 2023-05-17 RX ORDER — GLIMEPIRIDE 2 MG/1
1 TABLET ORAL DAILY
COMMUNITY
Start: 2022-11-14

## 2023-05-17 RX ORDER — CLONIDINE HYDROCHLORIDE 0.1 MG/1
1 TABLET ORAL 2 TIMES DAILY
COMMUNITY
Start: 2019-12-11

## 2023-05-17 RX ORDER — METOPROLOL SUCCINATE 50 MG/1
1 TABLET, EXTENDED RELEASE ORAL DAILY
COMMUNITY
Start: 2022-05-27 | End: 2023-06-22

## 2023-05-17 RX ORDER — LISINOPRIL AND HYDROCHLOROTHIAZIDE 20; 12.5 MG/1; MG/1
2 TABLET ORAL DAILY
COMMUNITY
Start: 2022-12-12

## 2023-05-17 RX ORDER — POTASSIUM CHLORIDE 20 MEQ/1
2 TABLET, EXTENDED RELEASE ORAL 2 TIMES DAILY
COMMUNITY
Start: 2022-01-28

## 2023-05-17 RX ORDER — MIRTAZAPINE 15 MG/1
1 TABLET, FILM COATED ORAL NIGHTLY
COMMUNITY
Start: 2023-03-16 | End: 2023-05-19 | Stop reason: SDUPTHER

## 2023-05-17 RX ORDER — AMLODIPINE BESYLATE 10 MG/1
1 TABLET ORAL DAILY
COMMUNITY
Start: 2022-05-27 | End: 2023-06-22

## 2023-05-17 RX ORDER — METFORMIN HYDROCHLORIDE 500 MG/1
TABLET, EXTENDED RELEASE ORAL
COMMUNITY
Start: 2023-02-27

## 2023-05-17 RX ORDER — ROSUVASTATIN CALCIUM 20 MG/1
TABLET, COATED ORAL
COMMUNITY
Start: 2022-05-27 | End: 2023-06-22

## 2023-05-17 RX ORDER — ALPRAZOLAM 0.5 MG/1
0.5 TABLET ORAL DAILY PRN
COMMUNITY
Start: 2018-01-23

## 2023-05-17 RX ORDER — ALBUTEROL SULFATE 90 UG/1
2 AEROSOL, METERED RESPIRATORY (INHALATION) EVERY 4 HOURS PRN
COMMUNITY
Start: 2023-02-21

## 2023-05-17 RX ORDER — VALSARTAN 320 MG/1
320 TABLET ORAL DAILY
COMMUNITY
Start: 2023-02-27

## 2023-05-17 RX ORDER — ASCORBIC ACID 250 MG
TABLET ORAL
COMMUNITY

## 2023-05-19 RX ORDER — MIRTAZAPINE 15 MG/1
15 TABLET, FILM COATED ORAL NIGHTLY
Qty: 90 TABLET | Refills: 3 | Status: SHIPPED | OUTPATIENT
Start: 2023-05-19

## 2023-05-19 NOTE — TELEPHONE ENCOUNTER
RX refill request from the patient/pharmacy. Patient last seen 02- with labs, and next appt. scheduled for 06-  Requested Prescriptions     Pending Prescriptions Disp Refills    mirtazapine (REMERON) 15 MG tablet 90 tablet 3     Sig: Take 1 tablet by mouth nightly    .

## 2023-05-31 PROBLEM — F41.9 ANXIETY: Status: ACTIVE | Noted: 2018-01-23

## 2023-05-31 PROBLEM — E55.9 VITAMIN D DEFICIENCY: Status: ACTIVE | Noted: 2017-09-07

## 2023-05-31 PROBLEM — E66.01 SEVERE OBESITY (BMI 35.0-39.9) WITH COMORBIDITY (HCC): Status: ACTIVE | Noted: 2018-05-09

## 2023-05-31 PROBLEM — N18.30 CKD (CHRONIC KIDNEY DISEASE), STAGE III (HCC): Status: ACTIVE | Noted: 2017-09-07

## 2023-05-31 PROBLEM — M85.89 OSTEOPENIA OF MULTIPLE SITES: Status: ACTIVE | Noted: 2017-09-07

## 2023-05-31 PROBLEM — I12.9 HYPERTENSION WITH RENAL DISEASE: Status: ACTIVE | Noted: 2017-09-07

## 2023-05-31 PROBLEM — F32.A MILD DEPRESSION: Status: ACTIVE | Noted: 2018-05-09

## 2023-05-31 PROBLEM — J45.20 MILD INTERMITTENT ASTHMA WITHOUT COMPLICATION: Status: ACTIVE | Noted: 2018-01-22

## 2023-05-31 NOTE — PROGRESS NOTES
Chief Complaint   Patient presents with    Follow-up       SUBJECTIVE:    Eliseo Hoover is a 60 y.o. female who returns in follow-up for medical problems include hypertension, hyperlipidemia, prior CVA, diabetes, obesity and other multimedical problems. She is taking her medication trying to follow her diet remains physically active today is that she can because of her right hemiplegia. She currently denies any chest pain, shortness of breath or cardiac respiratory complaints. There are no GI  complaints. There are no headaches, dizziness or no neurologic complaints changes. She has no other arthritic complaints and no other complaints or complete review of systems. Current Outpatient Medications   Medication Sig Dispense Refill    Ferrous Sulfate Dried (FERROUS SULFATE IRON PO) Take 1 tablet by mouth      mirtazapine (REMERON) 15 MG tablet Take 1 tablet by mouth nightly 90 tablet 3    albuterol sulfate HFA (PROVENTIL;VENTOLIN;PROAIR) 108 (90 Base) MCG/ACT inhaler Inhale 2 puffs into the lungs every 4 hours as needed      ALPRAZolam (XANAX) 0.5 MG tablet Take 1 tablet by mouth daily as needed. amLODIPine (NORVASC) 10 MG tablet Take 1 tablet by mouth daily      ascorbic acid (VITAMIN C) 250 MG tablet Take by mouth      aspirin 325 MG tablet Take 1 tablet by mouth daily      vitamin D (CHOLECALCIFEROL) 25 MCG (1000 UT) TABS tablet Take 1 tablet by mouth daily      cloNIDine (CATAPRES) 0.1 MG tablet Take 1 tablet by mouth 2 times daily      glimepiride (AMARYL) 2 MG tablet Take 1 tablet by mouth daily      insulin detemir (LEVEMIR) 100 UNIT/ML injection pen INJECT 50 UNITS SUBCUTANEOUSLY TWO TIMES DAILY.  *REPLACES LANTUS*      lisinopril-hydroCHLOROthiazide (PRINZIDE;ZESTORETIC) 20-12.5 MG per tablet Take 2 tablets by mouth daily      magnesium oxide (MAG-OX) 400 MG tablet Take 1 tablet by mouth 2 times daily      metFORMIN (GLUCOPHAGE-XR) 500 MG extended release tablet Take one tablet

## 2023-06-01 ENCOUNTER — OFFICE VISIT (OUTPATIENT)
Facility: CLINIC | Age: 58
End: 2023-06-01
Payer: MEDICARE

## 2023-06-01 VITALS
DIASTOLIC BLOOD PRESSURE: 82 MMHG | OXYGEN SATURATION: 97 % | WEIGHT: 226 LBS | SYSTOLIC BLOOD PRESSURE: 134 MMHG | HEART RATE: 74 BPM | RESPIRATION RATE: 18 BRPM | HEIGHT: 64 IN | BODY MASS INDEX: 38.58 KG/M2 | TEMPERATURE: 98.3 F

## 2023-06-01 DIAGNOSIS — J45.20 MILD INTERMITTENT ASTHMA WITHOUT COMPLICATION: ICD-10-CM

## 2023-06-01 DIAGNOSIS — E11.22 CONTROLLED TYPE 2 DIABETES MELLITUS WITH STAGE 3 CHRONIC KIDNEY DISEASE, WITHOUT LONG-TERM CURRENT USE OF INSULIN (HCC): ICD-10-CM

## 2023-06-01 DIAGNOSIS — E78.2 MIXED HYPERLIPIDEMIA: ICD-10-CM

## 2023-06-01 DIAGNOSIS — N18.30 STAGE 3 CHRONIC KIDNEY DISEASE, UNSPECIFIED WHETHER STAGE 3A OR 3B CKD (HCC): ICD-10-CM

## 2023-06-01 DIAGNOSIS — N18.30 CONTROLLED TYPE 2 DIABETES MELLITUS WITH STAGE 3 CHRONIC KIDNEY DISEASE, WITHOUT LONG-TERM CURRENT USE OF INSULIN (HCC): ICD-10-CM

## 2023-06-01 DIAGNOSIS — E66.01 SEVERE OBESITY (BMI 35.0-39.9) WITH COMORBIDITY (HCC): ICD-10-CM

## 2023-06-01 DIAGNOSIS — I12.9 HYPERTENSION WITH RENAL DISEASE: Primary | ICD-10-CM

## 2023-06-01 DIAGNOSIS — Z86.73 HISTORY OF CVA (CEREBROVASCULAR ACCIDENT): ICD-10-CM

## 2023-06-01 PROCEDURE — 99214 OFFICE O/P EST MOD 30 MIN: CPT | Performed by: INTERNAL MEDICINE

## 2023-06-01 PROCEDURE — 3046F HEMOGLOBIN A1C LEVEL >9.0%: CPT | Performed by: INTERNAL MEDICINE

## 2023-06-01 SDOH — ECONOMIC STABILITY: INCOME INSECURITY: HOW HARD IS IT FOR YOU TO PAY FOR THE VERY BASICS LIKE FOOD, HOUSING, MEDICAL CARE, AND HEATING?: NOT HARD AT ALL

## 2023-06-01 SDOH — ECONOMIC STABILITY: HOUSING INSECURITY
IN THE LAST 12 MONTHS, WAS THERE A TIME WHEN YOU DID NOT HAVE A STEADY PLACE TO SLEEP OR SLEPT IN A SHELTER (INCLUDING NOW)?: NO

## 2023-06-01 SDOH — ECONOMIC STABILITY: FOOD INSECURITY: WITHIN THE PAST 12 MONTHS, THE FOOD YOU BOUGHT JUST DIDN'T LAST AND YOU DIDN'T HAVE MONEY TO GET MORE.: NEVER TRUE

## 2023-06-01 SDOH — ECONOMIC STABILITY: FOOD INSECURITY: WITHIN THE PAST 12 MONTHS, YOU WORRIED THAT YOUR FOOD WOULD RUN OUT BEFORE YOU GOT MONEY TO BUY MORE.: NEVER TRUE

## 2023-06-01 ASSESSMENT — PATIENT HEALTH QUESTIONNAIRE - PHQ9
7. TROUBLE CONCENTRATING ON THINGS, SUCH AS READING THE NEWSPAPER OR WATCHING TELEVISION: 0
1. LITTLE INTEREST OR PLEASURE IN DOING THINGS: 1
SUM OF ALL RESPONSES TO PHQ QUESTIONS 1-9: 3
SUM OF ALL RESPONSES TO PHQ9 QUESTIONS 1 & 2: 3
9. THOUGHTS THAT YOU WOULD BE BETTER OFF DEAD, OR OF HURTING YOURSELF: 0
SUM OF ALL RESPONSES TO PHQ QUESTIONS 1-9: 3
4. FEELING TIRED OR HAVING LITTLE ENERGY: 0
SUM OF ALL RESPONSES TO PHQ QUESTIONS 1-9: 3
SUM OF ALL RESPONSES TO PHQ QUESTIONS 1-9: 3
10. IF YOU CHECKED OFF ANY PROBLEMS, HOW DIFFICULT HAVE THESE PROBLEMS MADE IT FOR YOU TO DO YOUR WORK, TAKE CARE OF THINGS AT HOME, OR GET ALONG WITH OTHER PEOPLE: 0
2. FEELING DOWN, DEPRESSED OR HOPELESS: 2
8. MOVING OR SPEAKING SO SLOWLY THAT OTHER PEOPLE COULD HAVE NOTICED. OR THE OPPOSITE, BEING SO FIGETY OR RESTLESS THAT YOU HAVE BEEN MOVING AROUND A LOT MORE THAN USUAL: 0
6. FEELING BAD ABOUT YOURSELF - OR THAT YOU ARE A FAILURE OR HAVE LET YOURSELF OR YOUR FAMILY DOWN: 0
3. TROUBLE FALLING OR STAYING ASLEEP: 0
5. POOR APPETITE OR OVEREATING: 0

## 2023-06-01 NOTE — PROGRESS NOTES
Ezra Xiong is a 60 y.o. female presenting for Follow-up      /82 (Site: Left Upper Arm, Position: Sitting, Cuff Size: Large Adult)   Pulse 74   Temp 98.3 °F (36.8 °C) (Oral)   Resp 18   Ht 5' 4\" (1.626 m)   Wt 226 lb (102.5 kg)   SpO2 97%   BMI 38.79 kg/m²       Current Outpatient Medications   Medication Sig Dispense Refill    Ferrous Sulfate Dried (FERROUS SULFATE IRON PO) Take 1 tablet by mouth      mirtazapine (REMERON) 15 MG tablet Take 1 tablet by mouth nightly 90 tablet 3    albuterol sulfate HFA (PROVENTIL;VENTOLIN;PROAIR) 108 (90 Base) MCG/ACT inhaler Inhale 2 puffs into the lungs every 4 hours as needed      ALPRAZolam (XANAX) 0.5 MG tablet Take 1 tablet by mouth daily as needed. amLODIPine (NORVASC) 10 MG tablet Take 1 tablet by mouth daily      ascorbic acid (VITAMIN C) 250 MG tablet Take by mouth      aspirin 325 MG tablet Take 1 tablet by mouth daily      vitamin D (CHOLECALCIFEROL) 25 MCG (1000 UT) TABS tablet Take 1 tablet by mouth daily      cloNIDine (CATAPRES) 0.1 MG tablet Take 1 tablet by mouth 2 times daily      glimepiride (AMARYL) 2 MG tablet Take 1 tablet by mouth daily      insulin detemir (LEVEMIR) 100 UNIT/ML injection pen INJECT 50 UNITS SUBCUTANEOUSLY TWO TIMES DAILY.  *REPLACES LANTUS*      lisinopril-hydroCHLOROthiazide (PRINZIDE;ZESTORETIC) 20-12.5 MG per tablet Take 2 tablets by mouth daily      magnesium oxide (MAG-OX) 400 MG tablet Take 1 tablet by mouth 2 times daily      metFORMIN (GLUCOPHAGE-XR) 500 MG extended release tablet Take one tablet in the morning and two tablets with dinner      metoprolol succinate (TOPROL XL) 50 MG extended release tablet Take 1 tablet by mouth daily      potassium chloride (KLOR-CON M) 20 MEQ extended release tablet Take 2 tablets by mouth 2 times daily      rosuvastatin (CRESTOR) 20 MG tablet TAKE 1 TABLET BY MOUTH EVERY DAY AT NIGHT *REPLACES PRAVASTATIN      valsartan (DIOVAN) 320 MG tablet Take 1 tablet by mouth

## 2023-06-02 LAB
ALBUMIN SERPL-MCNC: 3.7 G/DL (ref 3.5–5)
ALBUMIN/GLOB SERPL: 0.9 (ref 1.1–2.2)
ALP SERPL-CCNC: 65 U/L (ref 45–117)
ALT SERPL-CCNC: 24 U/L (ref 12–78)
ANION GAP SERPL CALC-SCNC: 8 MMOL/L (ref 5–15)
AST SERPL-CCNC: 18 U/L (ref 15–37)
BILIRUB SERPL-MCNC: 0.2 MG/DL (ref 0.2–1)
BUN SERPL-MCNC: 20 MG/DL (ref 6–20)
BUN/CREAT SERPL: 23 (ref 12–20)
CALCIUM SERPL-MCNC: 9.7 MG/DL (ref 8.5–10.1)
CHLORIDE SERPL-SCNC: 106 MMOL/L (ref 97–108)
CHOLEST SERPL-MCNC: 152 MG/DL
CK SERPL-CCNC: 55 U/L (ref 26–192)
CO2 SERPL-SCNC: 27 MMOL/L (ref 21–32)
CREAT SERPL-MCNC: 0.86 MG/DL (ref 0.55–1.02)
EST. AVERAGE GLUCOSE BLD GHB EST-MCNC: 249 MG/DL
GLOBULIN SER CALC-MCNC: 4.1 G/DL (ref 2–4)
GLUCOSE SERPL-MCNC: 64 MG/DL (ref 65–100)
HBA1C MFR BLD: 10.3 % (ref 4–5.6)
HDLC SERPL-MCNC: 44 MG/DL
HDLC SERPL: 3.5 (ref 0–5)
LDLC SERPL CALC-MCNC: 62.8 MG/DL (ref 0–100)
POTASSIUM SERPL-SCNC: 4.3 MMOL/L (ref 3.5–5.1)
PROT SERPL-MCNC: 7.8 G/DL (ref 6.4–8.2)
SODIUM SERPL-SCNC: 141 MMOL/L (ref 136–145)
TRIGL SERPL-MCNC: 226 MG/DL
VLDLC SERPL CALC-MCNC: 45.2 MG/DL

## 2023-06-22 RX ORDER — AMLODIPINE BESYLATE 10 MG/1
TABLET ORAL
Qty: 90 TABLET | Refills: 3 | Status: SHIPPED | OUTPATIENT
Start: 2023-06-22

## 2023-06-22 RX ORDER — METOPROLOL SUCCINATE 50 MG/1
TABLET, EXTENDED RELEASE ORAL
Qty: 90 TABLET | Refills: 3 | Status: SHIPPED | OUTPATIENT
Start: 2023-06-22

## 2023-06-22 RX ORDER — ROSUVASTATIN CALCIUM 20 MG/1
TABLET, COATED ORAL
Qty: 90 TABLET | Refills: 3 | Status: SHIPPED | OUTPATIENT
Start: 2023-06-22

## 2023-06-22 NOTE — TELEPHONE ENCOUNTER
PCP: Maxx Olmstead MD    Last appt: 6/1/23  Future Appointments   Date Time Provider Ayala Shirley   9/7/2023  9:10 AM José Wilder MD [facility] AMB       Last refilled:5/27/22    Requested Prescriptions     Pending Prescriptions Disp Refills    metoprolol succinate (TOPROL XL) 50 MG extended release tablet [Pharmacy Med Name: METOPROLOL SUCC ER 50 MG TAB] 90 tablet 3     Sig: TAKE 1 TABLET BY MOUTH EVERY DAY    insulin detemir (LEVEMIR FLEXTOUCH) 100 UNIT/ML injection pen [Pharmacy Med Name: Angi Patel 100 UNIT/ML] 10 Adjustable Dose Pre-filled Pen Syringe 17     Sig: Inject 50 units subcutaneously twice daily*replaces Lantus*    rosuvastatin (CRESTOR) 20 MG tablet [Pharmacy Med Name: ROSUVASTATIN CALCIUM 20 MG TAB] 90 tablet 3     Sig: TAKE 1 TABLET BY MOUTH EVERY DAY AT NIGHT *REPLACES PRAVASTATIN    amLODIPine (NORVASC) 10 MG tablet [Pharmacy Med Name: AMLODIPINE BESYLATE 10 MG TAB] 90 tablet 3     Sig: TAKE 1 TABLET BY MOUTH EVERY DAY

## 2023-09-01 RX ORDER — GLIMEPIRIDE 2 MG/1
TABLET ORAL
Qty: 90 TABLET | Refills: 3 | Status: SHIPPED | OUTPATIENT
Start: 2023-09-01

## 2023-09-01 NOTE — TELEPHONE ENCOUNTER
RX refill request from the patient/pharmacy. Patient last seen 06-01-23 with labs, and next appt. scheduled for 09-07-23.     Requested Prescriptions     Pending Prescriptions Disp Refills    glimepiride (AMARYL) 2 MG tablet [Pharmacy Med Name: GLIMEPIRIDE 2 MG TABLET] 90 tablet 3     Sig: TAKE 1 TABLET BY MOUTH EVERY DAY

## 2023-09-06 PROBLEM — Z00.00 MEDICARE ANNUAL WELLNESS VISIT, SUBSEQUENT: Status: ACTIVE | Noted: 2018-05-09

## 2023-09-07 ENCOUNTER — OFFICE VISIT (OUTPATIENT)
Facility: CLINIC | Age: 58
End: 2023-09-07
Payer: MEDICARE

## 2023-09-07 VITALS
DIASTOLIC BLOOD PRESSURE: 76 MMHG | RESPIRATION RATE: 18 BRPM | OXYGEN SATURATION: 99 % | HEIGHT: 64 IN | HEART RATE: 82 BPM | WEIGHT: 226 LBS | SYSTOLIC BLOOD PRESSURE: 124 MMHG | BODY MASS INDEX: 38.58 KG/M2 | TEMPERATURE: 98.1 F

## 2023-09-07 DIAGNOSIS — F32.A MILD DEPRESSION: ICD-10-CM

## 2023-09-07 DIAGNOSIS — E66.01 SEVERE OBESITY (BMI 35.0-39.9) WITH COMORBIDITY (HCC): ICD-10-CM

## 2023-09-07 DIAGNOSIS — M85.89 OSTEOPENIA OF MULTIPLE SITES: ICD-10-CM

## 2023-09-07 DIAGNOSIS — N18.30 STAGE 3 CHRONIC KIDNEY DISEASE, UNSPECIFIED WHETHER STAGE 3A OR 3B CKD (HCC): ICD-10-CM

## 2023-09-07 DIAGNOSIS — J45.20 MILD INTERMITTENT ASTHMA WITHOUT COMPLICATION: ICD-10-CM

## 2023-09-07 DIAGNOSIS — F41.9 ANXIETY: ICD-10-CM

## 2023-09-07 DIAGNOSIS — N18.30 CONTROLLED TYPE 2 DIABETES MELLITUS WITH STAGE 3 CHRONIC KIDNEY DISEASE, WITHOUT LONG-TERM CURRENT USE OF INSULIN (HCC): ICD-10-CM

## 2023-09-07 DIAGNOSIS — Z86.73 HISTORY OF CVA (CEREBROVASCULAR ACCIDENT): ICD-10-CM

## 2023-09-07 DIAGNOSIS — Z00.00 MEDICARE ANNUAL WELLNESS VISIT, SUBSEQUENT: ICD-10-CM

## 2023-09-07 DIAGNOSIS — D64.9 ANEMIA, UNSPECIFIED TYPE: ICD-10-CM

## 2023-09-07 DIAGNOSIS — I12.9 HYPERTENSION WITH RENAL DISEASE: Primary | ICD-10-CM

## 2023-09-07 DIAGNOSIS — E55.9 VITAMIN D DEFICIENCY: ICD-10-CM

## 2023-09-07 DIAGNOSIS — Z13.39 ALCOHOL SCREENING: ICD-10-CM

## 2023-09-07 DIAGNOSIS — E11.22 CONTROLLED TYPE 2 DIABETES MELLITUS WITH STAGE 3 CHRONIC KIDNEY DISEASE, WITHOUT LONG-TERM CURRENT USE OF INSULIN (HCC): ICD-10-CM

## 2023-09-07 DIAGNOSIS — E78.2 MIXED HYPERLIPIDEMIA: ICD-10-CM

## 2023-09-07 DIAGNOSIS — Z79.01 CHRONIC ANTICOAGULATION: ICD-10-CM

## 2023-09-07 PROCEDURE — G0439 PPPS, SUBSEQ VISIT: HCPCS | Performed by: INTERNAL MEDICINE

## 2023-09-07 PROCEDURE — G0442 ANNUAL ALCOHOL SCREEN 15 MIN: HCPCS | Performed by: INTERNAL MEDICINE

## 2023-09-07 PROCEDURE — 3046F HEMOGLOBIN A1C LEVEL >9.0%: CPT | Performed by: INTERNAL MEDICINE

## 2023-09-07 PROCEDURE — 99214 OFFICE O/P EST MOD 30 MIN: CPT | Performed by: INTERNAL MEDICINE

## 2023-09-07 RX ORDER — ROSUVASTATIN CALCIUM 20 MG/1
TABLET, COATED ORAL
Qty: 90 TABLET | Refills: 3 | Status: SHIPPED | OUTPATIENT
Start: 2023-09-07

## 2023-09-07 RX ORDER — METOPROLOL SUCCINATE 50 MG/1
50 TABLET, EXTENDED RELEASE ORAL DAILY
Qty: 90 TABLET | Refills: 3 | Status: SHIPPED | OUTPATIENT
Start: 2023-09-07

## 2023-09-07 ASSESSMENT — PATIENT HEALTH QUESTIONNAIRE - PHQ9
2. FEELING DOWN, DEPRESSED OR HOPELESS: 0
SUM OF ALL RESPONSES TO PHQ QUESTIONS 1-9: 1
7. TROUBLE CONCENTRATING ON THINGS, SUCH AS READING THE NEWSPAPER OR WATCHING TELEVISION: 0
10. IF YOU CHECKED OFF ANY PROBLEMS, HOW DIFFICULT HAVE THESE PROBLEMS MADE IT FOR YOU TO DO YOUR WORK, TAKE CARE OF THINGS AT HOME, OR GET ALONG WITH OTHER PEOPLE: 0
SUM OF ALL RESPONSES TO PHQ9 QUESTIONS 1 & 2: 0
4. FEELING TIRED OR HAVING LITTLE ENERGY: 1
SUM OF ALL RESPONSES TO PHQ QUESTIONS 1-9: 1
5. POOR APPETITE OR OVEREATING: 0
9. THOUGHTS THAT YOU WOULD BE BETTER OFF DEAD, OR OF HURTING YOURSELF: 0
SUM OF ALL RESPONSES TO PHQ QUESTIONS 1-9: 1
8. MOVING OR SPEAKING SO SLOWLY THAT OTHER PEOPLE COULD HAVE NOTICED. OR THE OPPOSITE, BEING SO FIGETY OR RESTLESS THAT YOU HAVE BEEN MOVING AROUND A LOT MORE THAN USUAL: 0
SUM OF ALL RESPONSES TO PHQ QUESTIONS 1-9: 1
6. FEELING BAD ABOUT YOURSELF - OR THAT YOU ARE A FAILURE OR HAVE LET YOURSELF OR YOUR FAMILY DOWN: 0
1. LITTLE INTEREST OR PLEASURE IN DOING THINGS: 0
3. TROUBLE FALLING OR STAYING ASLEEP: 0

## 2023-09-07 ASSESSMENT — LIFESTYLE VARIABLES
HOW OFTEN DO YOU HAVE A DRINK CONTAINING ALCOHOL: NEVER
HOW MANY STANDARD DRINKS CONTAINING ALCOHOL DO YOU HAVE ON A TYPICAL DAY: PATIENT DOES NOT DRINK

## 2023-09-08 LAB
25(OH)D3+25(OH)D2 SERPL-MCNC: 21.4 NG/ML (ref 30–100)
ALBUMIN SERPL-MCNC: 4.6 G/DL (ref 3.8–4.9)
ALBUMIN/GLOB SERPL: 1.5 {RATIO} (ref 1.2–2.2)
ALP SERPL-CCNC: 95 IU/L (ref 44–121)
ALT SERPL-CCNC: 19 IU/L (ref 0–32)
APPEARANCE UR: ABNORMAL
AST SERPL-CCNC: 16 IU/L (ref 0–40)
BACTERIA #/AREA URNS HPF: ABNORMAL /[HPF]
BASOPHILS # BLD AUTO: 0 X10E3/UL (ref 0–0.2)
BASOPHILS NFR BLD AUTO: 0 %
BILIRUB SERPL-MCNC: <0.2 MG/DL (ref 0–1.2)
BILIRUB UR QL STRIP: NEGATIVE
BUN SERPL-MCNC: 19 MG/DL (ref 6–24)
BUN/CREAT SERPL: 19 (ref 9–23)
CALCIUM SERPL-MCNC: 10.4 MG/DL (ref 8.7–10.2)
CASTS URNS QL MICRO: ABNORMAL /LPF
CHLORIDE SERPL-SCNC: 101 MMOL/L (ref 96–106)
CHOLEST SERPL-MCNC: 287 MG/DL (ref 100–199)
CK SERPL-CCNC: 57 U/L (ref 32–182)
CO2 SERPL-SCNC: 24 MMOL/L (ref 20–29)
COLOR UR: YELLOW
CREAT SERPL-MCNC: 1.01 MG/DL (ref 0.57–1)
EGFRCR SERPLBLD CKD-EPI 2021: 64 ML/MIN/1.73
EOSINOPHIL # BLD AUTO: 0.4 X10E3/UL (ref 0–0.4)
EOSINOPHIL NFR BLD AUTO: 4 %
EPI CELLS #/AREA URNS HPF: ABNORMAL /HPF (ref 0–10)
ERYTHROCYTE [DISTWIDTH] IN BLOOD BY AUTOMATED COUNT: 13.5 % (ref 11.7–15.4)
GLOBULIN SER CALC-MCNC: 3.1 G/DL (ref 1.5–4.5)
GLUCOSE SERPL-MCNC: 63 MG/DL (ref 70–99)
GLUCOSE UR QL STRIP: NEGATIVE
HBA1C MFR BLD: 12 % (ref 4.8–5.6)
HCT VFR BLD AUTO: 38.9 % (ref 34–46.6)
HDLC SERPL-MCNC: 40 MG/DL
HGB BLD-MCNC: 11.9 G/DL (ref 11.1–15.9)
HGB UR QL STRIP: NEGATIVE
IMM GRANULOCYTES # BLD AUTO: 0 X10E3/UL (ref 0–0.1)
IMM GRANULOCYTES NFR BLD AUTO: 0 %
KETONES UR QL STRIP: NEGATIVE
LDLC SERPL CALC-MCNC: 136 MG/DL (ref 0–99)
LEUKOCYTE ESTERASE UR QL STRIP: NEGATIVE
LYMPHOCYTES # BLD AUTO: 4.4 X10E3/UL (ref 0.7–3.1)
LYMPHOCYTES NFR BLD AUTO: 47 %
MCH RBC QN AUTO: 25.6 PG (ref 26.6–33)
MCHC RBC AUTO-ENTMCNC: 30.6 G/DL (ref 31.5–35.7)
MCV RBC AUTO: 84 FL (ref 79–97)
MICRO URNS: ABNORMAL
MICRO URNS: ABNORMAL
MONOCYTES # BLD AUTO: 0.6 X10E3/UL (ref 0.1–0.9)
MONOCYTES NFR BLD AUTO: 7 %
NEUTROPHILS # BLD AUTO: 4 X10E3/UL (ref 1.4–7)
NEUTROPHILS NFR BLD AUTO: 42 %
NITRITE UR QL STRIP: NEGATIVE
PH UR STRIP: 7.5 [PH] (ref 5–7.5)
PLATELET # BLD AUTO: 331 X10E3/UL (ref 150–450)
POTASSIUM SERPL-SCNC: 4.8 MMOL/L (ref 3.5–5.2)
PROT SERPL-MCNC: 7.7 G/DL (ref 6–8.5)
PROT UR QL STRIP: NEGATIVE
RBC # BLD AUTO: 4.64 X10E6/UL (ref 3.77–5.28)
RBC #/AREA URNS HPF: ABNORMAL /HPF (ref 0–2)
SODIUM SERPL-SCNC: 141 MMOL/L (ref 134–144)
SP GR UR STRIP: 1.02 (ref 1–1.03)
T4 FREE SERPL-MCNC: 1.1 NG/DL (ref 0.82–1.77)
TRIGL SERPL-MCNC: 595 MG/DL (ref 0–149)
TSH SERPL DL<=0.005 MIU/L-ACNC: 1.53 UIU/ML (ref 0.45–4.5)
UROBILINOGEN UR STRIP-MCNC: 0.2 MG/DL (ref 0.2–1)
VLDLC SERPL CALC-MCNC: 111 MG/DL (ref 5–40)
WBC # BLD AUTO: 9.4 X10E3/UL (ref 3.4–10.8)
WBC #/AREA URNS HPF: ABNORMAL /HPF (ref 0–5)

## 2023-09-10 LAB
ALBUMIN/CREAT UR: 19 MG/G CREAT (ref 0–29)
CREAT UR-MCNC: 85.2 MG/DL
MICROALBUMIN UR-MCNC: 16 UG/ML

## 2023-10-06 PROBLEM — Z00.00 MEDICARE ANNUAL WELLNESS VISIT, SUBSEQUENT: Status: RESOLVED | Noted: 2018-05-09 | Resolved: 2023-10-06

## 2023-10-16 ENCOUNTER — OFFICE VISIT (OUTPATIENT)
Facility: CLINIC | Age: 58
End: 2023-10-16
Payer: MEDICARE

## 2023-10-16 VITALS
OXYGEN SATURATION: 94 % | DIASTOLIC BLOOD PRESSURE: 80 MMHG | HEIGHT: 64 IN | BODY MASS INDEX: 38.58 KG/M2 | RESPIRATION RATE: 18 BRPM | SYSTOLIC BLOOD PRESSURE: 138 MMHG | HEART RATE: 80 BPM | WEIGHT: 226 LBS | TEMPERATURE: 98.1 F

## 2023-10-16 DIAGNOSIS — E66.01 SEVERE OBESITY (BMI 35.0-39.9) WITH COMORBIDITY (HCC): ICD-10-CM

## 2023-10-16 DIAGNOSIS — Z86.73 HISTORY OF CVA (CEREBROVASCULAR ACCIDENT): Primary | ICD-10-CM

## 2023-10-16 DIAGNOSIS — I12.9 HYPERTENSION WITH RENAL DISEASE: ICD-10-CM

## 2023-10-16 DIAGNOSIS — J45.20 MILD INTERMITTENT ASTHMA WITHOUT COMPLICATION: ICD-10-CM

## 2023-10-16 DIAGNOSIS — N18.30 CONTROLLED TYPE 2 DIABETES MELLITUS WITH STAGE 3 CHRONIC KIDNEY DISEASE, WITHOUT LONG-TERM CURRENT USE OF INSULIN (HCC): ICD-10-CM

## 2023-10-16 DIAGNOSIS — E11.22 CONTROLLED TYPE 2 DIABETES MELLITUS WITH STAGE 3 CHRONIC KIDNEY DISEASE, WITHOUT LONG-TERM CURRENT USE OF INSULIN (HCC): ICD-10-CM

## 2023-10-16 PROCEDURE — 90674 CCIIV4 VAC NO PRSV 0.5 ML IM: CPT | Performed by: INTERNAL MEDICINE

## 2023-10-16 PROCEDURE — G0008 ADMIN INFLUENZA VIRUS VAC: HCPCS | Performed by: INTERNAL MEDICINE

## 2023-10-16 PROCEDURE — 3046F HEMOGLOBIN A1C LEVEL >9.0%: CPT | Performed by: INTERNAL MEDICINE

## 2023-10-16 PROCEDURE — 99214 OFFICE O/P EST MOD 30 MIN: CPT | Performed by: INTERNAL MEDICINE

## 2023-10-26 RX ORDER — ROSUVASTATIN CALCIUM 40 MG/1
40 TABLET, COATED ORAL DAILY
Qty: 90 TABLET | Refills: 1 | Status: SHIPPED | OUTPATIENT
Start: 2023-10-26

## 2023-10-26 NOTE — TELEPHONE ENCOUNTER
RX refill request from the patient/pharmacy. Patient last seen 10-16-23 with labs, and next appt. scheduled for 12-07-23.     Requested Prescriptions     Pending Prescriptions Disp Refills    rosuvastatin (CRESTOR) 40 MG tablet 90 tablet 1     Sig: Take 1 tablet by mouth daily

## 2023-11-15 RX ORDER — MULTIVIT-MIN/IRON/FOLIC ACID/K 18-600-40
1 CAPSULE ORAL DAILY
Qty: 90 TABLET | Refills: 3 | Status: SHIPPED | OUTPATIENT
Start: 2023-11-15

## 2023-11-15 NOTE — TELEPHONE ENCOUNTER
PCP: Mervat Clark MD    Last appt: 10/16/2023    Future Appointments   Date Time Provider [address]   12/7/2023  9:40 AM Mervat Clark MD [facility] AMB       Requested Prescriptions     Pending Prescriptions Disp Refills    D3-1000 25 MCG (1000 UT) TABS tablet [Pharmacy Med Name: VITAMIN D3 25 MCG TABLET] 90 tablet 3     Sig: TAKE 1 TABLET BY MOUTH EVERY DAY

## 2024-01-28 DIAGNOSIS — I10 ESSENTIAL (PRIMARY) HYPERTENSION: ICD-10-CM

## 2024-01-29 RX ORDER — ALBUTEROL SULFATE 90 UG/1
AEROSOL, METERED RESPIRATORY (INHALATION)
Qty: 6.7 EACH | Refills: 5 | Status: SHIPPED | OUTPATIENT
Start: 2024-01-29

## 2024-01-29 RX ORDER — LISINOPRIL AND HYDROCHLOROTHIAZIDE 20; 12.5 MG/1; MG/1
2 TABLET ORAL DAILY
Qty: 180 TABLET | Refills: 3 | Status: SHIPPED | OUTPATIENT
Start: 2024-01-29

## 2024-01-29 NOTE — TELEPHONE ENCOUNTER
Chief Complaint   Patient presents with    Medication Refill       Requested Prescriptions     Pending Prescriptions Disp Refills    lisinopril-hydroCHLOROthiazide (PRINZIDE;ZESTORETIC) 20-12.5 MG per tablet [Pharmacy Med Name: LISINOPRIL-HCTZ 20-12.5 MG TAB] 180 tablet 3     Sig: TAKE 2 TABLETS BY MOUTH EVERY DAY    albuterol sulfate HFA (PROVENTIL;VENTOLIN;PROAIR) 108 (90 Base) MCG/ACT inhaler [Pharmacy Med Name: ALBUTEROL HFA (PROVENTIL) INH] 6.7 each 5     Sig: TAKE 2 PUFFS BY MOUTH EVERY 4 HOURS AS NEEDED FOR WHEEZE       Allergies:  No Known Allergies    Last visit with clinic:  12/20/2023   Next visit with clinic: 3/22/2024     Last visit with this provider: 12/20/2023   Next Visit with this provider: 3/22/2024    Signed by Osmin HODGSON  01/29/24  7:54 AM

## 2024-02-21 RX ORDER — VALSARTAN 320 MG/1
320 TABLET ORAL DAILY
Qty: 90 TABLET | Refills: 0 | Status: SHIPPED | OUTPATIENT
Start: 2024-02-21

## 2024-02-21 RX ORDER — PEN NEEDLE, DIABETIC 31 GX5/16"
NEEDLE, DISPOSABLE MISCELLANEOUS
Qty: 100 EACH | Refills: 0 | Status: SHIPPED | OUTPATIENT
Start: 2024-02-21

## 2024-02-21 RX ORDER — ROSUVASTATIN CALCIUM 40 MG/1
40 TABLET, COATED ORAL DAILY
Qty: 90 TABLET | Refills: 0 | Status: SHIPPED | OUTPATIENT
Start: 2024-02-21

## 2024-02-21 NOTE — TELEPHONE ENCOUNTER
PCP: Ciro Pruitt MD    Last appt: 12/20/2023    Future Appointments   Date Time Provider Department Center   3/22/2024  8:30 AM Ciro Pruitt MD [facility] AMB       Requested Prescriptions     Pending Prescriptions Disp Refills    valsartan (DIOVAN) 320 MG tablet [Pharmacy Med Name: VALSARTAN 320 MG TABLET] 90 tablet 3     Sig: TAKE 1 TABLET BY MOUTH EVERY DAY    rosuvastatin (CRESTOR) 40 MG tablet [Pharmacy Med Name: ROSUVASTATIN CALCIUM 40 MG TAB] 90 tablet 1     Sig: TAKE 1 TABLET BY MOUTH EVERY DAY    B-D ULTRAFINE III SHORT PEN 31G X 8 MM MISC [Pharmacy Med Name: BD UF SHORT PEN NEEDLE [id number]]  PRN     Sig: USE DAILY AS DIRECTED WITH HER INSULIN PEN.

## 2024-03-21 RX ORDER — MIRTAZAPINE 15 MG/1
15 TABLET, FILM COATED ORAL NIGHTLY
Qty: 90 TABLET | Refills: 3 | Status: SHIPPED | OUTPATIENT
Start: 2024-03-21

## 2024-03-21 RX ORDER — METFORMIN HYDROCHLORIDE 500 MG/1
TABLET, EXTENDED RELEASE ORAL
Qty: 270 TABLET | Refills: 3 | Status: SHIPPED | OUTPATIENT
Start: 2024-03-21

## 2024-03-21 NOTE — TELEPHONE ENCOUNTER
PCP: Ciro Pruitt MD    Last appt: 12/20/2023  Future Appointments   Date Time Provider Department Center   3/22/2024  8:30 AM Ciro Pruitt MD [facility] AMB       Requested Prescriptions     Pending Prescriptions Disp Refills    mirtazapine (REMERON) 15 MG tablet [Pharmacy Med Name: MIRTAZAPINE 15 MG TABLET] 90 tablet 3     Sig: TAKE 1 TABLET BY MOUTH NIGHTLY    metFORMIN (GLUCOPHAGE-XR) 500 MG extended release tablet [Pharmacy Med Name: METFORMIN HCL  MG TABLET] 270 tablet 3     Sig: TAKE ONE TABLET IN THE MORNING AND TWO TABLETS WITH DINNER       Prior labs and Blood pressures:  BP Readings from Last 3 Encounters:   12/20/23 134/70   10/16/23 138/80   09/07/23 124/76     Lab Results   Component Value Date/Time     12/20/2023 12:00 AM    K 4.5 12/20/2023 12:00 AM     12/20/2023 12:00 AM    CO2 22 12/20/2023 12:00 AM    BUN 17 12/20/2023 12:00 AM    GFRAA 86 02/16/2022 12:00 AM     Lab Results   Component Value Date/Time    MHH5KZCD 10.3 05/04/2021 09:29 AM     Lab Results   Component Value Date/Time    CHOL 122 12/20/2023 12:00 AM    CHOL 152 06/01/2023 10:33 AM    HDL 44 12/20/2023 12:00 AM    VLDL 43 02/27/2023 12:00 AM    VLDL 68 02/02/2021 09:36 AM     No results found for: \"VITD3\", \"VD3RIA\"        Lab Results   Component Value Date/Time    TSH 1.530 09/07/2023 12:00 AM

## 2024-03-22 ENCOUNTER — OFFICE VISIT (OUTPATIENT)
Facility: CLINIC | Age: 59
End: 2024-03-22
Payer: MEDICARE

## 2024-03-22 VITALS
DIASTOLIC BLOOD PRESSURE: 83 MMHG | WEIGHT: 235 LBS | HEART RATE: 72 BPM | BODY MASS INDEX: 40.34 KG/M2 | OXYGEN SATURATION: 97 % | TEMPERATURE: 98.8 F | SYSTOLIC BLOOD PRESSURE: 132 MMHG

## 2024-03-22 DIAGNOSIS — Z12.31 ENCOUNTER FOR SCREENING MAMMOGRAM FOR MALIGNANT NEOPLASM OF BREAST: ICD-10-CM

## 2024-03-22 DIAGNOSIS — Z86.73 HISTORY OF CVA (CEREBROVASCULAR ACCIDENT): ICD-10-CM

## 2024-03-22 DIAGNOSIS — E78.2 MIXED HYPERLIPIDEMIA: ICD-10-CM

## 2024-03-22 DIAGNOSIS — E66.01 SEVERE OBESITY (BMI 35.0-39.9) WITH COMORBIDITY (HCC): ICD-10-CM

## 2024-03-22 DIAGNOSIS — N18.30 STAGE 3 CHRONIC KIDNEY DISEASE, UNSPECIFIED WHETHER STAGE 3A OR 3B CKD (HCC): ICD-10-CM

## 2024-03-22 DIAGNOSIS — E11.22 CONTROLLED TYPE 2 DIABETES MELLITUS WITH STAGE 3 CHRONIC KIDNEY DISEASE, WITHOUT LONG-TERM CURRENT USE OF INSULIN (HCC): ICD-10-CM

## 2024-03-22 DIAGNOSIS — N18.30 CONTROLLED TYPE 2 DIABETES MELLITUS WITH STAGE 3 CHRONIC KIDNEY DISEASE, WITHOUT LONG-TERM CURRENT USE OF INSULIN (HCC): ICD-10-CM

## 2024-03-22 DIAGNOSIS — J45.20 MILD INTERMITTENT ASTHMA WITHOUT COMPLICATION: ICD-10-CM

## 2024-03-22 DIAGNOSIS — F32.A MILD DEPRESSION: ICD-10-CM

## 2024-03-22 DIAGNOSIS — F41.9 ANXIETY: ICD-10-CM

## 2024-03-22 DIAGNOSIS — I12.9 HYPERTENSION WITH RENAL DISEASE: Primary | ICD-10-CM

## 2024-03-22 PROCEDURE — 99214 OFFICE O/P EST MOD 30 MIN: CPT | Performed by: INTERNAL MEDICINE

## 2024-03-22 RX ORDER — ALBUTEROL SULFATE 90 UG/1
AEROSOL, METERED RESPIRATORY (INHALATION)
Qty: 6.7 EACH | Refills: 5 | Status: SHIPPED | OUTPATIENT
Start: 2024-03-22 | End: 2024-03-22 | Stop reason: SDUPTHER

## 2024-03-22 RX ORDER — ALBUTEROL SULFATE 90 UG/1
AEROSOL, METERED RESPIRATORY (INHALATION)
Qty: 6.7 EACH | Refills: 5 | Status: SHIPPED | OUTPATIENT
Start: 2024-03-22

## 2024-03-22 NOTE — PROGRESS NOTES
Chief Complaint   Patient presents with    Follow-up    Hypertension    Chronic Kidney Disease    Diabetes    Cholesterol Problem    Anxiety     3 month followup    1. Have you been to the ER, urgent care clinic since your last visit?  Hospitalized since your last visit?no    2. Have you seen or consulted any other health care providers outside of the [facility] since your last visit?  Include any pap smears or colon screening. no   
hyperlipidemia    4. Mild intermittent asthma without complication    5. Stage 3 chronic kidney disease, unspecified whether stage 3a or 3b CKD (HCC)    6. Severe obesity (BMI 35.0-39.9) with comorbidity (HCC)    7. History of CVA (cerebrovascular accident)    8. Anxiety    9. Mild depression    10. Encounter for screening mammogram for malignant neoplasm of breast      Impression  1.  Hypertension that is controlled so continue current therapy reviewed with her and her   2.  Diabetes repeat status pending prior lab reviewed I will adjust treatment if needed.  3 hyperlipidemia prior lab reviewed repeat status pending  4 asthma that is stable I did renew her albuterol  5 CKD stage III repeat status pending  6 obesity we discussed diet, exercise weight reduction for overall health benefit.  7 history of CVA with right hemiplegia  8 anxiety that is stable  9 depression that is controlled  10.  Needs a mammogram we will set that up  I will recheck him myself again in 3 months or sooner if there is a problem.  I will call with lab results in the interim.    PLAN:  1. Hypertension with renal disease  2. Controlled type 2 diabetes mellitus with stage 3 chronic kidney disease, without long-term current use of insulin (HCC)  -     Hemoglobin A1C; Future  -      DIABETES FOOT EXAM  3. Mixed hyperlipidemia  -     CK; Future  -     Lipid Panel; Future  -     Comprehensive Metabolic Panel; Future  4. Mild intermittent asthma without complication  5. Stage 3 chronic kidney disease, unspecified whether stage 3a or 3b CKD (HCC)  6. Severe obesity (BMI 35.0-39.9) with comorbidity (HCC)  7. History of CVA (cerebrovascular accident)  8. Anxiety  9. Mild depression  10. Encounter for screening mammogram for malignant neoplasm of breast  -     TEA DIGITAL SCREEN W OR WO CAD BILATERAL; Future          ATTENTION:   This medical record was transcribed using an electronic medical records system.  Although proofread, it may and can 
normal...

## 2024-03-23 LAB
ALBUMIN SERPL-MCNC: 4.3 G/DL (ref 3.8–4.9)
ALBUMIN/GLOB SERPL: 1.4 {RATIO} (ref 1.2–2.2)
ALP SERPL-CCNC: 69 IU/L (ref 44–121)
ALT SERPL-CCNC: 14 IU/L (ref 0–32)
AST SERPL-CCNC: 15 IU/L (ref 0–40)
BILIRUB SERPL-MCNC: <0.2 MG/DL (ref 0–1.2)
BUN SERPL-MCNC: 22 MG/DL (ref 8–27)
BUN/CREAT SERPL: 23 (ref 12–28)
CALCIUM SERPL-MCNC: 10 MG/DL (ref 8.7–10.3)
CHLORIDE SERPL-SCNC: 99 MMOL/L (ref 96–106)
CHOLEST SERPL-MCNC: 128 MG/DL (ref 100–199)
CK SERPL-CCNC: 45 U/L (ref 32–182)
CO2 SERPL-SCNC: 24 MMOL/L (ref 20–29)
CREAT SERPL-MCNC: 0.97 MG/DL (ref 0.57–1)
EGFRCR SERPLBLD CKD-EPI 2021: 67 ML/MIN/1.73
GLOBULIN SER CALC-MCNC: 3.1 G/DL (ref 1.5–4.5)
GLUCOSE SERPL-MCNC: 254 MG/DL (ref 70–99)
HBA1C MFR BLD: 10.9 % (ref 4.8–5.6)
HDLC SERPL-MCNC: 40 MG/DL
LDLC SERPL CALC-MCNC: 50 MG/DL (ref 0–99)
POTASSIUM SERPL-SCNC: 4.6 MMOL/L (ref 3.5–5.2)
PROT SERPL-MCNC: 7.4 G/DL (ref 6–8.5)
SODIUM SERPL-SCNC: 138 MMOL/L (ref 134–144)
TRIGL SERPL-MCNC: 239 MG/DL (ref 0–149)
VLDLC SERPL CALC-MCNC: 38 MG/DL (ref 5–40)

## 2024-03-27 NOTE — TELEPHONE ENCOUNTER
PCP: Ciro Pruitt MD    Last appt: 3/22/2024    Future Appointments   Date Time Provider Department Center   6/28/2024  8:50 AM Ciro Pruitt MD [facility] AMB       Requested Prescriptions     Pending Prescriptions Disp Refills    insulin NPH (NOVOLIN N) 100 UNIT/ML injection vial 3 mL 3     Sig: Inject 10 Units into the skin 2 times daily (before meals) NPH Insulin 10 U BID before breakfast and dinner

## 2024-05-03 RX ORDER — PEN NEEDLE, DIABETIC 31 GX5/16"
NEEDLE, DISPOSABLE MISCELLANEOUS
Qty: 100 EACH | Refills: 1 | Status: SHIPPED | OUTPATIENT
Start: 2024-05-03

## 2024-05-03 NOTE — TELEPHONE ENCOUNTER
PCP: Ciro Pruitt MD    Last appt: Visit date not found    Future Appointments   Date Time Provider Department Center   6/28/2024  8:50 AM Ciro Pruitt MD [facility] AMB       Requested Prescriptions     Pending Prescriptions Disp Refills    B-D ULTRAFINE III SHORT PEN 31G X 8 MM MISC [Pharmacy Med Name: BD UF SHORT PEN NEEDLE [id number]] 100 each 1     Sig: USE DAILY AS DIRECTED WITH INSULIN PEN.

## 2024-06-11 NOTE — TELEPHONE ENCOUNTER
RX refill request from the patient/pharmacy. Patient last seen 03- with labs, and next appt. scheduled for 06-  Requested Prescriptions     Pending Prescriptions Disp Refills    rosuvastatin (CRESTOR) 40 MG tablet [Pharmacy Med Name: ROSUVASTATIN CALCIUM 40 MG TAB] 90 tablet 1     Sig: TAKE 1 TABLET BY MOUTH EVERY DAY    valsartan (DIOVAN) 320 MG tablet [Pharmacy Med Name: VALSARTAN 320 MG TABLET] 90 tablet 1     Sig: TAKE 1 TABLET BY MOUTH EVERY DAY    .

## 2024-06-12 RX ORDER — VALSARTAN 320 MG/1
320 TABLET ORAL DAILY
Qty: 90 TABLET | Refills: 1 | Status: SHIPPED | OUTPATIENT
Start: 2024-06-12

## 2024-06-12 RX ORDER — ROSUVASTATIN CALCIUM 40 MG/1
40 TABLET, COATED ORAL DAILY
Qty: 90 TABLET | Refills: 1 | Status: SHIPPED | OUTPATIENT
Start: 2024-06-12

## 2024-06-24 NOTE — TELEPHONE ENCOUNTER
RX refill request from the patient/pharmacy. Patient last seen 03- with labs, and next appt. scheduled for 06-  Requested Prescriptions     Pending Prescriptions Disp Refills    insulin detemir (LEVEMIR FLEXPEN) 100 UNIT/ML injection pen 10 Adjustable Dose Pre-filled Pen Syringe 5     Sig: INJECT 50 UNITS SUBCUTANEOUSLY TWICE DAILY*REPLACES LANTUS*    .

## 2024-06-28 ENCOUNTER — OFFICE VISIT (OUTPATIENT)
Facility: CLINIC | Age: 59
End: 2024-06-28
Payer: MEDICARE

## 2024-06-28 VITALS
OXYGEN SATURATION: 98 % | RESPIRATION RATE: 17 BRPM | BODY MASS INDEX: 38.57 KG/M2 | SYSTOLIC BLOOD PRESSURE: 138 MMHG | WEIGHT: 225.9 LBS | HEART RATE: 76 BPM | HEIGHT: 64 IN | DIASTOLIC BLOOD PRESSURE: 88 MMHG | TEMPERATURE: 98.5 F

## 2024-06-28 DIAGNOSIS — D64.9 ANEMIA, UNSPECIFIED TYPE: ICD-10-CM

## 2024-06-28 DIAGNOSIS — F32.A MILD DEPRESSION: ICD-10-CM

## 2024-06-28 DIAGNOSIS — E78.2 MIXED HYPERLIPIDEMIA: ICD-10-CM

## 2024-06-28 DIAGNOSIS — Z13.39 ALCOHOL SCREENING: ICD-10-CM

## 2024-06-28 DIAGNOSIS — Z86.73 HISTORY OF CVA (CEREBROVASCULAR ACCIDENT): ICD-10-CM

## 2024-06-28 DIAGNOSIS — E66.01 SEVERE OBESITY (BMI 35.0-39.9) WITH COMORBIDITY (HCC): ICD-10-CM

## 2024-06-28 DIAGNOSIS — J45.20 MILD INTERMITTENT ASTHMA WITHOUT COMPLICATION: ICD-10-CM

## 2024-06-28 DIAGNOSIS — Z00.00 MEDICARE ANNUAL WELLNESS VISIT, SUBSEQUENT: ICD-10-CM

## 2024-06-28 DIAGNOSIS — N18.30 STAGE 3 CHRONIC KIDNEY DISEASE, UNSPECIFIED WHETHER STAGE 3A OR 3B CKD (HCC): ICD-10-CM

## 2024-06-28 DIAGNOSIS — M85.89 OSTEOPENIA OF MULTIPLE SITES: ICD-10-CM

## 2024-06-28 DIAGNOSIS — I12.9 HYPERTENSION WITH RENAL DISEASE: Primary | ICD-10-CM

## 2024-06-28 DIAGNOSIS — E11.22 CONTROLLED TYPE 2 DIABETES MELLITUS WITH STAGE 3 CHRONIC KIDNEY DISEASE, WITHOUT LONG-TERM CURRENT USE OF INSULIN (HCC): ICD-10-CM

## 2024-06-28 DIAGNOSIS — N18.30 CONTROLLED TYPE 2 DIABETES MELLITUS WITH STAGE 3 CHRONIC KIDNEY DISEASE, WITHOUT LONG-TERM CURRENT USE OF INSULIN (HCC): ICD-10-CM

## 2024-06-28 DIAGNOSIS — E55.9 VITAMIN D DEFICIENCY: ICD-10-CM

## 2024-06-28 DIAGNOSIS — F41.9 ANXIETY: ICD-10-CM

## 2024-06-28 PROCEDURE — 99214 OFFICE O/P EST MOD 30 MIN: CPT | Performed by: INTERNAL MEDICINE

## 2024-06-28 PROCEDURE — G0442 ANNUAL ALCOHOL SCREEN 15 MIN: HCPCS | Performed by: INTERNAL MEDICINE

## 2024-06-28 PROCEDURE — G0439 PPPS, SUBSEQ VISIT: HCPCS | Performed by: INTERNAL MEDICINE

## 2024-06-28 PROCEDURE — 3046F HEMOGLOBIN A1C LEVEL >9.0%: CPT | Performed by: INTERNAL MEDICINE

## 2024-06-28 RX ORDER — POTASSIUM CHLORIDE 20 MEQ/1
40 TABLET, EXTENDED RELEASE ORAL 2 TIMES DAILY
Qty: 60 TABLET | Refills: 2 | Status: SHIPPED | OUTPATIENT
Start: 2024-06-28

## 2024-06-28 RX ORDER — ROSUVASTATIN CALCIUM 40 MG/1
40 TABLET, COATED ORAL DAILY
Qty: 90 TABLET | Refills: 1 | Status: SHIPPED | OUTPATIENT
Start: 2024-06-28

## 2024-06-28 ASSESSMENT — PATIENT HEALTH QUESTIONNAIRE - PHQ9
9. THOUGHTS THAT YOU WOULD BE BETTER OFF DEAD, OR OF HURTING YOURSELF: NOT AT ALL
10. IF YOU CHECKED OFF ANY PROBLEMS, HOW DIFFICULT HAVE THESE PROBLEMS MADE IT FOR YOU TO DO YOUR WORK, TAKE CARE OF THINGS AT HOME, OR GET ALONG WITH OTHER PEOPLE: NOT DIFFICULT AT ALL
1. LITTLE INTEREST OR PLEASURE IN DOING THINGS: NOT AT ALL
7. TROUBLE CONCENTRATING ON THINGS, SUCH AS READING THE NEWSPAPER OR WATCHING TELEVISION: NOT AT ALL
SUM OF ALL RESPONSES TO PHQ QUESTIONS 1-9: 0
5. POOR APPETITE OR OVEREATING: NOT AT ALL
4. FEELING TIRED OR HAVING LITTLE ENERGY: NOT AT ALL
SUM OF ALL RESPONSES TO PHQ QUESTIONS 1-9: 0
SUM OF ALL RESPONSES TO PHQ QUESTIONS 1-9: 0
8. MOVING OR SPEAKING SO SLOWLY THAT OTHER PEOPLE COULD HAVE NOTICED. OR THE OPPOSITE, BEING SO FIGETY OR RESTLESS THAT YOU HAVE BEEN MOVING AROUND A LOT MORE THAN USUAL: NOT AT ALL
SUM OF ALL RESPONSES TO PHQ QUESTIONS 1-9: 0
2. FEELING DOWN, DEPRESSED OR HOPELESS: NOT AT ALL
6. FEELING BAD ABOUT YOURSELF - OR THAT YOU ARE A FAILURE OR HAVE LET YOURSELF OR YOUR FAMILY DOWN: NOT AT ALL
3. TROUBLE FALLING OR STAYING ASLEEP: NOT AT ALL
SUM OF ALL RESPONSES TO PHQ9 QUESTIONS 1 & 2: 0

## 2024-06-28 NOTE — PROGRESS NOTES
Lulu Rasmussen is a 59 y.o. female     Chief Complaint   Patient presents with    Medicare AWV       BP (!) 140/90 (Site: Left Upper Arm, Position: Sitting, Cuff Size: Large Adult)   Pulse 76   Temp 98.5 °F (36.9 °C) (Oral)   Resp 17   Ht 1.626 m (5' 4\")   Wt 102.5 kg (225 lb 14.4 oz)   SpO2 98%   BMI 38.78 kg/m²     Health Maintenance Due   Topic Date Due    Pneumococcal 0-64 years Vaccine (1 of 2 - PCV) 01/31/1971    HIV screen  Never done    Diabetic retinal exam  Never done    DTaP/Tdap/Td vaccine (1 - Tdap) Never done    Cervical cancer screen  Never done    Colorectal Cancer Screen  Never done    Shingles vaccine (1 of 2) Never done    COVID-19 Vaccine (3 - 2023-24 season) 09/01/2023    Breast cancer screen  10/08/2023    Annual Wellness Visit (Medicare Advantage)  01/01/2024    Respiratory Syncytial Virus (RSV) Pregnant or age 60 yrs+ (1 - 1-dose 60+ series) Never done    A1C test (Diabetic or Prediabetic)  06/22/2024         \"Have you been to the ER, urgent care clinic since your last visit?  Hospitalized since your last visit?\"    NO    “Have you seen or consulted any other health care providers outside of [facility] since your last visit?”    NO    “Have you had a colorectal cancer screening such as a colonoscopy/FIT/Cologuard?    NO    No colonoscopy on file  No cologuard on file  No FIT/FOBT on file   No flexible sigmoidoscopy on file        Have you had a mammogram?”   NO    Date of last Mammogram: 10/8/2021      “Have you had a pap smear?”    NO    No cervical cancer screening on file                  
  metFORMIN (GLUCOPHAGE-XR) 500 MG extended release tablet TAKE ONE TABLET IN THE MORNING AND TWO TABLETS WITH DINNER Yes Ciro Pruitt MD   lisinopril-hydroCHLOROthiazide (PRINZIDE;ZESTORETIC) 20-12.5 MG per tablet TAKE 2 TABLETS BY MOUTH EVERY DAY Yes Ciro Pruitt MD   D3-1000 25 MCG (1000 UT) TABS tablet TAKE 1 TABLET BY MOUTH EVERY DAY Yes Ciro Pruitt MD   metoprolol succinate (TOPROL XL) 50 MG extended release tablet Take 1 tablet by mouth daily Yes Ciro Pruitt MD   glimepiride (AMARYL) 2 MG tablet TAKE 1 TABLET BY MOUTH EVERY DAY Yes Ciro Pruitt MD   amLODIPine (NORVASC) 10 MG tablet TAKE 1 TABLET BY MOUTH EVERY DAY Yes Ciro Pruitt MD   Ferrous Sulfate Dried (FERROUS SULFATE IRON PO) Take 1 tablet by mouth Yes Provider, MD Emily   ALPRAZolam (XANAX) 0.5 MG tablet Take 1 tablet by mouth daily as needed. Yes Automatic Reconciliation, Ar   ascorbic acid (VITAMIN C) 250 MG tablet Take by mouth Yes Automatic Reconciliation, Ar   aspirin 325 MG tablet Take 1 tablet by mouth daily Yes Automatic Reconciliation, Ar   cloNIDine (CATAPRES) 0.1 MG tablet Take 1 tablet by mouth 2 times daily Yes Automatic Reconciliation, Ar   magnesium oxide (MAG-OX) 400 MG tablet Take 1 tablet by mouth 2 times daily Yes Automatic Reconciliation, Ar       CareTeam (Including outside providers/suppliers regularly involved in providing care):   Patient Care Team:  Ciro Pruitt MD as PCP - General  Ciro Pruitt MD as PCP - Empaneled Provider     Reviewed and updated this visit:  Tobacco  Allergies  Meds  Problems  Med Hx  Surg Hx  Soc Hx  Fam Hx

## 2024-06-28 NOTE — PATIENT INSTRUCTIONS
other eye disorders.  A preventive dental visit is recommended every 6 months.  Try to get at least 150 minutes of exercise per week or 10,000 steps per day on a pedometer .  Order or download the FREE \"Exercise & Physical Activity: Your Everyday Guide\" from [employer] [employer]. Call 1-410.333.7505 or search [employer] [employer] online.  You need 4482-2035 mg of calcium and 4158-5167 IU of vitamin D per day. It is possible to meet your calcium requirement with diet alone, but a vitamin D supplement is usually necessary to meet this goal.  When exposed to the sun, use a sunscreen that protects against both UVA and UVB radiation with an SPF of 30 or greater. Reapply every 2 to 3 hours or after sweating, drying off with a towel, or swimming.  Always wear a seat belt when traveling in a car. Always wear a helmet when riding a bicycle or motorcycle.

## 2024-06-29 LAB
25(OH)D3+25(OH)D2 SERPL-MCNC: 29.6 NG/ML (ref 30–100)
ALBUMIN SERPL-MCNC: 4.4 G/DL (ref 3.8–4.9)
ALP SERPL-CCNC: 82 IU/L (ref 44–121)
ALT SERPL-CCNC: 13 IU/L (ref 0–32)
APPEARANCE UR: CLEAR
AST SERPL-CCNC: 13 IU/L (ref 0–40)
BACTERIA #/AREA URNS HPF: NORMAL /[HPF]
BASOPHILS # BLD AUTO: 0 X10E3/UL (ref 0–0.2)
BASOPHILS NFR BLD AUTO: 0 %
BILIRUB SERPL-MCNC: <0.2 MG/DL (ref 0–1.2)
BILIRUB UR QL STRIP: NEGATIVE
BUN SERPL-MCNC: 18 MG/DL (ref 8–27)
BUN/CREAT SERPL: 20 (ref 12–28)
CALCIUM SERPL-MCNC: 10.1 MG/DL (ref 8.7–10.3)
CASTS URNS QL MICRO: NORMAL /LPF
CHLORIDE SERPL-SCNC: 102 MMOL/L (ref 96–106)
CHOLEST SERPL-MCNC: 158 MG/DL (ref 100–199)
CK SERPL-CCNC: 59 U/L (ref 32–182)
CO2 SERPL-SCNC: 24 MMOL/L (ref 20–29)
COLOR UR: YELLOW
CREAT SERPL-MCNC: 0.92 MG/DL (ref 0.57–1)
EGFRCR SERPLBLD CKD-EPI 2021: 71 ML/MIN/1.73
EOSINOPHIL # BLD AUTO: 0.3 X10E3/UL (ref 0–0.4)
EOSINOPHIL NFR BLD AUTO: 3 %
EPI CELLS #/AREA URNS HPF: NORMAL /HPF (ref 0–10)
ERYTHROCYTE [DISTWIDTH] IN BLOOD BY AUTOMATED COUNT: 14.4 % (ref 11.7–15.4)
GLOBULIN SER CALC-MCNC: 3.2 G/DL (ref 1.5–4.5)
GLUCOSE SERPL-MCNC: 71 MG/DL (ref 70–99)
GLUCOSE UR QL STRIP: NEGATIVE
HCT VFR BLD AUTO: 36.5 % (ref 34–46.6)
HDLC SERPL-MCNC: 46 MG/DL
HGB BLD-MCNC: 11.3 G/DL (ref 11.1–15.9)
HGB UR QL STRIP: NEGATIVE
IMM GRANULOCYTES # BLD AUTO: 0.1 X10E3/UL (ref 0–0.1)
IMM GRANULOCYTES NFR BLD AUTO: 1 %
KETONES UR QL STRIP: NEGATIVE
LDLC SERPL CALC-MCNC: 73 MG/DL (ref 0–99)
LEUKOCYTE ESTERASE UR QL STRIP: NEGATIVE
LYMPHOCYTES # BLD AUTO: 3.6 X10E3/UL (ref 0.7–3.1)
LYMPHOCYTES NFR BLD AUTO: 38 %
MCH RBC QN AUTO: 26 PG (ref 26.6–33)
MCHC RBC AUTO-ENTMCNC: 31 G/DL (ref 31.5–35.7)
MCV RBC AUTO: 84 FL (ref 79–97)
MICRO URNS: ABNORMAL
MICRO URNS: ABNORMAL
MONOCYTES # BLD AUTO: 0.7 X10E3/UL (ref 0.1–0.9)
MONOCYTES NFR BLD AUTO: 7 %
NEUTROPHILS # BLD AUTO: 4.8 X10E3/UL (ref 1.4–7)
NEUTROPHILS NFR BLD AUTO: 51 %
NITRITE UR QL STRIP: NEGATIVE
PH UR STRIP: 8 [PH] (ref 5–7.5)
PLATELET # BLD AUTO: 289 X10E3/UL (ref 150–450)
POTASSIUM SERPL-SCNC: 4.1 MMOL/L (ref 3.5–5.2)
PROT SERPL-MCNC: 7.6 G/DL (ref 6–8.5)
PROT UR QL STRIP: ABNORMAL
RBC # BLD AUTO: 4.34 X10E6/UL (ref 3.77–5.28)
RBC #/AREA URNS HPF: NORMAL /HPF (ref 0–2)
SODIUM SERPL-SCNC: 142 MMOL/L (ref 134–144)
SP GR UR STRIP: 1.01 (ref 1–1.03)
T4 FREE SERPL-MCNC: 1.15 NG/DL (ref 0.82–1.77)
TRIGL SERPL-MCNC: 238 MG/DL (ref 0–149)
TSH SERPL DL<=0.005 MIU/L-ACNC: 1.72 UIU/ML (ref 0.45–4.5)
UROBILINOGEN UR STRIP-MCNC: 0.2 MG/DL (ref 0.2–1)
VLDLC SERPL CALC-MCNC: 39 MG/DL (ref 5–40)
WBC # BLD AUTO: 9.4 X10E3/UL (ref 3.4–10.8)
WBC #/AREA URNS HPF: NORMAL /HPF (ref 0–5)

## 2024-06-30 LAB
ALBUMIN/CREAT UR: 36 MG/G CREAT (ref 0–29)
CREAT UR-MCNC: 46.5 MG/DL
MICROALBUMIN UR-MCNC: 16.6 UG/ML

## 2024-07-01 LAB — HBA1C MFR BLD: 11.3 % (ref 4.8–5.6)

## 2024-07-05 RX ORDER — MELATONIN
2000 DAILY
Qty: 60 TABLET | Refills: 0 | Status: SHIPPED | OUTPATIENT
Start: 2024-07-05

## 2024-07-05 NOTE — TELEPHONE ENCOUNTER
Per     PCP: Ciro Pruitt MD    Last appt: 6/28/2024    Future Appointments   Date Time Provider Department Center   10/23/2024  9:10 AM Ciro Pruitt MD [facility] AM[facility]       Requested Prescriptions     Pending Prescriptions Disp Refills    vitamin D3 (CHOLECALCIFEROL) 25 MCG (1000 UT) TABS tablet 60 tablet 0     Sig: Take 2 tablets by mouth daily

## 2024-07-09 RX ORDER — POTASSIUM CHLORIDE 1500 MG/1
40 TABLET, EXTENDED RELEASE ORAL 2 TIMES DAILY
Qty: 360 TABLET | Refills: 1 | Status: SHIPPED | OUTPATIENT
Start: 2024-07-09

## 2024-07-09 NOTE — TELEPHONE ENCOUNTER
RX refill request from the patient/pharmacy. Patient last seen 06- with labs, and next appt. scheduled for 10-  Requested Prescriptions     Pending Prescriptions Disp Refills    KLOR-CON M20 20 MEQ extended release tablet [Pharmacy Med Name: KLOR-CON M20 TABLET] 360 tablet 1     Sig: TAKE 2 TABLETS BY MOUTH TWICE A DAY    .

## 2024-07-11 RX ORDER — PEN NEEDLE, DIABETIC 31 GX5/16"
NEEDLE, DISPOSABLE MISCELLANEOUS
Qty: 100 EACH | Refills: 3 | Status: SHIPPED | OUTPATIENT
Start: 2024-07-11

## 2024-07-11 NOTE — TELEPHONE ENCOUNTER
Pt requesting refill to [facility] on file.    B-D ULTRAFINE III SHORT PEN 31G X 8 MM MISC [[id number]]    Order Details  Dose, Route, Frequency: As Directed   Dispense Quantity: 100 each Refills: 1          Sig: USE DAILY AS DIRECTED WITH INSULIN PEN.         Start Date: 05/03/24 End Date: --   Written Date: 05/03/24 Expiration Date: 05/03/25   Original Order: Insulin Pen Needle (B-D ULTRAFINE III SHORT PEN) 31G X 8 MM MISC [[id number]]   Providers    Authorizing Provider: Ciro Pruitt MD NPI: [id number]   Ordering User: Ciro Pruitt MD          Pharmacy    [facility]/pharmacy #[id number] - [place], [place] - [address] [facility] [address] -  [phone] - F [phone]  [address] [facility] [address]  Phone: [phone]  Fax: [phone]

## 2024-07-11 NOTE — TELEPHONE ENCOUNTER
RX refill request from the patient/pharmacy. Patient last seen 06- with labs, and next appt. scheduled for 10-  Requested Prescriptions     Pending Prescriptions Disp Refills    Insulin Pen Needle (B-D ULTRAFINE III SHORT PEN) 31G X 8 MM MISC 100 each 3     Sig: USE DAILY AS DIRECTED WITH INSULIN PEN.    .

## 2024-07-27 PROBLEM — Z00.00 MEDICARE ANNUAL WELLNESS VISIT, SUBSEQUENT: Status: RESOLVED | Noted: 2018-05-09 | Resolved: 2024-07-27

## 2024-09-09 RX ORDER — METOPROLOL SUCCINATE 50 MG/1
50 TABLET, EXTENDED RELEASE ORAL DAILY
Qty: 90 TABLET | Refills: 3 | Status: SHIPPED | OUTPATIENT
Start: 2024-09-09

## 2024-09-26 RX ORDER — CHOLECALCIFEROL (VITAMIN D3) 25 MCG
1 TABLET ORAL DAILY
Qty: 90 TABLET | Refills: 1 | Status: SHIPPED | OUTPATIENT
Start: 2024-09-26

## 2024-09-26 RX ORDER — GLIMEPIRIDE 2 MG/1
TABLET ORAL
Qty: 90 TABLET | Refills: 1 | Status: SHIPPED | OUTPATIENT
Start: 2024-09-26

## 2024-10-21 NOTE — TELEPHONE ENCOUNTER
PCP: Param Sánchez MD    Last appt: 2/16/2022  Future Appointments   Date Time Provider Yolanda Sofia   5/17/2022  8:50 AM Param Sánchez MD [facility] AM[facility]       Last refilled:1/12/22    Requested Prescriptions     Pending Prescriptions Disp Refills    mirtazapine (REMERON) 15 mg tablet [Pharmacy Med Name: MIRTAZAPINE 15 MG TABLET] 30 Tablet 1     Sig: TAKE 1 TABLET BY MOUTH NIGHTLY Statement Selected

## 2024-10-22 NOTE — PROGRESS NOTES
Chief Complaint   Patient presents with    Follow-up    Hypertension    Chronic Kidney Disease    Diabetes    Cholesterol Problem     3 month followup       SUBJECTIVE:    Lulu Rasmussen is a 59 y.o. female who returns in follow-up for medical problems include hypertension, hyperlipidemia, diabetes, CKD, status post CVA with right hemiplegia and other multimedical problems.  She is taking her medications and follow her diet remains physically active.  Currently she notes no chest pain, shortness of breath or cardiac respiratory complaints.  She notes no GI or  complaints.  She has no headaches, dizziness or neurologic complaints.  She has no current active arthritic complaints and there are no other complaints on complete review of systems.      Current Outpatient Medications   Medication Sig Dispense Refill    amLODIPine (NORVASC) 10 MG tablet Take 1 tablet by mouth daily 90 tablet 3    glimepiride (AMARYL) 2 MG tablet Take 1 tablet by mouth daily 90 tablet 1    vitamin D3 (CHOLECALCIFEROL) 25 MCG (1000 UT) TABS tablet TAKE 1 TABLET BY MOUTH EVERY DAY 90 tablet 1    metoprolol succinate (TOPROL XL) 50 MG extended release tablet TAKE 1 TABLET BY MOUTH EVERY DAY 90 tablet 3    Insulin Pen Needle (B-D ULTRAFINE III SHORT PEN) 31G X 8 MM MISC USE DAILY AS DIRECTED WITH INSULIN PEN. 100 each 3    KLOR-CON M20 20 MEQ extended release tablet TAKE 2 TABLETS BY MOUTH TWICE A  tablet 1    vitamin D3 (CHOLECALCIFEROL) 25 MCG (1000 UT) TABS tablet Take 2 tablets by mouth daily 60 tablet 0    rosuvastatin (CRESTOR) 40 MG tablet Take 1 tablet by mouth daily 90 tablet 1    insulin detemir (LEVEMIR FLEXPEN) 100 UNIT/ML injection pen INJECT 50 UNITS SUBCUTANEOUSLY TWICE DAILY*REPLACES LANTUS* 10 Adjustable Dose Pre-filled Pen Syringe 5    valsartan (DIOVAN) 320 MG tablet TAKE 1 TABLET BY MOUTH EVERY DAY 90 tablet 1    insulin NPH (NOVOLIN N) 100 UNIT/ML injection vial Inject 10 Units into the skin 2 times

## 2024-10-23 ENCOUNTER — OFFICE VISIT (OUTPATIENT)
Facility: CLINIC | Age: 59
End: 2024-10-23

## 2024-10-23 VITALS
DIASTOLIC BLOOD PRESSURE: 77 MMHG | SYSTOLIC BLOOD PRESSURE: 132 MMHG | HEART RATE: 75 BPM | OXYGEN SATURATION: 98 % | TEMPERATURE: 98.3 F

## 2024-10-23 DIAGNOSIS — E11.22 CONTROLLED TYPE 2 DIABETES MELLITUS WITH STAGE 3 CHRONIC KIDNEY DISEASE, WITHOUT LONG-TERM CURRENT USE OF INSULIN (HCC): ICD-10-CM

## 2024-10-23 DIAGNOSIS — Z86.73 HISTORY OF CVA (CEREBROVASCULAR ACCIDENT): ICD-10-CM

## 2024-10-23 DIAGNOSIS — E66.01 SEVERE OBESITY (BMI 35.0-39.9) WITH COMORBIDITY: ICD-10-CM

## 2024-10-23 DIAGNOSIS — E78.2 MIXED HYPERLIPIDEMIA: ICD-10-CM

## 2024-10-23 DIAGNOSIS — N18.30 CONTROLLED TYPE 2 DIABETES MELLITUS WITH STAGE 3 CHRONIC KIDNEY DISEASE, WITHOUT LONG-TERM CURRENT USE OF INSULIN (HCC): ICD-10-CM

## 2024-10-23 DIAGNOSIS — J45.20 MILD INTERMITTENT ASTHMA WITHOUT COMPLICATION: ICD-10-CM

## 2024-10-23 DIAGNOSIS — Z23 NEEDS FLU SHOT: ICD-10-CM

## 2024-10-23 DIAGNOSIS — I12.9 HYPERTENSION WITH RENAL DISEASE: Primary | ICD-10-CM

## 2024-10-23 DIAGNOSIS — N18.30 STAGE 3 CHRONIC KIDNEY DISEASE, UNSPECIFIED WHETHER STAGE 3A OR 3B CKD (HCC): ICD-10-CM

## 2024-10-23 RX ORDER — AMLODIPINE BESYLATE 10 MG/1
10 TABLET ORAL DAILY
Qty: 90 TABLET | Refills: 3 | Status: SHIPPED | OUTPATIENT
Start: 2024-10-23

## 2024-10-23 RX ORDER — GLIMEPIRIDE 2 MG/1
2 TABLET ORAL DAILY
Qty: 90 TABLET | Refills: 1 | Status: SHIPPED | OUTPATIENT
Start: 2024-10-23

## 2024-10-23 NOTE — PROGRESS NOTES
Chief Complaint   Patient presents with    Follow-up    Hypertension    Chronic Kidney Disease    Diabetes    Cholesterol Problem     3 month followup    1. Have you been to the ER, urgent care clinic since your last visit?  Hospitalized since your last visit?no    2. Have you seen or consulted any other health care providers outside of the [facility] since your last visit?  Include any pap smears or colon screening. no

## 2024-10-24 LAB
ALBUMIN SERPL-MCNC: 4.3 G/DL (ref 3.8–4.9)
ALP SERPL-CCNC: 79 IU/L (ref 44–121)
ALT SERPL-CCNC: 15 IU/L (ref 0–32)
AST SERPL-CCNC: 14 IU/L (ref 0–40)
BILIRUB SERPL-MCNC: <0.2 MG/DL (ref 0–1.2)
BUN SERPL-MCNC: 15 MG/DL (ref 8–27)
BUN/CREAT SERPL: 17 (ref 12–28)
CALCIUM SERPL-MCNC: 9.5 MG/DL (ref 8.7–10.3)
CHLORIDE SERPL-SCNC: 102 MMOL/L (ref 96–106)
CHOLEST SERPL-MCNC: 128 MG/DL (ref 100–199)
CK SERPL-CCNC: 76 U/L (ref 32–182)
CO2 SERPL-SCNC: 25 MMOL/L (ref 20–29)
CREAT SERPL-MCNC: 0.86 MG/DL (ref 0.57–1)
EGFRCR SERPLBLD CKD-EPI 2021: 77 ML/MIN/1.73
GLOBULIN SER CALC-MCNC: 3.1 G/DL (ref 1.5–4.5)
GLUCOSE SERPL-MCNC: 90 MG/DL (ref 70–99)
HBA1C MFR BLD: 12 % (ref 4.8–5.6)
HDLC SERPL-MCNC: 42 MG/DL
LDLC SERPL CALC-MCNC: 53 MG/DL (ref 0–99)
POTASSIUM SERPL-SCNC: 4.2 MMOL/L (ref 3.5–5.2)
PROT SERPL-MCNC: 7.4 G/DL (ref 6–8.5)
SODIUM SERPL-SCNC: 142 MMOL/L (ref 134–144)
TRIGL SERPL-MCNC: 201 MG/DL (ref 0–149)
VLDLC SERPL CALC-MCNC: 33 MG/DL (ref 5–40)

## 2024-12-09 RX ORDER — VALSARTAN 320 MG/1
320 TABLET ORAL DAILY
Qty: 90 TABLET | Refills: 1 | Status: SHIPPED | OUTPATIENT
Start: 2024-12-09

## 2024-12-09 RX ORDER — ALBUTEROL SULFATE 90 UG/1
INHALANT RESPIRATORY (INHALATION)
Qty: 6.7 EACH | Refills: 5 | Status: SHIPPED | OUTPATIENT
Start: 2024-12-09

## 2024-12-09 NOTE — TELEPHONE ENCOUNTER
RX refill request from the patient/pharmacy. Patient last seen 10- with labs, and next appt. scheduled for 01-  Requested Prescriptions     Pending Prescriptions Disp Refills    albuterol sulfate HFA (PROVENTIL;VENTOLIN;PROAIR) 108 (90 Base) MCG/ACT inhaler [Pharmacy Med Name: ALBUTEROL HFA (PROVENTIL) INH] 6.7 each 5     Sig: INHALE 2 PUFFS BY MOUTH EVERY 4 HOURS AS NEEDED FOR WHEEZE    valsartan (DIOVAN) 320 MG tablet [Pharmacy Med Name: VALSARTAN 320 MG TABLET] 90 tablet 1     Sig: TAKE 1 TABLET BY MOUTH EVERY DAY

## 2025-01-19 DIAGNOSIS — I10 ESSENTIAL (PRIMARY) HYPERTENSION: ICD-10-CM

## 2025-01-20 RX ORDER — LISINOPRIL AND HYDROCHLOROTHIAZIDE 12.5; 2 MG/1; MG/1
2 TABLET ORAL DAILY
Qty: 180 TABLET | Refills: 3 | Status: SHIPPED | OUTPATIENT
Start: 2025-01-20

## 2025-01-20 NOTE — TELEPHONE ENCOUNTER
RX refill request from the patient/pharmacy. Patient last seen 10- with labs, and next appt. scheduled for 01-  Requested Prescriptions     Pending Prescriptions Disp Refills    lisinopril-hydroCHLOROthiazide (PRINZIDE;ZESTORETIC) 20-12.5 MG per tablet [Pharmacy Med Name: LISINOPRIL-HCTZ 20-12.5 MG TAB] 180 tablet 3     Sig: TAKE 2 TABLETS BY MOUTH EVERY DAY    .

## 2025-01-23 ENCOUNTER — OFFICE VISIT (OUTPATIENT)
Facility: CLINIC | Age: 60
End: 2025-01-23

## 2025-01-23 VITALS
SYSTOLIC BLOOD PRESSURE: 138 MMHG | BODY MASS INDEX: 38.78 KG/M2 | DIASTOLIC BLOOD PRESSURE: 82 MMHG | TEMPERATURE: 98 F | HEART RATE: 81 BPM | HEIGHT: 64 IN

## 2025-01-23 DIAGNOSIS — E11.22 CONTROLLED TYPE 2 DIABETES MELLITUS WITH STAGE 3 CHRONIC KIDNEY DISEASE, WITHOUT LONG-TERM CURRENT USE OF INSULIN (HCC): ICD-10-CM

## 2025-01-23 DIAGNOSIS — N18.30 STAGE 3 CHRONIC KIDNEY DISEASE, UNSPECIFIED WHETHER STAGE 3A OR 3B CKD (HCC): ICD-10-CM

## 2025-01-23 DIAGNOSIS — Z00.00 MEDICARE ANNUAL WELLNESS VISIT, SUBSEQUENT: ICD-10-CM

## 2025-01-23 DIAGNOSIS — Z13.39 ALCOHOL SCREENING: ICD-10-CM

## 2025-01-23 DIAGNOSIS — F32.A MILD DEPRESSION: ICD-10-CM

## 2025-01-23 DIAGNOSIS — D64.9 ANEMIA, UNSPECIFIED TYPE: ICD-10-CM

## 2025-01-23 DIAGNOSIS — E55.9 VITAMIN D DEFICIENCY: ICD-10-CM

## 2025-01-23 DIAGNOSIS — E78.2 MIXED HYPERLIPIDEMIA: ICD-10-CM

## 2025-01-23 DIAGNOSIS — Z86.73 HISTORY OF CVA (CEREBROVASCULAR ACCIDENT): ICD-10-CM

## 2025-01-23 DIAGNOSIS — J45.20 MILD INTERMITTENT ASTHMA WITHOUT COMPLICATION: ICD-10-CM

## 2025-01-23 DIAGNOSIS — I12.9 HYPERTENSION WITH RENAL DISEASE: Primary | ICD-10-CM

## 2025-01-23 DIAGNOSIS — M85.89 OSTEOPENIA OF MULTIPLE SITES: ICD-10-CM

## 2025-01-23 DIAGNOSIS — F41.9 ANXIETY: ICD-10-CM

## 2025-01-23 DIAGNOSIS — N18.30 CONTROLLED TYPE 2 DIABETES MELLITUS WITH STAGE 3 CHRONIC KIDNEY DISEASE, WITHOUT LONG-TERM CURRENT USE OF INSULIN (HCC): ICD-10-CM

## 2025-01-23 DIAGNOSIS — E66.01 SEVERE OBESITY (BMI 35.0-39.9) WITH COMORBIDITY: ICD-10-CM

## 2025-01-23 RX ORDER — INSULIN GLARGINE 100 [IU]/ML
56 INJECTION, SOLUTION SUBCUTANEOUS 2 TIMES DAILY
Qty: 8 ADJUSTABLE DOSE PRE-FILLED PEN SYRINGE | Refills: 5 | Status: SHIPPED | OUTPATIENT
Start: 2025-01-23

## 2025-01-23 ASSESSMENT — PATIENT HEALTH QUESTIONNAIRE - PHQ9
SUM OF ALL RESPONSES TO PHQ QUESTIONS 1-9: 5
6. FEELING BAD ABOUT YOURSELF - OR THAT YOU ARE A FAILURE OR HAVE LET YOURSELF OR YOUR FAMILY DOWN: NOT AT ALL
8. MOVING OR SPEAKING SO SLOWLY THAT OTHER PEOPLE COULD HAVE NOTICED. OR THE OPPOSITE, BEING SO FIGETY OR RESTLESS THAT YOU HAVE BEEN MOVING AROUND A LOT MORE THAN USUAL: NOT AT ALL
7. TROUBLE CONCENTRATING ON THINGS, SUCH AS READING THE NEWSPAPER OR WATCHING TELEVISION: NOT AT ALL
5. POOR APPETITE OR OVEREATING: NOT AT ALL
9. THOUGHTS THAT YOU WOULD BE BETTER OFF DEAD, OR OF HURTING YOURSELF: NOT AT ALL
3. TROUBLE FALLING OR STAYING ASLEEP: NOT AT ALL
SUM OF ALL RESPONSES TO PHQ QUESTIONS 1-9: 5
2. FEELING DOWN, DEPRESSED OR HOPELESS: MORE THAN HALF THE DAYS
SUM OF ALL RESPONSES TO PHQ QUESTIONS 1-9: 5
4. FEELING TIRED OR HAVING LITTLE ENERGY: NEARLY EVERY DAY
1. LITTLE INTEREST OR PLEASURE IN DOING THINGS: NOT AT ALL
SUM OF ALL RESPONSES TO PHQ9 QUESTIONS 1 & 2: 2
SUM OF ALL RESPONSES TO PHQ QUESTIONS 1-9: 5
10. IF YOU CHECKED OFF ANY PROBLEMS, HOW DIFFICULT HAVE THESE PROBLEMS MADE IT FOR YOU TO DO YOUR WORK, TAKE CARE OF THINGS AT HOME, OR GET ALONG WITH OTHER PEOPLE: NOT DIFFICULT AT ALL

## 2025-01-23 ASSESSMENT — LIFESTYLE VARIABLES
HOW MANY STANDARD DRINKS CONTAINING ALCOHOL DO YOU HAVE ON A TYPICAL DAY: PATIENT DOES NOT DRINK
HOW OFTEN DO YOU HAVE A DRINK CONTAINING ALCOHOL: NEVER

## 2025-01-23 NOTE — PROGRESS NOTES
\"Have you been to the ER, urgent care clinic since your last visit?  Hospitalized since your last visit?\"    NO    “Have you seen or consulted any other health care providers outside our system since your last visit?”    NO    Have you had a mammogram?”   NO    Date of last Mammogram: 10/8/2021      “Have you had a pap smear?”    NO    No cervical cancer screening on file       “Have you had a colorectal cancer screening such as a colonoscopy/FIT/Cologuard?    NO    No colonoscopy on file  No cologuard on file  No FIT/FOBT on file   No flexible sigmoidoscopy on file     “Have you had a diabetic eye exam?”    NO     No diabetic eye exam on file         
Level of Safety:     Fall Risk   Failed to redirect to the Timeline version of the [employer] Smart[employer].    Hearing Loss   mild    Activities of Daily Living   Partial assistance.        No data to display                Abuse Screen   Patient is not abused    Social History     Social History Narrative    ** Merged History Encounter **            Review of Systems      ROS:    Constitutional: She denies fevers, weight loss, sweats.  Eyes: No blurred or double vision.  ENT: No difficulty with swallowing, taste, speech or smell.  NECK: no stiffness swelling or lymph node enlargement  Respiratory: No cough wheezing or shortness of breath.  Cardiovascular: Denies chest pain, palpitations, unexplained indigestion or syncope.  Breast: She has noted no masses or lumps and no discharge or axillary swelling  Gastrointestinal:  No changes in bowel movements, no abdominal pain, no bloating.  Genitourinary: No discharge or abnormal bleeding or pain  Extremities: No joint pain, stiffness or swelling.  Neurological:  No numbness, tingling, burring paresthesias or loss.  Chronic right hemiplegia from her CVA years ago.  No syncope, dizziness or frequent headache  Skin:  No recent rashes or mole changes.  Psychiatric/Behavioral:  Negative for depression.  The patient is not nervous/anxious.   HEMATOLOGIC: no easy bruising or bleeding gums  Endocrine: no sweats of urinary frequency or excessive thirst    Physical Examination     Evaluation of Cognitive Function:  Mood/affect:  happy  Appearance: age appropriate  Family member/caregiver input:     /82   Pulse 81   Temp 98 °F (36.7 °C) (Oral)   Ht 1.626 m (5' 4\")   BMI 38.78 kg/m²   [unfilled]     PHYSICAL EXAM:    General appearance - alert, well appearing, and in no distress  Mental status - alert, oriented to person, place, and time  HEENT:  Ears - bilateral TM's and external ear canals clear  Eyes - pupillary responses were normal.  Extraocular muscle function intact.

## 2025-01-24 LAB
25(OH)D3+25(OH)D2 SERPL-MCNC: 37.7 NG/ML (ref 30–100)
ALBUMIN SERPL-MCNC: 4.6 G/DL (ref 3.8–4.9)
ALBUMIN/CREAT UR: 56 MG/G CREAT (ref 0–29)
ALP SERPL-CCNC: 79 IU/L (ref 44–121)
ALT SERPL-CCNC: 17 IU/L (ref 0–32)
APPEARANCE UR: CLEAR
AST SERPL-CCNC: 16 IU/L (ref 0–40)
BACTERIA #/AREA URNS HPF: NORMAL /[HPF]
BASOPHILS # BLD AUTO: 0 X10E3/UL (ref 0–0.2)
BASOPHILS NFR BLD AUTO: 0 %
BILIRUB SERPL-MCNC: 0.2 MG/DL (ref 0–1.2)
BILIRUB UR QL STRIP: NEGATIVE
BUN SERPL-MCNC: 14 MG/DL (ref 8–27)
BUN/CREAT SERPL: 15 (ref 12–28)
CALCIUM SERPL-MCNC: 10.1 MG/DL (ref 8.7–10.3)
CASTS URNS QL MICRO: NORMAL /LPF
CHLORIDE SERPL-SCNC: 97 MMOL/L (ref 96–106)
CHOLEST SERPL-MCNC: 120 MG/DL (ref 100–199)
CK SERPL-CCNC: 69 U/L (ref 32–182)
CO2 SERPL-SCNC: 23 MMOL/L (ref 20–29)
COLOR UR: YELLOW
CREAT SERPL-MCNC: 0.92 MG/DL (ref 0.57–1)
CREAT UR-MCNC: 40 MG/DL
EGFRCR SERPLBLD CKD-EPI 2021: 71 ML/MIN/1.73
EOSINOPHIL # BLD AUTO: 0.2 X10E3/UL (ref 0–0.4)
EOSINOPHIL NFR BLD AUTO: 2 %
EPI CELLS #/AREA URNS HPF: NORMAL /HPF (ref 0–10)
ERYTHROCYTE [DISTWIDTH] IN BLOOD BY AUTOMATED COUNT: 13.4 % (ref 11.7–15.4)
GLOBULIN SER CALC-MCNC: 3.2 G/DL (ref 1.5–4.5)
GLUCOSE SERPL-MCNC: 308 MG/DL (ref 70–99)
GLUCOSE UR QL STRIP: ABNORMAL
HBA1C MFR BLD: 9.5 % (ref 4.8–5.6)
HCT VFR BLD AUTO: 39.5 % (ref 34–46.6)
HDLC SERPL-MCNC: 37 MG/DL
HGB BLD-MCNC: 12 G/DL (ref 11.1–15.9)
HGB UR QL STRIP: NEGATIVE
IMM GRANULOCYTES # BLD AUTO: 0.1 X10E3/UL (ref 0–0.1)
IMM GRANULOCYTES NFR BLD AUTO: 1 %
KETONES UR QL STRIP: NEGATIVE
LDLC SERPL CALC-MCNC: 32 MG/DL (ref 0–99)
LEUKOCYTE ESTERASE UR QL STRIP: NEGATIVE
LYMPHOCYTES # BLD AUTO: 2.7 X10E3/UL (ref 0.7–3.1)
LYMPHOCYTES NFR BLD AUTO: 32 %
MCH RBC QN AUTO: 25.9 PG (ref 26.6–33)
MCHC RBC AUTO-ENTMCNC: 30.4 G/DL (ref 31.5–35.7)
MCV RBC AUTO: 85 FL (ref 79–97)
MICRO URNS: ABNORMAL
MICRO URNS: ABNORMAL
MICROALBUMIN UR-MCNC: 22.2 UG/ML
MONOCYTES # BLD AUTO: 0.5 X10E3/UL (ref 0.1–0.9)
MONOCYTES NFR BLD AUTO: 6 %
NEUTROPHILS # BLD AUTO: 5 X10E3/UL (ref 1.4–7)
NEUTROPHILS NFR BLD AUTO: 59 %
NITRITE UR QL STRIP: NEGATIVE
PH UR STRIP: 8 [PH] (ref 5–7.5)
PLATELET # BLD AUTO: 310 X10E3/UL (ref 150–450)
POTASSIUM SERPL-SCNC: 4.4 MMOL/L (ref 3.5–5.2)
PROT SERPL-MCNC: 7.8 G/DL (ref 6–8.5)
PROT UR QL STRIP: NEGATIVE
RBC # BLD AUTO: 4.63 X10E6/UL (ref 3.77–5.28)
RBC #/AREA URNS HPF: NORMAL /HPF (ref 0–2)
SODIUM SERPL-SCNC: 138 MMOL/L (ref 134–144)
SP GR UR STRIP: 1.01 (ref 1–1.03)
T4 FREE SERPL-MCNC: 1.16 NG/DL (ref 0.82–1.77)
TRIGL SERPL-MCNC: 355 MG/DL (ref 0–149)
TSH SERPL DL<=0.005 MIU/L-ACNC: 1.5 UIU/ML (ref 0.45–4.5)
UROBILINOGEN UR STRIP-MCNC: 0.2 MG/DL (ref 0.2–1)
VLDLC SERPL CALC-MCNC: 51 MG/DL (ref 5–40)
WBC # BLD AUTO: 8.5 X10E3/UL (ref 3.4–10.8)
WBC #/AREA URNS HPF: NORMAL /HPF (ref 0–5)

## 2025-01-28 RX ORDER — INSULIN GLARGINE 100 [IU]/ML
60 INJECTION, SOLUTION SUBCUTANEOUS 2 TIMES DAILY
Qty: 8 ADJUSTABLE DOSE PRE-FILLED PEN SYRINGE | Refills: 5
Start: 2025-01-28

## 2025-01-28 NOTE — TELEPHONE ENCOUNTER
PCP: Ciro Pruitt MD    Last appt: 1/23/2025  Future Appointments   Date Time Provider Department Center   4/28/2025  8:50 AM Ciro Pruitt MD [facility]       Requested Prescriptions     Pending Prescriptions Disp Refills    insulin glargine (LANTUS SOLOSTAR) 100 UNIT/ML injection pen 8 Adjustable Dose Pre-filled Pen Syringe 5     Sig: Inject 60 Units into the skin 2 times daily       Prior labs and Blood pressures:  BP Readings from Last 3 Encounters:   01/23/25 138/82   10/23/24 132/77   06/28/24 138/88     Lab Results   Component Value Date/Time     01/23/2025 12:00 AM    K 4.4 01/23/2025 12:00 AM    CL 97 01/23/2025 12:00 AM    CO2 23 01/23/2025 12:00 AM    BUN 14 01/23/2025 12:00 AM    GFRAA 86 02/16/2022 12:00 AM     Lab Results   Component Value Date/Time    LER7PKOD 10.3 05/04/2021 09:29 AM     Lab Results   Component Value Date/Time    CHOL 120 01/23/2025 12:00 AM    HDL 37 01/23/2025 12:00 AM    LDL 32 01/23/2025 12:00 AM    LDL 50 03/22/2024 12:00 AM    VLDL 51 01/23/2025 12:00 AM    VLDL 43 02/27/2023 12:00 AM    VLDL 68 02/02/2021 09:36 AM     No results found for: \"VITD3\"        Lab Results   Component Value Date/Time    TSH 1.500 01/23/2025 12:00 AM    TSH 1.22 07/11/2019 09:32 AM

## 2025-01-28 NOTE — TELEPHONE ENCOUNTER
----- Message from Dr. Ciro Pruitt MD sent at 1/27/2025  4:50 PM EST -----  Call patient with abnormal results.  Blood sugar is high with extremely high glycohemoglobin although it is 9.5 is better than her prior 12 level it is still above goal.  I know she was without her insulin for about 3 days before the visit which would affect the blood sugar more than the Glyco.  Increase her Lantus insulin to 60 units daily.  Other labs are stable except for still very high triglycerides also associated with the blood sugar.

## 2025-02-21 PROBLEM — Z00.00 MEDICARE ANNUAL WELLNESS VISIT, SUBSEQUENT: Status: RESOLVED | Noted: 2018-05-09 | Resolved: 2025-02-21

## 2025-03-21 NOTE — TELEPHONE ENCOUNTER
RX refill request from the patient/pharmacy. Patient last seen 05- with labs, and next appt. scheduled for 07-  Requested Prescriptions     Pending Prescriptions Disp Refills    amLODIPine (NORVASC) 10 mg tablet 30 Tab 5     Sig: Take one tablet daily   . Pt has been on Depakote, brexipiprazole, Risperdal

## 2025-04-27 NOTE — PROGRESS NOTES
Chief Complaint   Patient presents with    3 Month Follow-Up    Hypertension       SUBJECTIVE:    Lulu Rasmussen is a 60 y.o. female who returns in follow-up for medical problems include hypertension, hyperlipidemia, CKD stage III, diabetes, DJD, prior CVA right hemiplegia and other multiple medical problems.  She currently is now taking her medication trying to follow her diet and remains physically active.  Currently she notes no chest pain, shortness of breath or cardiac respiratory complaints.  There are no GI or  complaints.  There are no headaches, dizziness or new neurologic complaints.  There are no current active arthritic complaints and there are no other complaints on complete review of systems.      Current Outpatient Medications   Medication Sig Dispense Refill    glimepiride (AMARYL) 2 MG tablet Take 1 tablet by mouth daily 90 tablet 1    insulin glargine (LANTUS SOLOSTAR) 100 UNIT/ML injection pen Inject 60 Units into the skin 2 times daily 8 Adjustable Dose Pre-filled Pen Syringe 5    lisinopril-hydroCHLOROthiazide (PRINZIDE;ZESTORETIC) 20-12.5 MG per tablet TAKE 2 TABLETS BY MOUTH EVERY  tablet 3    albuterol sulfate HFA (PROVENTIL;VENTOLIN;PROAIR) 108 (90 Base) MCG/ACT inhaler INHALE 2 PUFFS BY MOUTH EVERY 4 HOURS AS NEEDED FOR WHEEZE 6.7 each 5    valsartan (DIOVAN) 320 MG tablet TAKE 1 TABLET BY MOUTH EVERY DAY 90 tablet 1    amLODIPine (NORVASC) 10 MG tablet Take 1 tablet by mouth daily 90 tablet 3    vitamin D3 (CHOLECALCIFEROL) 25 MCG (1000 UT) TABS tablet TAKE 1 TABLET BY MOUTH EVERY DAY 90 tablet 1    metoprolol succinate (TOPROL XL) 50 MG extended release tablet TAKE 1 TABLET BY MOUTH EVERY DAY 90 tablet 3    Insulin Pen Needle (B-D ULTRAFINE III SHORT PEN) 31G X 8 MM MISC USE DAILY AS DIRECTED WITH INSULIN PEN. 100 each 3    KLOR-CON M20 20 MEQ extended release tablet TAKE 2 TABLETS BY MOUTH TWICE A  tablet 1    vitamin D3 (CHOLECALCIFEROL) 25 MCG (1000 UT)

## 2025-04-28 ENCOUNTER — OFFICE VISIT (OUTPATIENT)
Facility: CLINIC | Age: 60
End: 2025-04-28
Payer: MEDICARE

## 2025-04-28 VITALS
BODY MASS INDEX: 38.41 KG/M2 | OXYGEN SATURATION: 98 % | HEART RATE: 76 BPM | SYSTOLIC BLOOD PRESSURE: 136 MMHG | WEIGHT: 225 LBS | DIASTOLIC BLOOD PRESSURE: 80 MMHG | RESPIRATION RATE: 14 BRPM | TEMPERATURE: 98.4 F | HEIGHT: 64 IN

## 2025-04-28 DIAGNOSIS — N18.30 CONTROLLED TYPE 2 DIABETES MELLITUS WITH STAGE 3 CHRONIC KIDNEY DISEASE, WITHOUT LONG-TERM CURRENT USE OF INSULIN (HCC): ICD-10-CM

## 2025-04-28 DIAGNOSIS — E78.2 MIXED HYPERLIPIDEMIA: ICD-10-CM

## 2025-04-28 DIAGNOSIS — J45.20 MILD INTERMITTENT ASTHMA WITHOUT COMPLICATION: ICD-10-CM

## 2025-04-28 DIAGNOSIS — E11.22 CONTROLLED TYPE 2 DIABETES MELLITUS WITH STAGE 3 CHRONIC KIDNEY DISEASE, WITHOUT LONG-TERM CURRENT USE OF INSULIN (HCC): ICD-10-CM

## 2025-04-28 DIAGNOSIS — I12.9 HYPERTENSION WITH RENAL DISEASE: Primary | ICD-10-CM

## 2025-04-28 DIAGNOSIS — E66.01 SEVERE OBESITY (BMI 35.0-39.9) WITH COMORBIDITY (HCC): ICD-10-CM

## 2025-04-28 DIAGNOSIS — Z12.31 ENCOUNTER FOR SCREENING MAMMOGRAM FOR MALIGNANT NEOPLASM OF BREAST: ICD-10-CM

## 2025-04-28 DIAGNOSIS — N18.30 STAGE 3 CHRONIC KIDNEY DISEASE, UNSPECIFIED WHETHER STAGE 3A OR 3B CKD (HCC): ICD-10-CM

## 2025-04-28 DIAGNOSIS — Z86.73 HISTORY OF CVA (CEREBROVASCULAR ACCIDENT): ICD-10-CM

## 2025-04-28 PROCEDURE — 99214 OFFICE O/P EST MOD 30 MIN: CPT | Performed by: INTERNAL MEDICINE

## 2025-04-28 PROCEDURE — 3046F HEMOGLOBIN A1C LEVEL >9.0%: CPT | Performed by: INTERNAL MEDICINE

## 2025-04-28 RX ORDER — GLIMEPIRIDE 2 MG/1
2 TABLET ORAL DAILY
Qty: 90 TABLET | Refills: 1 | Status: SHIPPED | OUTPATIENT
Start: 2025-04-28

## 2025-04-28 NOTE — PROGRESS NOTES
Lulu Rasmussen is a 60 y.o. female  Chief Complaint   Patient presents with    3 Month Follow-Up    Hypertension     Vitals:    04/28/25 0852 04/28/25 0901   BP: (!) 146/81 136/79   BP Site: Left Upper Arm    Pulse: 76    Resp: 14    Temp: 98.4 °F (36.9 °C)    SpO2: 98%    Weight: 102.1 kg (225 lb)    Height: 1.626 m (5' 4\")          Health Maintenance Due   Topic Date Due    HIV screen  Never done    Diabetic retinal exam  Never done    DTaP/Tdap/Td vaccine (1 - Tdap) Never done    Pneumococcal 50+ years Vaccine (1 of 2 - PCV) 01/31/1983    Cervical cancer screen  Never done    Colorectal Cancer Screen  Never done    Shingles vaccine (1 of 2) Never done    Breast cancer screen  10/08/2023    Respiratory Syncytial Virus (RSV) Pregnant or age 60 yrs+ (1 - Risk 60-74 years 1-dose series) Never done    COVID-19 Vaccine (3 - 2024-25 season) 09/01/2024    A1C test (Diabetic or Prediabetic)  04/23/2025         \"Have you been to the ER, urgent care clinic since your last visit?  Hospitalized since your last visit?\"    NO    “Have you seen or consulted any other health care providers outside of [facility] since your last visit?”    NO    “Have you had a colorectal cancer screening such as a colonoscopy/FIT/Cologuard?    NO    No colonoscopy on file  No cologuard on file  No FIT/FOBT on file   No flexible sigmoidoscopy on file        Have you had a mammogram?”   NO    Date of last Mammogram: 10/8/2021      “Have you had a pap smear?”    NO    No cervical cancer screening on file

## 2025-04-29 ENCOUNTER — RESULTS FOLLOW-UP (OUTPATIENT)
Facility: CLINIC | Age: 60
End: 2025-04-29

## 2025-04-29 LAB
ALBUMIN SERPL-MCNC: 4.3 G/DL (ref 3.9–4.9)
ALP SERPL-CCNC: 70 IU/L (ref 44–121)
ALT SERPL-CCNC: 12 IU/L (ref 0–32)
AST SERPL-CCNC: 15 IU/L (ref 0–40)
BILIRUB SERPL-MCNC: <0.2 MG/DL (ref 0–1.2)
BUN SERPL-MCNC: 15 MG/DL (ref 8–27)
BUN/CREAT SERPL: 15 (ref 12–28)
CALCIUM SERPL-MCNC: 9.8 MG/DL (ref 8.7–10.3)
CHLORIDE SERPL-SCNC: 103 MMOL/L (ref 96–106)
CHOLEST SERPL-MCNC: 121 MG/DL (ref 100–199)
CK SERPL-CCNC: 66 U/L (ref 32–182)
CO2 SERPL-SCNC: 23 MMOL/L (ref 20–29)
CREAT SERPL-MCNC: 0.99 MG/DL (ref 0.57–1)
EGFRCR SERPLBLD CKD-EPI 2021: 65 ML/MIN/1.73
GLOBULIN SER CALC-MCNC: 2.8 G/DL (ref 1.5–4.5)
GLUCOSE SERPL-MCNC: 85 MG/DL (ref 70–99)
HBA1C MFR BLD: 8.1 % (ref 4.8–5.6)
HDLC SERPL-MCNC: 41 MG/DL
LDLC SERPL CALC-MCNC: 48 MG/DL (ref 0–99)
POTASSIUM SERPL-SCNC: 4.2 MMOL/L (ref 3.5–5.2)
PROT SERPL-MCNC: 7.1 G/DL (ref 6–8.5)
SODIUM SERPL-SCNC: 144 MMOL/L (ref 134–144)
TRIGL SERPL-MCNC: 196 MG/DL (ref 0–149)
VLDLC SERPL CALC-MCNC: 32 MG/DL (ref 5–40)

## 2025-05-02 RX ORDER — ROSUVASTATIN CALCIUM 40 MG/1
40 TABLET, COATED ORAL DAILY
Qty: 90 TABLET | Refills: 1 | Status: SHIPPED | OUTPATIENT
Start: 2025-05-02

## 2025-05-06 RX ORDER — MIRTAZAPINE 15 MG/1
15 TABLET, FILM COATED ORAL NIGHTLY
Qty: 90 TABLET | Refills: 1 | Status: SHIPPED | OUTPATIENT
Start: 2025-05-06

## 2025-05-06 RX ORDER — METFORMIN HYDROCHLORIDE 500 MG/1
TABLET, EXTENDED RELEASE ORAL
Qty: 270 TABLET | Refills: 1 | Status: SHIPPED | OUTPATIENT
Start: 2025-05-06

## 2025-05-06 RX ORDER — POTASSIUM CHLORIDE 1500 MG/1
40 TABLET, EXTENDED RELEASE ORAL 2 TIMES DAILY
Qty: 360 TABLET | Refills: 1 | Status: SHIPPED | OUTPATIENT
Start: 2025-05-06

## 2025-05-06 NOTE — TELEPHONE ENCOUNTER
PCP: Ciro Pruitt MD    Last appt: 4/28/2025    Future Appointments   Date Time Provider Department Center   6/24/2025  8:00 AM [facility] 3 [facility]   7/28/2025  8:50 AM Ciro Pruitt MD [facility]       Requested Prescriptions     Pending Prescriptions Disp Refills    mirtazapine (REMERON) 15 MG tablet [Pharmacy Med Name: MIRTAZAPINE 15 MG TABLET] 90 tablet 1     Sig: TAKE 1 TABLET BY MOUTH EVERY DAY AT NIGHT    potassium chloride (KLOR-CON M) 20 MEQ extended release tablet [Pharmacy Med Name: POTASSIUM CL ER 20 MEQ TAB MCR] 360 tablet 1     Sig: TAKE 2 TABLETS BY MOUTH TWICE A DAY    metFORMIN (GLUCOPHAGE-XR) 500 MG extended release tablet [Pharmacy Med Name: METFORMIN HCL  MG TABLET] 270 tablet 1     Sig: TAKE ONE TABLET IN THE MORNING AND TWO TABLETS WITH DINNER

## 2025-05-27 RX ORDER — ALBUTEROL SULFATE 90 UG/1
2 INHALANT RESPIRATORY (INHALATION) EVERY 4 HOURS PRN
Qty: 6.7 EACH | Refills: 5 | Status: SHIPPED | OUTPATIENT
Start: 2025-05-27

## 2025-05-27 RX ORDER — VALSARTAN 320 MG/1
320 TABLET ORAL DAILY
Qty: 90 TABLET | Refills: 1 | Status: SHIPPED | OUTPATIENT
Start: 2025-05-27

## 2025-05-27 NOTE — TELEPHONE ENCOUNTER
RX refill request from the patient/pharmacy. Patient last seen 04/28/2025 with labs, and next appt. scheduled for 07/28/2025.   Requested Prescriptions     Pending Prescriptions Disp Refills    valsartan (DIOVAN) 320 MG tablet [Pharmacy Med Name: VALSARTAN 320 MG TABLET] 90 tablet 1     Sig: TAKE 1 TABLET BY MOUTH EVERY DAY    albuterol sulfate HFA (PROVENTIL;VENTOLIN;PROAIR) 108 (90 Base) MCG/ACT inhaler [Pharmacy Med Name: ALBUTEROL HFA (PROVENTIL) INH] 6.7 each 5     Sig: INHALE 2 PUFFS BY MOUTH EVERY 4 HOURS AS NEEDED FOR WHEEZING

## 2025-06-24 ENCOUNTER — HOSPITAL ENCOUNTER (OUTPATIENT)
Facility: HOSPITAL | Age: 60
Discharge: HOME | End: 2025-06-27
Attending: INTERNAL MEDICINE
Payer: MEDICARE

## 2025-06-24 DIAGNOSIS — Z12.31 ENCOUNTER FOR SCREENING MAMMOGRAM FOR MALIGNANT NEOPLASM OF BREAST: ICD-10-CM

## 2025-06-24 PROCEDURE — 77063 BREAST TOMOSYNTHESIS BI: CPT

## 2025-06-25 RX ORDER — METOPROLOL SUCCINATE 50 MG/1
50 TABLET, EXTENDED RELEASE ORAL DAILY
Qty: 90 TABLET | Refills: 1 | Status: SHIPPED | OUTPATIENT
Start: 2025-06-25

## 2025-06-25 RX ORDER — AMLODIPINE BESYLATE 10 MG/1
10 TABLET ORAL DAILY
Qty: 90 TABLET | Refills: 1 | Status: SHIPPED | OUTPATIENT
Start: 2025-06-25

## 2025-06-25 NOTE — TELEPHONE ENCOUNTER
PCP: Ciro Pruitt MD    Last appt: 4/28/2025    Future Appointments   Date Time Provider Department Center   7/28/2025  8:50 AM Ciro Pruitt MD [facility]       Requested Prescriptions     Pending Prescriptions Disp Refills    amLODIPine (NORVASC) 10 MG tablet [Pharmacy Med Name: AMLODIPINE BESYLATE 10 MG TAB] 90 tablet 1     Sig: TAKE 1 TABLET BY MOUTH EVERY DAY    metoprolol succinate (TOPROL XL) 50 MG extended release tablet [Pharmacy Med Name: METOPROLOL SUCC ER 50 MG TAB] 90 tablet 1     Sig: TAKE 1 TABLET BY MOUTH EVERY DAY

## 2025-06-27 RX ORDER — INSULIN GLARGINE 100 [IU]/ML
60 INJECTION, SOLUTION SUBCUTANEOUS 2 TIMES DAILY
Qty: 8 ADJUSTABLE DOSE PRE-FILLED PEN SYRINGE | Refills: 5 | Status: SHIPPED | OUTPATIENT
Start: 2025-06-27

## 2025-06-27 NOTE — TELEPHONE ENCOUNTER
Patient is requesting a refill of the following and states she is out and says that she is running out before she can refill     insulin glargine (LANTUS SOLOSTAR)

## 2025-08-01 RX ORDER — PEN NEEDLE, DIABETIC 31 GX5/16"
NEEDLE, DISPOSABLE MISCELLANEOUS
Qty: 100 EACH | Refills: 5 | Status: SHIPPED | OUTPATIENT
Start: 2025-08-01

## 2025-08-01 NOTE — TELEPHONE ENCOUNTER
PCP: Ciro Pruitt MD    Last appt: 4/28/2025    Future Appointments   Date Time Provider Department Center   8/5/2025 10:20 AM Ciro Pruitt MD [facility] DE[facility]       Requested Prescriptions     Pending Prescriptions Disp Refills    B-D ULTRAFINE III SHORT PEN 31G X 8 MM MISC [Pharmacy Med Name: BD UF SHORT PEN NEEDLE [id number]]  3     Sig: USE DAILY AS DIRECTED WITH INSULIN PEN

## 2025-08-05 ENCOUNTER — OFFICE VISIT (OUTPATIENT)
Facility: CLINIC | Age: 60
End: 2025-08-05

## 2025-08-05 VITALS
TEMPERATURE: 98.4 F | OXYGEN SATURATION: 98 % | WEIGHT: 220 LBS | SYSTOLIC BLOOD PRESSURE: 128 MMHG | DIASTOLIC BLOOD PRESSURE: 76 MMHG | HEART RATE: 75 BPM | BODY MASS INDEX: 37.76 KG/M2

## 2025-08-05 DIAGNOSIS — Z86.73 HISTORY OF CVA (CEREBROVASCULAR ACCIDENT): ICD-10-CM

## 2025-08-05 DIAGNOSIS — E11.22 CONTROLLED TYPE 2 DIABETES MELLITUS WITH STAGE 3 CHRONIC KIDNEY DISEASE, WITHOUT LONG-TERM CURRENT USE OF INSULIN (HCC): ICD-10-CM

## 2025-08-05 DIAGNOSIS — J45.20 MILD INTERMITTENT ASTHMA WITHOUT COMPLICATION: ICD-10-CM

## 2025-08-05 DIAGNOSIS — E66.01 SEVERE OBESITY (BMI 35.0-39.9) WITH COMORBIDITY (HCC): ICD-10-CM

## 2025-08-05 DIAGNOSIS — N18.30 STAGE 3 CHRONIC KIDNEY DISEASE, UNSPECIFIED WHETHER STAGE 3A OR 3B CKD (HCC): ICD-10-CM

## 2025-08-05 DIAGNOSIS — I12.9 HYPERTENSION WITH RENAL DISEASE: Primary | ICD-10-CM

## 2025-08-05 DIAGNOSIS — E78.2 MIXED HYPERLIPIDEMIA: ICD-10-CM

## 2025-08-05 DIAGNOSIS — Z12.11 COLON CANCER SCREENING: ICD-10-CM

## 2025-08-05 DIAGNOSIS — N18.30 CONTROLLED TYPE 2 DIABETES MELLITUS WITH STAGE 3 CHRONIC KIDNEY DISEASE, WITHOUT LONG-TERM CURRENT USE OF INSULIN (HCC): ICD-10-CM

## 2025-08-05 RX ORDER — INSULIN GLARGINE 100 [IU]/ML
INJECTION, SOLUTION SUBCUTANEOUS
Qty: 8 ADJUSTABLE DOSE PRE-FILLED PEN SYRINGE | Refills: 5 | OUTPATIENT
Start: 2025-08-05

## 2025-08-06 LAB
ALBUMIN SERPL-MCNC: 4.2 G/DL (ref 3.9–4.9)
ALP SERPL-CCNC: 59 IU/L (ref 44–121)
ALT SERPL-CCNC: 12 IU/L (ref 0–32)
AST SERPL-CCNC: 17 IU/L (ref 0–40)
BILIRUB SERPL-MCNC: <0.2 MG/DL (ref 0–1.2)
BUN SERPL-MCNC: 19 MG/DL (ref 8–27)
BUN/CREAT SERPL: 18 (ref 12–28)
CALCIUM SERPL-MCNC: 10.2 MG/DL (ref 8.7–10.3)
CHLORIDE SERPL-SCNC: 105 MMOL/L (ref 96–106)
CHOLEST SERPL-MCNC: 102 MG/DL (ref 100–199)
CK SERPL-CCNC: 70 U/L (ref 32–182)
CO2 SERPL-SCNC: 23 MMOL/L (ref 20–29)
CREAT SERPL-MCNC: 1.04 MG/DL (ref 0.57–1)
EGFRCR SERPLBLD CKD-EPI 2021: 61 ML/MIN/1.73
EST. AVERAGE GLUCOSE BLD GHB EST-MCNC: 154 MG/DL
GLOBULIN SER CALC-MCNC: 3 G/DL (ref 1.5–4.5)
GLUCOSE SERPL-MCNC: 77 MG/DL (ref 70–99)
HBA1C MFR BLD: 7 % (ref 4.8–5.6)
HDLC SERPL-MCNC: 38 MG/DL
LDLC SERPL CALC-MCNC: 35 MG/DL (ref 0–99)
POTASSIUM SERPL-SCNC: 4.9 MMOL/L (ref 3.5–5.2)
PROT SERPL-MCNC: 7.2 G/DL (ref 6–8.5)
SODIUM SERPL-SCNC: 143 MMOL/L (ref 134–144)
TRIGL SERPL-MCNC: 174 MG/DL (ref 0–149)
VLDLC SERPL CALC-MCNC: 29 MG/DL (ref 5–40)

## 2025-08-25 RX ORDER — ROSUVASTATIN CALCIUM 40 MG/1
40 TABLET, COATED ORAL DAILY
Qty: 90 TABLET | Refills: 1 | Status: SHIPPED | OUTPATIENT
Start: 2025-08-25